# Patient Record
Sex: MALE | Race: WHITE | Employment: FULL TIME | URBAN - NONMETROPOLITAN AREA
[De-identification: names, ages, dates, MRNs, and addresses within clinical notes are randomized per-mention and may not be internally consistent; named-entity substitution may affect disease eponyms.]

---

## 2021-08-30 ENCOUNTER — APPOINTMENT (OUTPATIENT)
Dept: CT IMAGING | Age: 46
DRG: 003 | End: 2021-08-30
Payer: COMMERCIAL

## 2021-08-30 ENCOUNTER — HOSPITAL ENCOUNTER (INPATIENT)
Age: 46
LOS: 21 days | Discharge: NURSING, REHAB OR LONG-TERM CARE | DRG: 003 | End: 2021-09-20
Attending: EMERGENCY MEDICINE | Admitting: INTERNAL MEDICINE
Payer: COMMERCIAL

## 2021-08-30 ENCOUNTER — APPOINTMENT (OUTPATIENT)
Dept: GENERAL RADIOLOGY | Age: 46
DRG: 003 | End: 2021-08-30
Payer: COMMERCIAL

## 2021-08-30 DIAGNOSIS — R09.02 HYPOXIA: Primary | ICD-10-CM

## 2021-08-30 DIAGNOSIS — U07.1 COVID-19: ICD-10-CM

## 2021-08-30 LAB
ALBUMIN SERPL-MCNC: 3.6 G/DL (ref 3.5–5.1)
ALP BLD-CCNC: 59 U/L (ref 38–126)
ALT SERPL-CCNC: 20 U/L (ref 11–66)
ANION GAP SERPL CALCULATED.3IONS-SCNC: 13 MEQ/L (ref 8–16)
AST SERPL-CCNC: 41 U/L (ref 5–40)
BASOPHILS # BLD: 0.2 %
BASOPHILS ABSOLUTE: 0 THOU/MM3 (ref 0–0.1)
BILIRUB SERPL-MCNC: 0.6 MG/DL (ref 0.3–1.2)
BUN BLDV-MCNC: 24 MG/DL (ref 7–22)
C-REACTIVE PROTEIN: 10.62 MG/DL (ref 0–1)
CALCIUM SERPL-MCNC: 8.8 MG/DL (ref 8.5–10.5)
CHLORIDE BLD-SCNC: 97 MEQ/L (ref 98–111)
CO2: 25 MEQ/L (ref 23–33)
CREAT SERPL-MCNC: 1.5 MG/DL (ref 0.4–1.2)
CREATININE URINE: 202.1 MG/DL
D-DIMER QUANTITATIVE: 796 NG/ML FEU (ref 0–500)
EOSINOPHIL # BLD: 0 %
EOSINOPHILS ABSOLUTE: 0 THOU/MM3 (ref 0–0.4)
ERYTHROCYTE [DISTWIDTH] IN BLOOD BY AUTOMATED COUNT: 13.3 % (ref 11.5–14.5)
ERYTHROCYTE [DISTWIDTH] IN BLOOD BY AUTOMATED COUNT: 43.3 FL (ref 35–45)
GFR SERPL CREATININE-BSD FRML MDRD: 50 ML/MIN/1.73M2
GLUCOSE BLD-MCNC: 128 MG/DL (ref 70–108)
HCT VFR BLD CALC: 48.4 % (ref 42–52)
HEMOGLOBIN: 16 GM/DL (ref 14–18)
IMMATURE GRANS (ABS): 0.04 THOU/MM3 (ref 0–0.07)
IMMATURE GRANULOCYTES: 0.9 %
INR BLD: 1.29 (ref 0.85–1.13)
LACTIC ACID, SEPSIS: 1.5 MMOL/L (ref 0.5–1.9)
LACTIC ACID, SEPSIS: 2 MMOL/L (ref 0.5–1.9)
LYMPHOCYTES # BLD: 13.5 %
LYMPHOCYTES ABSOLUTE: 0.6 THOU/MM3 (ref 1–4.8)
MCH RBC QN AUTO: 29.1 PG (ref 26–33)
MCHC RBC AUTO-ENTMCNC: 33.1 GM/DL (ref 32.2–35.5)
MCV RBC AUTO: 88.2 FL (ref 80–94)
MONOCYTES # BLD: 8.5 %
MONOCYTES ABSOLUTE: 0.4 THOU/MM3 (ref 0.4–1.3)
NUCLEATED RED BLOOD CELLS: 0 /100 WBC
OSMOLALITY CALCULATION: 275.8 MOSMOL/KG (ref 275–300)
PLATELET # BLD: 161 THOU/MM3 (ref 130–400)
PMV BLD AUTO: 9.7 FL (ref 9.4–12.4)
POTASSIUM REFLEX MAGNESIUM: 3.9 MEQ/L (ref 3.5–5.2)
PRO-BNP: 48 PG/ML (ref 0–450)
PROCALCITONIN: 0.16 NG/ML (ref 0.01–0.09)
RBC # BLD: 5.49 MILL/MM3 (ref 4.7–6.1)
SARS-COV-2, NAA: DETECTED
SEG NEUTROPHILS: 76.9 %
SEGMENTED NEUTROPHILS ABSOLUTE COUNT: 3.2 THOU/MM3 (ref 1.8–7.7)
SODIUM BLD-SCNC: 135 MEQ/L (ref 135–145)
SODIUM URINE: < 20 MEQ/L
TOTAL PROTEIN: 7.3 G/DL (ref 6.1–8)
TROPONIN T: < 0.01 NG/ML
WBC # BLD: 4.2 THOU/MM3 (ref 4.8–10.8)

## 2021-08-30 PROCEDURE — 99284 EMERGENCY DEPT VISIT MOD MDM: CPT

## 2021-08-30 PROCEDURE — 85610 PROTHROMBIN TIME: CPT

## 2021-08-30 PROCEDURE — 84300 ASSAY OF URINE SODIUM: CPT

## 2021-08-30 PROCEDURE — 85025 COMPLETE CBC W/AUTO DIFF WBC: CPT

## 2021-08-30 PROCEDURE — 85379 FIBRIN DEGRADATION QUANT: CPT

## 2021-08-30 PROCEDURE — 93005 ELECTROCARDIOGRAM TRACING: CPT

## 2021-08-30 PROCEDURE — 71275 CT ANGIOGRAPHY CHEST: CPT

## 2021-08-30 PROCEDURE — 86140 C-REACTIVE PROTEIN: CPT

## 2021-08-30 PROCEDURE — 84145 PROCALCITONIN (PCT): CPT

## 2021-08-30 PROCEDURE — 96374 THER/PROPH/DIAG INJ IV PUSH: CPT

## 2021-08-30 PROCEDURE — 36415 COLL VENOUS BLD VENIPUNCTURE: CPT

## 2021-08-30 PROCEDURE — 6360000002 HC RX W HCPCS

## 2021-08-30 PROCEDURE — XW033E5 INTRODUCTION OF REMDESIVIR ANTI-INFECTIVE INTO PERIPHERAL VEIN, PERCUTANEOUS APPROACH, NEW TECHNOLOGY GROUP 5: ICD-10-PCS

## 2021-08-30 PROCEDURE — 2580000003 HC RX 258: Performed by: EMERGENCY MEDICINE

## 2021-08-30 PROCEDURE — 6360000002 HC RX W HCPCS: Performed by: EMERGENCY MEDICINE

## 2021-08-30 PROCEDURE — 99215 OFFICE O/P EST HI 40 MIN: CPT

## 2021-08-30 PROCEDURE — 87635 SARS-COV-2 COVID-19 AMP PRB: CPT

## 2021-08-30 PROCEDURE — 82570 ASSAY OF URINE CREATININE: CPT

## 2021-08-30 PROCEDURE — 80053 COMPREHEN METABOLIC PANEL: CPT

## 2021-08-30 PROCEDURE — 71045 X-RAY EXAM CHEST 1 VIEW: CPT

## 2021-08-30 PROCEDURE — 84484 ASSAY OF TROPONIN QUANT: CPT

## 2021-08-30 PROCEDURE — 83880 ASSAY OF NATRIURETIC PEPTIDE: CPT

## 2021-08-30 PROCEDURE — 6360000004 HC RX CONTRAST MEDICATION: Performed by: EMERGENCY MEDICINE

## 2021-08-30 PROCEDURE — 99214 OFFICE O/P EST MOD 30 MIN: CPT | Performed by: NURSE PRACTITIONER

## 2021-08-30 PROCEDURE — 83605 ASSAY OF LACTIC ACID: CPT

## 2021-08-30 PROCEDURE — 96361 HYDRATE IV INFUSION ADD-ON: CPT

## 2021-08-30 PROCEDURE — 2060000000 HC ICU INTERMEDIATE R&B

## 2021-08-30 RX ORDER — POLYETHYLENE GLYCOL 3350 17 G/17G
17 POWDER, FOR SOLUTION ORAL DAILY PRN
Status: DISCONTINUED | OUTPATIENT
Start: 2021-08-30 | End: 2021-09-20 | Stop reason: HOSPADM

## 2021-08-30 RX ORDER — SODIUM CHLORIDE 0.9 % (FLUSH) 0.9 %
5-40 SYRINGE (ML) INJECTION EVERY 12 HOURS SCHEDULED
Status: DISCONTINUED | OUTPATIENT
Start: 2021-08-30 | End: 2021-09-15

## 2021-08-30 RX ORDER — ACETAMINOPHEN 650 MG/1
650 SUPPOSITORY RECTAL EVERY 6 HOURS PRN
Status: DISCONTINUED | OUTPATIENT
Start: 2021-08-30 | End: 2021-09-13

## 2021-08-30 RX ORDER — ONDANSETRON 4 MG/1
4 TABLET, ORALLY DISINTEGRATING ORAL EVERY 8 HOURS PRN
Status: DISCONTINUED | OUTPATIENT
Start: 2021-08-30 | End: 2021-09-14

## 2021-08-30 RX ORDER — ACETAMINOPHEN 325 MG/1
650 TABLET ORAL EVERY 6 HOURS PRN
Status: DISCONTINUED | OUTPATIENT
Start: 2021-08-30 | End: 2021-09-20 | Stop reason: HOSPADM

## 2021-08-30 RX ORDER — ONDANSETRON 2 MG/ML
4 INJECTION INTRAMUSCULAR; INTRAVENOUS EVERY 6 HOURS PRN
Status: DISCONTINUED | OUTPATIENT
Start: 2021-08-30 | End: 2021-09-20 | Stop reason: HOSPADM

## 2021-08-30 RX ORDER — DEXAMETHASONE 4 MG/1
6 TABLET ORAL DAILY
Status: DISCONTINUED | OUTPATIENT
Start: 2021-08-30 | End: 2021-09-01

## 2021-08-30 RX ORDER — DEXAMETHASONE SODIUM PHOSPHATE 4 MG/ML
10 INJECTION, SOLUTION INTRA-ARTICULAR; INTRALESIONAL; INTRAMUSCULAR; INTRAVENOUS; SOFT TISSUE ONCE
Status: COMPLETED | OUTPATIENT
Start: 2021-08-30 | End: 2021-08-30

## 2021-08-30 RX ORDER — 0.9 % SODIUM CHLORIDE 0.9 %
30 INTRAVENOUS SOLUTION INTRAVENOUS PRN
Status: DISCONTINUED | OUTPATIENT
Start: 2021-08-30 | End: 2021-09-01

## 2021-08-30 RX ORDER — 0.9 % SODIUM CHLORIDE 0.9 %
1000 INTRAVENOUS SOLUTION INTRAVENOUS ONCE
Status: COMPLETED | OUTPATIENT
Start: 2021-08-30 | End: 2021-08-30

## 2021-08-30 RX ORDER — SODIUM CHLORIDE 0.9 % (FLUSH) 0.9 %
5-40 SYRINGE (ML) INJECTION PRN
Status: DISCONTINUED | OUTPATIENT
Start: 2021-08-30 | End: 2021-09-20 | Stop reason: HOSPADM

## 2021-08-30 RX ORDER — SODIUM CHLORIDE 9 MG/ML
25 INJECTION, SOLUTION INTRAVENOUS PRN
Status: DISCONTINUED | OUTPATIENT
Start: 2021-08-30 | End: 2021-09-01

## 2021-08-30 RX ADMIN — ENOXAPARIN SODIUM 40 MG: 40 INJECTION SUBCUTANEOUS at 23:12

## 2021-08-30 RX ADMIN — IOPAMIDOL 80 ML: 755 INJECTION, SOLUTION INTRAVENOUS at 18:24

## 2021-08-30 RX ADMIN — DEXAMETHASONE SODIUM PHOSPHATE 10 MG: 4 INJECTION, SOLUTION INTRA-ARTICULAR; INTRALESIONAL; INTRAMUSCULAR; INTRAVENOUS; SOFT TISSUE at 17:31

## 2021-08-30 RX ADMIN — SODIUM CHLORIDE 1000 ML: 9 INJECTION, SOLUTION INTRAVENOUS at 17:31

## 2021-08-30 ASSESSMENT — ENCOUNTER SYMPTOMS
VOMITING: 0
DIARRHEA: 0
TROUBLE SWALLOWING: 0
ABDOMINAL PAIN: 0
CHEST TIGHTNESS: 1
RHINORRHEA: 0
CONSTIPATION: 0
SHORTNESS OF BREATH: 1
EYE REDNESS: 0
COUGH: 1
SHORTNESS OF BREATH: 0
BACK PAIN: 0
NAUSEA: 0
EYE DISCHARGE: 0
ALLERGIC/IMMUNOLOGIC NEGATIVE: 1
WHEEZING: 0
SORE THROAT: 0
EYE PAIN: 0

## 2021-08-30 ASSESSMENT — PAIN SCALES - GENERAL: PAINLEVEL_OUTOF10: 0

## 2021-08-30 NOTE — ED TRIAGE NOTES
Pt presents to ED from urgent care, alert and oriented x4. Breathing easy and unlabored on RA. Pt rprts sob w/exertion. Feeling generalized fatigue and weakness. Pt place on monitor. 88% on RA. Titrated from 2-4L via NC for improved oxygen of 93%. Pt tolerating well.

## 2021-08-30 NOTE — ED NOTES
Assumed care at this time. Received bedside shift report from [facility]. RR even and unlabored. No distress noted. Pt resting in bed.  Will continue to monitor     Chad Reynoso RN  08/30/21 1931

## 2021-08-30 NOTE — ED NOTES
Pt on call light asking for water. This RN checked with Dr. Harmony Mendoza and Dr. Deepa Wilkinson verbal permission for pt to eat and drink. This RN asked pt if he takes any medications at home at all and pt refuses.       Ericka Mays RN  08/30/21 1944

## 2021-08-30 NOTE — ED NOTES
[facility] [place] ([facility]) ED unable to take report because of overcrowding.      Rick Willis RN  08/30/21 4573

## 2021-08-30 NOTE — ED PROVIDER NOTES
[facility]  Urgent Care Encounter      CHIEF COMPLAINT       Chief Complaint   Patient presents with    Concern For COVID-19    Shortness of Breath       Nurses Notes reviewed and I agree except as noted in the HPI. HISTORY OF PRESENT ILLNESS   Deloris Goel is a 48 y.o. male who presents with worsening symptoms of shortness of breath cough, fatigue, chest pain developing over the last 4 to 5 days after his wife was diagnosed with Covid. Denies wheezing or stridor, nausea vomiting or diarrhea. Patient is a non-smoker. Wife states pulse ox was as low as 85% today. REVIEW OF SYSTEMS     Review of Systems   Constitutional: Positive for activity change, appetite change and fatigue. Negative for fever. HENT: Positive for congestion. Negative for ear pain, rhinorrhea, sore throat and trouble swallowing. Eyes: Negative for pain, discharge and redness. Respiratory: Positive for cough and chest tightness. Negative for shortness of breath and wheezing. Cardiovascular: Negative. Gastrointestinal: Negative for abdominal pain, constipation, diarrhea, nausea and vomiting. Endocrine: Negative. Genitourinary: Negative for dysuria, frequency and urgency. Musculoskeletal: Negative for arthralgias, back pain and myalgias. Skin: Negative for rash. Allergic/Immunologic: Negative. Neurological: Negative for dizziness, tremors, weakness and headaches. Hematological: Negative. Psychiatric/Behavioral: Negative for dysphoric mood and sleep disturbance. The patient is not nervous/anxious. PAST MEDICAL HISTORY   No past medical history on file. SURGICAL HISTORY     Patient  has no past surgical history on file. CURRENT MEDICATIONS       Previous Medications    No medications on file       ALLERGIES     Patient is has No Known Allergies. FAMILY HISTORY     Patient'sfamily history is not on file. SOCIAL HISTORY     Patient  reports that he has never smoked.  He discharge medications for this patient.       KATHARINA Plascencia - KATHARINA Slaughter CNP  08/30/21 5405

## 2021-08-30 NOTE — ED PROVIDER NOTES
[facility] CVICU 4B    EMERGENCY MEDICINE     Pt Name: Amalia John  MRN: [id number]  [place] 1975  Date of evaluation: 8/30/2021  Provider: Jass Angelo MD,     [address] [place] [address]       Chief Complaint   Patient presents with    Concern For COVID-19    Shortness of Breath       HISTORY OF PRESENT ILLNESS    Amalia John is a pleasant 48 y.o. male who presents to the emergency department from urgent care for evaluation of shortness of breath. Patient has had cough, fatigue, chest pain over the last 4 to 5 days. His wife was recently diagnosed with Covid. Patient was seen at the urgent care in which his Covid test was positive. At that time they noticed his pulse ox was 85%. Patient states that he feels very short of breath and fatigued. Patient denies any wheezing, nausea, vomiting, diarrhea, or chest pain. Wife states that he has had fevers off and on for the past 4 days. He was not vaccinated for Covid. Triage notes and Nursing notes were reviewed by myself. Any discrepancies are addressed above. PAST MEDICAL HISTORY   History reviewed. No pertinent past medical history. SURGICAL HISTORY       Past Surgical History:   Procedure Laterality Date    CHOLECYSTECTOMY         CURRENT MEDICATIONS       There are no discharge medications for this patient. ALLERGIES     Patient has no known allergies. FAMILY HISTORY     History reviewed. No pertinent family history.      SOCIAL HISTORY       Social History     Socioeconomic History    Marital status:      Spouse name: None    Number of children: None    Years of education: None    Highest education level: None   Occupational History    None   Tobacco Use    Smoking status: Never Smoker    Smokeless tobacco: Never Used   Vaping Use    Vaping Use: Never used   Substance and Sexual Activity    Alcohol use: Yes     Comment: social    Drug use: Never    Sexual activity: None   Other Topics Concern    None   Social History Narrative    None     Social Determinants of Health     Financial Resource Strain:     Difficulty of Paying Living Expenses:    Food Insecurity:     Worried About Running Out of Food in the Last Year:     [address] in the Last Year:    Transportation Needs:     Lack of Transportation (Medical):  Lack of Transportation (Non-Medical):    Physical Activity:     Days of Exercise per Week:     Minutes of Exercise per Session:    Stress:     Feeling of Stress :    Social Connections:     Frequency of Communication with Friends and Family:     Frequency of Social Gatherings with Friends and Family:     Attends Islam Services:     Active Member of Clubs or Organizations:     Attends Club or Organization Meetings:     Marital Status:    Intimate Partner Violence:     Fear of Current or Ex-Partner:     Emotionally Abused:     Physically Abused:     Sexually Abused:        REVIEW OF SYSTEMS     Review of Systems   Constitutional: Positive for fatigue and fever. HENT: Negative for congestion and trouble swallowing. Eyes: Negative for redness. Respiratory: Positive for cough and shortness of breath. Cardiovascular: Negative for chest pain. Gastrointestinal: Negative for abdominal pain, nausea and vomiting. Genitourinary: Negative for difficulty urinating. Musculoskeletal: Positive for myalgias. Negative for back pain. Skin: Negative for rash. Allergic/Immunologic: Negative for immunocompromised state. Neurological: Negative for light-headedness and headaches. Hematological: Does not bruise/bleed easily. Except as noted above the remainder of the review of systems was reviewed and is.    PHYSICAL EXAM    (up to 7 for level 4, 8 or more for level 5)     ED Triage Vitals [08/30/21 1556]   BP Temp Temp Source Pulse Resp SpO2 Height Weight   (!) 152/84 98.4 °F (36.9 °C) Temporal 106 24 (!) 88 % 5' 10\" (1.778 m) 265 lb (120.2 kg)       Physical Exam  Vitals and nursing note reviewed. Constitutional:       General: He is not in acute distress. Appearance: He is obese. He is ill-appearing. He is not diaphoretic. HENT:      Head: Normocephalic and atraumatic. Mouth/Throat:      Mouth: Mucous membranes are moist.      Pharynx: Oropharynx is clear. Eyes:      Extraocular Movements: Extraocular movements intact. Pupils: Pupils are equal, round, and reactive to light. Neck:      Vascular: No JVD. Cardiovascular:      Rate and Rhythm: Regular rhythm. Tachycardia present. Pulses: Normal pulses. Heart sounds: Normal heart sounds. No murmur heard. Pulmonary:      Effort: Pulmonary effort is normal. Tachypnea present. No respiratory distress. Breath sounds: Normal breath sounds. No decreased breath sounds or wheezing. Chest:      Chest wall: No crepitus. Abdominal:      General: Bowel sounds are normal.      Palpations: Abdomen is soft. Tenderness: There is no abdominal tenderness. There is no guarding or rebound. Musculoskeletal:      Cervical back: Neck supple. Right lower leg: No tenderness. No edema. Left lower leg: No tenderness. No edema. Skin:     General: Skin is warm and dry. Capillary Refill: Capillary refill takes less than 2 seconds. Neurological:      General: No focal deficit present. Mental Status: He is alert and oriented to person, place, and time. DIAGNOSTIC RESULTS     EKG:(none if blank)  All EKG's are interpreted by [facility] Phys[facility] who either signs or Co-signs this chart in the absence of a cardiologist.        RADIOLOGY: (none if blank)   Interpretation per the Radiologistbelow, if available at the time of this note:    CTA Chest W WO Contrast   Final Result   1. No pulmonary emboli are seen. 2. Moderate groundglass infiltrates scattered throughout both lungs. **This report has been created using voice recognition software.   It may contain minor errors which are inherent in voice recognition technology. **          Final report electronically signed by Dr. Uri Mendoza on 8/30/2021 6:35 PM      XR CHEST PORTABLE   Final Result   1. Poor inflation of the lungs. Mild cardiomegaly. .   2. Moderate patchy infiltrates both mid and lower lung fields. No effusion. **This report has been created using voice recognition software. It may contain minor errors which are inherent in voice recognition technology. **      Final report electronically signed by Dr. Uri Mendoza on 8/30/2021 5:35 PM          LABS:  Labs Reviewed   COVID-19, RAPID - Abnormal; Notable for the following components:       Result Value    SARS-CoV-2, LISA DETECTED (*)     All other components within normal limits   CBC WITH AUTO DIFFERENTIAL - Abnormal; Notable for the following components:    WBC 4.2 (*)     Lymphocytes Absolute 0.6 (*)     All other components within normal limits   COMPREHENSIVE METABOLIC PANEL W/ REFLEX TO MG FOR LOW K - Abnormal; Notable for the following components:    Glucose 128 (*)     CREATININE 1.5 (*)     BUN 24 (*)     Chloride 97 (*)     AST 41 (*)     All other components within normal limits   PROTIME-INR - Abnormal; Notable for the following components:    INR 1.29 (*)     All other components within normal limits   D-DIMER, QUANTITATIVE - Abnormal; Notable for the following components:    D-Dimer, Quant 796.00 (*)     All other components within normal limits   C-REACTIVE PROTEIN - Abnormal; Notable for the following components:    CRP 10.62 (*)     All other components within normal limits   LACTATE, SEPSIS - Abnormal; Notable for the following components:    Lactic Acid, Sepsis 2.0 (*)     All other components within normal limits   GLOMERULAR FILTRATION RATE, ESTIMATED - Abnormal; Notable for the following components:    Est, Glom Filt Rate 50 (*)     All other components within normal limits   PROCALCITONIN - Abnormal; Notable for the following 5-40 mL (10 mLs IntraVENous Given 8/31/21 0020)   sodium chloride flush 0.9 % injection 5-40 mL (has no administration in time range)   0.9 % sodium chloride infusion (has no administration in time range)   enoxaparin (LOVENOX) injection 40 mg (40 mg SubCUTAneous Given 8/30/21 2312)   ondansetron (ZOFRAN-ODT) disintegrating tablet 4 mg (has no administration in time range)     Or   ondansetron (ZOFRAN) injection 4 mg (has no administration in time range)   polyethylene glycol (GLYCOLAX) packet 17 g (has no administration in time range)   acetaminophen (TYLENOL) tablet 650 mg (has no administration in time range)     Or   acetaminophen (TYLENOL) suppository 650 mg (has no administration in time range)   dexamethasone (DECADRON) tablet 6 mg (6 mg Oral Given 8/31/21 0020)   remdesivir 200 mg in sodium chloride 0.9 % 250 mL IVPB (200 mg IntraVENous New Bag 8/31/21 0023)     Followed by   remdesivir 100 mg in sodium chloride 0.9 % 250 mL IVPB (has no administration in time range)   0.9 % sodium chloride bolus (has no administration in time range)   0.9 % sodium chloride bolus (0 mLs IntraVENous Stopped 8/30/21 1901)   dexamethasone (DECADRON) injection 10 mg (10 mg IntraVENous Given 8/30/21 1731)   iopamidol (ISOVUE-370) 76 % injection 80 mL (80 mLs IntraVENous Given 8/30/21 1824)         Procedures: (None if blank)       CLINICAL       1. Hypoxia    2. COVID-19          DISPOSITION/PLAN   DISPOSITION Admitted 08/30/2021 08:06:52 PM      PATIENT REFERRED TO:  [facility] EMERGENCY DEPT  [address]  [phone]    If symptoms worsen      DISCHARGE MEDICATIONS:  There are no discharge medications for this patient.              (Please note that portions of this note were completed with a voice recognition program.  Efforts were made to edit the dictations but occasionallywords are mis-transcribed.)      Electronically signed by Kristofer Ruiz MD on 8/30/21 at 5:31 PM EDT    Attending Physician, Emergency [address] [facility] MD  08/31/21 4611

## 2021-08-31 LAB
ALBUMIN SERPL-MCNC: 3.5 G/DL (ref 3.5–5.1)
ALP BLD-CCNC: 54 U/L (ref 38–126)
ALT SERPL-CCNC: 18 U/L (ref 11–66)
ANION GAP SERPL CALCULATED.3IONS-SCNC: 13 MEQ/L (ref 8–16)
APTT: 31.5 SECONDS (ref 22–38)
APTT: 35.4 SECONDS (ref 22–38)
APTT: 36.5 SECONDS (ref 22–38)
AST SERPL-CCNC: 35 U/L (ref 5–40)
BASOPHILS # BLD: 0.4 %
BASOPHILS ABSOLUTE: 0 THOU/MM3 (ref 0–0.1)
BILIRUB SERPL-MCNC: 0.4 MG/DL (ref 0.3–1.2)
BUN BLDV-MCNC: 21 MG/DL (ref 7–22)
C-REACTIVE PROTEIN: 8.84 MG/DL (ref 0–1)
CALCIUM SERPL-MCNC: 8.5 MG/DL (ref 8.5–10.5)
CHLORIDE BLD-SCNC: 104 MEQ/L (ref 98–111)
CO2: 23 MEQ/L (ref 23–33)
CREAT SERPL-MCNC: 0.8 MG/DL (ref 0.4–1.2)
EKG ATRIAL RATE: 80 BPM
EKG P AXIS: 31 DEGREES
EKG P-R INTERVAL: 136 MS
EKG Q-T INTERVAL: 390 MS
EKG QRS DURATION: 98 MS
EKG QTC CALCULATION (BAZETT): 449 MS
EKG R AXIS: -16 DEGREES
EKG T AXIS: -15 DEGREES
EKG VENTRICULAR RATE: 80 BPM
EOSINOPHIL # BLD: 0 %
EOSINOPHILS ABSOLUTE: 0 THOU/MM3 (ref 0–0.4)
ERYTHROCYTE [DISTWIDTH] IN BLOOD BY AUTOMATED COUNT: 13.5 % (ref 11.5–14.5)
ERYTHROCYTE [DISTWIDTH] IN BLOOD BY AUTOMATED COUNT: 13.6 % (ref 11.5–14.5)
ERYTHROCYTE [DISTWIDTH] IN BLOOD BY AUTOMATED COUNT: 43.5 FL (ref 35–45)
ERYTHROCYTE [DISTWIDTH] IN BLOOD BY AUTOMATED COUNT: 44 FL (ref 35–45)
FERRITIN: 1997 NG/ML (ref 22–322)
GFR SERPL CREATININE-BSD FRML MDRD: > 90 ML/MIN/1.73M2
GLUCOSE BLD-MCNC: 158 MG/DL (ref 70–108)
HCT VFR BLD CALC: 45.9 % (ref 42–52)
HCT VFR BLD CALC: 46.1 % (ref 42–52)
HEMOGLOBIN: 15 GM/DL (ref 14–18)
HEMOGLOBIN: 15.1 GM/DL (ref 14–18)
IMMATURE GRANS (ABS): 0.03 THOU/MM3 (ref 0–0.07)
IMMATURE GRANULOCYTES: 1.2 %
LYMPHOCYTES # BLD: 20.5 %
LYMPHOCYTES ABSOLUTE: 0.5 THOU/MM3 (ref 1–4.8)
MCH RBC QN AUTO: 28.7 PG (ref 26–33)
MCH RBC QN AUTO: 28.8 PG (ref 26–33)
MCHC RBC AUTO-ENTMCNC: 32.5 GM/DL (ref 32.2–35.5)
MCHC RBC AUTO-ENTMCNC: 32.9 GM/DL (ref 32.2–35.5)
MCV RBC AUTO: 87.1 FL (ref 80–94)
MCV RBC AUTO: 88.5 FL (ref 80–94)
MONOCYTES # BLD: 7.4 %
MONOCYTES ABSOLUTE: 0.2 THOU/MM3 (ref 0.4–1.3)
NUCLEATED RED BLOOD CELLS: 0 /100 WBC
PLATELET # BLD: 157 THOU/MM3 (ref 130–400)
PLATELET # BLD: 165 THOU/MM3 (ref 130–400)
PMV BLD AUTO: 9.5 FL (ref 9.4–12.4)
PMV BLD AUTO: 9.8 FL (ref 9.4–12.4)
POTASSIUM REFLEX MAGNESIUM: 3.8 MEQ/L (ref 3.5–5.2)
RBC # BLD: 5.21 MILL/MM3 (ref 4.7–6.1)
RBC # BLD: 5.27 MILL/MM3 (ref 4.7–6.1)
SEG NEUTROPHILS: 70.5 %
SEGMENTED NEUTROPHILS ABSOLUTE COUNT: 1.7 THOU/MM3 (ref 1.8–7.7)
SODIUM BLD-SCNC: 140 MEQ/L (ref 135–145)
TOTAL PROTEIN: 6.6 G/DL (ref 6.1–8)
WBC # BLD: 2.4 THOU/MM3 (ref 4.8–10.8)
WBC # BLD: 2.8 THOU/MM3 (ref 4.8–10.8)

## 2021-08-31 PROCEDURE — 85025 COMPLETE CBC W/AUTO DIFF WBC: CPT

## 2021-08-31 PROCEDURE — 97166 OT EVAL MOD COMPLEX 45 MIN: CPT

## 2021-08-31 PROCEDURE — 2060000000 HC ICU INTERMEDIATE R&B

## 2021-08-31 PROCEDURE — 86140 C-REACTIVE PROTEIN: CPT

## 2021-08-31 PROCEDURE — 2700000000 HC OXYGEN THERAPY PER DAY

## 2021-08-31 PROCEDURE — 82728 ASSAY OF FERRITIN: CPT

## 2021-08-31 PROCEDURE — 6360000002 HC RX W HCPCS

## 2021-08-31 PROCEDURE — 80053 COMPREHEN METABOLIC PANEL: CPT

## 2021-08-31 PROCEDURE — 2580000003 HC RX 258: Performed by: INTERNAL MEDICINE

## 2021-08-31 PROCEDURE — 85730 THROMBOPLASTIN TIME PARTIAL: CPT

## 2021-08-31 PROCEDURE — 97535 SELF CARE MNGMENT TRAINING: CPT

## 2021-08-31 PROCEDURE — 36415 COLL VENOUS BLD VENIPUNCTURE: CPT

## 2021-08-31 PROCEDURE — 99233 SBSQ HOSP IP/OBS HIGH 50: CPT | Performed by: INTERNAL MEDICINE

## 2021-08-31 PROCEDURE — 6370000000 HC RX 637 (ALT 250 FOR IP): Performed by: INTERNAL MEDICINE

## 2021-08-31 PROCEDURE — 93010 ELECTROCARDIOGRAM REPORT: CPT | Performed by: NUCLEAR MEDICINE

## 2021-08-31 PROCEDURE — 6360000002 HC RX W HCPCS: Performed by: INTERNAL MEDICINE

## 2021-08-31 PROCEDURE — 2580000003 HC RX 258

## 2021-08-31 PROCEDURE — 97530 THERAPEUTIC ACTIVITIES: CPT

## 2021-08-31 PROCEDURE — 2500000003 HC RX 250 WO HCPCS

## 2021-08-31 PROCEDURE — 85027 COMPLETE CBC AUTOMATED: CPT

## 2021-08-31 RX ORDER — HEPARIN SODIUM 5000 [USP'U]/ML
5000 INJECTION, SOLUTION INTRAVENOUS; SUBCUTANEOUS EVERY 8 HOURS SCHEDULED
Status: DISCONTINUED | OUTPATIENT
Start: 2021-08-31 | End: 2021-09-01 | Stop reason: SDUPTHER

## 2021-08-31 RX ORDER — HEPARIN SODIUM 1000 [USP'U]/ML
80 INJECTION, SOLUTION INTRAVENOUS; SUBCUTANEOUS ONCE
Status: DISCONTINUED | OUTPATIENT
Start: 2021-08-31 | End: 2021-08-31 | Stop reason: ALTCHOICE

## 2021-08-31 RX ORDER — SODIUM CHLORIDE, SODIUM LACTATE, POTASSIUM CHLORIDE, CALCIUM CHLORIDE 600; 310; 30; 20 MG/100ML; MG/100ML; MG/100ML; MG/100ML
INJECTION, SOLUTION INTRAVENOUS CONTINUOUS
Status: DISCONTINUED | OUTPATIENT
Start: 2021-08-31 | End: 2021-08-31

## 2021-08-31 RX ORDER — ZINC SULFATE 50(220)MG
50 CAPSULE ORAL DAILY
Status: DISCONTINUED | OUTPATIENT
Start: 2021-08-31 | End: 2021-09-01

## 2021-08-31 RX ORDER — HEPARIN SODIUM 1000 [USP'U]/ML
40 INJECTION, SOLUTION INTRAVENOUS; SUBCUTANEOUS PRN
Status: DISCONTINUED | OUTPATIENT
Start: 2021-08-31 | End: 2021-08-31 | Stop reason: ALTCHOICE

## 2021-08-31 RX ORDER — HEPARIN SODIUM 10000 [USP'U]/100ML
5-30 INJECTION, SOLUTION INTRAVENOUS CONTINUOUS
Status: DISCONTINUED | OUTPATIENT
Start: 2021-08-31 | End: 2021-08-31

## 2021-08-31 RX ORDER — LACTOBACILLUS RHAMNOSUS GG 10B CELL
1 CAPSULE ORAL
Status: DISCONTINUED | OUTPATIENT
Start: 2021-08-31 | End: 2021-09-14

## 2021-08-31 RX ORDER — VITAMIN B COMPLEX
1000 TABLET ORAL DAILY
Status: DISCONTINUED | OUTPATIENT
Start: 2021-08-31 | End: 2021-09-01

## 2021-08-31 RX ORDER — SODIUM CHLORIDE, SODIUM LACTATE, POTASSIUM CHLORIDE, CALCIUM CHLORIDE 600; 310; 30; 20 MG/100ML; MG/100ML; MG/100ML; MG/100ML
INJECTION, SOLUTION INTRAVENOUS CONTINUOUS
Status: ACTIVE | OUTPATIENT
Start: 2021-08-31 | End: 2021-08-31

## 2021-08-31 RX ORDER — ASCORBIC ACID 500 MG
1000 TABLET ORAL DAILY
Status: DISCONTINUED | OUTPATIENT
Start: 2021-08-31 | End: 2021-09-01

## 2021-08-31 RX ORDER — SODIUM CHLORIDE, SODIUM LACTATE, POTASSIUM CHLORIDE, CALCIUM CHLORIDE 600; 310; 30; 20 MG/100ML; MG/100ML; MG/100ML; MG/100ML
INJECTION, SOLUTION INTRAVENOUS CONTINUOUS
Status: DISCONTINUED | OUTPATIENT
Start: 2021-08-31 | End: 2021-09-01

## 2021-08-31 RX ORDER — HEPARIN SODIUM 1000 [USP'U]/ML
80 INJECTION, SOLUTION INTRAVENOUS; SUBCUTANEOUS PRN
Status: DISCONTINUED | OUTPATIENT
Start: 2021-08-31 | End: 2021-08-31 | Stop reason: ALTCHOICE

## 2021-08-31 RX ADMIN — SODIUM CHLORIDE, PRESERVATIVE FREE 10 ML: 5 INJECTION INTRAVENOUS at 22:57

## 2021-08-31 RX ADMIN — Medication 50 MG: at 10:08

## 2021-08-31 RX ADMIN — DEXAMETHASONE 6 MG: 4 TABLET ORAL at 00:20

## 2021-08-31 RX ADMIN — HEPARIN SODIUM 5000 UNITS: 5000 INJECTION INTRAVENOUS; SUBCUTANEOUS at 07:10

## 2021-08-31 RX ADMIN — SODIUM CHLORIDE, POTASSIUM CHLORIDE, SODIUM LACTATE AND CALCIUM CHLORIDE: 600; 310; 30; 20 INJECTION, SOLUTION INTRAVENOUS at 17:40

## 2021-08-31 RX ADMIN — SODIUM CHLORIDE, POTASSIUM CHLORIDE, SODIUM LACTATE AND CALCIUM CHLORIDE 125 ML/HR: 600; 310; 30; 20 INJECTION, SOLUTION INTRAVENOUS at 03:57

## 2021-08-31 RX ADMIN — HEPARIN SODIUM 5000 UNITS: 5000 INJECTION INTRAVENOUS; SUBCUTANEOUS at 22:45

## 2021-08-31 RX ADMIN — Medication 1000 UNITS: at 10:09

## 2021-08-31 RX ADMIN — DEXAMETHASONE 6 MG: 4 TABLET ORAL at 10:10

## 2021-08-31 RX ADMIN — SODIUM CHLORIDE, PRESERVATIVE FREE 10 ML: 5 INJECTION INTRAVENOUS at 00:20

## 2021-08-31 RX ADMIN — REMDESIVIR 200 MG: 100 INJECTION, POWDER, LYOPHILIZED, FOR SOLUTION INTRAVENOUS at 00:23

## 2021-08-31 RX ADMIN — OXYCODONE HYDROCHLORIDE AND ACETAMINOPHEN 1000 MG: 500 TABLET ORAL at 10:09

## 2021-08-31 RX ADMIN — Medication 1 CAPSULE: at 10:08

## 2021-08-31 RX ADMIN — REMDESIVIR 100 MG: 100 INJECTION, POWDER, LYOPHILIZED, FOR SOLUTION INTRAVENOUS at 22:43

## 2021-08-31 RX ADMIN — HEPARIN SODIUM 5000 UNITS: 5000 INJECTION INTRAVENOUS; SUBCUTANEOUS at 14:00

## 2021-08-31 RX ADMIN — TOCILIZUMAB 810 MG: 180 INJECTION, SOLUTION SUBCUTANEOUS at 16:26

## 2021-08-31 ASSESSMENT — PAIN SCALES - GENERAL
PAINLEVEL_OUTOF10: 0

## 2021-08-31 NOTE — PROGRESS NOTES
Calvin Lackey 58  INPATIENT OCCUPATIONAL THERAPY  [facility] CVICU 4B  EVALUATION    Time:   Time In: 8662  Time Out: 2523  Timed Code Treatment Minutes: 40 Minutes  Minutes: 51          Date: 2021  Patient Name: Harry Mcrae,   Gender: male      MRN: [id number]  : 1975  (48 y.o.)  Referring Practitioner: Cecil Cohen MD  Diagnosis: hypoxia  Additional Pertinent Hx: Per H&P: Pt presents with SOB, productive cough, fever, chills, nasal congestion, loss of smell, loss of taste, and malaise starting 2021. Pt diagnosed with Acute Hypoxic Respiratory Failure 2/2 COVID-19 PNA with suspected superimposed CAP    Restrictions/Precautions:  Restrictions/Precautions: General Precautions, Fall Risk, Isolation  Position Activity Restriction  Other position/activity restrictions: monitor O2; droplet +    Subjective  Chart Reviewed: Yes, Orders, Progress Notes, History and Physical  Patient assessed for rehabilitation services?: Yes  Family / Caregiver Present: No    Subjective: Pt seated in bed upon arrival, agreeable to OT session. Sp02 85% on 5L on arrival. Spoke with RT who in agreement with titrating O2 up to 6L before initiating activity, with continued monitoring of Sp02 throughout. Increased time required throughout to ensure adequate oxygenation before attempting next activity. Pain:  Pain Assessment  Patient Currently in Pain: Denies    Vitals: Blood Pressure: 111/73  Oxygen: 86% on arrival while on 5L O2 via NC; Titrated to 6L with pt then able to achieve 90% within few minutes; Sp02 decreased to 82% at lowest upon transitioning to EOB, required ~5 minutes to recover to 88-90%; Pt then requesting to use BSC, despite encouragement for bedpan due to Sp02, and with mobility to BSC sp02 decreased to 86% while on 6L. Again required ~5 minutes to recover to 88-90%.  RN ok'd transfer to bedside chair for pt to eat breakfast with Sp02 decreasing to 87% when transferred to bedside chair, able to recover to 90-91% within ~3 minutes  Heart Rate: 91 bpm    Significantly increased time required to monitor vitals throughout, max cuing for pursed lip breathing. Increased time required for pt to recover when Sp02 decreased. Primarily maintained at 88-90% at rest, decreased with activity. While seated at EOB, required increased time for staff to retrieve different pulse oximeter due to inaccurate pleth, in addition to time required to retrieve [facility] and ADL supplies. Upon OTR departure from room, pt seated upright in chair eating breakfast and Sp02 at 92% on 6L. Social/Functional History:  Lives With: Spouse (and 3 schoolage children)  Type of Home: House  Home Layout: Two level, Performs ADL's on one level, Able to Live on Main level with bedroom/bathroom  Home Equipment:  (none)   Bathroom Equipment:  (none)       ADL Assistance: Independent  Homemaking Assistance: Independent  Ambulation Assistance: Independent  Transfer Assistance: Independent    Active : Yes  Occupation: Full time employment       VISION:WFL    HEARING:  WFL    COGNITION: Silva Hussein:  Bilateral Upper Extremity:  WFL    STRENGTH:  Bilateral Upper Extremity:  WFL    ADL:   Feeding: Independent. Toileting: Stand By Assistance and with verbal cues . clothing mgmt; tolerated standing ~1 minute X2 trials to complete; moderate cues for pursed lip breathing  Toilet Transfer: Stand By Assistance. to/from [facility]. Increased time to complete ADLs due to Sp02. BALANCE:  Sitting Balance:  Independent. Standing Balance: Stand By Assistance. BED MOBILITY:  Supine to Sit: Supervision    Scooting: Independent      TRANSFERS:  Sit to Stand:  Stand By Assistance. Stand to Sit: Stand By Assistance. FUNCTIONAL MOBILITY:  Assistive Device: None  Assist Level:  Stand By Assistance. Distance: EOB>BSC; BSC>bedside chair  Steady, no LOB. Cues for pursed lip breathing. Activity Tolerance:  Patient tolerance of  treatment: poor. Significantly limited activity due to decreased Sp02 with all activity. Pt demoes decreased awareness of when Sp02 is low. Assessment:  Assessment: Pt presents requiring increased assistance for ADLs, transfers, and functional mobility compared to PLOF. Pt will continue to benefit from OT services to improve independence with these tasks, in addition to overall strength/endurance to facilitate return to PLOF. Performance deficits / Impairments: Decreased ADL status, Decreased functional mobility , Decreased endurance, Decreased high-level IADLs  Prognosis: Good  REQUIRES OT FOLLOW UP: Yes  Decision Making: Medium Complexity    Treatment Initiated: Treatment and education initiated within context of evaluation. Evaluation time included review of current medical information, gathering information related to past medical, social and functional history, completion of standardized testing, formal and informal observation of tasks, assessment of data and development of plan of care and goals. Treatment time included skilled education and facilitation of tasks to increase safety and independence with ADL's for improved functional independence and quality of life. Discharge Recommendations:  Home with assist PRN, Home with Home health OT, Continue to assess pending progress (pending O2 requirements)    Patient Education:  OT Education: OT Role, Plan of Care, Energy Conservation, monitoring Sp02 both at rest and with activity    Equipment Recommendations: Other: will continue to monitor pending progress    Plan:  Times per week: 5x  Current Treatment Recommendations: Strengthening, Functional Mobility Training, Endurance Training, Patient/Caregiver Education & Training, Equipment Evaluation, Education, & procurement, Self-Care / ADL, Home Management Training. See long-term goal time frame for expected duration of plan of care.   If no long-term goals established, a short length of stay is anticipated. Goals:     Short term goals  Time Frame for Short term goals: by discharge  Short term goal 1: Pt will increase activity tolerance for functional mobility to/from [facility] or chair, progressing to bathroom as able, with MI and Sp02 >=90% in prep for toileting tasks. Short term goal 2: Pt will complete BADL tasks with MI, incorporating ECT prn, to increase independence and ease with self care tasks. Short term goal 3: Pt will tolerate dynamic standing >5 minutes with MI and Sp02 >=90% in prep for sinkside grooming tasks. Following session, patient left in safe position with all fall risk precautions in place.

## 2021-08-31 NOTE — PROGRESS NOTES
Updated Dr Sobia Ingram regarding patient asking for Tricor to be administered as there is a COVID research done that showed a correlation between [place] and fat. Awaiting on response.

## 2021-08-31 NOTE — H&P
Medicine Admission History & Physical      Patient:  Steph Mercado  YOB: 1975  Date of Service: 8/30/2021  MRN: [id number]   Acct: [de-identified]   Primary Care Physician: No primary care provider on file. Admitting Faculty MD: No admitting provider for patient encounter. Code Status: No Order Limited Code details: Intubation/Re-intubation No Comment; Defibrillation/Cardioversion No Comment; Chest Compressions No Comment; Resuscitative Medications No Comment; Other No Comment    Date of Service: Pt seen/examined in consultation on 8/30/2021     General Medicine History and Physical     History provided by: patient and wife  History limited by: patient condition  Patient presents from: home    Chief Complaint with Duration:  SOB, productive cough, fever, chills, nasal congestion, loss of smell, loss of taste, and malaise starting 08/28/2021    History of Present Illness:  Steph Mercado is a 48 y.o. male with a PMH of undiagnosed WILLIE and morbid obesity  who presented with the aforementioned. COVID-19 Assessment Note     Subjective  Steph Mercado is currently Confirmed for COVID-19. Presenting symptoms: fever, chills, cough, shortness of breath, nasal congestion, sputum production, loss of smell, loss of taste and malaise. he denies  sore throat, chest pain, diarrhea, nausea and vomiting, headache, muscle pain, abdominal pain and malaise  Max temperature in last 24 hours: 100.8 around 11:00 AM 08/30/2021  Symptoms began on 08/28/2021. Exposure source: member of household, wife who got it last week w/o severe s/s    Summarized ED course: Initial vital signs included RR 24, , /84. 1L IVNS x1. 10 mg IV Dexamethasone.      Admission Labs:   BUN 24   Serum creatinine 1.5      CRP 10.62   Pro-BNP 48   Troponin <0.010   AST 41   WBC 4.2, abs lymph 0.6   INR 1.29   D-dimer 796   COVID POSITIVE   Otherwise unremarkable    Admission Diagnostics/Imaging:   CKR - moderate patchy infiltrates both mid and lower lung fields. No effusion   CTPE - no PE. Moderate GGO scattered throughout bilateral lung fields   EKG: normal sinus rhythm    Assessment / Plan     #Acute Hypoxic Respiratory Failure 2/2 COVID-19 PNA with suspected superimposed CAP: Active  Sxs started 08/28/2021 as above. Baseline O2 is RA. Initially placed on 3L NC, now on 5L NC and O2 sat >93%. Plan:  -Initiate O2 therapy; wean to saturation >92%  -Ordered ferritin  -Ordered procalcitonin; pt had change of sputum, concern for superimposed bacterial infection. If not elevated, will d/c empiric CAP abx.   -Daily CMP  -Daily CBC  -Start IV dexamethasone 6 mg daily for 10 days (Stop: 09/09)  -Pt qualifies for remdesivir; dosing per pharmacy  -Re-evaluate to determine if patient is a candidate for tocilizumab. CRP 10.62    #Non-oliguric Pre-renal NIRAJ, unstagable: Active   Serum creatinine 1.5 no baseline. eGFR 50. BUN:sCr 16 favoring Intrinsic etiology however FENa 0.1% favoring pre-renal etiology likely 2/2 poor PO fluid intake. Plan:  -Start Jeanell Frankel @ 125 cc/hr x 12 hours  -Strict I&Os  -Avoid nephrotoxic substances  -BMP daily  -Will consider nephrology consult if renal function worsens    #Suspected WILLIE w/o formal evaluation:   Snores, witnessed apneic episodes. STOP-BANG positive  Plan: Refer to pulmonologist for outpatient sleep study on discharge    #Morbid Obesity: Active  W: 120.2 kg. BMI: 38.02  Plan: Noted    Fluids: Encourage PO fluid intake  Electrolyte: Replete as needed   Nutrition: Regular  GI: none  DVT ppx:  Heparin porcine every 8h. Study published in NEJM 1 mo ago suggests therapeutically-dosed heparin in non-critically ill patients with COVID-19 increases the probability of survival to hospital discharge with reduced use of cardiovascular or respiratory organ support as compared with usual-care thromboprophylaxis. PT/OT/SLP: PT/OT for early mobility    Admit to:  [facility]    Past Medical History Past Surgical History    has no past medical history on file. has no past surgical history on file. Social History Family History    reports that he has never smoked. He has never used smokeless tobacco. He reports current alcohol use. He reports that he does not use drugs. family history is not on file. Outpatient Medications   No current outpatient medications     Allergies   Patient has no known allergies. Immunizations     There is no immunization history on file for this patient. Review of Systems - negative except for the aforementioned    Objective     Vitals:  BP (!) 152/84   Pulse 88   Temp 98.4 °F (36.9 °C) (Temporal)   Resp 20   Ht 5' 10\" (1.778 m)   Wt 265 lb (120.2 kg)   SpO2 94%   BMI 38.02 kg/m²     Physical Exam  Constitutional:       Appearance: Normal appearance. He is obese. He is not diaphoretic. HENT:      Head: Normocephalic and atraumatic. Mouth/Throat:      Mouth: Mucous membranes are moist.      Pharynx: Oropharynx is clear. No oropharyngeal exudate or posterior oropharyngeal erythema. Eyes:      General: No scleral icterus. Extraocular Movements: Extraocular movements intact. Pupils: Pupils are equal, round, and reactive to light. Cardiovascular:      Rate and Rhythm: Normal rate and regular rhythm. Pulses: Normal pulses. Heart sounds: Normal heart sounds. No murmur heard. No friction rub. No gallop. Pulmonary:      Effort: Pulmonary effort is normal.      Breath sounds: Examination of the right-lower field reveals rales. Examination of the left-lower field reveals rales. Rales present. No wheezing or rhonchi. Abdominal:      General: Bowel sounds are normal.      Palpations: Abdomen is soft. Tenderness: There is no abdominal tenderness. There is no guarding or rebound. Musculoskeletal:      Cervical back: Normal range of motion and neck supple. Right lower leg: No edema. Left lower leg: No edema.    Skin: Peptide   Result Value Ref Range    Pro-BNP 48.0 0.0 - 450.0 pg/mL   Protime-INR   Result Value Ref Range    INR 1.29 (H) 0.85 - 1.13   D-Dimer, Quantitative   Result Value Ref Range    D-Dimer, Quant 796.00 (H) 0.00 - 500.00 ng/ml FEU   C-Reactive Protein   Result Value Ref Range    CRP 10.62 (H) 0.00 - 1.00 mg/dl   Lactate, Sepsis   Result Value Ref Range    Lactic Acid, Sepsis 2.0 (H) 0.5 - 1.9 mmol/L   Lactate, Sepsis   Result Value Ref Range    Lactic Acid, Sepsis 1.5 0.5 - 1.9 mmol/L   Anion Gap   Result Value Ref Range    Anion Gap 13.0 8.0 - 16.0 meq/L   Osmolality   Result Value Ref Range    Osmolality Calc 275.8 275.0 - 300.0 mOsmol/kg   Glomerular Filtration Rate, Estimated   Result Value Ref Range    Est, Glom Filt Rate 50 (A) ml/min/1.73m2       Diagnostics:  CTA Chest W WO Contrast  Result Date: 8/30/2021  1. No pulmonary emboli are seen. 2. Moderate groundglass infiltrates scattered throughout both lungs. **This report has been created using voice recognition software. It may contain minor errors which are inherent in voice recognition technology. **  Final report electronically signed by Dr. Prashant Nice on 8/30/2021 6:35 PM    XR CHEST PORTABLE  Result Date: 8/30/2021  1. Poor inflation of the lungs. Mild cardiomegaly. . 2. Moderate patchy infiltrates both mid and lower lung fields. No effusion. **This report has been created using voice recognition software. It may contain minor errors which are inherent in voice recognition technology. ** Final report electronically signed by Dr. Prashant Nice on 8/30/2021 5:35 PM    EKG:   As above    Micro:  COVID-19    Signed:  Candis Mercado MD  Internal Medicine, PGY-1  08/30/21  8:06 PM    Staff: No admitting provider for patient encounter.

## 2021-08-31 NOTE — ED NOTES
Pt and vs reassessed. RR increases while pt is asleep. Pt oxygen at 02% on 4 L NC. Pt resting in bed with eyes closed.  Will continue to monitor     Chip Peña RN  08/30/21 0208

## 2021-08-31 NOTE — PROGRESS NOTES
Patient is concerned with FMLA paperwork, called , Kenia Aguirre. Advised FMLA paperwork needs to go through family provider. Updated wife, Diaz Yaneth.

## 2021-08-31 NOTE — CARE COORDINATION
8/31/21, 7:18 AM EDT  DISCHARGE PLANNING EVALUATION:    Amalia John       Admitted: 8/30/2021/ [facility] day: 1   Location: [facility]05/005-A Reason for admit: Hypoxia [R09.02]  COVID-19 [U07.1]   PMH:  has no past medical history on file. Procedure:   CXR   Impression:        1. Poor inflation of the lungs. Mild cardiomegaly. .   2. Moderate patchy infiltrates both mid and lower lung fields. No effusion. CTA Chest W WO Contrast   Impression:        1.  No pulmonary emboli are seen. 2. Moderate groundglass infiltrates scattered throughout both lungs. Barriers to Discharge:  From ED, Covid (+), procalcitonin 0.16, creatinine 1.5, Tmax 98.4, nasal cannula oxygen at 5 liters with saturations at 95%. Acapella, incentive spirometry, PT/OT, ID consult, Dcadron, SQ Heparin, IV fluids, IV Remdesivir. PCP: No primary care provider on file. Readmission Risk Score: 10%    Patient Goals/Plan/Treatment Preferences: Met with Leonid So. He currently lives at home with his wife. Plan is to return home at discharge. He denies need for DME and declines HH. Will follow for potential needs. Transportation/Food Security/Housekeeping Addressed:  No issues identified.

## 2021-08-31 NOTE — ED NOTES
ED to inpatient nurses report    Chief Complaint   Patient presents with    Concern For COVID-19    Shortness of Breath      Present to ED from home  LOC: alert and orientated to name, place, date  Vital signs   Vitals:    08/30/21 1556 08/30/21 1930 08/30/21 2029   BP: (!) 152/84  108/68   Pulse: 106 88 82   Resp: 24 20 25   Temp: 98.4 °F (36.9 °C)     TempSrc: Temporal     SpO2: (!) 88% 94% 91%   Weight: 265 lb (120.2 kg)     Height: 5' 10\" (1.778 m)        Oxygen Baseline 92%      Current needs required 4 L NC     LDAs:   Peripheral IV 08/30/21 Left Antecubital (Active)   Site Assessment Clean;Dry; Intact 08/30/21 2052   Line Status Normal saline locked 08/30/21 2052   Dressing Status Clean;Dry; Intact 08/30/21 2052   Dressing Intervention New 08/30/21 1719     Mobility: Independent  Pending ED orders: none    Present condition: pt O2 in low 90%, this RN increased pt from 3 L back to 4L and O2 levels went back to 95%      Electronically signed by Donn Dodd RN on 8/30/2021 at 8:58 PM       Donn Dodd RN  08/30/21 2059

## 2021-08-31 NOTE — ED NOTES
Pt and vs reassessed. RR even and unlabored. Pt O2 levels 91% on 3 L. This RN increase oxygen back up to 4 L. No distress noted.  Will continue to monitor     Lizzeth Wynne RN  08/30/21 2100

## 2021-08-31 NOTE — PROGRESS NOTES
Hospitalist Progress Note      Patient:  Mini Granda    Unit/Bed:4B-05/005-A  YOB: 1975  MRN: [id number]   Acct: [de-identified]   PCP: Trey Xiong MD  Date of Admission: 8/30/2021    Assessment/Plan:    Acute hypoxic respiratory failure secondary to COVID-19 pneumonia-WORSENING - now on hi flow. procalcitonin is negative-hold off antibiotics-currently on remdesivir and Decadron-continue add vitamin D vitamin C zinc and probiotic as well, transition to high flow today 8/31-wean high flow as able  -We will add one-time dose of Actemra    NIRAJ-likely prerenal resolved with IV fluid    Morbid obesity-lifestyle modification    Chief Complaint: SOB    Initial H and P:-    Mini Granda is a 48 y.o. male with a PMH of undiagnosed WILLIE and morbid obesity  who presented with the aforementioned.     COVID-19 Assessment Note      Subjective  Mini Granda is currently Confirmed for COVID-19. Presenting symptoms: fever, chills, cough, shortness of breath, nasal congestion, sputum production, loss of smell, loss of taste and malaise. he denies  sore throat, chest pain, diarrhea, nausea and vomiting, headache, muscle pain, abdominal pain and malaise  Max temperature in last 24 hours: 100.8 around 11:00 AM 08/30/2021  Symptoms began on 08/28/2021. Exposure source: member of household, wife who got it last week w/o severe s/s    Subjective (past 24 hours):   Still short of breath  No nausea no vomiting no diarrhea  No chest pain      Past medical history, family history, social history and allergies reviewed again and is unchanged since admission. ROS (All review of systems completed. Pertinent positives noted.  Otherwise All other systems reviewed and negative.)     Medications:  Reviewed    Infusion Medications    sodium chloride       Scheduled Medications    heparin (porcine)  5,000 Units SubCUTAneous 3 times per day    ascorbic acid  1,000 mg Oral Daily    Vitamin D  1,000 Units Oral Daily    zinc sulfate  50 mg Oral Daily    lactobacillus  1 capsule Oral Daily with breakfast    sodium chloride flush  5-40 mL IntraVENous 2 times per day    dexamethasone  6 mg Oral Daily    remdesivir IVPB  100 mg IntraVENous Q24H     PRN Meds: sodium chloride flush, sodium chloride, ondansetron **OR** ondansetron, polyethylene glycol, acetaminophen **OR** acetaminophen, sodium chloride      Intake/Output Summary (Last 24 hours) at 8/31/2021 1514  Last data filed at 8/31/2021 1359  Gross per 24 hour   Intake 1728.82 ml   Output 400 ml   Net 1328.82 ml       Diet:  ADULT DIET; Regular    Exam:  /73   Pulse 88   Temp 97.6 °F (36.4 °C) (Oral)   Resp 15   Ht 5' 10\" (1.778 m)   Wt 263 lb 8 oz (119.5 kg)   SpO2 93%   BMI 37.81 kg/m²   General appearance: No apparent distress, appears stated age and cooperative. HEENT: Pupils equal, round, and reactive to light. Conjunctivae/corneas clear. Neck: Supple, with full range of motion. No jugular venous distention. Trachea midline. Respiratory:  Normal respiratory effort. Clear to auscultation, bilaterally without Rales/Wheezes/Rhonchi. Cardiovascular: Regular rate and rhythm with normal S1/S2 without murmurs, rubs or gallops. Abdomen: Soft, non-tender, non-distended with normal bowel sounds. Musculoskeletal: passive and active ROM x 4 extremities. Skin: Skin color, texture, turgor normal.  No rashes or lesions. Neurologic:  Neurovascularly intact without any focal sensory/motor deficits.  Cranial nerves: II-XII intact, grossly non-focal.  Psychiatric: Alert and oriented, thought content appropriate, normal insight  Capillary Refill: Brisk,< 3 seconds   Peripheral Pulses: +2 palpable, equal bilaterally     Labs:   Recent Labs     08/30/21  1712 08/31/21  0208 08/31/21  0807   WBC 4.2* 2.8* 2.4*   HGB 16.0 15.0 15.1   HCT 48.4 46.1 45.9    157 165     Recent Labs     08/30/21  1712 08/31/21  0807    140   K 3.9 3.8   CL 97* 104   CO2 25 23   BUN 24* 21   CREATININE 1.5* 0.8   CALCIUM 8.8 8.5     Recent Labs     08/30/21  1712 08/31/21  0807   AST 41* 35   ALT 20 18   BILITOT 0.6 0.4   ALKPHOS 59 54     Recent Labs     08/30/21  1712   INR 1.29*     No results for input(s): Beth Whitehead in the last 72 hours. Microbiology:    Blood culture #1: No results found for: BC    Blood culture #2:No results found for: Gigi Dunk    Organism:No results found for: ORG    No results found for: LABGRAM    MRSA culture only:No results found for: [facility]    Urine culture: No results found for: LABURIN    Respiratory culture: No results found for: CULTRESP    Aerobic and Anaerobic :  No results found for: LABAERO  No results found for: LABANAE    Urinalysis:    No results found for: Jonna Bacca, BACTERIA, RBCUA, BLOODU, [address], [address]    Radiology:  CTA Chest W WO Contrast   Final Result   1. No pulmonary emboli are seen. 2. Moderate groundglass infiltrates scattered throughout both lungs. **This report has been created using voice recognition software. It may contain minor errors which are inherent in voice recognition technology. **          Final report electronically signed by Dr. Francisca Perry on 8/30/2021 6:35 PM      XR CHEST PORTABLE   Final Result   1. Poor inflation of the lungs. Mild cardiomegaly. .   2. Moderate patchy infiltrates both mid and lower lung fields. No effusion. **This report has been created using voice recognition software. It may contain minor errors which are inherent in voice recognition technology. **      Final report electronically signed by Dr. Francisca Perry on 8/30/2021 5:35 PM        CTA Chest W WO Contrast    Result Date: 8/30/2021  CTA THORAX / PULMONARY EMBOLUS STUDY: CLINICAL INFORMATION: SOB, positive for covid COMPARISON; No prior study. TECHNIQUE: 1.5 mm axial images were obtained through the chest following the administration of IV contrast (ISOVUE).   A non-contrast localizer was obtained. 3D reconstructions were performed on the scanner to include sagittal and coronal MIP images through both the right and left pulmonary arteries. ALL CT SCANS AT THIS FACILITY use dose modulation, iterative reconstruction, and/or weight-based dosing when appropriate to reduce radiation dose to as low as reasonably achievable. FINDINGS: Upper abdomen: Small hiatus hernia. Mediastinum and darren: There are several slightly prominent lymph nodes in the mediastinum, likely postinflammatory. Many of the lymph nodes are calcified, consistent with old, healed granulomatous disease. Similarly, there are calcified lymph nodes in both hilar regions. Bones: No evidence for acute fracture or bone destruction. Lungs: Moderate patchy groundglass infiltrates are scattered throughout both lungs, consistent with a history of covid infection. . No effusion is seen. Vascular: No pulmonary emboli are seen. There is no large vessel aneurysm or dissection. 1.  No pulmonary emboli are seen. 2. Moderate groundglass infiltrates scattered throughout both lungs. **This report has been created using voice recognition software. It may contain minor errors which are inherent in voice recognition technology. **  Final report electronically signed by Dr. Zakiya Arriaga on 8/30/2021 6:35 PM    XR CHEST PORTABLE    Result Date: 8/30/2021  PROCEDURE: XR CHEST PORTABLE CLINICAL INFORMATION: SOB, covid COMPARISON: No prior study. TECHNIQUE: A single mobile view of the chest was obtained. 1. Poor inflation of the lungs. Mild cardiomegaly. . 2. Moderate patchy infiltrates both mid and lower lung fields. No effusion. **This report has been created using voice recognition software. It may contain minor errors which are inherent in voice recognition technology. ** Final report electronically signed by Dr. Zakiya Arriaga on 8/30/2021 5:35 PM      Electronically signed by Jessica Reid MD on 8/31/2021 at 3:14 PM

## 2021-09-01 ENCOUNTER — APPOINTMENT (OUTPATIENT)
Dept: GENERAL RADIOLOGY | Age: 46
DRG: 003 | End: 2021-09-01
Payer: COMMERCIAL

## 2021-09-01 ENCOUNTER — APPOINTMENT (OUTPATIENT)
Dept: CARDIAC CATH/INVASIVE PROCEDURES | Age: 46
DRG: 003 | End: 2021-09-01
Payer: COMMERCIAL

## 2021-09-01 LAB
ABO: NORMAL
ACINETOBACTER CALCOACETICUS-BAUMANNII BY PCR: NOT DETECTED
ADENOVIRUS BY PCR: NOT DETECTED
ALBUMIN SERPL-MCNC: 3 G/DL (ref 3.5–5.1)
ALLEN TEST: ABNORMAL
ALLEN TEST: POSITIVE
ALLEN TEST: POSITIVE
ALP BLD-CCNC: 50 U/L (ref 38–126)
ALT SERPL-CCNC: 20 U/L (ref 11–66)
ANION GAP SERPL CALCULATED.3IONS-SCNC: 11 MEQ/L (ref 8–16)
ANION GAP SERPL CALCULATED.3IONS-SCNC: 13 MEQ/L (ref 8–16)
ANION GAP SERPL CALCULATED.3IONS-SCNC: 8 MEQ/L (ref 8–16)
ANTIBODY SCREEN: NORMAL
AST SERPL-CCNC: 35 U/L (ref 5–40)
BASE EXCESS (CALCULATED): -1.1 MMOL/L (ref -2.5–2.5)
BASE EXCESS (CALCULATED): -1.6 MMOL/L (ref -2.5–2.5)
BASE EXCESS (CALCULATED): -1.6 MMOL/L (ref -2.5–2.5)
BASE EXCESS (CALCULATED): -2.3 MMOL/L (ref -2.5–2.5)
BASE EXCESS (CALCULATED): -3.2 MMOL/L (ref -2.5–2.5)
BASE EXCESS (CALCULATED): -3.7 MMOL/L (ref -2.5–2.5)
BASE EXCESS (CALCULATED): 0.7 MMOL/L (ref -2.5–2.5)
BASE EXCESS (CALCULATED): 0.9 MMOL/L (ref -2.5–2.5)
BASE EXCESS MIXED: -2 MMOL/L (ref -2–3)
BASE EXCESS MIXED: -3 MMOL/L (ref -2–3)
BASE EXCESS MIXED: -3.5 MMOL/L (ref -2–3)
BILIRUB SERPL-MCNC: 0.4 MG/DL (ref 0.3–1.2)
BILIRUBIN URINE: NEGATIVE
BLOOD, URINE: NEGATIVE
BUN BLDV-MCNC: 21 MG/DL (ref 7–22)
BUN BLDV-MCNC: 21 MG/DL (ref 7–22)
BUN BLDV-MCNC: 24 MG/DL (ref 7–22)
C-REACTIVE PROTEIN: 4 MG/DL (ref 0–1)
CALCIUM SERPL-MCNC: 7 MG/DL (ref 8.5–10.5)
CALCIUM SERPL-MCNC: 7.3 MG/DL (ref 8.5–10.5)
CALCIUM SERPL-MCNC: 8.4 MG/DL (ref 8.5–10.5)
CHARACTER, URINE: CLEAR
CHLAMYDIA PNEUMONIAE BY PCR: NOT DETECTED
CHLORIDE BLD-SCNC: 106 MEQ/L (ref 98–111)
CHLORIDE BLD-SCNC: 111 MEQ/L (ref 98–111)
CHLORIDE BLD-SCNC: 113 MEQ/L (ref 98–111)
CO2: 17 MEQ/L (ref 23–33)
CO2: 23 MEQ/L (ref 23–33)
CO2: 24 MEQ/L (ref 23–33)
COLLECTED BY:: ABNORMAL
COLOR: YELLOW
CREAT SERPL-MCNC: 0.6 MG/DL (ref 0.4–1.2)
CREAT SERPL-MCNC: 0.6 MG/DL (ref 0.4–1.2)
CREAT SERPL-MCNC: 0.7 MG/DL (ref 0.4–1.2)
DEVICE: ABNORMAL
ENTEROBACTER CLOACAE COMPLEX BY PCR: NOT DETECTED
ERYTHROCYTE [DISTWIDTH] IN BLOOD BY AUTOMATED COUNT: 13.5 % (ref 11.5–14.5)
ERYTHROCYTE [DISTWIDTH] IN BLOOD BY AUTOMATED COUNT: 42.1 FL (ref 35–45)
ESCHERICHIA COLI BY PCR: NOT DETECTED
FIBRINOGEN: 386 MG/100ML (ref 155–475)
FIO2, MIXED VENOUS: 30
FIO2, MIXED VENOUS: 30
GFR SERPL CREATININE-BSD FRML MDRD: > 90 ML/MIN/1.73M2
GLUCOSE BLD-MCNC: 152 MG/DL (ref 70–108)
GLUCOSE BLD-MCNC: 164 MG/DL (ref 70–108)
GLUCOSE BLD-MCNC: 180 MG/DL (ref 70–108)
GLUCOSE, URINE: NEGATIVE MG/DL
GLUCOSE, WHOLE BLOOD: 160 MG/DL (ref 70–108)
GLUCOSE, WHOLE BLOOD: 197 MG/DL (ref 70–108)
HAEMOPHILUS INFLUENZAE BY PCR: DETECTED
HCO3, MIXED: 19 MMOL/L (ref 23–28)
HCO3, MIXED: 24 MMOL/L (ref 23–28)
HCO3, MIXED: 25 MMOL/L (ref 23–28)
HCO3: 20 MMOL/L (ref 23–28)
HCO3: 20 MMOL/L (ref 23–28)
HCO3: 22 MMOL/L (ref 23–28)
HCO3: 23 MMOL/L (ref 23–28)
HCO3: 23 MMOL/L (ref 23–28)
HCO3: 24 MMOL/L (ref 23–28)
HCO3: 24 MMOL/L (ref 23–28)
HCO3: 25 MMOL/L (ref 23–28)
HCT VFR BLD CALC: 38.6 % (ref 42–52)
HCT VFR BLD CALC: 40.2 % (ref 42–52)
HEMOGLOBIN: 13.1 GM/DL (ref 14–18)
HEMOGLOBIN: 13.1 GM/DL (ref 14–18)
IFIO2: 100
IFIO2: 100
IFIO2: 30
INFLUENZA A BY PCR: NOT DETECTED
INFLUENZA B BY PCR: NOT DETECTED
KETONES, URINE: NEGATIVE
KLEBSIELLA AEROGENES BY PCR: NOT DETECTED
KLEBSIELLA OXYTOCA BY PCR: NOT DETECTED
KLEBSIELLA PNEUMONIAE GROUP BY PCR: NOT DETECTED
LACTIC ACID: 1.3 MMOL/L (ref 0.5–2)
LEGIONELLA PNEUMOPHILIA BY PCR: NOT DETECTED
LEUKOCYTE EST, POC: NEGATIVE
LV EF: 58 %
LV EF: 60 %
LVEF MODALITY: NORMAL
LVEF MODALITY: NORMAL
MAGNESIUM: 2 MG/DL (ref 1.6–2.4)
MCH RBC QN AUTO: 29 PG (ref 26–33)
MCHC RBC AUTO-ENTMCNC: 33.9 GM/DL (ref 32.2–35.5)
MCV RBC AUTO: 85.4 FL (ref 80–94)
METAPNEUMOVIRUS BY PCR: NOT DETECTED
MODE: ABNORMAL
MORAXELLA CATARRHALIS BY PCR: NOT DETECTED
MYCOPLASMA PNEUMONIAE BY PCR: NOT DETECTED
NITRITE, URINE: NEGATIVE
NON-SARS CORONAVIRUS: NOT DETECTED
O2 SAT, MIXED: 100 %
O2 SAT, MIXED: 85 %
O2 SAT, MIXED: 87 %
O2 SATURATION: 100 %
O2 SATURATION: 94 %
O2 SATURATION: 94 %
O2 SATURATION: 96 %
O2 SATURATION: 97 %
O2 SATURATION: 97 %
PARAINFLUENZA VIRUS BY PCR: NOT DETECTED
PCO2, MIXED VENOUS: 26 MMHG (ref 41–51)
PCO2, MIXED VENOUS: 49 MMHG (ref 41–51)
PCO2, MIXED VENOUS: 56 MMHG (ref 41–51)
PCO2: 24 MMHG (ref 35–45)
PCO2: 26 MMHG (ref 35–45)
PCO2: 29 MMHG (ref 35–45)
PCO2: 34 MMHG (ref 35–45)
PCO2: 34 MMHG (ref 35–45)
PCO2: 44 MMHG (ref 35–45)
PCO2: 49 MMHG (ref 35–45)
PCO2: 49 MMHG (ref 35–45)
PH BLOOD GAS: 7.29 (ref 7.35–7.45)
PH BLOOD GAS: 7.32 (ref 7.35–7.45)
PH BLOOD GAS: 7.32 (ref 7.35–7.45)
PH BLOOD GAS: 7.45 (ref 7.35–7.45)
PH BLOOD GAS: 7.46 (ref 7.35–7.45)
PH BLOOD GAS: 7.48 (ref 7.35–7.45)
PH BLOOD GAS: 7.49 (ref 7.35–7.45)
PH BLOOD GAS: 7.53 (ref 7.35–7.45)
PH UA: 5.5 (ref 5–9)
PH, MIXED: 7.25 (ref 7.31–7.41)
PH, MIXED: 7.31 (ref 7.31–7.41)
PH, MIXED: 7.47 (ref 7.31–7.41)
PLATELET # BLD: 175 THOU/MM3 (ref 130–400)
PMV BLD AUTO: 9.7 FL (ref 9.4–12.4)
PO2 MIXED: 169 MMHG (ref 25–40)
PO2 MIXED: 55 MMHG (ref 25–40)
PO2 MIXED: 62 MMHG (ref 25–40)
PO2: 100 MMHG (ref 71–104)
PO2: 368 MMHG (ref 71–104)
PO2: 368 MMHG (ref 71–104)
PO2: 426 MMHG (ref 71–104)
PO2: 67 MMHG (ref 71–104)
PO2: 67 MMHG (ref 71–104)
PO2: 93 MMHG (ref 71–104)
PO2: 97 MMHG (ref 71–104)
POC ACTIVATED CLOTTING TIME KAOLIN: 395 SECONDS (ref 1–150)
POC LACTIC ACID: 0.9 MMOL/L (ref 0.5–1.9)
POC LACTIC ACID: 1.1 MMOL/L (ref 0.5–1.9)
POC O2 SATURATION: 79 % (ref 94–97)
POC O2 SATURATION: 98 % (ref 94–97)
POTASSIUM REFLEX MAGNESIUM: 3.8 MEQ/L (ref 3.5–5.2)
POTASSIUM SERPL-SCNC: 3.4 MEQ/L (ref 3.5–5.2)
POTASSIUM SERPL-SCNC: 3.5 MEQ/L (ref 3.5–5.2)
POTASSIUM SERPL-SCNC: 3.5 MEQ/L (ref 3.5–5.2)
PROCALCITONIN: 0.08 NG/ML (ref 0.01–0.09)
PROTEIN UA: NEGATIVE MG/DL
PROTEUS SPECIES BY PCR: NOT DETECTED
PSEUDOMONAS AERUGINOSA BY PCR: NOT DETECTED
RBC # BLD: 4.52 MILL/MM3 (ref 4.7–6.1)
RESISTANT GENE CTX-M BY PCR: ABNORMAL
RESISTANT GENE IMP BY PCR: ABNORMAL
RESISTANT GENE KPC BY PCR: ABNORMAL
RESISTANT GENE MECA/C & MREJ BY PCR: ABNORMAL
RESISTANT GENE NDM BY PCR: ABNORMAL
RESISTANT GENE OXA-48-LIKE BY PCR: ABNORMAL
RESISTANT GENE VIM BY PCR: ABNORMAL
RESPIRATORY SYNCYTIAL VIRUS BY PCR: NOT DETECTED
RH FACTOR: NORMAL
RHINOVIRUS ENTEROVIRUS PCR: NOT DETECTED
SERRATIA MARCESCENS BY PCR: NOT DETECTED
SET PEEP: 12 MMHG
SET PRESS SUPP: 10 CMH2O
SET RESPIRATORY RATE: 10 BPM
SITE: ABNORMAL
SODIUM BLD-SCNC: 140 MEQ/L (ref 135–145)
SODIUM BLD-SCNC: 143 MEQ/L (ref 135–145)
SODIUM BLD-SCNC: 143 MEQ/L (ref 135–145)
SOURCE, BLOOD GAS: ABNORMAL
SOURCE: ABNORMAL
SPECIFIC GRAVITY UA: > 1.03 (ref 1–1.03)
SPECIMEN ACCEPTABILITY: ABNORMAL
STAPH AUREUS BY PCR: NOT DETECTED
STREP AGALACTIAE BY PCR: NOT DETECTED
STREP PNEUMONIAE BY PCR: NOT DETECTED
STREP PYOGENES BY PCR: NOT DETECTED
TOTAL PROTEIN: 6.3 G/DL (ref 6.1–8)
TRIGL SERPL-MCNC: 85 MG/DL (ref 0–199)
UROBILINOGEN, URINE: 1 EU/DL (ref 0–1)
WBC # BLD: 4.5 THOU/MM3 (ref 4.8–10.8)

## 2021-09-01 PROCEDURE — 84145 PROCALCITONIN (PCT): CPT

## 2021-09-01 PROCEDURE — 2000000000 HC ICU R&B

## 2021-09-01 PROCEDURE — 94002 VENT MGMT INPAT INIT DAY: CPT

## 2021-09-01 PROCEDURE — 6360000002 HC RX W HCPCS: Performed by: HOSPITALIST

## 2021-09-01 PROCEDURE — 87486 CHLMYD PNEUM DNA AMP PROBE: CPT

## 2021-09-01 PROCEDURE — 2580000003 HC RX 258

## 2021-09-01 PROCEDURE — 82803 BLOOD GASES ANY COMBINATION: CPT

## 2021-09-01 PROCEDURE — 93005 ELECTROCARDIOGRAM TRACING: CPT | Performed by: NURSE PRACTITIONER

## 2021-09-01 PROCEDURE — 80053 COMPREHEN METABOLIC PANEL: CPT

## 2021-09-01 PROCEDURE — 89220 SPUTUM SPECIMEN COLLECTION: CPT

## 2021-09-01 PROCEDURE — 71045 X-RAY EXAM CHEST 1 VIEW: CPT

## 2021-09-01 PROCEDURE — 33952 ECMO/ECLS INSJ PRPH CANNULA: CPT | Performed by: INTERNAL MEDICINE

## 2021-09-01 PROCEDURE — 87077 CULTURE AEROBIC IDENTIFY: CPT

## 2021-09-01 PROCEDURE — 93460 R&L HRT ART/VENTRICLE ANGIO: CPT | Performed by: INTERNAL MEDICINE

## 2021-09-01 PROCEDURE — 6360000004 HC RX CONTRAST MEDICATION: Performed by: INTERNAL MEDICINE

## 2021-09-01 PROCEDURE — 99233 SBSQ HOSP IP/OBS HIGH 50: CPT | Performed by: HOSPITALIST

## 2021-09-01 PROCEDURE — 36556 INSERT NON-TUNNEL CV CATH: CPT

## 2021-09-01 PROCEDURE — 82810 BLOOD GASES O2 SAT ONLY: CPT

## 2021-09-01 PROCEDURE — 36556 INSERT NON-TUNNEL CV CATH: CPT | Performed by: NURSE PRACTITIONER

## 2021-09-01 PROCEDURE — 97530 THERAPEUTIC ACTIVITIES: CPT

## 2021-09-01 PROCEDURE — 31500 INSERT EMERGENCY AIRWAY: CPT

## 2021-09-01 PROCEDURE — 6360000002 HC RX W HCPCS: Performed by: INTERNAL MEDICINE

## 2021-09-01 PROCEDURE — 93306 TTE W/DOPPLER COMPLETE: CPT

## 2021-09-01 PROCEDURE — 6360000002 HC RX W HCPCS

## 2021-09-01 PROCEDURE — 2580000003 HC RX 258: Performed by: NURSE PRACTITIONER

## 2021-09-01 PROCEDURE — 6370000000 HC RX 637 (ALT 250 FOR IP): Performed by: INTERNAL MEDICINE

## 2021-09-01 PROCEDURE — 36556 INSERT NON-TUNNEL CV CATH: CPT | Performed by: INTERNAL MEDICINE

## 2021-09-01 PROCEDURE — 36600 WITHDRAWAL OF ARTERIAL BLOOD: CPT

## 2021-09-01 PROCEDURE — P9016 RBC LEUKOCYTES REDUCED: HCPCS

## 2021-09-01 PROCEDURE — 93320 DOPPLER ECHO COMPLETE: CPT

## 2021-09-01 PROCEDURE — 86923 COMPATIBILITY TEST ELECTRIC: CPT

## 2021-09-01 PROCEDURE — 83605 ASSAY OF LACTIC ACID: CPT

## 2021-09-01 PROCEDURE — 85027 COMPLETE CBC AUTOMATED: CPT

## 2021-09-01 PROCEDURE — P9041 ALBUMIN (HUMAN),5%, 50ML: HCPCS | Performed by: INTERNAL MEDICINE

## 2021-09-01 PROCEDURE — 93312 ECHO TRANSESOPHAGEAL: CPT

## 2021-09-01 PROCEDURE — C1713 ANCHOR/SCREW BN/BN,TIS/BN: HCPCS

## 2021-09-01 PROCEDURE — 87581 M.PNEUMON DNA AMP PROBE: CPT

## 2021-09-01 PROCEDURE — 2709999900 HC NON-CHARGEABLE SUPPLY

## 2021-09-01 PROCEDURE — 36592 COLLECT BLOOD FROM PICC: CPT

## 2021-09-01 PROCEDURE — 87798 DETECT AGENT NOS DNA AMP: CPT

## 2021-09-01 PROCEDURE — 87541 LEGION PNEUMO DNA AMP PROB: CPT

## 2021-09-01 PROCEDURE — 33946 ECMO/ECLS INITIATION VENOUS: CPT | Performed by: INTERNAL MEDICINE

## 2021-09-01 PROCEDURE — 86140 C-REACTIVE PROTEIN: CPT

## 2021-09-01 PROCEDURE — 85385 FIBRINOGEN ANTIGEN: CPT

## 2021-09-01 PROCEDURE — 87205 SMEAR GRAM STAIN: CPT

## 2021-09-01 PROCEDURE — 2580000003 HC RX 258: Performed by: HOSPITALIST

## 2021-09-01 PROCEDURE — 2500000003 HC RX 250 WO HCPCS: Performed by: NURSE PRACTITIONER

## 2021-09-01 PROCEDURE — 36415 COLL VENOUS BLD VENIPUNCTURE: CPT

## 2021-09-01 PROCEDURE — 0BH18EZ INSERTION OF ENDOTRACHEAL AIRWAY INTO TRACHEA, VIA NATURAL OR ARTIFICIAL OPENING ENDOSCOPIC: ICD-10-PCS | Performed by: INTERNAL MEDICINE

## 2021-09-01 PROCEDURE — 05HM33Z INSERTION OF INFUSION DEVICE INTO RIGHT INTERNAL JUGULAR VEIN, PERCUTANEOUS APPROACH: ICD-10-PCS | Performed by: INTERNAL MEDICINE

## 2021-09-01 PROCEDURE — 84132 ASSAY OF SERUM POTASSIUM: CPT

## 2021-09-01 PROCEDURE — 94761 N-INVAS EAR/PLS OXIMETRY MLT: CPT

## 2021-09-01 PROCEDURE — 6370000000 HC RX 637 (ALT 250 FOR IP): Performed by: HOSPITALIST

## 2021-09-01 PROCEDURE — 6370000000 HC RX 637 (ALT 250 FOR IP): Performed by: NURSE PRACTITIONER

## 2021-09-01 PROCEDURE — 6360000002 HC RX W HCPCS: Performed by: NURSE PRACTITIONER

## 2021-09-01 PROCEDURE — 93325 DOPPLER ECHO COLOR FLOW MAPG: CPT

## 2021-09-01 PROCEDURE — 81003 URINALYSIS AUTO W/O SCOPE: CPT

## 2021-09-01 PROCEDURE — P9045 ALBUMIN (HUMAN), 5%, 250 ML: HCPCS

## 2021-09-01 PROCEDURE — 85018 HEMOGLOBIN: CPT

## 2021-09-01 PROCEDURE — 86850 RBC ANTIBODY SCREEN: CPT

## 2021-09-01 PROCEDURE — 83735 ASSAY OF MAGNESIUM: CPT

## 2021-09-01 PROCEDURE — C1894 INTRO/SHEATH, NON-LASER: HCPCS

## 2021-09-01 PROCEDURE — 99291 CRITICAL CARE FIRST HOUR: CPT | Performed by: INTERNAL MEDICINE

## 2021-09-01 PROCEDURE — 97110 THERAPEUTIC EXERCISES: CPT

## 2021-09-01 PROCEDURE — C1769 GUIDE WIRE: HCPCS

## 2021-09-01 PROCEDURE — 87070 CULTURE OTHR SPECIMN AEROBIC: CPT

## 2021-09-01 PROCEDURE — 31500 INSERT EMERGENCY AIRWAY: CPT | Performed by: INTERNAL MEDICINE

## 2021-09-01 PROCEDURE — 5A1955Z RESPIRATORY VENTILATION, GREATER THAN 96 CONSECUTIVE HOURS: ICD-10-PCS | Performed by: INTERNAL MEDICINE

## 2021-09-01 PROCEDURE — 84478 ASSAY OF TRIGLYCERIDES: CPT

## 2021-09-01 PROCEDURE — 85347 COAGULATION TIME ACTIVATED: CPT

## 2021-09-01 PROCEDURE — 86901 BLOOD TYPING SEROLOGIC RH(D): CPT

## 2021-09-01 PROCEDURE — 87086 URINE CULTURE/COLONY COUNT: CPT

## 2021-09-01 PROCEDURE — 87631 RESP VIRUS 3-5 TARGETS: CPT

## 2021-09-01 PROCEDURE — 99223 1ST HOSP IP/OBS HIGH 75: CPT | Performed by: INTERNAL MEDICINE

## 2021-09-01 PROCEDURE — 2500000003 HC RX 250 WO HCPCS

## 2021-09-01 PROCEDURE — 37799 UNLISTED PX VASCULAR SURGERY: CPT

## 2021-09-01 PROCEDURE — 94669 MECHANICAL CHEST WALL OSCILL: CPT

## 2021-09-01 PROCEDURE — 2700000000 HC OXYGEN THERAPY PER DAY

## 2021-09-01 PROCEDURE — C9113 INJ PANTOPRAZOLE SODIUM, VIA: HCPCS | Performed by: NURSE PRACTITIONER

## 2021-09-01 PROCEDURE — 82947 ASSAY GLUCOSE BLOOD QUANT: CPT

## 2021-09-01 PROCEDURE — 85014 HEMATOCRIT: CPT

## 2021-09-01 PROCEDURE — 86900 BLOOD TYPING SEROLOGIC ABO: CPT

## 2021-09-01 RX ORDER — ACETAMINOPHEN 325 MG/1
650 TABLET ORAL EVERY 4 HOURS PRN
Status: DISCONTINUED | OUTPATIENT
Start: 2021-09-01 | End: 2021-09-01 | Stop reason: SDUPTHER

## 2021-09-01 RX ORDER — ALBUMIN, HUMAN INJ 5% 5 %
25 SOLUTION INTRAVENOUS ONCE
Status: COMPLETED | OUTPATIENT
Start: 2021-09-01 | End: 2021-09-01

## 2021-09-01 RX ORDER — AZITHROMYCIN 250 MG/1
250 TABLET, FILM COATED ORAL DAILY
Status: DISCONTINUED | OUTPATIENT
Start: 2021-09-02 | End: 2021-09-01

## 2021-09-01 RX ORDER — ALBUMIN, HUMAN INJ 5% 5 %
25 SOLUTION INTRAVENOUS ONCE
Status: COMPLETED | OUTPATIENT
Start: 2021-09-02 | End: 2021-09-02

## 2021-09-01 RX ORDER — SODIUM CHLORIDE 9 MG/ML
INJECTION, SOLUTION INTRAVENOUS CONTINUOUS
Status: DISCONTINUED | OUTPATIENT
Start: 2021-09-01 | End: 2021-09-01

## 2021-09-01 RX ORDER — SODIUM CHLORIDE, SODIUM LACTATE, POTASSIUM CHLORIDE, CALCIUM CHLORIDE 600; 310; 30; 20 MG/100ML; MG/100ML; MG/100ML; MG/100ML
INJECTION, SOLUTION INTRAVENOUS CONTINUOUS
Status: DISCONTINUED | OUTPATIENT
Start: 2021-09-01 | End: 2021-09-02

## 2021-09-01 RX ORDER — CISATRACURIUM BESYLATE 2 MG/ML
10 INJECTION, SOLUTION INTRAVENOUS ONCE
Status: COMPLETED | OUTPATIENT
Start: 2021-09-01 | End: 2021-09-01

## 2021-09-01 RX ORDER — ONDANSETRON 2 MG/ML
4 INJECTION INTRAMUSCULAR; INTRAVENOUS EVERY 6 HOURS PRN
Status: DISCONTINUED | OUTPATIENT
Start: 2021-09-01 | End: 2021-09-01 | Stop reason: SDUPTHER

## 2021-09-01 RX ORDER — ALBUMIN, HUMAN INJ 5% 5 %
SOLUTION INTRAVENOUS
Status: COMPLETED
Start: 2021-09-01 | End: 2021-09-02

## 2021-09-01 RX ORDER — SODIUM CHLORIDE 9 MG/ML
INJECTION, SOLUTION INTRAVENOUS PRN
Status: DISCONTINUED | OUTPATIENT
Start: 2021-09-01 | End: 2021-09-01

## 2021-09-01 RX ORDER — AZITHROMYCIN 250 MG/1
500 TABLET, FILM COATED ORAL ONCE
Status: COMPLETED | OUTPATIENT
Start: 2021-09-01 | End: 2021-09-01

## 2021-09-01 RX ORDER — LORAZEPAM 2 MG/ML
INJECTION INTRAMUSCULAR
Status: DISCONTINUED
Start: 2021-09-01 | End: 2021-09-01

## 2021-09-01 RX ORDER — MAGNESIUM SULFATE IN WATER 40 MG/ML
2000 INJECTION, SOLUTION INTRAVENOUS PRN
Status: DISCONTINUED | OUTPATIENT
Start: 2021-09-01 | End: 2021-09-20 | Stop reason: HOSPADM

## 2021-09-01 RX ORDER — DEXAMETHASONE SODIUM PHOSPHATE 4 MG/ML
10 INJECTION, SOLUTION INTRA-ARTICULAR; INTRALESIONAL; INTRAMUSCULAR; INTRAVENOUS; SOFT TISSUE EVERY 24 HOURS
Status: COMPLETED | OUTPATIENT
Start: 2021-09-01 | End: 2021-09-05

## 2021-09-01 RX ORDER — HEPARIN SODIUM 5000 [USP'U]/ML
15000 INJECTION, SOLUTION INTRAVENOUS; SUBCUTANEOUS PRN
Status: DISCONTINUED | OUTPATIENT
Start: 2021-09-01 | End: 2021-09-11

## 2021-09-01 RX ORDER — SODIUM CHLORIDE 9 MG/ML
25 INJECTION, SOLUTION INTRAVENOUS PRN
Status: DISCONTINUED | OUTPATIENT
Start: 2021-09-01 | End: 2021-09-01 | Stop reason: SDUPTHER

## 2021-09-01 RX ORDER — SODIUM CHLORIDE 0.9 % (FLUSH) 0.9 %
5-40 SYRINGE (ML) INJECTION EVERY 12 HOURS SCHEDULED
Status: DISCONTINUED | OUTPATIENT
Start: 2021-09-01 | End: 2021-09-01 | Stop reason: SDUPTHER

## 2021-09-01 RX ORDER — MORPHINE SULFATE 2 MG/ML
INJECTION, SOLUTION INTRAMUSCULAR; INTRAVENOUS
Status: DISCONTINUED
Start: 2021-09-01 | End: 2021-09-01

## 2021-09-01 RX ORDER — SODIUM CHLORIDE 0.9 % (FLUSH) 0.9 %
5-40 SYRINGE (ML) INJECTION PRN
Status: DISCONTINUED | OUTPATIENT
Start: 2021-09-01 | End: 2021-09-01 | Stop reason: SDUPTHER

## 2021-09-01 RX ORDER — MEPERIDINE HYDROCHLORIDE 50 MG/ML
50 INJECTION INTRAMUSCULAR; INTRAVENOUS; SUBCUTANEOUS EVERY 4 HOURS PRN
Status: DISCONTINUED | OUTPATIENT
Start: 2021-09-01 | End: 2021-09-01 | Stop reason: SDUPTHER

## 2021-09-01 RX ORDER — PANTOPRAZOLE SODIUM 40 MG/10ML
40 INJECTION, POWDER, LYOPHILIZED, FOR SOLUTION INTRAVENOUS DAILY
Status: DISCONTINUED | OUTPATIENT
Start: 2021-09-01 | End: 2021-09-14

## 2021-09-01 RX ORDER — SODIUM CHLORIDE 9 MG/ML
10 INJECTION INTRAVENOUS DAILY
Status: DISCONTINUED | OUTPATIENT
Start: 2021-09-01 | End: 2021-09-14

## 2021-09-01 RX ORDER — MINERAL OIL AND WHITE PETROLATUM 150; 830 MG/G; MG/G
OINTMENT OPHTHALMIC PRN
Status: DISCONTINUED | OUTPATIENT
Start: 2021-09-01 | End: 2021-09-20 | Stop reason: HOSPADM

## 2021-09-01 RX ORDER — POTASSIUM CHLORIDE 29.8 MG/ML
20 INJECTION INTRAVENOUS PRN
Status: DISCONTINUED | OUTPATIENT
Start: 2021-09-02 | End: 2021-09-14

## 2021-09-01 RX ORDER — CHLORHEXIDINE GLUCONATE 0.12 MG/ML
15 RINSE ORAL 2 TIMES DAILY
Status: DISCONTINUED | OUTPATIENT
Start: 2021-09-01 | End: 2021-09-06

## 2021-09-01 RX ORDER — ALBUTEROL SULFATE 2.5 MG/3ML
2.5 SOLUTION RESPIRATORY (INHALATION) EVERY 4 HOURS PRN
Status: DISCONTINUED | OUTPATIENT
Start: 2021-09-01 | End: 2021-09-20 | Stop reason: HOSPADM

## 2021-09-01 RX ORDER — MORPHINE SULFATE 4 MG/ML
INJECTION, SOLUTION INTRAMUSCULAR; INTRAVENOUS
Status: DISCONTINUED
Start: 2021-09-01 | End: 2021-09-01

## 2021-09-01 RX ORDER — KETAMINE HCL IN NACL, ISO-OSM 100MG/10ML
100 SYRINGE (ML) INJECTION ONCE
Status: COMPLETED | OUTPATIENT
Start: 2021-09-01 | End: 2021-09-01

## 2021-09-01 RX ORDER — PROPOFOL 10 MG/ML
5-80 INJECTION, EMULSION INTRAVENOUS
Status: DISCONTINUED | OUTPATIENT
Start: 2021-09-01 | End: 2021-09-04

## 2021-09-01 RX ORDER — HEPARIN SODIUM 10000 [USP'U]/100ML
5-30 INJECTION, SOLUTION INTRAVENOUS CONTINUOUS
Status: DISCONTINUED | OUTPATIENT
Start: 2021-09-01 | End: 2021-09-08

## 2021-09-01 RX ADMIN — MORPHINE SULFATE: 4 INJECTION, SOLUTION INTRAMUSCULAR; INTRAVENOUS at 16:34

## 2021-09-01 RX ADMIN — CEFTRIAXONE SODIUM 1000 MG: 1 INJECTION, POWDER, FOR SOLUTION INTRAMUSCULAR; INTRAVENOUS at 15:17

## 2021-09-01 RX ADMIN — SODIUM CHLORIDE, PRESERVATIVE FREE 10 ML: 5 INJECTION INTRAVENOUS at 21:05

## 2021-09-01 RX ADMIN — SODIUM CHLORIDE, PRESERVATIVE FREE 10 ML: 5 INJECTION INTRAVENOUS at 08:15

## 2021-09-01 RX ADMIN — OXYCODONE HYDROCHLORIDE AND ACETAMINOPHEN 1000 MG: 500 TABLET ORAL at 08:12

## 2021-09-01 RX ADMIN — HEPARIN SODIUM 5000 UNITS: 5000 INJECTION INTRAVENOUS; SUBCUTANEOUS at 15:14

## 2021-09-01 RX ADMIN — Medication 15 ML: at 22:00

## 2021-09-01 RX ADMIN — IOPAMIDOL 40 ML: 755 INJECTION, SOLUTION INTRAVENOUS at 19:52

## 2021-09-01 RX ADMIN — ALBUMIN, HUMAN INJ 5% 25 G: 5 SOLUTION at 23:45

## 2021-09-01 RX ADMIN — Medication 50 MG: at 08:12

## 2021-09-01 RX ADMIN — CEFAZOLIN 3000 MG: 10 INJECTION, POWDER, FOR SOLUTION INTRAVENOUS at 17:30

## 2021-09-01 RX ADMIN — DEXAMETHASONE 6 MG: 4 TABLET ORAL at 08:13

## 2021-09-01 RX ADMIN — LORAZEPAM: 2 INJECTION INTRAMUSCULAR; INTRAVENOUS at 16:35

## 2021-09-01 RX ADMIN — CISATRACURIUM BESYLATE 10 MG: 2 INJECTION INTRAVENOUS at 18:20

## 2021-09-01 RX ADMIN — CISATRACURIUM BESYLATE 2 MCG/KG/MIN: 10 INJECTION, SOLUTION INTRAVENOUS at 18:20

## 2021-09-01 RX ADMIN — SODIUM BICARBONATE 50 MEQ: 84 INJECTION INTRAVENOUS at 22:22

## 2021-09-01 RX ADMIN — Medication 1000 UNITS: at 08:12

## 2021-09-01 RX ADMIN — SODIUM CHLORIDE, PRESERVATIVE FREE 10 ML: 5 INJECTION INTRAVENOUS at 19:00

## 2021-09-01 RX ADMIN — ALBUMIN (HUMAN) 25 G: 12.5 INJECTION, SOLUTION INTRAVENOUS at 23:45

## 2021-09-01 RX ADMIN — PROPOFOL 40 MCG/KG/MIN: 10 INJECTION, EMULSION INTRAVENOUS at 22:22

## 2021-09-01 RX ADMIN — DEXAMETHASONE SODIUM PHOSPHATE 10 MG: 4 INJECTION, SOLUTION INTRA-ARTICULAR; INTRALESIONAL; INTRAMUSCULAR; INTRAVENOUS; SOFT TISSUE at 15:10

## 2021-09-01 RX ADMIN — HEPARIN SODIUM 5000 UNITS: 5000 INJECTION INTRAVENOUS; SUBCUTANEOUS at 05:50

## 2021-09-01 RX ADMIN — Medication 100 MG: at 16:27

## 2021-09-01 RX ADMIN — MORPHINE SULFATE: 2 INJECTION, SOLUTION INTRAMUSCULAR; INTRAVENOUS at 16:21

## 2021-09-01 RX ADMIN — SODIUM CHLORIDE: 9 INJECTION, SOLUTION INTRAVENOUS at 17:36

## 2021-09-01 RX ADMIN — Medication 1 CAPSULE: at 08:12

## 2021-09-01 RX ADMIN — AZITHROMYCIN 500 MG: 250 TABLET, FILM COATED ORAL at 15:10

## 2021-09-01 RX ADMIN — ALBUMIN (HUMAN) 25 G: 12.5 INJECTION, SOLUTION INTRAVENOUS at 16:50

## 2021-09-01 RX ADMIN — SODIUM CHLORIDE, POTASSIUM CHLORIDE, SODIUM LACTATE AND CALCIUM CHLORIDE: 600; 310; 30; 20 INJECTION, SOLUTION INTRAVENOUS at 22:42

## 2021-09-01 RX ADMIN — PANTOPRAZOLE SODIUM 40 MG: 40 INJECTION, POWDER, FOR SOLUTION INTRAVENOUS at 17:36

## 2021-09-01 RX ADMIN — SODIUM CHLORIDE, PRESERVATIVE FREE 10 ML: 5 INJECTION INTRAVENOUS at 21:00

## 2021-09-01 ASSESSMENT — PULMONARY FUNCTION TESTS
PIF_VALUE: 31
PIF_VALUE: 53
PIF_VALUE: 31
PIF_VALUE: 29
PIF_VALUE: 31
PIF_VALUE: 31

## 2021-09-01 ASSESSMENT — PAIN SCALES - GENERAL
PAINLEVEL_OUTOF10: 0
PAINLEVEL_OUTOF10: 0

## 2021-09-01 NOTE — PROGRESS NOTES
A size 8.0 endotracheal tube was successfully placed using a size 4 blade.  The Lot number for the endotracheal tube was [id number]

## 2021-09-01 NOTE — SIGNIFICANT EVENT
I discussed with the patient and his wife the benefits and risks of ECMO. I explained the reasons why we perform ECMO. I explained that it does not treat Covid. However, it is an alternative means of life support while recovering from Covid. Answered questions. Regina hours later, I came back and met with the patient and his wife. I explained that there were 4 options available. One option was to proceed with intubation and immediate ECMO. I explained that he qualifies because his PF ratio is approximately 70. Benefits to this strategy is that there is some safety margin at this point with intubation and cannulation. Option 2 is to wait to see if the disease progresses further. If the disease progresses further, then proceed with intubation and cannulation. The benefit to this process would be that if he got better there would be no intubation or ECMO. However, the risk is that if he gets worse, there is much less safety net with the hypoxemia and it is more risky to provide life support at that point. The 3rd option is to opt for intubation if disease progresses, and proceed with traditional life support management. The 4th option is to continue with current therapy with no escalation if he gets worse. We discussed the different options. The patient decided he would prefer to have the safety net and proceed with intubation and ECMO. Patient is an excellent ECMO candidate and that he is young, very active, and has no comorbid illnesses. His risk factor is that he is obese. However, he is not extremely obese. Electronically signed by Yetta Lions Marylen Perish MD.

## 2021-09-01 NOTE — CARE COORDINATION
9/1/21, 12:28 PM EDT    DISCHARGE ON GOING EVALUATION    [facility] day: 2  Location: -05/005-A Reason for admit: Hypoxia [R09.02]  COVID-19 [U07.1]   Procedure:   9/1 CXR    Impression:       There are diffuse infiltrates throughout the lungs bilaterally. Overall appearance of the chest is similar to the previous exam.      ECHO    Barriers to Discharge: Tmax 98.2, high flow oxygen at 100% FiO2, 40 liters oxygen with saturations at 91%. Acapella, incentive spirometry, IV fluids, IV Zithromax, IV Rocephin, IV Decadron, SQ Heparin, IV Remdesivir. PCP: Frank Mercado MD  Readmission Risk Score: 12%  Patient Goals/Plan/Treatment Preferences: Plan is to return home with spouse. Will follow for potential needs or services.

## 2021-09-01 NOTE — PROGRESS NOTES
1820 100 mcq ketamine , 10mg of nimbex and nimbex drip started at 2 mcq/kg/min per Antonio Williamson APNP  0115  and  present. preparing for ecmo placement. 4778 blood started  1851 blood to pt.  1915 blood completed. 1920 procedure completed. bilateral groin cannulation. 2 oxygenators in line. Sweep  At 2 liters each oxy. flow 5.0 with rpm at 5000. .1925 abgs drawn. Decreased sweep to 1 liter each oxy. 1935 repeated abg, no change  . Rafi Hark placed in lt introducer. A- line in rt radial. Noted some hematoma at site. plased vasc band with 10 ml air.,no more hematoma. See notes from hybrid or/cath lab documentation for complete procedure.

## 2021-09-01 NOTE — PROCEDURES
ICU PROCEDURE - ENDOTRACHEAL INTUBATION    Arielle Houston     MRN#: [id number]  21      Rubi Winston@[employer]     : 1975      INDICATION: Progressive respiratory failure    TIME OUT: taken    Permission obtained, risks/benefits reviewed:    ANESTHESIA:   [x]Ketamine  []Ativan  [x] Morphine  [x]Propofol  []Other medications:      ESTIMATED BLOOD LOSS:  None. COMPLICATIONS:  [x]N/A  [] Other:    LARYNGOSCOPIC AIRWAY GRADE (CORMACK-LEHANE):[]1  []2a  [x]2b []3  []4        INTUBATION EQUIPMENT USED:  [x] Direct laryngoscope only    OUTCOME: Successful placement of #8.0 Taperguard Evac endotracheal via   [x]Oral route    INSERTION DEPTH:  25    cm from   [x]lip           CONFIRMATION OF TUBE POSITION:   [x]Capnography - Strong & repeatable exhaled CO2 detection   [x]Multiple point auscultation   [x]SpO2 response   [x]STAT X-ray   []Bronchoscopic assessment    UNUSUAL FINDINGS:    PROCEDURE:     Using direct laryngoscopy, the vocal cords were visualized and the endotracheal tube was placed through the cords under direct vision. Good breath sounds were auscultated bilaterally without sounds over abdomen. Appropriate strong & repeatable exhaled CO2 detection was confirmed.        Electronically signed by Zora Estrella MD on 2021 at 5:55 PM

## 2021-09-01 NOTE — PROGRESS NOTES
[facility] CVICU 4B  Occupational Therapy  Daily Note  Time:   Time In:   Time Out: 0804  Timed Code Treatment Minutes: 24 Minutes  Minutes: 24          Date: 2021  Patient Name: Jaxon Baker,   Gender: male      Room: [facility]05/005-A  MRN: [id number]  : 1975  (48 y.o.)  Referring Practitioner: Fitz Charles MD  Diagnosis: hypoxia  Additional Pertinent Hx: Per H&P: Pt presents with SOB, productive cough, fever, chills, nasal congestion, loss of smell, loss of taste, and malaise starting 2021. Pt diagnosed with Acute Hypoxic Respiratory Failure 2/2 COVID-19 PNA with suspected superimposed CAP    Restrictions/Precautions:  Restrictions/Precautions: General Precautions, Fall Risk, Isolation  Position Activity Restriction  Other position/activity restrictions: monitor O2; droplet +     SUBJECTIVE: Pt seated in bedside chair upon arrival, agreeable to OT session. PAIN: denies    Vitals: Oxygen: Patient on 40 Lpm 100% FiO2 via High Flow  upon arrival to room. Patient O2 sats at 92%. Upon activity patient dropping O2 at 86-89%. Pt requiring mn rest break(s) to recover. Heart Rate: 90's  Respiration Rate: 20's    COGNITION: WFL    ADL:   No ADL's completed this session. Shameka Ramesh BALANCE:  Sitting Balance:  Supervision. Standing Balance: Stand By Assistance. x4 minutes x1 trial    BED MOBILITY:  Not Tested    TRANSFERS:  Sit to Stand:  Stand By Assistance. Stand to Sit: Stand By Assistance. Comment: To/from bedside chair    FUNCTIONAL MOBILITY:  Assistive Device: None  Assist Level:  Stand By Assistance. Distance:   Completed functional mobility short distance within pt room at fair pace, no LOB noted- distance limited by high flow tubing. Pt requires lengthy seated rest break after trial of mobility, mod fatigue noted.      ADDITIONAL ACTIVITIES:  Patient identified a personal goal to increase UB strength and improve overall endurance so they can complete their toilet & shower transfers; skilled edu on UE strengthening. Completed BUE exercises x10 reps x1 sets using min resistance band in all joints/planes to increase strength and endurance required for ADLs. Pt required min rest break between each exercise and min v/c for proper technique. Reviewed energy conservation techniques for deep breathing- demos good understanding. Completed to increase ease during functional tasks. ASSESSMENT:     Activity Tolerance:  Patient tolerance of  treatment: fair. Discharge Recommendations: Home with assist PRN, Home with Home health OT, Continue to assess pending progress (pending O2 requirements)   Equipment Recommendations: Other: will continue to monitor pending progress  Plan: Times per week: 5x  Current Treatment Recommendations: Strengthening, Functional Mobility Training, Endurance Training, Patient/Caregiver Education & Training, Equipment Evaluation, Education, & procurement, Self-Care / ADL, Home Management Training    Patient Education  Patient Education: Energy Conservation, Home Exercise Program, Importance of Increasing Activity and Safety with transfers and mobility. Goals  Short term goals  Time Frame for Short term goals: by discharge  Short term goal 1: Pt will increase activity tolerance for functional mobility to/from [facility] or chair, progressing to bathroom as able, with MI and Sp02 >=90% in prep for toileting tasks. Short term goal 2: Pt will complete BADL tasks with MI, incorporating ECT prn, to increase independence and ease with self care tasks. Short term goal 3: Pt will tolerate dynamic standing >5 minutes with MI and Sp02 >=90% in prep for sinkside grooming tasks. Following session, patient left in safe position with all fall risk precautions in place.

## 2021-09-01 NOTE — PROCEDURES
ICU PROCEDURE - CVC PLACEMENT:    Risks and benefits to the procedure were discussed. Alternatives and their risks were discussed as well. INDICATION: Acute hypoxic respiratory failure    SITE: Right Subclavian Vein      CONSENT: was obtained after explaining indication/risks to patient and/or next of kin    TIME OUT: taken    STERILE PREP: Prior to the procedure all involved washed their hands. Full maximum sterile field/barrier technique was followed (with cap and mask, gloves and sterile gown, as well as broad field sterile drapes). Local disinfection was performed with broad field application of orange dyed chloraprep solution, which was allowed to dry fully prior to the initiation of the procedure. LOCAL ANESTHETIC: Aqueous lidocaine 1%     ESTIMATED BLOOD LOSS: Minimal    ULTRASOUND GUIDANCE USED: No    PROCEDURE:  Using modified seldinger technique, the appropriate vessel was located with the introducer needle subsequent to the use of a 22g x 1.5 inch \"\" needle. The flexible J-tip wire was passed without difficulty or resistance, followed by minimal skin incisions over the introducer needle. The introducer needle was withdrawn and discarded, maintaining manual control of the guidewire at all times. After dilation of the tract, a preflushed 3 lumen CVC catheter was advanced over the guidewire without difficulty or resistance to its full extent. The guidewire was withdrawn and discarded and good return of nonpulsatile, dark venous blood assured through all lumes, with easy flushing of the lumens. The line was sutured in place then the site was reprepped with a  chlorprep solution followed by a Biopatch device. After hemostasis was assured, an op site was applied and all sharps and materials were discarded appropriately. EBL < 5 ml. COMPLICATIONS: None    POST PROCEDURE CHEST XRAY HAS BEEN ORDERED    Electronically signed by     Nicol Fernandes CNP on 9/1/2021 at 7:58 PM

## 2021-09-01 NOTE — H&P
[address] [address]  Sedation/Analgesia History & Physical    Pt Name: Aleah Balderas  Account number: [de-identified]  MRN: [id number]  YOB: 1975  Provider Performing Procedure: Claude Salinas, MD MD  Referring Provider: Charlee Pearson MD   Primary Care Physician: Mitali Alvarez MD  Date: 9/1/2021    PRE-PROCEDURE    Code Status: FULL CODE  Brief History/Pre-Procedure Diagnosis:   Hypoxic Resp Failure  COVID    Consent: : I have discussed with the patient risks, benefits, and alternatives (and relevant risks, benefits, and side effects related to alternatives or not receiving care), and likelihood of the success. The patient and/or representative appear to understand and agree to proceed. The discussion encompasses risks, benefits, and side effects related to the alternatives and the risks related to not receiving the proposed care, treatment, and services. The indication, risks and benefits of the procedure and possible therapeutic consequences and alternatives were discussed with the patient. The patient was given the opportunity to ask questions and to have them answered to his/her satisfaction. Risks of the procedure include but are not limited to the following: Bleeding, hematoma including retroperitoneal hemmorhage, infection, pain and discomfort, injury to the aorta and other blood vessels, rhythm disturbance, low blood pressure, myocardial infarction, stroke, kidney damage/failure, myocardial perforation, allergic reactions to sedatives/contrast material, loss of pulse/vascular compromise requiring surgery, aneurysm/pseudoaneurysm formation, possible loss of a limb/hand/leg, needing blood transfusion, requiring emergent open heart surgery or emergent coronary intervention, the need for intubation/respiratory support, the requirement for defibrillation/cardioversion, and death. Alternatives to and omission of the suggested procedure were discussed.  The patient had no further questions and wished to proceed; the consent form was signed. MEDICAL HISTORY  []ASHD/ANGINA/MI/CHF   []Hypertension  []Diabetes  []Hyperlipidemia  []Smoking  []Family Hx of ASHD  [x]Additional information:       has no past medical history on file. SURGICAL HISTORY   has a past surgical history that includes Cholecystectomy.   Additional information:       ALLERGIES   Allergies as of 08/30/2021    (No Known Allergies)     Additional information:       MEDICATIONS   Aspirin  [] 81 mg  [] 325 mg  [x] None  Antiplatelet drug therapy use last 5 days  [x]No []Yes  Coumadin Use Last 5 Days [x]No []Yes  Other anticoagulant use last 5 days  [x]No []Yes    Current Facility-Administered Medications:     Dexamethasone Sodium Phosphate injection 10 mg, 10 mg, IntraVENous, Q24H, Kaila Mccarty MD, 10 mg at 09/01/21 1510    heparin 25,000 units in dextrose 5% 250 mL (premix) infusion, 5-30 Units/kg/hr, IntraVENous, Continuous, KATHARINA Mar CNP    fentaNYL 500 mcg in sodium chloride 0.9% 100 ml infusion, 12.5-300 mcg/hr, IntraVENous, Continuous, KATHARINA Mar - CNP    midazolam (VERSED) 100 mg in dextrose 5 % 100 mL infusion, 1-10 mg/hr, IntraVENous, Continuous, KATHARINA Mar - CNP    dexmedetomidine (PRECEDEX) 400 mcg in sodium chloride 0.9 % 100 mL infusion, 0.2-1.4 mcg/kg/hr, IntraVENous, Continuous, KATHARINA Mar - CNP    docusate (COLACE) 50 MG/5ML liquid 100 mg, 100 mg, Per NG tube, BID PRN, KATHARINA Mar - CNP    pantoprazole (PROTONIX) injection 40 mg, 40 mg, IntraVENous, Daily, 40 mg at 09/01/21 1736 **AND** sodium chloride (PF) 0.9 % injection 10 mL, 10 mL, IntraVENous, Daily, KATHARINA Mar CNP    [START ON 9/2/2021] potassium chloride 20 mEq/50 mL IVPB (Central Line), 20 mEq, IntraVENous, PRN, KATHARINA Mar - CNP    magnesium sulfate 2000 mg in 50 mL IVPB premix, 2,000 mg, IntraVENous, PRN, KATHARINA Mar - CNP    calcium replacement protocol, , Other, RX Placeholder, KATHARINA Salmon - CNP    albuterol (PROVENTIL) nebulizer solution 2.5 mg, 2.5 mg, Nebulization, Q4H PRN, Gaudencio Martell APRN - CNP    0.9 % sodium chloride infusion, , IntraVENous, Continuous, Gaudencio Dials, APRN - CNP, Last Rate: 125 mL/hr at 09/01/21 1736, New Bag at 09/01/21 1736    [START ON 9/2/2021] ceFAZolin (ANCEF) 2000 mg in dextrose 5 % 50 mL IVPB, 2,000 mg, IntraVENous, Q8H, KATHARINA Salmon - CNP    cisatracurium Besylate injection SOLN 10 mg, 10 mg, IntraVENous, Once, KATHARINA Salmon - CNP    cisatracurium besylate (NIMBEX) 200 mg in sodium chloride 0.9 % 100 mL infusion, 0.5-10 mcg/kg/min, IntraVENous, Continuous, KATHARINA Salmon - CNP, Last Rate: 7.2 mL/hr at 09/01/21 1820, 2 mcg/kg/min at 09/01/21 1820    ketamine (KETALAR) injection 100 mg, 100 mg, IntraVENous, Once, KATHARINA Salmon - CNP    propofol injection, 5-80 mcg/kg/min, IntraVENous, Titrated, Gaudencio Martell APRN - CNP    morphine 4 MG/ML injection, , , ,     morphine 2 MG/ML injection, , , ,     LORazepam (ATIVAN) 2 MG/ML injection, , , ,     chlorhexidine (PERIDEX) 0.12 % solution 15 mL, 15 mL, Mouth/Throat, BID, Gaudencio Martell APRN - CNP    heparin (porcine) injection 15,000 Units, 15,000 Units, IntraVENous, PRN, Gaudencio Martell APRN - CNP    0.9 % sodium chloride infusion, , IntraVENous, PRN, Ehsan Pham MD    0.9 % sodium chloride infusion, , IntraVENous, PRN, Ehsan Pham MD    lactobacillus (CULTURELLE) capsule 1 capsule, 1 capsule, Oral, Daily with breakfast, Terry Cleary MD, 1 capsule at 09/01/21 8390    sodium chloride flush 0.9 % injection 5-40 mL, 5-40 mL, IntraVENous, 2 times per day, Nicole Patterson MD, 10 mL at 09/01/21 0815    sodium chloride flush 0.9 % injection 5-40 mL, 5-40 mL, IntraVENous, PRN, Nicole Patterson MD    0.9 % sodium chloride infusion, 25 mL, IntraVENous, PRN, Nicole Patterson MD    ondansetron (ZOFRAN-ODT) disintegrating tablet 4 mg, 4 sedation/analgesia documentation record)  [x]Formulation and discussion of sedation/procedure plan, risks, and expectations with patient and/or responsible adult completed. [x]Patient examined immediately prior to the procedure.  (Refer to nursing sedation/analgesia documentation record)    Sandy Kiser MD MD   Electronically signed 9/1/2021 at 7:35 PM

## 2021-09-01 NOTE — FLOWSHEET NOTE
09/01/21 1712   Encounter Summary   Services provided to: Patient; Family   Referral/Consult From: Nurse   Support System Spouse; Family members   Continue Visiting Yes  (1/21 COVID-19)   Complexity of Encounter Moderate   Length of Encounter 30 minutes   Routine   Type Initial   Assessment Tearful; Anxious; Fearful;Unable to respond   Intervention Active listening;Explored feelings, thoughts, concerns;Prayer   Spiritual/Jew   Type Spiritual support   Assessment Tearful; Anxious; Fearful   Intervention Active listening;Nurtured hope;Prayer   Assessment:  During my contact with the 48year old COVID-19 patient he was intubated and unresponsive. The pt was being attend to by the medical staff. The pt's spouse Aquilino Ortiz) was tearfully present. She shared that her  was admitted 2 days ago. She also shared her anxiety and fears. Interventions:  I offered emotional support and words of encouragement. I also said a prayer of comfort, strength and healing. Outcomes: The pt's spouse was grateful and was less anxious. Plan:  A follow up from the [address] [place] [address] to assist the pt's family because it has been emotionally stressful.

## 2021-09-01 NOTE — H&P
Patient:  Pro Razo    Unit/Bed:4D-02/002-A  MRN: [id number]   PCP: Lauryn Pearson MD  Date of Admission: 8/30/2021    Assessment and Plan(All pulmonary edema, renal failure, PE, and respiratory failure diagnoses are acute in nature unless otherwise specified):        1. Acute hypoxemic respiratory failure: Secondary to COVID-19 with subsequent progression to ARDS. Patient intubated 9/1/2021. He was assessed for ECMO. He was found to be an excellent candidate for ECMO despite having obesity. After thorough discussion with the patient and family, he elected to proceed with intubation and ECMO. Patient will undergo minimal mechanical ventilator support and look to early wean from mechanical ventilator after ECMO is initiated. 2. COVID-19: Patient failed trial of remdesivir, tocilizumab, and Decadron. Patient with progressive hypoxemia. 3. ARDS: Patient will undergo pulmonary lavage to screen for bacterial pulmonary infection. Patient has already received Rocephin and Zithromax. Holding on antibiotics. Initiate ECMO. Initiate lung rest strategy. 4. Obesity: Aware. 5. Mild neutropenia: Obtain immunoglobulin panel. CC: [id number]. HPI: Patient is a 41-year-old white male lifetime non-smoker. He is a farmer by occupation. He is obese but has no other medical diagnoses including coronary artery disease diabetes or sleep apnea. Patient presented to the emergency room and was admitted on 8/30/2021. He started to develop fever associated with chills cough shortness of breath nasal congestion loss of taste and loss of smell starting 8/28/2021. As he continued to progress his dyspnea continued to worsen. He developed malaise. He indicated that his wife was recently diagnosed with Covid. Patient underwent Covid screen and was positive. CT scan chest showed bilateral infiltrates. He initially was started on low-flow oxygen but continued to require progressive oxygen supplementation.   He was treated with remdesivir Decadron and subsequently tocilizumab. He continued to develop progressive dyspnea and had the syndrome of \"happy hypoxia\". As patient continued to worsen, he elected to proceed with intubation and ECMO. Patient intubated 9/1/2021. ECMO cannulation 9/1/2021. ROS: Prior to intubation patient complained of shortness of breath. He denied fever. He had a cough but no mucus production. He denies chills. He denies diarrhea. Does complain of mild anxiety. He is currently sedated on mechanical ventilator. PMH:  Per HPI  SHX: Lifetime non-smoker. FHX: Recently exposed to his wife who had Covid. Allergies: NKDA  Medications:     heparin (PORCINE) Infusion      fentaNYL 500 mcg in sodium chloride 0.9% 100 ml infusion      midazolam      dexmedetomidine (PRECEDEX) IV infusion      sodium chloride      cisatracurium (NIMBEX) infusion      propofol      sodium chloride      sodium chloride        dexamethasone  10 mg IntraVENous Q24H    pantoprazole  40 mg IntraVENous Daily    And    sodium chloride (PF)  10 mL IntraVENous Daily    calcium replacement protocol   Other RX Placeholder    [START ON 9/2/2021] ceFAZolin (ANCEF) IVPB  2,000 mg IntraVENous Q8H    cisatracurium Besylate  10 mg IntraVENous Once    ketamine  100 mg IntraVENous Once    morphine        morphine        LORazepam        chlorhexidine  15 mL Mouth/Throat BID    ceFAZolin  3,000 mg IntraVENous Once    lactobacillus  1 capsule Oral Daily with breakfast    sodium chloride flush  5-40 mL IntraVENous 2 times per day       Vital Signs:   T: 98.5: P: 84 RR: 20 B/P: 137/88: FiO2: 100: O2 Sat:95: I/O: pending GCS: 3  Body mass index is 37.81 kg/m². Darrian Dailey General:   Heavyset acutely ill-appearing white male. HEENT:  normocephalic and atraumatic. No scleral icterus. PERR  Neck: supple. No Thyromegaly. Lungs: Bilateral crackles. Tachypnea. Normal respirations. Cardiac: RRR. No JVD. Abdomen: soft. Nontender. Nondistended. Extremities:  No clubbing, cyanosis, or edema x 4. Vasculature: capillary refill < 3 seconds. Palpable dorsalis pedis pulses. Skin:  warm and dry. Psych:  Alert and oriented x3. Affect appropriate. Now sedated on mechanical ventilator. Lymph:  No supraclavicular adenopathy. Neurologic:  No focal deficit. No seizures. Data: (All radiographs, tracings, PFTs, and imaging are personally viewed and interpreted unless otherwise noted).  Telemetry shows sinus tachycardia with a rate of 102.  Chest x-ray shows bilateral infiltrates.  Covid + 8/30/2021.  Sodium 140, potassium 3.8, chloride 106, bicarb 23, BUN 24, creatinine 0.7, glucose 152. CRP 4.0. Albumin 3.0. White blood cell count 2.4, hemoglobin 15.1, platelets 241. Ferritin 1997.  ABG on 100% oxygen: 7.4 5/34/67. PF ratio 67.    CC time 35 minutes. Time was discontiguous and does not include procedures. Electronically signed by Rosario Verma M.D.

## 2021-09-01 NOTE — PROCEDURES
Central Line Placement: Risks and benefits to the procedure were discussed. Alternatives and their risks were discussed as well. Patient was placed in the attention position. Patient was placed in Trendelenburg position. Patient was prepped using Chlorhexidene prep. Patient was draped utilizing sterile gloves, hair net, mask, sterile gown and sterile OR towels. Maximum barrier precautions were utilized. Utilizing the left subclavian approach, patient received 5 cc of 1% lidocaine. Utilizing an introducer needle, it was placed subcutaneously advanced under the clavicle and leveled flat. It was subsequently advanced toward the thoracic inlet, until venous return was obtained. A guide wire was placed through the introducer needle. The introducer needle was subsequently withdrawn leaving the guide wire in place. Utilizing a scalpel, a small incision was made in the skin. A punch dilator was placed over the guide wire and subsequently withdrawn leaving the guide wire in place. A cordis catheter was subsequently placed over the guide wire. The guide wire was subsequently withdrawn leaving this catheter in place. All ports had good venous return and were subsequently flushed with saline. The catheter was secured using 2.0 silk. Secondary cleansing with chlorhexidine prep was subsequently placed. Tegaderm with bio- patch dressing was utilized for secondary securing and protection. There were no complications. EBL:   Less than 5 mL      Indication:  ECMO    Electronically signed by Malka Cortez.  [place] MD

## 2021-09-01 NOTE — PROGRESS NOTES
80 DR. Sherin Rosado in assessed pt spoke [facility] pt and wife. 1621 morphine 4 mg given iv  1627 ketamne 100 mg given iv.  1630 intubated [facility] 8 ett 26 at lip.placed on vent bilateral breath sounds heard. 1634 morphne 4 mg given iv.  1635 ativan 4 mg given iv.  1640 preparing for cvc and introducer.

## 2021-09-01 NOTE — BRIEF OP NOTE
[facility]  Sedation/Analgesia Post Sedation Record    Pt Name: Verónica Malone  Account number: [de-identified]  MRN: [id number]  YOB: 1975  Procedure Performed By: Percival Augusta, MD MD Candance Crate, [address]  Primary Care Physician: Juan Armenta MD  Date: 9/1/2021    POST-PROCEDURE    Physicians/Assistants: Percival Augusta, MD MD Candance Crate, JESÚS    Procedure Performed:VV ECMO, Cath, [address]      Sedation/Anesthesia: Versed/ Fentanyl and 2% xylocaine local anesthesia. Estimated Blood Loss: < 50 ml. Specimens Removed: None         Disposition of Specimen: N/A        Complications: No Immediate Complications.        Post-procedure Diagnosis/Findings:     No sig CAD  PA pressure 40s  Preserved CO/CI  25Fr outlflow cannula on the left  24Fr inflow cannula on the right  Heparin gtt    Flow was 5-8 L./min          Percival Augusta, MD MD Candance Crate, JESÚS  Electronically signed 9/1/2021 at 7:37 PM  Interventional Cardiology

## 2021-09-01 NOTE — CONSULTS
[facility]    Patient's Name/Date of Birth: Lashawn Olivares / 1975 (15 y.o.)    Date: September 1, 2021     Referring Provider: Carlos Alvarez MD    CHIEF COMPLAINT: Hypoxic Resp Failure     HPI: This is a pleasant [address] y.o. male presents with morbid obesity with 3-4 day onset of sob and fatigue. Wife states it has been difficult for him to move and function which is unusual for him. He came in to the ED after stated he was just too sick. Wife is COVID + and and RN at hospital.  He was hypoxic to 4L initially with bilateral severe infiltrates. Over the last 24 hours, he continues to be hypoxic, RR has increased from 20 to 25. PF ratio < 70 more than 4 hours. He is on 100% FiO2, and 40-60Lpm.  He has continued to decline. Wife and patient have been given all options. He is on medical therapy for COVID. No smoking. Echo: No results found for this or any previous visit. All labs, EKG's, diagnostic testing and images as well as cardiac cath, stress testing were reviewed during this encounter    History reviewed. No pertinent past medical history.   Past Surgical History:   Procedure Laterality Date    CHOLECYSTECTOMY       Current Facility-Administered Medications   Medication Dose Route Frequency Provider Last Rate Last Admin    Dexamethasone Sodium Phosphate injection 10 mg  10 mg IntraVENous Q24H Gerson Sin MD        azithromycin [facility]) tablet 500 mg  500 mg Oral Once Carlos Alvarez MD        cefTRIAXone (ROCEPHIN) 1000 mg IVPB in 50 mL D5W minibag  1,000 mg IntraVENous Q24H Carlos Alvarez MD       [employer] [START ON 9/2/2021] azithromycin (ZITHROMAX) tablet 250 mg  250 mg Oral Daily Carlos Alvarez MD        heparin (porcine) injection 5,000 Units  5,000 Units SubCUTAneous 3 times per day Jackalyn Gottron, MD   5,000 Units at 09/01/21 0550    lactobacillus (CULTURELLE) capsule 1 capsule  1 capsule Oral Daily with breakfast Bryant Caba MD   1 capsule at 09/01/21 0934    sodium chloride flush 0.9 % injection 5-40 mL  5-40 mL IntraVENous 2 times per day Kelsea Dale MD   10 mL at 09/01/21 0815    sodium chloride flush 0.9 % injection 5-40 mL  5-40 mL IntraVENous PRN Kelsea Dale MD        0.9 % sodium chloride infusion  25 mL IntraVENous PRN Kelsea Dale MD        ondansetron (ZOFRAN-ODT) disintegrating tablet 4 mg  4 mg Oral Q8H PRN Kelsea Dale MD        Or    ondansetron [facility]) injection 4 mg  4 mg IntraVENous Q6H PRN Kelsea Dale MD        polyethylene glycol (GLYCOLAX) packet 17 g  17 g Oral Daily PRN Kelsea Dale MD        acetaminophen (TYLENOL) tablet 650 mg  650 mg Oral Q6H PRN Kelsea Dale MD        Or   [facility] acetaminophen (TYLENOL) suppository 650 mg  650 mg Rectal Q6H PRN Kelsea Dale MD        remdesivir 100 mg in sodium chloride 0.9 % 250 mL IVPB  100 mg IntraVENous Q24H Kelsea Dale MD   Stopped at 08/31/21 2313    0.9 % sodium chloride bolus  30 mL IntraVENous PRN Kelsea Dale MD         Prior to Admission medications    Not on File   Scheduled Meds:   dexamethasone  10 mg IntraVENous Q24H    azithromycin  500 mg Oral Once    cefTRIAXone (ROCEPHIN) IV  1,000 mg IntraVENous Q24H    [START ON 9/2/2021] azithromycin  250 mg Oral Daily    heparin (porcine)  5,000 Units SubCUTAneous 3 times per day    lactobacillus  1 capsule Oral Daily with breakfast    sodium chloride flush  5-40 mL IntraVENous 2 times per day    remdesivir IVPB  100 mg IntraVENous Q24H     Continuous Infusions:   sodium chloride       PRN Meds:.sodium chloride flush, sodium chloride, ondansetron **OR** ondansetron, polyethylene glycol, acetaminophen **OR** acetaminophen, sodium chloride    No Known Allergies  History reviewed. No pertinent family history.   Social History     Socioeconomic History    Marital status:      Spouse name: Not on file    Number of children: Not on file    Years of education: Not on file    Highest education level: Not on file   Occupational History    Not on file   Tobacco Use    Smoking status: Never Smoker    Smokeless tobacco: Never Used   Vaping Use    Vaping Use: Never used   Substance and Sexual Activity    Alcohol use: Yes     Comment: social    Drug use: Never    Sexual activity: Not on file   Other Topics Concern    Not on file   Social History Narrative    Not on file     Social Determinants of Health     Financial Resource Strain:     Difficulty of Paying Living Expenses:    Food Insecurity:     Worried About Running Out of Food in the Last Year:     [address] in the Last Year:    Transportation Needs:     Lack of Transportation (Medical):  Lack of Transportation (Non-Medical):    Physical Activity:     Days of Exercise per Week:     Minutes of Exercise per Session:    Stress:     Feeling of Stress :    Social Connections:     Frequency of Communication with Friends and Family:     Frequency of Social Gatherings with Friends and Family:     Attends Christianity Services:     Active Member of Clubs or Organizations:     Attends Club or Organization Meetings:     Marital Status:    Intimate Partner Violence:     Fear of Current or Ex-Partner:     Emotionally Abused:     Physically Abused:     Sexually Abused:      ROS:   Constitutional: Denies any recent wt change. Eyes:  Denies any blurring or double vision, no glaucoma  Ears/Nose/Mouth/Throat:  Denies any chronic sinus/rhinitis, bleeding gums  Cardiovascular:  As described above. Respiratory:  Denies any frequent cough, wheezing or coughing up blood  Genitourinary:  Denies difficulty with urination and kidney stones  Gastrointestinal:  Denies any chronic problems with abdominal pain, nausea, vomiting or diarrhea  Musculoskeletal:  Denies any joint pain, back pain, or difficulty walking  Integumentary:  Denies any rash  Neurological:  No numbness or tingling  Endocrine:  Denies any polydipsia. Hematologic/Lymphatic:  Denies any hemorrhage or lymphatic drainage problems. Labs:  CBC:   Recent Labs     08/30/21  1712 08/31/21  0208 08/31/21  0807   WBC 4.2* 2.8* 2.4*   HGB 16.0 15.0 15.1   HCT 48.4 46.1 45.9   MCV 88.2 88.5 87.1    157 165     BMP:   Recent Labs     08/30/21  1712 08/31/21  0807 09/01/21  0454    140 140   K 3.9 3.8 3.8   CL 97* 104 106   CO2 25 23 23   BUN 24* 21 24*   CREATININE 1.5* 0.8 0.7     Accucheck Glucoses: No results for input(s): POCGLU in the last 72 hours. Cardiac Enzymes: No results for input(s): CKTOTAL, CKMB, CKMBINDEX, TROPONINI in the last 72 hours.   PT/INR:   Recent Labs     08/30/21  1712   INR 1.29*     APTT:   Recent Labs     08/31/21  0208 08/31/21  0807 08/31/21  1435   APTT 36.5 35.4 31.5     Liver Profile:  Lab Results   Component Value Date    AST 35 09/01/2021    ALT 20 09/01/2021    BILITOT 0.4 09/01/2021    ALKPHOS 50 09/01/2021   No results found for: CHOL, HDL, TRIG  TSH: No results found for: TSH  UA: No results found for: NITRITE, COLORU, PHUR, LABCAST, WBCUA, RBCUA, MUCUS, TRICHOMONAS, YEAST, BACTERIA, CLARITYU, SPECGRAV, LEUKOCYTESUR, UROBILINOGEN, BILIRUBINUR, BLOODU, GLUCOSEU, AMORPHOUS      Physical Exam:  Vitals:    09/01/21 1428   BP:    Pulse:    Resp: (!) 32   Temp:    SpO2: 95%        Intake/Output Summary (Last 24 hours) at 9/1/2021 1510  Last data filed at 9/1/2021 1157  Gross per 24 hour   Intake 598 ml   Output 400 ml   Net 198 ml      General:  Tachypneic, fatigued  Neck: Supple, no JVD, no carotid bruits  Heart: Regular rhythm normal S1 and S2, no rubs, murmurs or gallops  Lungs: Bilateral crackles  Abdomen: positive bowel sounds, soft, non-tender, non-distended, no bruits, no masses  Extremities:no clubbing, cyanosis or edema  Neurologic: alert and oriented x 3, cranial nerves 2-12 grossly intact, motor and sensory intact, moving all extremities  Skin: No rashes    Assessment:  Acute Hypoxic Resp Failure with COVID 19

## 2021-09-01 NOTE — PROGRESS NOTES
Hospitalist Progress Note      Patient:  Kyle Washburn    Unit/Bed:4B-05/005-A  YOB: 1975  MRN: [id number]   Acct: [de-identified]   PCP: Galen Jorgensen MD  Date of Admission: 8/30/2021    Assessment/Plan:    Acute hypoxic respiratory failure secondary to COVID-19 pneumonia-WORSENING - Currently on remdesivir and Dexamethasone, s/p Tocilizumabab; ok to continue w/ supplements. on HFO w/ ABG showing hypercapnia c/w resp fatigue, transferred to ICU for consideration fo ECMO first thing in the morning . procalcitonin WNLs, no Abx warranted at this time. IS/Acapella/PT to see        NIRAJ-likely prerenal resolved with IV fluid     Morbid obesity w/ BMI 37.81    Chief Complaint: SOB    Initial H and P:-    Admitted for management of Acute hypoxic respiratory failure secondary to COVID 19  I took over care on 9/1/2021. Pt was already transferred to ICU. Subjective (past 24 hours):   Pt reports clinical status overall unchanged; denies CP/fever/chills/palpitation      Past medical history, family history, social history and allergies reviewed again and is unchanged since admission. ROS (All review of systems completed. Pertinent positives noted.  Otherwise All other systems reviewed and negative.)     Medications:  Reviewed    Infusion Medications    sodium chloride       Scheduled Medications    dexamethasone  10 mg IntraVENous Q24H    azithromycin  500 mg Oral Daily    cefTRIAXone (ROCEPHIN) IV  1,000 mg IntraVENous Q24H    heparin (porcine)  5,000 Units SubCUTAneous 3 times per day    lactobacillus  1 capsule Oral Daily with breakfast    sodium chloride flush  5-40 mL IntraVENous 2 times per day    remdesivir IVPB  100 mg IntraVENous Q24H     PRN Meds: sodium chloride flush, sodium chloride, ondansetron **OR** ondansetron, polyethylene glycol, acetaminophen **OR** acetaminophen, sodium chloride      Intake/Output Summary (Last 24 hours) at 9/1/2021 results found for: ORG    No results found for: LABGRAM    MRSA culture only:No results found for: [facility]    Urine culture: No results found for: LABURIN    Respiratory culture: No results found for: CULTRESP    Aerobic and Anaerobic :  No results found for: LABAERO  No results found for: LABANAE    Urinalysis:    No results found for: [place]ny [place], BACTERIA, RBCUA, BLOODU, [address] [place] [address]    Radiology:  CTA Chest W WO Contrast   Final Result   1. No pulmonary emboli are seen. 2. Moderate groundglass infiltrates scattered throughout both lungs. **This report has been created using voice recognition software. It may contain minor errors which are inherent in voice recognition technology. **          Final report electronically signed by Dr. Raffi Alcantara on 8/30/2021 6:35 PM      XR CHEST PORTABLE   Final Result   1. Poor inflation of the lungs. Mild cardiomegaly. .   2. Moderate patchy infiltrates both mid and lower lung fields. No effusion. **This report has been created using voice recognition software. It may contain minor errors which are inherent in voice recognition technology. **      Final report electronically signed by Dr. Raffi Alcantara on 8/30/2021 5:35 PM      XR CHEST PORTABLE    (Results Pending)     CTA Chest W WO Contrast    Result Date: 8/30/2021  CTA THORAX / PULMONARY EMBOLUS STUDY: CLINICAL INFORMATION: SOB, positive for covid COMPARISON; No prior study. TECHNIQUE: 1.5 mm axial images were obtained through the chest following the administration of IV contrast (ISOVUE). A non-contrast localizer was obtained. 3D reconstructions were performed on the scanner to include sagittal and coronal MIP images through both the right and left pulmonary arteries. ALL CT SCANS AT THIS FACILITY use dose modulation, iterative reconstruction, and/or weight-based dosing when appropriate to reduce radiation dose to as low as reasonably achievable.  FINDINGS: Upper abdomen: Small hiatus hernia. Mediastinum and darren: There are several slightly prominent lymph nodes in the mediastinum, likely postinflammatory. Many of the lymph nodes are calcified, consistent with old, healed granulomatous disease. Similarly, there are calcified lymph nodes in both hilar regions. Bones: No evidence for acute fracture or bone destruction. Lungs: Moderate patchy groundglass infiltrates are scattered throughout both lungs, consistent with a history of covid infection. . No effusion is seen. Vascular: No pulmonary emboli are seen. There is no large vessel aneurysm or dissection. 1.  No pulmonary emboli are seen. 2. Moderate groundglass infiltrates scattered throughout both lungs. **This report has been created using voice recognition software. It may contain minor errors which are inherent in voice recognition technology. **  Final report electronically signed by Dr. Dru Gore on 8/30/2021 6:35 PM    XR CHEST PORTABLE    Result Date: 8/30/2021  PROCEDURE: XR CHEST PORTABLE CLINICAL INFORMATION: SOB, covid COMPARISON: No prior study. TECHNIQUE: A single mobile view of the chest was obtained. 1. Poor inflation of the lungs. Mild cardiomegaly. . 2. Moderate patchy infiltrates both mid and lower lung fields. No effusion. **This report has been created using voice recognition software. It may contain minor errors which are inherent in voice recognition technology. ** Final report electronically signed by Dr. Dru Gore on 8/30/2021 5:35 PM    With RN in room, patient was updated about and agreed upon the treatment plan, all the questions and concerns were addressed. Patient was seen in PPE (PERSONAL PROTECTIVE EQUIPMENT). Patient was interviewed in Strict Airborne and Droplet Precautions.     Electronically signed by Lalo Cobian MD on 9/1/2021 at 8:49 AM

## 2021-09-01 NOTE — FLOWSHEET NOTE
Pt was with his wife at the time of the visit. He got covid and will be intubated. His wife was very emotional and teary. I consoled them and empowered them and prayed for them. 09/01/21 1729   Encounter Summary   Services provided to: Patient and family together   Referral/Consult From: Nurse;Nursing Supervisor/Manager   Support System Spouse; Children   Continue Visiting Yes  (9/1)   Complexity of Encounter Moderate   Length of Encounter 15 minutes   Spiritual/Mormonism   Type Spiritual support   Assessment Approachable;Tearful;Fearful   Intervention Active listening;Nurtured hope;Prayer;Empowerment;Sustaining presence/ Ministry of presence   Outcome Connection/belonging;Expressed gratitude;Encouraged; Hopeful;Receptive

## 2021-09-01 NOTE — FLOWSHEET NOTE
Patient arrived to unit from  via bed. Patient transferred to ICU bed and placed on continuous ICU bedside monitor. Patient admitted for Hypoxia [R09.02]  COVID-19 [U07.1]. Vitals obtained. See flowsheets. Patient's IV access includes LR left AC. Current infusions and rates of infusion include LR atKVO. Assessment completed by Conrado Jc RN. Two nurse skin assessment completed by Marya Yu. See flowsheets for assessment details. Policies and procedures of ICU able to be explained to patient at this time. Family member(s)/representative(s) present at time of admission include wife. Patient rights explained to family member(s)/representatives and patient, as able. Patient/patient's family member(s)/representative(s) N/A to have physician notified of their admission. All questions posed by patient's family member(s)/representative(s) and patient answered at this time.

## 2021-09-02 ENCOUNTER — APPOINTMENT (OUTPATIENT)
Dept: GENERAL RADIOLOGY | Age: 46
DRG: 003 | End: 2021-09-02
Payer: COMMERCIAL

## 2021-09-02 LAB
ALBUMIN SERPL-MCNC: 2.9 G/DL (ref 3.5–5.1)
ALBUMIN SERPL-MCNC: 3 G/DL (ref 3.5–5.1)
ALLEN TEST: ABNORMAL
ALLEN TEST: NORMAL
ALP BLD-CCNC: 36 U/L (ref 38–126)
ALP BLD-CCNC: 37 U/L (ref 38–126)
ALT SERPL-CCNC: 26 U/L (ref 11–66)
ALT SERPL-CCNC: 26 U/L (ref 11–66)
ANION GAP SERPL CALCULATED.3IONS-SCNC: 10 MEQ/L (ref 8–16)
ANION GAP SERPL CALCULATED.3IONS-SCNC: 8 MEQ/L (ref 8–16)
ANION GAP SERPL CALCULATED.3IONS-SCNC: 9 MEQ/L (ref 8–16)
AST SERPL-CCNC: 44 U/L (ref 5–40)
AST SERPL-CCNC: 44 U/L (ref 5–40)
AVERAGE GLUCOSE: 108 MG/DL (ref 70–126)
BASE EXCESS (CALCULATED): -0.4 MMOL/L (ref -2.5–2.5)
BASE EXCESS (CALCULATED): -0.5 MMOL/L (ref -2.5–2.5)
BASE EXCESS (CALCULATED): -0.5 MMOL/L (ref -2.5–2.5)
BASE EXCESS (CALCULATED): -0.6 MMOL/L (ref -2.5–2.5)
BASE EXCESS (CALCULATED): -0.8 MMOL/L (ref -2.5–2.5)
BASE EXCESS (CALCULATED): -1.2 MMOL/L (ref -2.5–2.5)
BASE EXCESS (CALCULATED): -1.2 MMOL/L (ref -2.5–2.5)
BASE EXCESS (CALCULATED): -1.3 MMOL/L (ref -2.5–2.5)
BASE EXCESS (CALCULATED): -1.4 MMOL/L (ref -2.5–2.5)
BASE EXCESS (CALCULATED): -1.4 MMOL/L (ref -2.5–2.5)
BASE EXCESS (CALCULATED): -1.7 MMOL/L (ref -2.5–2.5)
BASE EXCESS MIXED: -0.5 MMOL/L (ref -2–3)
BASE EXCESS MIXED: -0.8 MMOL/L (ref -2–3)
BASE EXCESS MIXED: -0.8 MMOL/L (ref -2–3)
BASE EXCESS MIXED: -1 MMOL/L (ref -2–3)
BASE EXCESS MIXED: -1.3 MMOL/L (ref -2–3)
BASE EXCESS MIXED: -1.4 MMOL/L (ref -2–3)
BASE EXCESS MIXED: -1.7 MMOL/L (ref -2–3)
BASE EXCESS MIXED: -1.8 MMOL/L (ref -2–3)
BASE EXCESS MIXED: -3 MMOL/L (ref -2–3)
BASE EXCESS MIXED: -3.1 MMOL/L (ref -2–3)
BASE EXCESS MIXED: -4.2 MMOL/L (ref -2–3)
BASE EXCESS MIXED: -4.8 MMOL/L (ref -2–3)
BASE EXCESS MIXED: 0 MMOL/L (ref -2–3)
BASE EXCESS MIXED: 0.3 MMOL/L (ref -2–3)
BASE EXCESS MIXED: 0.4 MMOL/L (ref -2–3)
BASE EXCESS MIXED: 0.8 MMOL/L (ref -2–3)
BILIRUB SERPL-MCNC: 0.6 MG/DL (ref 0.3–1.2)
BILIRUB SERPL-MCNC: 0.6 MG/DL (ref 0.3–1.2)
BILIRUBIN DIRECT: 0.3 MG/DL (ref 0–0.3)
BUN BLDV-MCNC: 18 MG/DL (ref 7–22)
BUN BLDV-MCNC: 19 MG/DL (ref 7–22)
BUN BLDV-MCNC: 19 MG/DL (ref 7–22)
BUN BLDV-MCNC: 20 MG/DL (ref 7–22)
BUN BLDV-MCNC: 20 MG/DL (ref 7–22)
C-REACTIVE PROTEIN: 1.36 MG/DL (ref 0–1)
CALCIUM IONIZED: 1.05 MMOL/L (ref 1.12–1.32)
CALCIUM SERPL-MCNC: 7.2 MG/DL (ref 8.5–10.5)
CALCIUM SERPL-MCNC: 7.4 MG/DL (ref 8.5–10.5)
CALCIUM SERPL-MCNC: 7.5 MG/DL (ref 8.5–10.5)
CHLORIDE BLD-SCNC: 110 MEQ/L (ref 98–111)
CHLORIDE BLD-SCNC: 110 MEQ/L (ref 98–111)
CHLORIDE BLD-SCNC: 111 MEQ/L (ref 98–111)
CHLORIDE BLD-SCNC: 112 MEQ/L (ref 98–111)
CHLORIDE BLD-SCNC: 112 MEQ/L (ref 98–111)
CO2: 22 MEQ/L (ref 23–33)
CO2: 23 MEQ/L (ref 23–33)
CO2: 24 MEQ/L (ref 23–33)
COLLECTED BY:: ABNORMAL
COLLECTED BY:: NORMAL
CREAT SERPL-MCNC: 0.6 MG/DL (ref 0.4–1.2)
CREAT SERPL-MCNC: 0.7 MG/DL (ref 0.4–1.2)
DEVICE: ABNORMAL
DEVICE: NORMAL
EKG ATRIAL RATE: 53 BPM
EKG P AXIS: 62 DEGREES
EKG P-R INTERVAL: 102 MS
EKG Q-T INTERVAL: 514 MS
EKG QRS DURATION: 114 MS
EKG QTC CALCULATION (BAZETT): 482 MS
EKG R AXIS: -17 DEGREES
EKG T AXIS: -31 DEGREES
EKG VENTRICULAR RATE: 53 BPM
ERYTHROCYTE [DISTWIDTH] IN BLOOD BY AUTOMATED COUNT: 13.8 % (ref 11.5–14.5)
ERYTHROCYTE [DISTWIDTH] IN BLOOD BY AUTOMATED COUNT: 14 % (ref 11.5–14.5)
ERYTHROCYTE [DISTWIDTH] IN BLOOD BY AUTOMATED COUNT: 14.1 % (ref 11.5–14.5)
ERYTHROCYTE [DISTWIDTH] IN BLOOD BY AUTOMATED COUNT: 14.5 % (ref 11.5–14.5)
ERYTHROCYTE [DISTWIDTH] IN BLOOD BY AUTOMATED COUNT: 45 FL (ref 35–45)
ERYTHROCYTE [DISTWIDTH] IN BLOOD BY AUTOMATED COUNT: 45.1 FL (ref 35–45)
ERYTHROCYTE [DISTWIDTH] IN BLOOD BY AUTOMATED COUNT: 45.9 FL (ref 35–45)
ERYTHROCYTE [DISTWIDTH] IN BLOOD BY AUTOMATED COUNT: 48.1 FL (ref 35–45)
FIBRINOGEN: 252 MG/100ML (ref 155–475)
FIBRINOGEN: 272 MG/100ML (ref 155–475)
FIBRINOGEN: 295 MG/100ML (ref 155–475)
FIBRINOGEN: 329 MG/100ML (ref 155–475)
FIO2, MIXED VENOUS: 30
GFR SERPL CREATININE-BSD FRML MDRD: > 90 ML/MIN/1.73M2
GLUCOSE BLD-MCNC: 146 MG/DL (ref 70–108)
GLUCOSE BLD-MCNC: 154 MG/DL (ref 70–108)
GLUCOSE BLD-MCNC: 162 MG/DL (ref 70–108)
GLUCOSE BLD-MCNC: 162 MG/DL (ref 70–108)
GLUCOSE BLD-MCNC: 164 MG/DL (ref 70–108)
GLUCOSE BLD-MCNC: 169 MG/DL (ref 70–108)
GLUCOSE, WHOLE BLOOD: 153 MG/DL (ref 70–108)
GLUCOSE, WHOLE BLOOD: 164 MG/DL (ref 70–108)
GLUCOSE, WHOLE BLOOD: 169 MG/DL (ref 70–108)
GLUCOSE, WHOLE BLOOD: 172 MG/DL (ref 70–108)
GLUCOSE, WHOLE BLOOD: 172 MG/DL (ref 70–108)
GLUCOSE, WHOLE BLOOD: 179 MG/DL (ref 70–108)
GLUCOSE, WHOLE BLOOD: 186 MG/DL (ref 70–108)
HBA1C MFR BLD: 5.6 % (ref 4.4–6.4)
HCO3, MIXED: 19 MMOL/L (ref 23–28)
HCO3, MIXED: 20 MMOL/L (ref 23–28)
HCO3, MIXED: 20 MMOL/L (ref 23–28)
HCO3, MIXED: 21 MMOL/L (ref 23–28)
HCO3, MIXED: 23 MMOL/L (ref 23–28)
HCO3, MIXED: 24 MMOL/L (ref 23–28)
HCO3, MIXED: 25 MMOL/L (ref 23–28)
HCO3, MIXED: 26 MMOL/L (ref 23–28)
HCO3, MIXED: 26 MMOL/L (ref 23–28)
HCO3, MIXED: 27 MMOL/L (ref 23–28)
HCO3: 22 MMOL/L (ref 23–28)
HCO3: 22 MMOL/L (ref 23–28)
HCO3: 23 MMOL/L (ref 23–28)
HCO3: 24 MMOL/L (ref 23–28)
HCO3: 25 MMOL/L (ref 23–28)
HCT VFR BLD CALC: 34.2 % (ref 42–52)
HCT VFR BLD CALC: 34.2 % (ref 42–52)
HCT VFR BLD CALC: 34.9 % (ref 42–52)
HCT VFR BLD CALC: 36.7 % (ref 42–52)
HCT VFR BLD CALC: 38.3 % (ref 42–52)
HEMOGLOBIN: 10.7 GM/DL (ref 14–18)
HEMOGLOBIN: 11 GM/DL (ref 14–18)
HEMOGLOBIN: 11.4 GM/DL (ref 14–18)
HEMOGLOBIN: 11.9 GM/DL (ref 14–18)
HEMOGLOBIN: 12.3 GM/DL (ref 14–18)
IFIO2: 30
IGA: 64 MG/DL (ref 70–400)
IGG: 721 MG/DL (ref 700–1600)
IGM: 54 MG/DL (ref 40–230)
LACTIC ACID: 1.3 MMOL/L (ref 0.5–2)
LACTIC ACID: 1.8 MMOL/L (ref 0.5–2)
MAGNESIUM: 2.1 MG/DL (ref 1.6–2.4)
MCH RBC QN AUTO: 28.4 PG (ref 26–33)
MCH RBC QN AUTO: 28.4 PG (ref 26–33)
MCH RBC QN AUTO: 28.5 PG (ref 26–33)
MCH RBC QN AUTO: 28.7 PG (ref 26–33)
MCHC RBC AUTO-ENTMCNC: 31.3 GM/DL (ref 32.2–35.5)
MCHC RBC AUTO-ENTMCNC: 32.1 GM/DL (ref 32.2–35.5)
MCHC RBC AUTO-ENTMCNC: 32.2 GM/DL (ref 32.2–35.5)
MCHC RBC AUTO-ENTMCNC: 32.7 GM/DL (ref 32.2–35.5)
MCV RBC AUTO: 87.9 FL (ref 80–94)
MCV RBC AUTO: 88.5 FL (ref 80–94)
MCV RBC AUTO: 88.6 FL (ref 80–94)
MCV RBC AUTO: 90.7 FL (ref 80–94)
MODE: ABNORMAL
MODE: NORMAL
O2 SAT, MIXED: 62 %
O2 SAT, MIXED: 64 %
O2 SAT, MIXED: 66 %
O2 SAT, MIXED: 66 %
O2 SAT, MIXED: 68 %
O2 SAT, MIXED: 69 %
O2 SAT, MIXED: 73 %
O2 SAT, MIXED: 79 %
O2 SAT, MIXED: 82 %
O2 SAT, MIXED: 82 %
O2 SAT, MIXED: 84 %
O2 SAT, MIXED: 87 %
O2 SAT, MIXED: 87 %
O2 SAT, MIXED: 91 %
O2 SAT, MIXED: 91 %
O2 SAT, MIXED: 99 %
O2 SATURATION: 92 %
O2 SATURATION: 94 %
O2 SATURATION: 95 %
O2 SATURATION: 97 %
O2 SATURATION: 97 %
O2 SATURATION: 98 %
O2 SATURATION: 99 %
PCO2, MIXED VENOUS: 29 MMHG (ref 41–51)
PCO2, MIXED VENOUS: 30 MMHG (ref 41–51)
PCO2, MIXED VENOUS: 30 MMHG (ref 41–51)
PCO2, MIXED VENOUS: 31 MMHG (ref 41–51)
PCO2, MIXED VENOUS: 36 MMHG (ref 41–51)
PCO2, MIXED VENOUS: 37 MMHG (ref 41–51)
PCO2, MIXED VENOUS: 38 MMHG (ref 41–51)
PCO2, MIXED VENOUS: 40 MMHG (ref 41–51)
PCO2, MIXED VENOUS: 44 MMHG (ref 41–51)
PCO2, MIXED VENOUS: 46 MMHG (ref 41–51)
PCO2, MIXED VENOUS: 46 MMHG (ref 41–51)
PCO2, MIXED VENOUS: 47 MMHG (ref 41–51)
PCO2: 30 MMHG (ref 35–45)
PCO2: 32 MMHG (ref 35–45)
PCO2: 35 MMHG (ref 35–45)
PCO2: 36 MMHG (ref 35–45)
PCO2: 37 MMHG (ref 35–45)
PCO2: 37 MMHG (ref 35–45)
PCO2: 38 MMHG (ref 35–45)
PCO2: 39 MMHG (ref 35–45)
PCO2: 39 MMHG (ref 35–45)
PCO2: 42 MMHG (ref 35–45)
PCO2: 42 MMHG (ref 35–45)
PCO2: 46 MMHG (ref 35–45)
PH BLOOD GAS: 7.34 (ref 7.35–7.45)
PH BLOOD GAS: 7.37 (ref 7.35–7.45)
PH BLOOD GAS: 7.38 (ref 7.35–7.45)
PH BLOOD GAS: 7.39 (ref 7.35–7.45)
PH BLOOD GAS: 7.4 (ref 7.35–7.45)
PH BLOOD GAS: 7.4 (ref 7.35–7.45)
PH BLOOD GAS: 7.41 (ref 7.35–7.45)
PH BLOOD GAS: 7.42 (ref 7.35–7.45)
PH BLOOD GAS: 7.43 (ref 7.35–7.45)
PH BLOOD GAS: 7.45 (ref 7.35–7.45)
PH BLOOD GAS: 7.47 (ref 7.35–7.45)
PH, MIXED: 7.32 (ref 7.31–7.41)
PH, MIXED: 7.34 (ref 7.31–7.41)
PH, MIXED: 7.34 (ref 7.31–7.41)
PH, MIXED: 7.36 (ref 7.31–7.41)
PH, MIXED: 7.36 (ref 7.31–7.41)
PH, MIXED: 7.37 (ref 7.31–7.41)
PH, MIXED: 7.37 (ref 7.31–7.41)
PH, MIXED: 7.38 (ref 7.31–7.41)
PH, MIXED: 7.4 (ref 7.31–7.41)
PH, MIXED: 7.42 (ref 7.31–7.41)
PH, MIXED: 7.42 (ref 7.31–7.41)
PH, MIXED: 7.43 (ref 7.31–7.41)
PH, MIXED: 7.45 (ref 7.31–7.41)
PH, MIXED: 7.46 (ref 7.31–7.41)
PIP: 22 CMH2O
PLATELET # BLD: 173 THOU/MM3 (ref 130–400)
PLATELET # BLD: 178 THOU/MM3 (ref 130–400)
PLATELET # BLD: 195 THOU/MM3 (ref 130–400)
PLATELET # BLD: 196 THOU/MM3 (ref 130–400)
PMV BLD AUTO: 9.4 FL (ref 9.4–12.4)
PMV BLD AUTO: 9.5 FL (ref 9.4–12.4)
PMV BLD AUTO: 9.5 FL (ref 9.4–12.4)
PMV BLD AUTO: 9.7 FL (ref 9.4–12.4)
PO2 MIXED: 146 MMHG (ref 25–40)
PO2 MIXED: 33 MMHG (ref 25–40)
PO2 MIXED: 34 MMHG (ref 25–40)
PO2 MIXED: 36 MMHG (ref 25–40)
PO2 MIXED: 36 MMHG (ref 25–40)
PO2 MIXED: 37 MMHG (ref 25–40)
PO2 MIXED: 37 MMHG (ref 25–40)
PO2 MIXED: 40 MMHG (ref 25–40)
PO2 MIXED: 43 MMHG (ref 25–40)
PO2 MIXED: 43 MMHG (ref 25–40)
PO2 MIXED: 45 MMHG (ref 25–40)
PO2 MIXED: 50 MMHG (ref 25–40)
PO2 MIXED: 51 MMHG (ref 25–40)
PO2 MIXED: 53 MMHG (ref 25–40)
PO2 MIXED: 59 MMHG (ref 25–40)
PO2 MIXED: 64 MMHG (ref 25–40)
PO2: 102 MMHG (ref 71–104)
PO2: 103 MMHG (ref 71–104)
PO2: 115 MMHG (ref 71–104)
PO2: 123 MMHG (ref 71–104)
PO2: 125 MMHG (ref 71–104)
PO2: 130 MMHG (ref 71–104)
PO2: 131 MMHG (ref 71–104)
PO2: 132 MMHG (ref 71–104)
PO2: 65 MMHG (ref 71–104)
PO2: 68 MMHG (ref 71–104)
PO2: 72 MMHG (ref 71–104)
PO2: 92 MMHG (ref 71–104)
PO2: 97 MMHG (ref 71–104)
PO2: 99 MMHG (ref 71–104)
POC ACTIVATED CLOTTING TIME KAOLIN: 164 SECONDS (ref 1–150)
POC ACTIVATED CLOTTING TIME KAOLIN: 164 SECONDS (ref 1–150)
POC ACTIVATED CLOTTING TIME KAOLIN: 169 SECONDS (ref 1–150)
POC ACTIVATED CLOTTING TIME KAOLIN: 169 SECONDS (ref 1–150)
POC ACTIVATED CLOTTING TIME KAOLIN: 175 SECONDS (ref 1–150)
POC ACTIVATED CLOTTING TIME KAOLIN: 186 SECONDS (ref 1–150)
POC ACTIVATED CLOTTING TIME KAOLIN: 191 SECONDS (ref 1–150)
POC ACTIVATED CLOTTING TIME KAOLIN: 191 SECONDS (ref 1–150)
POC ACTIVATED CLOTTING TIME KAOLIN: 197 SECONDS (ref 1–150)
POC ACTIVATED CLOTTING TIME KAOLIN: 202 SECONDS (ref 1–150)
POC ACTIVATED CLOTTING TIME KAOLIN: 208 SECONDS (ref 1–150)
POC ACTIVATED CLOTTING TIME KAOLIN: 213 SECONDS (ref 1–150)
POC ACTIVATED CLOTTING TIME KAOLIN: 224 SECONDS (ref 1–150)
POC ACTIVATED CLOTTING TIME KAOLIN: 252 SECONDS (ref 1–150)
POC ACTIVATED CLOTTING TIME KAOLIN: 274 SECONDS (ref 1–150)
POC ACTIVATED CLOTTING TIME KAOLIN: 279 SECONDS (ref 1–150)
POTASSIUM REFLEX MAGNESIUM: 4.3 MEQ/L (ref 3.5–5.2)
POTASSIUM SERPL-SCNC: 4.2 MEQ/L (ref 3.5–5.2)
POTASSIUM SERPL-SCNC: 4.2 MEQ/L (ref 3.5–5.2)
POTASSIUM SERPL-SCNC: 4.3 MEQ/L (ref 3.5–5.2)
POTASSIUM SERPL-SCNC: 4.3 MEQ/L (ref 3.5–5.2)
RBC # BLD: 3.77 MILL/MM3 (ref 4.7–6.1)
RBC # BLD: 3.86 MILL/MM3 (ref 4.7–6.1)
RBC # BLD: 3.97 MILL/MM3 (ref 4.7–6.1)
RBC # BLD: 4.33 MILL/MM3 (ref 4.7–6.1)
SET PEEP: 12 MMHG
SET PRESS SUPP: 10 CMH2O
SET RESPIRATORY RATE: 10 BPM
SITE: ABNORMAL
SITE: NORMAL
SITE: NORMAL
SODIUM BLD-SCNC: 142 MEQ/L (ref 135–145)
SODIUM BLD-SCNC: 142 MEQ/L (ref 135–145)
SODIUM BLD-SCNC: 143 MEQ/L (ref 135–145)
SOURCE, BLOOD GAS: ABNORMAL
SOURCE, BLOOD GAS: NORMAL
TOTAL PROTEIN: 5.1 G/DL (ref 6.1–8)
TOTAL PROTEIN: 5.1 G/DL (ref 6.1–8)
WBC # BLD: 4.8 THOU/MM3 (ref 4.8–10.8)
WBC # BLD: 4.9 THOU/MM3 (ref 4.8–10.8)
WBC # BLD: 5.2 THOU/MM3 (ref 4.8–10.8)
WBC # BLD: 5.2 THOU/MM3 (ref 4.8–10.8)

## 2021-09-02 PROCEDURE — 71045 X-RAY EXAM CHEST 1 VIEW: CPT

## 2021-09-02 PROCEDURE — 37799 UNLISTED PX VASCULAR SURGERY: CPT

## 2021-09-02 PROCEDURE — 85014 HEMATOCRIT: CPT

## 2021-09-02 PROCEDURE — 6370000000 HC RX 637 (ALT 250 FOR IP): Performed by: NURSE PRACTITIONER

## 2021-09-02 PROCEDURE — 94003 VENT MGMT INPAT SUBQ DAY: CPT

## 2021-09-02 PROCEDURE — 86140 C-REACTIVE PROTEIN: CPT

## 2021-09-02 PROCEDURE — 82330 ASSAY OF CALCIUM: CPT

## 2021-09-02 PROCEDURE — 2000000000 HC ICU R&B

## 2021-09-02 PROCEDURE — 82947 ASSAY GLUCOSE BLOOD QUANT: CPT

## 2021-09-02 PROCEDURE — 6360000002 HC RX W HCPCS: Performed by: INTERNAL MEDICINE

## 2021-09-02 PROCEDURE — B2111ZZ FLUOROSCOPY OF MULTIPLE CORONARY ARTERIES USING LOW OSMOLAR CONTRAST: ICD-10-PCS | Performed by: INTERNAL MEDICINE

## 2021-09-02 PROCEDURE — 99291 CRITICAL CARE FIRST HOUR: CPT | Performed by: INTERNAL MEDICINE

## 2021-09-02 PROCEDURE — P9045 ALBUMIN (HUMAN), 5%, 250 ML: HCPCS

## 2021-09-02 PROCEDURE — 36415 COLL VENOUS BLD VENIPUNCTURE: CPT

## 2021-09-02 PROCEDURE — 4A023N6 MEASUREMENT OF CARDIAC SAMPLING AND PRESSURE, RIGHT HEART, PERCUTANEOUS APPROACH: ICD-10-PCS | Performed by: INTERNAL MEDICINE

## 2021-09-02 PROCEDURE — 33948 ECMO/ECLS DAILY MGMT-VENOUS: CPT | Performed by: INTERNAL MEDICINE

## 2021-09-02 PROCEDURE — 85018 HEMOGLOBIN: CPT

## 2021-09-02 PROCEDURE — 85347 COAGULATION TIME ACTIVATED: CPT

## 2021-09-02 PROCEDURE — 80053 COMPREHEN METABOLIC PANEL: CPT

## 2021-09-02 PROCEDURE — 6360000002 HC RX W HCPCS: Performed by: NURSE PRACTITIONER

## 2021-09-02 PROCEDURE — 2580000003 HC RX 258: Performed by: INTERNAL MEDICINE

## 2021-09-02 PROCEDURE — 82248 BILIRUBIN DIRECT: CPT

## 2021-09-02 PROCEDURE — 85027 COMPLETE CBC AUTOMATED: CPT

## 2021-09-02 PROCEDURE — 2580000003 HC RX 258

## 2021-09-02 PROCEDURE — 82948 REAGENT STRIP/BLOOD GLUCOSE: CPT

## 2021-09-02 PROCEDURE — C9113 INJ PANTOPRAZOLE SODIUM, VIA: HCPCS | Performed by: NURSE PRACTITIONER

## 2021-09-02 PROCEDURE — 2580000003 HC RX 258: Performed by: NURSE PRACTITIONER

## 2021-09-02 PROCEDURE — 6360000002 HC RX W HCPCS

## 2021-09-02 PROCEDURE — P9041 ALBUMIN (HUMAN),5%, 50ML: HCPCS | Performed by: INTERNAL MEDICINE

## 2021-09-02 PROCEDURE — 83735 ASSAY OF MAGNESIUM: CPT

## 2021-09-02 PROCEDURE — 82784 ASSAY IGA/IGD/IGG/IGM EACH: CPT

## 2021-09-02 PROCEDURE — 83036 HEMOGLOBIN GLYCOSYLATED A1C: CPT

## 2021-09-02 PROCEDURE — 85385 FIBRINOGEN ANTIGEN: CPT

## 2021-09-02 PROCEDURE — 94761 N-INVAS EAR/PLS OXIMETRY MLT: CPT

## 2021-09-02 PROCEDURE — 93010 ELECTROCARDIOGRAM REPORT: CPT | Performed by: NUCLEAR MEDICINE

## 2021-09-02 PROCEDURE — 74018 RADEX ABDOMEN 1 VIEW: CPT

## 2021-09-02 PROCEDURE — P9041 ALBUMIN (HUMAN),5%, 50ML: HCPCS | Performed by: NURSE PRACTITIONER

## 2021-09-02 PROCEDURE — 2500000003 HC RX 250 WO HCPCS: Performed by: NURSE PRACTITIONER

## 2021-09-02 PROCEDURE — 2500000003 HC RX 250 WO HCPCS: Performed by: INTERNAL MEDICINE

## 2021-09-02 PROCEDURE — 6370000000 HC RX 637 (ALT 250 FOR IP): Performed by: INTERNAL MEDICINE

## 2021-09-02 PROCEDURE — 82803 BLOOD GASES ANY COMBINATION: CPT

## 2021-09-02 PROCEDURE — 83605 ASSAY OF LACTIC ACID: CPT

## 2021-09-02 RX ORDER — SODIUM CHLORIDE, SODIUM LACTATE, POTASSIUM CHLORIDE, AND CALCIUM CHLORIDE .6; .31; .03; .02 G/100ML; G/100ML; G/100ML; G/100ML
250 INJECTION, SOLUTION INTRAVENOUS ONCE
Status: COMPLETED | OUTPATIENT
Start: 2021-09-02 | End: 2021-09-02

## 2021-09-02 RX ORDER — ALBUMIN, HUMAN INJ 5% 5 %
25 SOLUTION INTRAVENOUS ONCE
Status: COMPLETED | OUTPATIENT
Start: 2021-09-02 | End: 2021-09-03

## 2021-09-02 RX ORDER — LORAZEPAM 2 MG/ML
4 INJECTION INTRAMUSCULAR EVERY 4 HOURS PRN
Status: DISCONTINUED | OUTPATIENT
Start: 2021-09-02 | End: 2021-09-11

## 2021-09-02 RX ORDER — DEXTROSE MONOHYDRATE 25 G/50ML
12.5 INJECTION, SOLUTION INTRAVENOUS PRN
Status: DISCONTINUED | OUTPATIENT
Start: 2021-09-02 | End: 2021-09-20 | Stop reason: HOSPADM

## 2021-09-02 RX ORDER — ALBUMIN, HUMAN INJ 5% 5 %
25 SOLUTION INTRAVENOUS EVERY 6 HOURS
Status: DISCONTINUED | OUTPATIENT
Start: 2021-09-02 | End: 2021-09-03

## 2021-09-02 RX ORDER — MORPHINE SULFATE 4 MG/ML
4 INJECTION, SOLUTION INTRAMUSCULAR; INTRAVENOUS
Status: DISCONTINUED | OUTPATIENT
Start: 2021-09-02 | End: 2021-09-11

## 2021-09-02 RX ORDER — 0.9 % SODIUM CHLORIDE 0.9 %
250 INTRAVENOUS SOLUTION INTRAVENOUS ONCE
Status: DISCONTINUED | OUTPATIENT
Start: 2021-09-02 | End: 2021-09-02

## 2021-09-02 RX ORDER — SODIUM CHLORIDE, SODIUM LACTATE, POTASSIUM CHLORIDE, CALCIUM CHLORIDE 600; 310; 30; 20 MG/100ML; MG/100ML; MG/100ML; MG/100ML
INJECTION, SOLUTION INTRAVENOUS CONTINUOUS
Status: DISCONTINUED | OUTPATIENT
Start: 2021-09-02 | End: 2021-09-03

## 2021-09-02 RX ORDER — DOPAMINE HYDROCHLORIDE 160 MG/100ML
2-20 INJECTION, SOLUTION INTRAVENOUS CONTINUOUS
Status: DISCONTINUED | OUTPATIENT
Start: 2021-09-02 | End: 2021-09-02

## 2021-09-02 RX ORDER — DEXTROSE MONOHYDRATE 50 MG/ML
100 INJECTION, SOLUTION INTRAVENOUS PRN
Status: DISCONTINUED | OUTPATIENT
Start: 2021-09-02 | End: 2021-09-20 | Stop reason: HOSPADM

## 2021-09-02 RX ORDER — SODIUM CHLORIDE, SODIUM LACTATE, POTASSIUM CHLORIDE, AND CALCIUM CHLORIDE .6; .31; .03; .02 G/100ML; G/100ML; G/100ML; G/100ML
500 INJECTION, SOLUTION INTRAVENOUS ONCE
Status: COMPLETED | OUTPATIENT
Start: 2021-09-02 | End: 2021-09-02

## 2021-09-02 RX ORDER — MIDAZOLAM HYDROCHLORIDE 1 MG/ML
INJECTION INTRAMUSCULAR; INTRAVENOUS
Status: DISCONTINUED
Start: 2021-09-02 | End: 2021-09-02 | Stop reason: WASHOUT

## 2021-09-02 RX ORDER — NICOTINE POLACRILEX 4 MG
15 LOZENGE BUCCAL PRN
Status: DISCONTINUED | OUTPATIENT
Start: 2021-09-02 | End: 2021-09-14

## 2021-09-02 RX ADMIN — ALBUMIN (HUMAN) 25 G: 12.5 INJECTION, SOLUTION INTRAVENOUS at 20:30

## 2021-09-02 RX ADMIN — ALBUMIN (HUMAN) 25 G: 12.5 INJECTION, SOLUTION INTRAVENOUS at 14:04

## 2021-09-02 RX ADMIN — DEXAMETHASONE SODIUM PHOSPHATE 10 MG: 4 INJECTION, SOLUTION INTRA-ARTICULAR; INTRALESIONAL; INTRAMUSCULAR; INTRAVENOUS; SOFT TISSUE at 08:30

## 2021-09-02 RX ADMIN — CISATRACURIUM BESYLATE 2 MCG/KG/MIN: 10 INJECTION, SOLUTION INTRAVENOUS at 06:34

## 2021-09-02 RX ADMIN — DOCUSATE SODIUM 100 MG: 50 LIQUID ORAL at 08:30

## 2021-09-02 RX ADMIN — MIDAZOLAM 1 MG/HR: 5 INJECTION, SOLUTION INTRAMUSCULAR; INTRAVENOUS at 05:08

## 2021-09-02 RX ADMIN — SODIUM CHLORIDE, PRESERVATIVE FREE 10 ML: 5 INJECTION INTRAVENOUS at 08:31

## 2021-09-02 RX ADMIN — POTASSIUM CHLORIDE 20 MEQ: 29.8 INJECTION, SOLUTION INTRAVENOUS at 01:08

## 2021-09-02 RX ADMIN — SODIUM CHLORIDE, POTASSIUM CHLORIDE, SODIUM LACTATE AND CALCIUM CHLORIDE 250 ML: 600; 310; 30; 20 INJECTION, SOLUTION INTRAVENOUS at 05:05

## 2021-09-02 RX ADMIN — Medication 15 ML: at 08:30

## 2021-09-02 RX ADMIN — FENTANYL CITRATE 200 MCG/HR: 50 INJECTION INTRAMUSCULAR; INTRAVENOUS at 12:30

## 2021-09-02 RX ADMIN — HEPARIN SODIUM 18 UNITS/KG/HR: 10000 INJECTION, SOLUTION INTRAVENOUS at 23:02

## 2021-09-02 RX ADMIN — DOPAMINE HYDROCHLORIDE 2.5 MCG/KG/MIN: 160 INJECTION, SOLUTION INTRAVENOUS at 23:25

## 2021-09-02 RX ADMIN — Medication 200 MCG/HR: at 09:43

## 2021-09-02 RX ADMIN — HEPARIN SODIUM 22 UNITS/KG/HR: 10000 INJECTION, SOLUTION INTRAVENOUS at 11:48

## 2021-09-02 RX ADMIN — FENTANYL CITRATE 200 MCG/HR: 50 INJECTION INTRAMUSCULAR; INTRAVENOUS at 18:13

## 2021-09-02 RX ADMIN — MINERAL OIL AND WHITE PETROLATUM: 150; 830 OINTMENT OPHTHALMIC at 05:09

## 2021-09-02 RX ADMIN — SODIUM CHLORIDE, POTASSIUM CHLORIDE, SODIUM LACTATE AND CALCIUM CHLORIDE: 600; 310; 30; 20 INJECTION, SOLUTION INTRAVENOUS at 19:43

## 2021-09-02 RX ADMIN — PANTOPRAZOLE SODIUM 40 MG: 40 INJECTION, POWDER, FOR SOLUTION INTRAVENOUS at 08:30

## 2021-09-02 RX ADMIN — CEFTRIAXONE SODIUM 2000 MG: 2 INJECTION, POWDER, FOR SOLUTION INTRAMUSCULAR; INTRAVENOUS at 01:46

## 2021-09-02 RX ADMIN — INSULIN LISPRO 1 UNITS: 100 INJECTION, SOLUTION INTRAVENOUS; SUBCUTANEOUS at 20:37

## 2021-09-02 RX ADMIN — POTASSIUM CHLORIDE 20 MEQ: 29.8 INJECTION, SOLUTION INTRAVENOUS at 00:16

## 2021-09-02 RX ADMIN — PROPOFOL 35 MCG/KG/MIN: 10 INJECTION, EMULSION INTRAVENOUS at 02:28

## 2021-09-02 RX ADMIN — MIDAZOLAM 10 MG/HR: 5 INJECTION, SOLUTION INTRAMUSCULAR; INTRAVENOUS at 15:45

## 2021-09-02 RX ADMIN — ALBUMIN (HUMAN) 25 G: 12.5 INJECTION, SOLUTION INTRAVENOUS at 23:28

## 2021-09-02 RX ADMIN — MINERAL OIL AND WHITE PETROLATUM: 150; 830 OINTMENT OPHTHALMIC at 02:31

## 2021-09-02 RX ADMIN — POTASSIUM CHLORIDE 20 MEQ: 29.8 INJECTION, SOLUTION INTRAVENOUS at 02:09

## 2021-09-02 RX ADMIN — Medication 15 ML: at 20:33

## 2021-09-02 RX ADMIN — INSULIN LISPRO 1 UNITS: 100 INJECTION, SOLUTION INTRAVENOUS; SUBCUTANEOUS at 16:36

## 2021-09-02 RX ADMIN — SODIUM CHLORIDE, POTASSIUM CHLORIDE, SODIUM LACTATE AND CALCIUM CHLORIDE 500 ML: 600; 310; 30; 20 INJECTION, SOLUTION INTRAVENOUS at 20:58

## 2021-09-02 RX ADMIN — SODIUM CHLORIDE, POTASSIUM CHLORIDE, SODIUM LACTATE AND CALCIUM CHLORIDE: 600; 310; 30; 20 INJECTION, SOLUTION INTRAVENOUS at 14:24

## 2021-09-02 RX ADMIN — INSULIN LISPRO 1 UNITS: 100 INJECTION, SOLUTION INTRAVENOUS; SUBCUTANEOUS at 07:35

## 2021-09-02 RX ADMIN — HEPARIN SODIUM 12 UNITS/KG/HR: 10000 INJECTION, SOLUTION INTRAVENOUS at 01:44

## 2021-09-02 RX ADMIN — INSULIN LISPRO 1 UNITS: 100 INJECTION, SOLUTION INTRAVENOUS; SUBCUTANEOUS at 11:55

## 2021-09-02 RX ADMIN — Medication 100 MCG/HR: at 06:51

## 2021-09-02 RX ADMIN — Medication 1 CAPSULE: at 08:30

## 2021-09-02 RX ADMIN — ALBUMIN (HUMAN) 25 G: 12.5 INJECTION, SOLUTION INTRAVENOUS at 08:42

## 2021-09-02 RX ADMIN — SODIUM CHLORIDE, POTASSIUM CHLORIDE, SODIUM LACTATE AND CALCIUM CHLORIDE 250 ML: 600; 310; 30; 20 INJECTION, SOLUTION INTRAVENOUS at 04:28

## 2021-09-02 RX ADMIN — CEFTRIAXONE SODIUM 2000 MG: 2 INJECTION, POWDER, FOR SOLUTION INTRAMUSCULAR; INTRAVENOUS at 14:24

## 2021-09-02 RX ADMIN — INSULIN LISPRO 1 UNITS: 100 INJECTION, SOLUTION INTRAVENOUS; SUBCUTANEOUS at 23:02

## 2021-09-02 RX ADMIN — SODIUM CHLORIDE, PRESERVATIVE FREE 10 ML: 5 INJECTION INTRAVENOUS at 20:33

## 2021-09-02 RX ADMIN — SODIUM CHLORIDE, POTASSIUM CHLORIDE, SODIUM LACTATE AND CALCIUM CHLORIDE: 600; 310; 30; 20 INJECTION, SOLUTION INTRAVENOUS at 05:50

## 2021-09-02 RX ADMIN — INSULIN LISPRO 1 UNITS: 100 INJECTION, SOLUTION INTRAVENOUS; SUBCUTANEOUS at 02:29

## 2021-09-02 ASSESSMENT — PULMONARY FUNCTION TESTS
PIF_VALUE: 23

## 2021-09-02 ASSESSMENT — PAIN SCALES - GENERAL
PAINLEVEL_OUTOF10: 0
PAINLEVEL_OUTOF10: 0

## 2021-09-02 NOTE — PROGRESS NOTES
Oxygen Delivery and Consumption @ 2305    Patient's Hgb 13.1 g/dl   PaO2 93.4 mmHg   PvO2 55.4 mmHg   SaO2 96.4%   SvO2 85.2%   Flow (C.O.) 4.7 LPM   DO2 822 mL/min   VO2 98 mL/min

## 2021-09-02 NOTE — PROGRESS NOTES
Comprehensive Nutrition Assessment    Type and Reason for Visit:  Initial (TF)    Nutrition Recommendations/Plan:   Start Vital 1.2 TF( also lowest CHO TF) at 10 ml/hr & increase 10 ml every 8 hours as pt tolerates to goal of 40 ml/hr. When TF at goal, start flushing 1 ( 2.5 oz) liquid protein tid. Discussed with Zuhair Juan. Free H20 flush per Dr 100 ml every 8 hours. Nutrition Assessment:    Pt. nutritionally compromised AEB NPO status. At risk for further nutrition compromise r/t Dx + COVID 8/30, intubated 9/1, VV ECMO started 9/1, paralyzed this admit, + Hflu, ARDS and underlying medical condition (hx of GB OR, obesity ). Nutrition recommendations/interventions as per above. Malnutrition Assessment:  Malnutrition Status: At risk for malnutrition (Comment)    Context:  Acute Illness     Findings of the 6 clinical characteristics of malnutrition:  Energy Intake:  Unable to assess  Weight Loss:  Unable to assess (UBW not available)     Body Fat Loss:  No significant body fat loss     Muscle Mass Loss:  No significant muscle mass loss    Fluid Accumulation:  No significant fluid accumulation     Strength:  Not Performed    Estimated Daily Nutrient Needs:  Energy (kcal):  ~1435 kcals ( 19) with ECMO. Pt is + COVID; Weight Used for Energy Requirements:  Ideal     Protein (g):  ~151 grams (2); Weight Used for Protein Requirements:  Ideal        Fluid (ml/day):  per Dr;         Nutrition Related Findings:     Pt dx+ COVID 8/30, intubated 9/1, VV ECMO started 9/1. Dr has ordered TF start. Spoke with Zuhair Juan, plan start Vital 1.2 TF ( peptide based & also lowest CHO) TF at 10 ml/hr & goal with ECMO as tolerated. Nimbex & diprivan is off. Pt with 1 bm last shift. meds include fentanyl,versed, culturelle,  decadron, humalog, protonix, Rocephin  IV albumin. MAP 81. A1c 5.6% with average glucose 108. current glucose 186, CRP 1.36, lactic acid 1.8.  8/31 ferritin 1997.     Wounds:  None (reported)       Current Nutrition Therapies:    Current Tube Feeding (TF) Orders:  · Feeding Route: Orogastric (placed 9/1)  · Formula: Peptide Based (Vital 1.2)  · Schedule: Continuous (goal 40 ml/hr)  · Additives/Modulars: Protein (1 ( 2.5 oz) liquid protein tid)  · Water Flushes: per Dr 100 ml every 8 hours  · Current TF & Flush Orders Provides: start at 10 ml/hr  · Goal TF & Flush Orders Provides: 5447 kcals (1152 TF, 312 modular), 150 grams protein (72 TF, 78 modular), 106 grams CHO, 1079ml free water (779 TF, 300 flushes) in 1485ml (960 TF, 225 modular, 300 flushes)/ 24 hours      Anthropometric Measures:  · Height: 5' 10\" (177.8 cm)  · Current Body Weight: 271 lb 9.7 oz (123.2 kg) (9/2 no edema)   · Admission Body Weight: 263 lb 7.2 oz (119.5 kg) (8/31 no edema)    · Usual Body Weight:  (not available)     · Ideal Body Weight: 166 lbs;     · BMI: 39  · BMI Categories: Obese Class 2 (BMI 35.0 -39.9)       Nutrition Diagnosis:   · Inadequate oral intake related to impaired respiratory function (and ECMO) as evidenced by intubation, nutrition support - enteral nutrition      Nutrition Interventions:   Food and/or Nutrient Delivery:  Start Tube Feeding (Start Vital 1.2 TF at 10 ml/hr & increase 10 ml every 8 hours as pt tolerates to goal of 40 ml/hr. When TF at goal, start flushing 1 ( 2.5 oz) liquid protein tid. Free H20 flush per Dr 100 ml every 8 hours)  Nutrition Education/Counseling:  Education not appropriate   Coordination of Nutrition Care:  Continue to monitor while inpatient, Interdisciplinary Rounds    Goals:  EN appropriate for ECMO pt. Nutrition Monitoring and Evaluation:   Behavioral-Environmental Outcomes:  None Identified   Food/Nutrient Intake Outcomes:  Enteral Nutrition Intake/Tolerance  Physical Signs/Symptoms Outcomes:  Biochemical Data, Chewing or Swallowing, GI Status, Fluid Status or Edema, Hemodynamic Status, Nutrition Focused Physical Findings, Skin, Weight     Discharge Planning:     Too soon to determine Electronically signed by Oswaldo Hollins RD, EDDY on 9/2/21 at 10:59 AM EDT    Contact: [phone]

## 2021-09-02 NOTE — PROGRESS NOTES
5 - patient arrived to 4D 02 from hybrid OR. ECMO cannulas in both groins. L subclavian swan - eusebia catheter in place, unable to assess placement at this time. Rt subclavian CVC with all ports infusing. Rt radial 6 fr arterial sheath being transduced. vasc band in place d/t hematoma in cath lab, site is soft with no hematoma noted at this time. Current infusions: fentanyl @ 50 mcg/ hr, NS @999, propofol @ 40 mcg/kg/min, nimbex @ 2 mcg/kg/min  ECMO flow 5 lpm, speed 5,000 lpm.    2035 - abg/ vbg results communicated to Rosendo Sweeney CNP    2040 - FiO2 decreased to 30% and rate decreased to 16 by Sammy Herron, RT per Rosendo Sweeney CNP. Will repeat gasses in 15 minutes. 2056 - Dr. Verner Loron called for update at this time. Discussed changes made from previous gasses. Telephone orders to decrease rate to 12 and oxy 2 to a sweep of 0 lpm. He stated he would call and discuss the case with Dr. Tiny Alejandro and call me back. 2102- Dr. Verner Loron called for more orders at this time. Telephone orders to place both oxygenators to a sweep of 0.5 lpm and decrease the rate to 10 and peep to 12. Discussed this plan with Rosendo Sweeney CNP.     2218 - sweep increased to 1Lpm on both oxygenators at this time [facility]    2230 - 2 ml of air removed from vasc band at this time. 2300 -2 ml of air removed from vasc band     0115 - sweep gas for oxy 2 decreased to 1 L. [facility], CNP for a total of 3 lpm    0210 - sweep gas for oxy 1 decreased to 1L per [facility] C[facility] for a total of 2 lpm.     6858-  Pump flow decreased to 4 lpm [facility], C[facility]      5455 - nimbex stopped, versed and fentanyl increased by dr. Tiny Alejandro.  See mar

## 2021-09-02 NOTE — PLAN OF CARE
Problem: MECHANICAL VENTILATION  Goal: Will be able to breathe spontaneously, without ventilator support  Description: Will be able to breathe spontaneously, without ventilator support  Outcome: Ongoing  Pt remains on ventilator at this time

## 2021-09-02 NOTE — FLOWSHEET NOTE
ECMO Hourly Circuit Check       0800 0900 1000 1100 1200 1300 1400 1500 1600 1700 1800 1900    Device Secured Yes Yes Yes Yes Yes Yes Yes Yes Yes Yes Yes Yes    Connected to O2        No       No       No       No        No       No       No       No       No       No       No       No    Right connection of tubing to the oxygenator and gas hoses       Yes       Yes       Yes       Yes       Yes       Yes       Yes       Yes       Yes       Yes       Yes       Yes    Absence of kinks and tension       Yes       Yes       Yes       Yes       Yes       Yes       Yes       Yes       Yes       Yes       Yes       Yes    Lines free of clots/fibrin with flashlight assessment       Yes       Yes       Yes       Yes       Yes       Yes       Yes       Yes       Yes       Yes       Yes       Yes    Assess difference in blood color between cannulas       Yes       Yes       Yes       Yes       Yes       Yes       Yes       Yes       Yes       Yes       Yes       Yes    No migration of cannula Yes Yes Yes Yes Yes Yes Yes Yes Yes Yes Yes Yes    Bleeding at insertion site No No No No No No No No No No No No    Knee immobilizer in place N/A N/A N/A N/A N/A N/A N/A N/A N/A N/A N/A N/A    No traction on tubing No No No No No No No No No No No No    Flow Rate 4.0 4.0 6.0 6.0 6.0 6.0 6.0 6.0 6.0 6.0 6.0 6.0    Emergency Supplies at bedside Yes Yes Yes Yes Yes Yes Yes Yes Yes Yes Yes Yes                                                                                                                              Sweep Gas Oxy 1=1  Oxy 2=1 Oxy 1=1  Oxy 2=1 Oxy 1=1  Oxy 2=1 Oxy 1=1  Oxy 2=2 Oxy 1=1  Oxy 2=1 Oxy 1=1  Oxy 2=1 Oxy 1=1  Oxy 2=1 Oxy 1=1  Oxy 2=1 Oxy 1=1  Oxy 2=1 Oxy 1=1  Oxy 2=1 Oxy 1=1  Oxy 2=1 Oxy 1=1  Oxy 2=1    DO2   794  [phone]  [phone]  931  898  898    VO2  69  339  255  [phone]  [phone]  [phone]

## 2021-09-02 NOTE — PROGRESS NOTES
[address] THERAPY MISSED TREATMENT NOTE  [facility] ICU 4D  4D-002-A      Date: 2021  Patient Name: Margarita Bahena        CSN: [id number]   : 1975  (48 y.o.)  Gender: male   Referring Practitioner: Paris Abad MD  Diagnosis: hypoxia         REASON FOR MISSED TREATMENT: Pt intubated and ECMO initiated on 21. Will discontinue OT orders at this time. Please re-order when pt is appropriate.

## 2021-09-02 NOTE — PROGRESS NOTES
1750 pt to hybrid or for ECMO cannulation. pt transported per bed, on vent with resp therapy, providers Dario and Lito Lafleur.

## 2021-09-02 NOTE — FLOWSHEET NOTE
Oxygen Delivery and Consumption    Patient's Hgb 11.0 g/dl   PaO2 123 mmHg   PvO2 36 mmHg   SaO2 99%   SvO2 68%   Flow (C.O.) 6.0 LPM   DO2 898 mL/min   VO2 298 mL/min     Ratio 3.1:1

## 2021-09-02 NOTE — PROCEDURES
[address] [place] [address]                            CARDIAC CATHETERIZATION    PATIENT NAME: Cynthia Colorado                        :        1975  MED REC NO:   [id number]                           ROOM:       0002  ACCOUNT NO:   [de-identified]                           ADMIT DATE: 2021  PROVIDER:     Krissy Lawrence MD    DATE OF PROCEDURE:  2021    CARDIAC CATHETERIZATION    INDICATION:  Acute hypoxic respiratory failure with progressive  worsening PF ratios secondary to COVID-19 pneumonia. DESCRIPTION OF PROCEDURE:  After informed consent was obtained from the  patient and the patient's family, he was brought to the hybrid operating  room and prepped in a sterile fashion. Bilateral femoral veins was  chosen as primary points of access. The left subclavian vein was  already accessed per the ICU and was switched out to a standard sterile  9-[place] sheath. The patient was already sedated, intubated, and  paralyzed. JASPREET probe was then inserted. A general sweep of the cardiac  structures were done. Please see the JASPREET report for formal details. In  brief, he had normal LV and RV function. No significant tricuspid or  mitral regurgitation. No significant aortic regurgitation. There was  no enlargement of the LA and RA. There was no obvious intraatrial shunt  seen. No significant pericardial effusion was seen either. We then  proceeded with the intervention. After infiltration of the right wrist, 2% lidocaine using micropuncture  and modified Seldinger technique under fluoroscopic guidance and  ultrasound guidance, I was able to insert a 6-[place] standard sheath in  the right radial artery. Standard antispasmodic/antithrombotic cocktail  was given intraarterially. I then performed diagnostic coronary  angiography using a 5-[place] JL3.5 and a 5-[place] JR4 catheters.     CORONARY ANGIOGRAM:  LEFT MAIN:  Patent without any significant obstruction. LAD:  Patent without any significant obstruction. Large diagonal branch  without any significant obstruction. LCX:  No significant obstruction. It gives rise to large OM1, OM2  systems without any significant obstruction. RAMUS INTERMEDIUS:  No significant obstruction. RCA:  No significant obstruction. Bifurcates into PDA and PLB, both  without any significant obstruction. RCA is dominant. I then performed right heart catheterization with the standard [place]  catheter via the left subclavian vein. RA 15, RV 43/16, PA 44/21 with a mean of 30, wedge pressure 20, PA  saturation 79%. Cardiac output 6.9, cardiac index 3. VV ECMO Insertion and Initiation:  Thereafter, I infiltrated both  femoral veins sequentially first to the left and then the right with 2%  lidocaine and then using modified Seldinger technique, I was able to  insert a 7-[place] sheath in the left femoral vein and 6-[place] sheath in  the right femoral vein. Via the left femoral vein, 0.035 single-curved  Lunderquist wire was placed in the left SVC. I serially dilated up to  22-[place]. I then placed the preemptive Woggle suture in the patient's  groin for hemostasis, and through this under fluoroscopic and JASPREET  guidance, I inserted a 25-[place]an multihole drainage cannula, placed in  the infradiaphragmatic IVC. Dilator was removed. The cannula was  allowed to bleed back and then it was clamped. I then took  single-curved Lunderquist via the right femoral artery, placed it in the  SVC and then into the left subclavian vein. I upsized the access again  up to 22-Erit[place]an with serial dilation and I then placed a preemptive  Woggle suture for hemostasis. I then inserted a 24-[place] multihole  infusion cannula, placed at the right atrial SVC junction. This was  allowed to bleed back and it was clamped.   Heparin IV had already been  given after the initial access with the 6-[place]an and 7-[place] sheath. ACT was confirmed to be above 250 seconds. Thereafter, a standard VV-ECMO circuit was established and appropriately  deaired. All standards were set up in serial fashion. Thereafter, under  wet-to-wet connection, the outflow cannula was connected, and then under  wet-to-wet connection, the inflow cannula was connected. This was all  done with the assistance of Dr. Jadon Lynn. Once we assured there was no air  within the system, pump was turned on, all clamps were released and pump  was flowing. Initially, there was thought to be some re-circulation,  but this was managed by just pullback of the outflow cannula. Thereafter, saturations were 100% with minimal vent settings. All the  cannulas were sutured in place. The lines were sutured in place. The  right radial artery line was left in place as well. The patient was  transferred to ICU in critical condition. IMMEDIATE COMPLICATIONS:  None. MEDICATIONS:  See EMR. ACCESS:  See above. ESTIMATED BLOOD LOSS:  50 to 100 mL. SUMMARY:  Successful bifemoral venous cannulation and initiation of VV  ECMO for COVID-19 hypoxic respiratory failure; patent coronary arteries;  mildly elevated right heart cath pressures; cardiac output 6.9/cardiac  index 3. PLAN:  1. ICU care. 2.  ECMO Protocol. 3.  Sweep gases to be adjusted based on CO2.  4.  Flow to be commenced or to the current cardiac output to avoid  shunting. 5.  Vent settings to be reduced to avoid ventilator-associated lung  injury. 6.  Continue heparin infusion. 7.  Monitor coagulation parameters. 8.  Maintain paralysis and sedation. 9.  Transfuse to hemoglobin of greater than 10.  10.  Chest x-ray. 11.  Goal CVP 10 to 15. All the above was explained to the patient's family. They are agreeable  and amenable to the plan.         Divya Callejas MD    D: 09/02/2021 7:26:01       T: 09/02/2021 9:55:14     DONTAE/[email or web address]  Job#: [id number]     Doc#: [id number]    CC:

## 2021-09-02 NOTE — FLOWSHEET NOTE
3036- Attempted to turn flow to 6LPM per Dr. Corrinne Angry. RPMs increased to 6500 and flows began to vary from 1LPM to 5LPM.  Unable to achieve flow of 6LPM.  Decreased back down to 4LPNM @ 1640SYAB per Dr. Corrinne Angry and Dr. Trinh Alonzo. 0930- ABG/VBG/DO2/VO2 discussed with Alexis Boyd CNP. Orders received to adjust flow rate to achieve 6LPM as pt tolerates. Flow adjusted to 5. 0LPM at this time. 0945- Flow adjusted to 5. 5LPM at this time. 1000- Flow adjusted to 6. 0LPM at this time. Pt tolerating well at this time. Alexis Boyd CNP at bedside and aware. 1100- ABG/VBGs dicussed with Alexis Boyd CNP. Orders received to draw ABG peripherally. Phone call received from Dr. Trinh Alonzo. Updated on ABG/VBG and inability to calculate DO2/VO2. No further orders received at this time. VBG drawn peripherally and perfusion calculation performed. Reviewed with Alexis Boyd CNP. No new orders received.

## 2021-09-02 NOTE — FLOWSHEET NOTE
Oxygen Delivery and Consumption    Patient's Hgb 12.3g/dl   PaO2 72 mmHg   PvO2 53 mmHg   SaO2 95%   SvO2 87%   Flow (C.O.) 5.0 LPM   DO2 794 mL/min   VO2 69 mL/min     Ratio 11.5:1.  Reviewed with Nadine Eduardo CNP. Orders received to increase flow to 6LPM as tolerates. Flow adjusted to 5LPM at this time, RPMs 5000.

## 2021-09-02 NOTE — PROGRESS NOTES
ECMO DAILY ROUNDING NOTE:    Team Present at bedside. Family Present at bebside:  no  Condition:  critical    RE for ECMO initiation: ARDS  Do2:  614  Vo2:  88  Sweep Gas:  1/1  Pump Flow:  4L  SvO2:  78  SaO2:  92  ACT Goal:  190-210  ACT:  186  Plt:  195  Hgb:  12.3  Lactic Acid:  pending  CXR:  Bilateral infiltrates  Telemetry:  NSR  Paralytic:  no  Sedation Goals:  RASS -3 to -4  Pupils:  constricted  PC 10:  PEEP 12:  RATE 10:  FiO2:  40  Generated :    Electronically signed by Corry Dixon MD.

## 2021-09-02 NOTE — FLOWSHEET NOTE
Oxygen Delivery and Consumption    Patient's Hgb 11 g/dl   PaO2 125 mmHg   PvO2 36 mmHg   SaO2 99%   SvO2 66%   Flow (C.O.) 6.0 LPM   DO2 898 mL/min   VO2 308 mL/min     Ratio 3:1

## 2021-09-02 NOTE — PROGRESS NOTES
ECMO Hourly Circuit Check       2000 2100 2200 2300 0000 0100 0200 0300 0400 0500 0600 0700    Device Secured yes yes yes yes yes yes yes yes yes yes yes yes    Connected to O2  n/a n/a n/a n/a n/a n/a n/a n/a n/a n/a n/a n/a    Right connection of tubing to the oxygenator and gas hoses yes yes yes yes yes yes yes yes yes yes yes yes    Absence of kinks and tension yes yes yes yes yes yes yes yes yes yes yes yes    Lines free of clots/fibrin with flashlight assessment yes yes yes yes yes yes yes yes yes yes yes yes    Assess difference in blood color between cannulas yes yes yes yes yes yes yes yes yes yes yes yes    No migration of cannula none none none none none none none none none None none none    Bleeding at insertion site no no no no no no no no no no no no    Knee immobilizer in place n/a n/a n/a n/a n/a n/a n/a n/a n/a n/a n/a n/a    No traction on tubing yes yes yes yes yes yes yes yes yes yes yes yes    Flow Rate 5 5 4.9 5 5 5 5 5 4.2 4.9 5.1 4.1    Emergency Supplies at bedside yes huseyin yes yes yes yes yes yes yes yes yes yes                                                                    Sweep Gas Oxy 1 -1   Oxy 2- 1 Oxy 1 - 0.5  Oxy 2 - 0.5 Oxy 1 - 0.5  Oxy 2 - 0.5 Oxy 1 - 1  Oxy 2 - 1 Oxy 1 - 2  Oxy 2 - 1 2/2 Oxy 1- 2  Oxy 2 - 1 Oxy 1- 1  Oxy 2 - 1 Oxy 1- 1  Oxy 2 - 1 Oxy 1- 1  Oxy 2 - 1 Oxy 1- 1  Oxy 2 - 1 Oxy 1-1L  Oxy 2-1L    DO2    822 865  807 797  769      VO2    98 138  130 95  26

## 2021-09-02 NOTE — FLOWSHEET NOTE
Oxygen Delivery and Consumption    Patient's Hgb 11.4 g/dl   PaO2 130 mmHg   PvO2 37 mmHg   SaO2 99%   SvO2 69%   Flow (C.O.) 6.0 LPM   DO2 931 mL/min   VO2 292 mL/min     Ratio 3.2:1

## 2021-09-02 NOTE — PROGRESS NOTES
Oxygen Delivery and Consumption    Patient's Hgb 12.3 g/dl   PaO2 68 mmHg   PvO2 59 mmHg   SaO2 94%   SvO2 91%   Flow (C.O.) 4.9 LPM   DO2 769 mL/min   VO2 26 mL/min

## 2021-09-02 NOTE — PROGRESS NOTES
Patient:  Isaac Nice    Unit/Bed:4D-02/002-A  MRN: [id number]   PCP: Darius De Leon MD  Date of Admission: 8/30/2021    Assessment and Plan(All pulmonary edema, renal failure, PE, and respiratory failure diagnoses are acute in nature unless otherwise specified):          Acute hypoxemic respiratory failure: Secondary to COVID-19 with subsequent progression to ARDS. Patient intubated 9/1/2021. Patient undergoing lung rest strategy since ECMO has been administered. COVID-19: Patient failed trial of remdesivir, tocilizumab, and Decadron. Patient with progressive hypoxemia. Pneumonia: Coinfection with Haemophilus influenza. Patient on Rocephin. Patient transition to higher dosing of Rocephin. Antibiotics initiated 8/30/2021. Sepsis: Secondary to combination of Covid and Haemophilus influenza. Present at presentation. ARDS:  Patient intubated 9/1/2021. ECMO initiated 9/1/2021. Patient under lung rest strategy with pressure control 10, PEEP 12, FiO2 40%. Rate 10. Continue with lung rest strategy and ECMO. Volume status: Patient has a right-sided ejection fraction of 6 L. Therefore, need to keep CVP between 10 and 15 in order to adequately preload right ventricle for optimal left ventricle delivery. Goal should be flow of 6.0 L in order to adequately prefill the right ventricle for left ventricular delivery. Lower flows will result in the lower right sided cardiac output and decreased oxygen delivery to the distal tissues. Patient on albumin 25 g IV every 6 hours for 8 doses to increase intravascular volume. He is also on  cc an hour in order to increase intravascular volume. .  Obesity: Aware. Mild neutropenia: Obtain immunoglobulin panel. White blood cell count trending upward. CC: [id number]. HPI: Patient is a 68-year-old white male lifetime non-smoker. He is a farmer by occupation.   He is obese but has no other medical diagnoses including coronary artery disease diabetes or sleep apnea. Patient presented to the emergency room and was admitted on 8/30/2021. He started to develop fever associated with chills cough shortness of breath nasal congestion loss of taste and loss of smell starting 8/28/2021. As he continued to progress his dyspnea continued to worsen. He developed malaise. He indicated that his wife was recently diagnosed with Covid. Patient underwent Covid screen and was positive. CT scan chest showed bilateral infiltrates. He initially was started on low-flow oxygen but continued to require progressive oxygen supplementation. He was treated with remdesivir Decadron and subsequently tocilizumab. He continued to develop progressive dyspnea and had the syndrome of \"happy hypoxia\". As patient continued to worsen, he elected to proceed with intubation and ECMO. Patient intubated 9/1/2021. ECMO cannulation 9/1/2021. ROS: Sedated on mechanical ventilator. PMH:  Per HPI  SHX: Lifetime non-smoker. FHX: Covid exposure from wife. Allergies: No known drug allergies.   Medications:     dextrose      midazolam 5 mg/hr (09/02/21 0658)    heparin (PORCINE) Infusion 21 Units/kg/hr (09/02/21 0708)    fentaNYL 500 mcg in sodium chloride 0.9% 100 ml infusion 150 mcg/hr (09/02/21 0658)    cisatracurium (NIMBEX) infusion Stopped (09/02/21 0657)    propofol Stopped (09/02/21 0540)    lactated ringers 100 mL/hr at 09/02/21 0550      insulin lispro  0-6 Units SubCUTAneous Q4H    cefTRIAXone (ROCEPHIN) IV  2,000 mg IntraVENous Q24H    dexamethasone  10 mg IntraVENous Q24H    pantoprazole  40 mg IntraVENous Daily    And    sodium chloride (PF)  10 mL IntraVENous Daily    calcium replacement protocol   Other RX Placeholder    chlorhexidine  15 mL Mouth/Throat BID    lactobacillus  1 capsule Oral Daily with breakfast    sodium chloride flush  5-40 mL IntraVENous 2 times per day       Vital Signs:   T: 97.7: P: 76 RR: 11 B/P: 103/67: FiO2: 30: O2 Sat:94: I/O: 7359/1315 GCS: 3  Body mass index is 38.97 kg/m². Bridget Us PC: 10/12: TV: 300: RRTotal: 10: Ti:1 sec:   General:   Acutely ill-appearing heavyset white male. HEENT:  normocephalic and atraumatic. No scleral icterus. PERR  Neck: supple. No Thyromegaly. Lungs: Bilateral crackles. No retractions  Cardiac: RRR. No JVD. Abdomen: soft. Nontender. Obese. Nondistended. Extremities:  No clubbing, cyanosis, or edema x 4. Vasculature: capillary refill < 3 seconds. Palpable dorsalis pedis pulses. Skin:  warm and dry. Psych: Sedated on mechanical ventilator. Lymph:  No supraclavicular adenopathy. Neurologic:  No focal deficit. No seizures. Data: (All radiographs, tracings, PFTs, and imaging are personally viewed and interpreted unless otherwise noted).  Covid + 8/30/2021.  Sodium 143, potassium 4.3, chloride 112, bicarb 22, BUN 20, creatinine 0.7, glucose 162. CRP 1.36. Albumin 3.0. WBC 5.2, hemoglobin 12.3, platelets 444. Fibrinogen 329.  Pulmonary lavage 9/1/2021: PCR positive for Haemophilus influenza.  Chest x-ray shows bilateral pulmonary infiltrates with progression. CC time 35 minutes. Time was discontiguous and does not include procedures. Electronically signed by Maureen Jaramillo M.D.

## 2021-09-02 NOTE — PLAN OF CARE
Problem: Nutrition  Goal: Optimal nutrition therapy  Outcome: Ongoing   Nutrition Problem #1: Inadequate oral intake  Intervention: Food and/or Nutrient Delivery: Start Tube Feeding (Start Vital 1.2 TF at 10 ml/hr & increase 10 ml every 8 hours as pt tolerates to goal of 40 ml/hr. When TF at goal, start flushing 1 ( 2.5 oz) liquid protein tid. Free H20 flush per Dr 100 ml every 8 hours)  Nutritional Goals: EN appropriate for ECMO pt.

## 2021-09-02 NOTE — FLOWSHEET NOTE
CVP 6-7, BP trending lower. RPMs have been increased to maintain flow rate at 6. 0LPM.  RPMs now 6800 in order to maintain. Discussed with Cathryn Yan. Orders received to give 500ml bolus of LR.

## 2021-09-02 NOTE — FLOWSHEET NOTE
Oxygen Delivery and Consumption    Patient's Hgb 11.4 g/dl   PaO2 132 mmHg   PvO2 33 mmHg   SaO2 99%   SvO2 64%   Flow (C.O.) 6.0 LPM   DO2 931 mL/min   VO2 339 mL/min     Dicussed previous and current VBG/ABG results with Olivia Loya CNP and Dr. Elaine Vincent. Decision was made by Olivia Loya CNP to draw VBG peripherally. Ratio 2.7:1.  Discussed results with Olivia Loya CNP.

## 2021-09-02 NOTE — FLOWSHEET NOTE
Oxygen Delivery and Consumption     Patient's Hgb 11.4 g/dl   PaO2 131 mmHg   PvO2 40 mmHg   SaO2 99%   SvO2 73%   Flow (C.O.) 6.0 LPM   DO2 931 mL/min   VO2 255 mL/min     Ratio 3.6:1

## 2021-09-02 NOTE — ADT AUTH CERT
Utilization Reviews       [facility], Acute - Care Day 3 (9/1/2021) by Hilda Payne RN       Review Status Review Entered   Completed 9/2/2021 11:41      Criteria Review      Care Day: 3 Care Date: 9/1/2021 Level of Care:    Guideline Day 3    Clinical Status    ( ) * Hemodynamic stability    9/2/2021 11:41 AM EDT by Gabino Fees: 98.5: P: 84 RR: 20 B/P: 137/88: FiO2: 100: O2 Sat:95: I/O: pending GCS: 3 Body mass index is 37.81 kg/m²    ( ) * Afebrile or temperature acceptable for next level of care    ( ) * Tachypnea absent    ( ) * Hypoxemia absent    ( ) * Mental status at baseline    ( ) * Renal function at baseline, or stable and acceptable for next level of care    ( ) * Discharge plans and education understood    Activity    ( ) * Ambulatory or acceptable for next level of care    Routes    ( ) * Oral hydration    ( ) * Oral medications or regimen acceptable for next level of care    9/2/2021 11:41 AM EDT by George Cortez      ALBUMIN HUMAN 5% IV X2, VIT C DAILY, ZITHROMAX PO X1, ANCEF IV X1, ROCEPHIN IV DAILY, DECADRON PO DAILY CHANGED TO IV DAILY, HEPARIN TID, ISOVUE IV X1, KETALAR IV X1, ATIVAN IV X1, MORPHINE IV X2, PROTONIX IV DAILY, SODIUM BICARB IV X1, VIT D DAILY    ( ) * Oral diet or acceptable for next level of care    Interventions    ( ) * Isolation not indicated, or is performable at next level of care    Medications    ( ) * Antimicrobial medication absent or regimen established for next level of care    * Milestone   Additional Notes   PULMONOLOGY 9/1/21:      Assessment and Plan(All pulmonary edema, renal failure, PE, and respiratory failure diagnoses are acute in nature unless otherwise specified):         1.  Acute hypoxemic respiratory failure: Secondary to COVID-19 with subsequent progression to ARDS.  Patient intubated 9/1/2021. Charles Seymour was assessed for ECMO. Charles Seymour was found to be an excellent candidate for ECMO despite having obesity.  After thorough discussion with the patient and family, he elected to proceed with intubation and ECMO. Christi Goltz will undergo minimal mechanical ventilator support and look to early wean from mechanical ventilator after ECMO is initiated. 2. COVID-19: Patient failed trial of remdesivir, tocilizumab, and Decadron.  Patient with progressive hypoxemia. 3. ARDS: Patient will undergo pulmonary lavage to screen for bacterial pulmonary infection.  Patient has already received Rocephin and Zithromax.  Holding on antibiotics.  Initiate ECMO.  Initiate lung rest strategy. 4. Obesity: Aware. 5. Mild neutropenia: Obtain immunoglobulin panel.       CC: COVID-19. HPI: Patient is a 52-year-old white male lifetime non-smoker. 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 Selma Joyce is obese but has no other medical diagnoses including coronary artery disease diabetes or sleep apnea. Patient presented to the emergency room and was admitted on 8/30/2021. Selma Joyce started to develop fever associated with chills cough shortness of breath nasal congestion loss of taste and loss of smell starting 8/28/2021.  As he continued to progress his dyspnea continued to worsen.  He developed malaise.  He indicated that his wife was recently diagnosed with Covid.  Patient underwent Covid screen and was positive.  CT scan chest showed bilateral infiltrates.  He initially was started on low-flow oxygen but continued to require progressive oxygen supplementation.  He was treated with remdesivir Decadron and subsequently tocilizumab.  He continued to develop progressive dyspnea and had the syndrome of \"happy hypoxia\".  As patient continued to worsen, he elected to proceed with intubation and ECMO. Patient intubated 9/1/2021. [address] cannulation 9/1/2021. ROS: Prior to intubation patient complained of shortness of breath.  He denied fever.  He had a cough but no mucus production.  He denies chills.  He denies diarrhea.  Does complain of mild anxiety.  He is currently sedated on mechanical ventilator. PMH:  Per HPI   SHX: Lifetime non-smoker. FHX: Recently exposed to his wife who had Covid. Allergies: NKDA   Medications:     Infusions Meds   · heparin (PORCINE) Infusion    · fentaNYL 500 mcg in sodium chloride 0.9% 100 ml infusion    · midazolam    · dexmedetomidine (PRECEDEX) IV infusion    · sodium chloride    · cisatracurium (NIMBEX) infusion    · propofol    · sodium chloride    · sodium chloride           Scheduled Medications   · dexamethasone 10 mg IntraVENous Q24H   · pantoprazole 40 mg IntraVENous Daily     And   · sodium chloride (PF) 10 mL IntraVENous Daily   · calcium replacement protocol Other RX Placeholder   · [START ON 9/2/2021] ceFAZolin (ANCEF) IVPB 2,000 mg IntraVENous Q8H   · cisatracurium Besylate 10 mg IntraVENous Once   · ketamine 100 mg IntraVENous Once   · morphine       · morphine       · LORazepam       · chlorhexidine 15 mL Mouth/Throat BID   · ceFAZolin 3,000 mg IntraVENous Once   · lactobacillus 1 capsule Oral Daily with breakfast   · sodium chloride flush 5-40 mL IntraVENous 2 times per day              Vital Signs:    T: 98.5: P: 84 RR: 20 B/P: 137/88: FiO2: 100: O2 Sat:95: I/O: pending GCS: 3   Body mass index is 37.81 kg/m². [facility] General:   Heavyset acutely ill-appearing white male. HEENT:  normocephalic and atraumatic.  No scleral icterus. PERR   Neck: supple.  No Thyromegaly. Lungs: Bilateral crackles.  Tachypnea.  Normal respirations. Cardiac: RRR.  No JVD. Abdomen: soft.  Nontender.  Nondistended. Extremities:  No clubbing, cyanosis, or edema x 4.     Vasculature: capillary refill < 3 seconds. Palpable dorsalis pedis pulses. Skin:  warm and dry. Psych:  Alert and oriented x3.  Affect appropriate.  Now sedated on mechanical ventilator. Lymph:  No supraclavicular adenopathy. Neurologic:  No focal deficit. No seizures.       Data: (All radiographs, tracings, PFTs, and imaging are personally viewed and interpreted unless otherwise noted).     · Telemetry shows sinus tachycardia with a rate of 102. · Chest x-ray shows bilateral infiltrates. · Covid + 8/30/2021. · Sodium 140, potassium 3.8, chloride 106, bicarb 23, BUN 24, creatinine 0.7, glucose 152.  CRP 4.0.  Albumin 3.0.  White blood cell count 2.4, hemoglobin 15.1, platelets 409.  Ferritin 1997. · ABG on 100% oxygen: 7.4 5/34/67.  PF ratio 67.    ==================================   PULMONOLOGY   PROCEDURE NOTE:      Central Line Placement: Risks and benefits to the procedure were discussed.  Alternatives and their risks were discussed as well. Patient was placed in the attention position.  Patient was placed in Trendelenburg position.  Patient was prepped using Chlorhexidene prep.  Patient was draped utilizing sterile gloves, hair net, mask, sterile gown and sterile OR towels.  Maximum barrier precautions were utilized.  Utilizing the left subclavian approach, patient received 5 cc of 1% lidocaine.  Utilizing an introducer needle, it was placed subcutaneously advanced under the clavicle and leveled flat.  It was subsequently advanced toward the thoracic inlet, until venous return was obtained.  A guide wire was placed through the introducer needle.  The introducer needle was subsequently withdrawn leaving the guide wire in place.  Utilizing a scalpel, a small incision was made in the skin.  A punch dilator was placed over the guide wire and subsequently withdrawn leaving the guide wire in place.  A cordis catheter was subsequently placed over the guide wire.  The guide wire was subsequently withdrawn leaving this catheter in place.  All ports had good venous return and were subsequently flushed with saline.  The catheter was secured using 2.0 silk.  Secondary cleansing with chlorhexidine prep was subsequently placed.  Tegaderm with bio- patch dressing was utilized for secondary securing and protection.  There were no complications.       EBL:   Less than 5 mL           Indication: [facility]  =======================================   PULMONOLOGY    PROCEDURE NOTE:      ICU PROCEDURE - ENDOTRACHEAL INTUBATION       Tre Lyons                                                  MRN#: [id number]   21                                                  Rubi [email or web address]        : 1975           INDICATION: Progressive respiratory failure       TIME OUT: taken       Permission obtained, risks/benefits reviewed:       ANESTHESIA:    ?Ketamine   ? Ativan   ? Morphine   ? Propofol   ? Other medications:           ESTIMATED BLOOD LOSS:   None.       COMPLICATIONS:   ?N/A   ? Other:       LARYNGOSCOPIC AIRWAY GRADE (CORMACK-LEHANE):?1  ?2a  ?2b ?3  ?4              INTUBATION EQUIPMENT USED:   ? Direct laryngoscope only       OUTCOME: Successful placement of #8.0 Taperguard Evac endotracheal via    ? Oral route       INSERTION DEPTH:  25    cm from    ?lip                    CONFIRMATION OF TUBE POSITION:    ?Capnography - Strong & repeatable exhaled CO2 detection    ?Multiple point auscultation    ?SpO2 response    ? STAT X-ray    ?Bronchoscopic assessment       UNUSUAL FINDINGS:       PROCEDURE:        Using direct laryngoscopy, the vocal cords were visualized and the endotracheal tube was placed through the cords under direct vision.        Good breath sounds were auscultated bilaterally without sounds over abdomen.  Appropriate strong & repeatable exhaled CO2 detection was confirmed.    =======================================   CRITICAL CARE 21:      ICU PROCEDURE - CVC PLACEMENT:     Risks and benefits to the procedure were discussed.  Alternatives and their risks were discussed as well.       INDICATION: Acute hypoxic respiratory failure       SITE: Right Subclavian Vein           CONSENT: was obtained after explaining indication/risks to patient and/or next of kin       TIME OUT: taken       STERILE PREP: Prior to the procedure all involved washed their hands.  Full maximum sterile field/barrier technique was followed (with cap and mask, gloves and sterile gown, as well as broad field sterile drapes). Local disinfection was performed with broad field application of orange dyed chloraprep solution, which was allowed to dry fully prior to the initiation of the procedure.       LOCAL ANESTHETIC: Aqueous lidocaine 1%        ESTIMATED BLOOD LOSS: Minimal       ULTRASOUND GUIDANCE USED: No       PROCEDURE:  Using modified seldinger technique, the appropriate vessel was located with the introducer needle subsequent to the use of a 22g x 1.5 inch \"\" needle. The flexible J-tip wire was passed without difficulty or resistance, followed by minimal skin incisions over the introducer needle.  The introducer needle was withdrawn and discarded, maintaining manual control of the guidewire at all times. After dilation of the tract, a preflushed 3 lumen CVC catheter was advanced over the guidewire without difficulty or resistance to its full extent. The guidewire was withdrawn and discarded and good return of nonpulsatile, dark venous blood assured through all lumes, with easy flushing of the lumens. The line was sutured in place then the site was reprepped with a  chlorprep solution followed by a Biopatch device.  After hemostasis was assured, an op site was applied and all sharps and materials were discarded appropriately.  EBL < 5 ml.       COMPLICATIONS: None       POST PROCEDURE CHEST XRAY HAS BEEN ORDERED

## 2021-09-03 ENCOUNTER — APPOINTMENT (OUTPATIENT)
Dept: GENERAL RADIOLOGY | Age: 46
DRG: 003 | End: 2021-09-03
Payer: COMMERCIAL

## 2021-09-03 LAB
ALBUMIN SERPL-MCNC: 3.8 G/DL (ref 3.5–5.1)
ALLEN TEST: ABNORMAL
ALLEN TEST: NORMAL
ALP BLD-CCNC: 29 U/L (ref 38–126)
ALT SERPL-CCNC: 29 U/L (ref 11–66)
ANION GAP SERPL CALCULATED.3IONS-SCNC: 13 MEQ/L (ref 8–16)
ANION GAP SERPL CALCULATED.3IONS-SCNC: 7 MEQ/L (ref 8–16)
ANION GAP SERPL CALCULATED.3IONS-SCNC: 8 MEQ/L (ref 8–16)
AST SERPL-CCNC: 44 U/L (ref 5–40)
BASE EXCESS (CALCULATED): -0.3 MMOL/L (ref -2.5–2.5)
BASE EXCESS (CALCULATED): 0.1 MMOL/L (ref -2.5–2.5)
BASE EXCESS (CALCULATED): 0.2 MMOL/L (ref -2.5–2.5)
BASE EXCESS (CALCULATED): 0.3 MMOL/L (ref -2.5–2.5)
BASE EXCESS (CALCULATED): 0.4 MMOL/L (ref -2.5–2.5)
BASE EXCESS (CALCULATED): 0.4 MMOL/L (ref -2.5–2.5)
BASE EXCESS (CALCULATED): 0.5 MMOL/L (ref -2.5–2.5)
BASE EXCESS (CALCULATED): 0.5 MMOL/L (ref -2.5–2.5)
BASE EXCESS (CALCULATED): 0.6 MMOL/L (ref -2.5–2.5)
BASE EXCESS (CALCULATED): 0.6 MMOL/L (ref -2.5–2.5)
BASE EXCESS (CALCULATED): 0.7 MMOL/L (ref -2.5–2.5)
BASE EXCESS (CALCULATED): 0.7 MMOL/L (ref -2.5–2.5)
BASE EXCESS (CALCULATED): 1.6 MMOL/L (ref -2.5–2.5)
BASE EXCESS (CALCULATED): 3.9 MMOL/L (ref -2.5–2.5)
BASE EXCESS MIXED: -1.7 MMOL/L (ref -2–3)
BASE EXCESS MIXED: -1.8 MMOL/L (ref -2–3)
BASE EXCESS MIXED: -4.5 MMOL/L (ref -2–3)
BASE EXCESS MIXED: 0 MMOL/L (ref -2–3)
BASE EXCESS MIXED: 0.4 MMOL/L (ref -2–3)
BASE EXCESS MIXED: 0.6 MMOL/L (ref -2–3)
BASE EXCESS MIXED: 0.8 MMOL/L (ref -2–3)
BASE EXCESS MIXED: 1.6 MMOL/L (ref -2–3)
BASE EXCESS MIXED: 3.2 MMOL/L (ref -2–3)
BILIRUB SERPL-MCNC: 0.8 MG/DL (ref 0.3–1.2)
BILIRUBIN DIRECT: 0.4 MG/DL (ref 0–0.3)
BUN BLDV-MCNC: 17 MG/DL (ref 7–22)
BUN BLDV-MCNC: 18 MG/DL (ref 7–22)
BUN BLDV-MCNC: 18 MG/DL (ref 7–22)
CALCIUM IONIZED: 1.14 MMOL/L (ref 1.12–1.32)
CALCIUM SERPL-MCNC: 7.9 MG/DL (ref 8.5–10.5)
CALCIUM SERPL-MCNC: 7.9 MG/DL (ref 8.5–10.5)
CALCIUM SERPL-MCNC: 8.1 MG/DL (ref 8.5–10.5)
CHLORIDE BLD-SCNC: 109 MEQ/L (ref 98–111)
CHLORIDE BLD-SCNC: 109 MEQ/L (ref 98–111)
CHLORIDE BLD-SCNC: 111 MEQ/L (ref 98–111)
CO2: 22 MEQ/L (ref 23–33)
CO2: 25 MEQ/L (ref 23–33)
CO2: 25 MEQ/L (ref 23–33)
COLLECTED BY:: ABNORMAL
COLLECTED BY:: NORMAL
CREAT SERPL-MCNC: 0.5 MG/DL (ref 0.4–1.2)
CREAT SERPL-MCNC: 0.6 MG/DL (ref 0.4–1.2)
CREAT SERPL-MCNC: 0.6 MG/DL (ref 0.4–1.2)
DEVICE: ABNORMAL
DEVICE: NORMAL
ERYTHROCYTE [DISTWIDTH] IN BLOOD BY AUTOMATED COUNT: 14.3 % (ref 11.5–14.5)
ERYTHROCYTE [DISTWIDTH] IN BLOOD BY AUTOMATED COUNT: 14.5 % (ref 11.5–14.5)
ERYTHROCYTE [DISTWIDTH] IN BLOOD BY AUTOMATED COUNT: 47.9 FL (ref 35–45)
ERYTHROCYTE [DISTWIDTH] IN BLOOD BY AUTOMATED COUNT: 48.6 FL (ref 35–45)
FERRITIN: 1900 NG/ML (ref 22–322)
FIBRINOGEN: 214 MG/100ML (ref 155–475)
FIBRINOGEN: 227 MG/100ML (ref 155–475)
FIO2, MIXED VENOUS: 30
GFR SERPL CREATININE-BSD FRML MDRD: > 90 ML/MIN/1.73M2
GLUCOSE BLD-MCNC: 130 MG/DL (ref 70–108)
GLUCOSE BLD-MCNC: 150 MG/DL (ref 70–108)
GLUCOSE BLD-MCNC: 150 MG/DL (ref 70–108)
GLUCOSE, WHOLE BLOOD: 137 MG/DL (ref 70–108)
GLUCOSE, WHOLE BLOOD: 145 MG/DL (ref 70–108)
GLUCOSE, WHOLE BLOOD: 158 MG/DL (ref 70–108)
GLUCOSE, WHOLE BLOOD: 163 MG/DL (ref 70–108)
GLUCOSE, WHOLE BLOOD: 183 MG/DL (ref 70–108)
HCO3, MIXED: 21 MMOL/L (ref 23–28)
HCO3, MIXED: 24 MMOL/L (ref 23–28)
HCO3, MIXED: 24 MMOL/L (ref 23–28)
HCO3, MIXED: 26 MMOL/L (ref 23–28)
HCO3, MIXED: 26 MMOL/L (ref 23–28)
HCO3, MIXED: 27 MMOL/L (ref 23–28)
HCO3, MIXED: 28 MMOL/L (ref 23–28)
HCO3, MIXED: 28 MMOL/L (ref 23–28)
HCO3, MIXED: 30 MMOL/L (ref 23–28)
HCO3: 25 MMOL/L (ref 23–28)
HCO3: 26 MMOL/L (ref 23–28)
HCO3: 27 MMOL/L (ref 23–28)
HCO3: 30 MMOL/L (ref 23–28)
HCT VFR BLD CALC: 31.8 % (ref 42–52)
HCT VFR BLD CALC: 33.5 % (ref 42–52)
HCT VFR BLD CALC: 34.1 % (ref 42–52)
HCT VFR BLD CALC: 35.1 % (ref 42–52)
HEMOGLOBIN: 10.1 GM/DL (ref 14–18)
HEMOGLOBIN: 10.5 GM/DL (ref 14–18)
HEMOGLOBIN: 10.9 GM/DL (ref 14–18)
HEMOGLOBIN: 11.1 GM/DL (ref 14–18)
IFIO2: 30
LACTIC ACID: 1.2 MMOL/L (ref 0.5–2)
LACTIC ACID: 1.3 MMOL/L (ref 0.5–2)
LD: 635 U/L (ref 100–190)
MAGNESIUM: 2.3 MG/DL (ref 1.6–2.4)
MCH RBC QN AUTO: 28.5 PG (ref 26–33)
MCH RBC QN AUTO: 28.8 PG (ref 26–33)
MCHC RBC AUTO-ENTMCNC: 31.3 GM/DL (ref 32.2–35.5)
MCHC RBC AUTO-ENTMCNC: 31.8 GM/DL (ref 32.2–35.5)
MCV RBC AUTO: 90.6 FL (ref 80–94)
MCV RBC AUTO: 90.8 FL (ref 80–94)
MODE: ABNORMAL
MODE: NORMAL
O2 SAT, MIXED: 50 %
O2 SAT, MIXED: 51 %
O2 SAT, MIXED: 55 %
O2 SAT, MIXED: 55 %
O2 SAT, MIXED: 56 %
O2 SAT, MIXED: 60 %
O2 SAT, MIXED: 61 %
O2 SAT, MIXED: 62 %
O2 SAT, MIXED: 63 %
O2 SAT, MIXED: 71 %
O2 SAT, MIXED: 72 %
O2 SAT, MIXED: 77 %
O2 SATURATION: 100 %
O2 SATURATION: 91 %
O2 SATURATION: 91 %
O2 SATURATION: 92 %
O2 SATURATION: 93 %
O2 SATURATION: 93 %
O2 SATURATION: 94 %
O2 SATURATION: 95 %
O2 SATURATION: 95 %
O2 SATURATION: 96 %
PCO2, MIXED VENOUS: 38 MMHG (ref 41–51)
PCO2, MIXED VENOUS: 42 MMHG (ref 41–51)
PCO2, MIXED VENOUS: 45 MMHG (ref 41–51)
PCO2, MIXED VENOUS: 49 MMHG (ref 41–51)
PCO2, MIXED VENOUS: 50 MMHG (ref 41–51)
PCO2, MIXED VENOUS: 52 MMHG (ref 41–51)
PCO2, MIXED VENOUS: 53 MMHG (ref 41–51)
PCO2, MIXED VENOUS: 53 MMHG (ref 41–51)
PCO2, MIXED VENOUS: 54 MMHG (ref 41–51)
PCO2, MIXED VENOUS: 55 MMHG (ref 41–51)
PCO2: 43 MMHG (ref 35–45)
PCO2: 44 MMHG (ref 35–45)
PCO2: 44 MMHG (ref 35–45)
PCO2: 47 MMHG (ref 35–45)
PCO2: 48 MMHG (ref 35–45)
PCO2: 49 MMHG (ref 35–45)
PCO2: 49 MMHG (ref 35–45)
PH BLOOD GAS: 7.35 (ref 7.35–7.45)
PH BLOOD GAS: 7.36 (ref 7.35–7.45)
PH BLOOD GAS: 7.37 (ref 7.35–7.45)
PH BLOOD GAS: 7.38 (ref 7.35–7.45)
PH BLOOD GAS: 7.4 (ref 7.35–7.45)
PH, MIXED: 7.3 (ref 7.31–7.41)
PH, MIXED: 7.3 (ref 7.31–7.41)
PH, MIXED: 7.31 (ref 7.31–7.41)
PH, MIXED: 7.32 (ref 7.31–7.41)
PH, MIXED: 7.33 (ref 7.31–7.41)
PH, MIXED: 7.34 (ref 7.31–7.41)
PH, MIXED: 7.35 (ref 7.31–7.41)
PH, MIXED: 7.35 (ref 7.31–7.41)
PH, MIXED: 7.36 (ref 7.31–7.41)
PH, MIXED: 7.37 (ref 7.31–7.41)
PIP: 22 CMH2O
PIP: 23 CMH2O
PLATELET # BLD: 146 THOU/MM3 (ref 130–400)
PLATELET # BLD: 167 THOU/MM3 (ref 130–400)
PMV BLD AUTO: 9.2 FL (ref 9.4–12.4)
PMV BLD AUTO: 9.5 FL (ref 9.4–12.4)
PO2 MIXED: 29 MMHG (ref 25–40)
PO2 MIXED: 30 MMHG (ref 25–40)
PO2 MIXED: 32 MMHG (ref 25–40)
PO2 MIXED: 32 MMHG (ref 25–40)
PO2 MIXED: 33 MMHG (ref 25–40)
PO2 MIXED: 33 MMHG (ref 25–40)
PO2 MIXED: 34 MMHG (ref 25–40)
PO2 MIXED: 35 MMHG (ref 25–40)
PO2 MIXED: 35 MMHG (ref 25–40)
PO2 MIXED: 39 MMHG (ref 25–40)
PO2 MIXED: 39 MMHG (ref 25–40)
PO2 MIXED: 45 MMHG (ref 25–40)
PO2: 556 MMHG (ref 71–104)
PO2: 64 MMHG (ref 71–104)
PO2: 65 MMHG (ref 71–104)
PO2: 67 MMHG (ref 71–104)
PO2: 67 MMHG (ref 71–104)
PO2: 71 MMHG (ref 71–104)
PO2: 72 MMHG (ref 71–104)
PO2: 73 MMHG (ref 71–104)
PO2: 74 MMHG (ref 71–104)
PO2: 75 MMHG (ref 71–104)
PO2: 76 MMHG (ref 71–104)
PO2: 80 MMHG (ref 71–104)
PO2: 80 MMHG (ref 71–104)
PO2: 84 MMHG (ref 71–104)
POC ACTIVATED CLOTTING TIME KAOLIN: 197 SECONDS (ref 1–150)
POC ACTIVATED CLOTTING TIME KAOLIN: 202 SECONDS (ref 1–150)
POC ACTIVATED CLOTTING TIME KAOLIN: 208 SECONDS (ref 1–150)
POTASSIUM REFLEX MAGNESIUM: 4.2 MEQ/L (ref 3.5–5.2)
POTASSIUM SERPL-SCNC: 4.1 MEQ/L (ref 3.5–5.2)
POTASSIUM SERPL-SCNC: 4.4 MEQ/L (ref 3.5–5.2)
RBC # BLD: 3.51 MILL/MM3 (ref 4.7–6.1)
RBC # BLD: 3.69 MILL/MM3 (ref 4.7–6.1)
SET PEEP: 12 MMHG
SET PRESS SUPP: 10 CMH2O
SET RESPIRATORY RATE: 10 BPM
SITE: ABNORMAL
SITE: NORMAL
SITE: NORMAL
SODIUM BLD-SCNC: 141 MEQ/L (ref 135–145)
SODIUM BLD-SCNC: 142 MEQ/L (ref 135–145)
SODIUM BLD-SCNC: 146 MEQ/L (ref 135–145)
SOURCE, BLOOD GAS: ABNORMAL
SOURCE, BLOOD GAS: NORMAL
SOURCE, BLOOD GAS: NORMAL
TOTAL PROTEIN: 5.3 G/DL (ref 6.1–8)
URINE CULTURE, ROUTINE: NORMAL
WBC # BLD: 5 THOU/MM3 (ref 4.8–10.8)
WBC # BLD: 5.3 THOU/MM3 (ref 4.8–10.8)

## 2021-09-03 PROCEDURE — 2580000003 HC RX 258: Performed by: INTERNAL MEDICINE

## 2021-09-03 PROCEDURE — 2700000000 HC OXYGEN THERAPY PER DAY

## 2021-09-03 PROCEDURE — 36415 COLL VENOUS BLD VENIPUNCTURE: CPT

## 2021-09-03 PROCEDURE — 94761 N-INVAS EAR/PLS OXIMETRY MLT: CPT

## 2021-09-03 PROCEDURE — 2580000003 HC RX 258

## 2021-09-03 PROCEDURE — APPSS180 APP SPLIT SHARED TIME > 60 MINUTES: Performed by: NURSE PRACTITIONER

## 2021-09-03 PROCEDURE — 83615 LACTATE (LD) (LDH) ENZYME: CPT

## 2021-09-03 PROCEDURE — 80053 COMPREHEN METABOLIC PANEL: CPT

## 2021-09-03 PROCEDURE — 82330 ASSAY OF CALCIUM: CPT

## 2021-09-03 PROCEDURE — 6360000002 HC RX W HCPCS: Performed by: NURSE PRACTITIONER

## 2021-09-03 PROCEDURE — 6360000002 HC RX W HCPCS: Performed by: INTERNAL MEDICINE

## 2021-09-03 PROCEDURE — 71045 X-RAY EXAM CHEST 1 VIEW: CPT

## 2021-09-03 PROCEDURE — 82947 ASSAY GLUCOSE BLOOD QUANT: CPT

## 2021-09-03 PROCEDURE — 2580000003 HC RX 258: Performed by: NURSE PRACTITIONER

## 2021-09-03 PROCEDURE — 82728 ASSAY OF FERRITIN: CPT

## 2021-09-03 PROCEDURE — P9041 ALBUMIN (HUMAN),5%, 50ML: HCPCS | Performed by: INTERNAL MEDICINE

## 2021-09-03 PROCEDURE — 83735 ASSAY OF MAGNESIUM: CPT

## 2021-09-03 PROCEDURE — P9047 ALBUMIN (HUMAN), 25%, 50ML: HCPCS | Performed by: NURSE PRACTITIONER

## 2021-09-03 PROCEDURE — 85027 COMPLETE CBC AUTOMATED: CPT

## 2021-09-03 PROCEDURE — 99291 CRITICAL CARE FIRST HOUR: CPT | Performed by: INTERNAL MEDICINE

## 2021-09-03 PROCEDURE — 85018 HEMOGLOBIN: CPT

## 2021-09-03 PROCEDURE — 94003 VENT MGMT INPAT SUBQ DAY: CPT

## 2021-09-03 PROCEDURE — 93005 ELECTROCARDIOGRAM TRACING: CPT | Performed by: NURSE PRACTITIONER

## 2021-09-03 PROCEDURE — 37799 UNLISTED PX VASCULAR SURGERY: CPT

## 2021-09-03 PROCEDURE — 85385 FIBRINOGEN ANTIGEN: CPT

## 2021-09-03 PROCEDURE — 85347 COAGULATION TIME ACTIVATED: CPT

## 2021-09-03 PROCEDURE — 33949 ECMO/ECLS DAILY MGMT ARTERY: CPT | Performed by: INTERNAL MEDICINE

## 2021-09-03 PROCEDURE — 99292 CRITICAL CARE ADDL 30 MIN: CPT | Performed by: INTERNAL MEDICINE

## 2021-09-03 PROCEDURE — 2000000000 HC ICU R&B

## 2021-09-03 PROCEDURE — 6370000000 HC RX 637 (ALT 250 FOR IP): Performed by: INTERNAL MEDICINE

## 2021-09-03 PROCEDURE — 6370000000 HC RX 637 (ALT 250 FOR IP): Performed by: NURSE PRACTITIONER

## 2021-09-03 PROCEDURE — 82248 BILIRUBIN DIRECT: CPT

## 2021-09-03 PROCEDURE — 82803 BLOOD GASES ANY COMBINATION: CPT

## 2021-09-03 PROCEDURE — 83605 ASSAY OF LACTIC ACID: CPT

## 2021-09-03 PROCEDURE — 85014 HEMATOCRIT: CPT

## 2021-09-03 PROCEDURE — C9113 INJ PANTOPRAZOLE SODIUM, VIA: HCPCS | Performed by: NURSE PRACTITIONER

## 2021-09-03 RX ORDER — FUROSEMIDE 10 MG/ML
80 INJECTION INTRAMUSCULAR; INTRAVENOUS ONCE
Status: COMPLETED | OUTPATIENT
Start: 2021-09-03 | End: 2021-09-03

## 2021-09-03 RX ORDER — SODIUM CHLORIDE 9 MG/ML
INJECTION, SOLUTION INTRAVENOUS PRN
Status: DISCONTINUED | OUTPATIENT
Start: 2021-09-03 | End: 2021-09-06

## 2021-09-03 RX ORDER — ALBUMIN (HUMAN) 12.5 G/50ML
25 SOLUTION INTRAVENOUS ONCE
Status: COMPLETED | OUTPATIENT
Start: 2021-09-03 | End: 2021-09-04

## 2021-09-03 RX ADMIN — CEFTRIAXONE SODIUM 2000 MG: 2 INJECTION, POWDER, FOR SOLUTION INTRAMUSCULAR; INTRAVENOUS at 01:42

## 2021-09-03 RX ADMIN — ALBUMIN (HUMAN) 25 G: 12.5 INJECTION, SOLUTION INTRAVENOUS at 07:08

## 2021-09-03 RX ADMIN — HEPARIN SODIUM 18 UNITS/KG/HR: 10000 INJECTION, SOLUTION INTRAVENOUS at 23:08

## 2021-09-03 RX ADMIN — DOCUSATE SODIUM 100 MG: 50 LIQUID ORAL at 08:14

## 2021-09-03 RX ADMIN — Medication 15 ML: at 08:14

## 2021-09-03 RX ADMIN — DEXAMETHASONE SODIUM PHOSPHATE 10 MG: 4 INJECTION, SOLUTION INTRA-ARTICULAR; INTRALESIONAL; INTRAMUSCULAR; INTRAVENOUS; SOFT TISSUE at 10:18

## 2021-09-03 RX ADMIN — Medication 15 ML: at 20:26

## 2021-09-03 RX ADMIN — INSULIN LISPRO 1 UNITS: 100 INJECTION, SOLUTION INTRAVENOUS; SUBCUTANEOUS at 10:18

## 2021-09-03 RX ADMIN — HEPARIN SODIUM 18 UNITS/KG/HR: 10000 INJECTION, SOLUTION INTRAVENOUS at 10:57

## 2021-09-03 RX ADMIN — FENTANYL CITRATE 200 MCG/HR: 50 INJECTION INTRAMUSCULAR; INTRAVENOUS at 08:14

## 2021-09-03 RX ADMIN — ALBUMIN (HUMAN) 25 G: 12.5 INJECTION, SOLUTION INTRAVENOUS at 20:31

## 2021-09-03 RX ADMIN — MIDAZOLAM 9 MG/HR: 5 INJECTION, SOLUTION INTRAMUSCULAR; INTRAVENOUS at 01:42

## 2021-09-03 RX ADMIN — MINERAL OIL AND WHITE PETROLATUM: 150; 830 OINTMENT OPHTHALMIC at 08:14

## 2021-09-03 RX ADMIN — ALBUMIN (HUMAN) 25 G: 12.5 INJECTION, SOLUTION INTRAVENOUS at 15:08

## 2021-09-03 RX ADMIN — FUROSEMIDE 80 MG: 40 INJECTION, SOLUTION INTRAMUSCULAR; INTRAVENOUS at 21:22

## 2021-09-03 RX ADMIN — SODIUM CHLORIDE, PRESERVATIVE FREE 10 ML: 5 INJECTION INTRAVENOUS at 20:31

## 2021-09-03 RX ADMIN — INSULIN LISPRO 1 UNITS: 100 INJECTION, SOLUTION INTRAVENOUS; SUBCUTANEOUS at 20:40

## 2021-09-03 RX ADMIN — NALOXEGOL OXALATE 12.5 MG: 12.5 TABLET, FILM COATED ORAL at 15:08

## 2021-09-03 RX ADMIN — FENTANYL CITRATE 200 MCG/HR: 50 INJECTION INTRAMUSCULAR; INTRAVENOUS at 14:57

## 2021-09-03 RX ADMIN — MIDAZOLAM 9 MG/HR: 5 INJECTION, SOLUTION INTRAMUSCULAR; INTRAVENOUS at 14:58

## 2021-09-03 RX ADMIN — CEFTRIAXONE SODIUM 2000 MG: 2 INJECTION, POWDER, FOR SOLUTION INTRAMUSCULAR; INTRAVENOUS at 14:57

## 2021-09-03 RX ADMIN — Medication 1 CAPSULE: at 08:14

## 2021-09-03 RX ADMIN — SODIUM CHLORIDE, POTASSIUM CHLORIDE, SODIUM LACTATE AND CALCIUM CHLORIDE: 600; 310; 30; 20 INJECTION, SOLUTION INTRAVENOUS at 00:59

## 2021-09-03 RX ADMIN — INSULIN LISPRO 1 UNITS: 100 INJECTION, SOLUTION INTRAVENOUS; SUBCUTANEOUS at 17:03

## 2021-09-03 RX ADMIN — FENTANYL CITRATE 200 MCG/HR: 50 INJECTION INTRAMUSCULAR; INTRAVENOUS at 01:42

## 2021-09-03 RX ADMIN — PANTOPRAZOLE SODIUM 40 MG: 40 INJECTION, POWDER, FOR SOLUTION INTRAVENOUS at 08:14

## 2021-09-03 RX ADMIN — ALBUMIN (HUMAN) 25 G: 0.25 INJECTION, SOLUTION INTRAVENOUS at 23:44

## 2021-09-03 RX ADMIN — SODIUM CHLORIDE, POTASSIUM CHLORIDE, SODIUM LACTATE AND CALCIUM CHLORIDE: 600; 310; 30; 20 INJECTION, SOLUTION INTRAVENOUS at 08:14

## 2021-09-03 RX ADMIN — SODIUM CHLORIDE, PRESERVATIVE FREE 10 ML: 5 INJECTION INTRAVENOUS at 08:38

## 2021-09-03 RX ADMIN — FENTANYL CITRATE 200 MCG/HR: 50 INJECTION INTRAMUSCULAR; INTRAVENOUS at 22:00

## 2021-09-03 RX ADMIN — ALBUMIN (HUMAN) 25 G: 12.5 INJECTION, SOLUTION INTRAVENOUS at 03:05

## 2021-09-03 RX ADMIN — INSULIN LISPRO 1 UNITS: 100 INJECTION, SOLUTION INTRAVENOUS; SUBCUTANEOUS at 03:11

## 2021-09-03 ASSESSMENT — PULMONARY FUNCTION TESTS
PIF_VALUE: 23
PIF_VALUE: 24
PIF_VALUE: 23
PIF_VALUE: 24
PIF_VALUE: 23

## 2021-09-03 NOTE — PROGRESS NOTES
Oxygen Delivery and Consumption    Patient's Hgb 10.1 g/dl   PaO2 70.5 mmHg   PvO2 32.3 mmHg   SaO2 92.7%   SvO2 54.5%   Flow (C.O.) 4.4 LPM   DO2 563 mL/min   VO2 231 mL/min     Ratio 2.4 - 1

## 2021-09-03 NOTE — PROGRESS NOTES
Oxygen Delivery and Consumption    Patient's Hgb 10.9 g/dl   PaO2 72 mmHg   PvO2 39 mmHg   SaO2 94%   SvO2 72%   Flow (C.O.) 4.7 LPM   DO2 655 mL/min   VO2 156 mL/min      Ratio 4.2:1

## 2021-09-03 NOTE — PROGRESS NOTES
Oxygen Delivery and Consumption    Patient's Hgb 10.5 g/dl   PaO2 75 mmHg   PvO2 33 mmHg   SaO2 94%   SvO2 60%   Flow (C.O.) 5.3 LPM   DO2 578 mL/min   VO2 211 mL/min     Ratio 2.7:1    Reported to Jeanie Damon CNP, at bedside.

## 2021-09-03 NOTE — PLAN OF CARE
Problem: MECHANICAL VENTILATION  Goal: Will be able to breathe spontaneously, without ventilator support  Description: Will be able to breathe spontaneously, without ventilator support  Outcome: Ongoing   Vent setting optimized to achieve target tidal volume, respiratory rate and ideal oxygen saturations. SBT will be performed when appropriate.

## 2021-09-03 NOTE — PROGRESS NOTES
ECMO Hourly Circuit Check       0800 0900 1000 1100 1200 1300 1400 1500 1600 1700 1800 1900    Device Secured Yes Yes Yes Yes Yes Yes Yes Yes Yes Yes Yes Yes    Connected to O2        No No No No No No No No No No No No    Right connection of tubing to the oxygenator and gas hoses      Yes Yes Yes Yes Yes Yes Yes Yes Yes Yes Yes Yes    Absence of kinks and tension      Yes Yes Yes Yes Yes Yes Yes Yes Yes Yes Yes Yes    Lines free of clots/fibrin with flashlight assessment      Yes Yes Yes Yes Yes Yes Yes Yes Yes Yes Yes Yes    Assess difference in blood color between cannulas      Yes Yes Yes Yes Yes Yes Yes Yes Yes Yes Yes Yes    No migration of cannula No No No No No No No No No No No No    Bleeding at insertion site No No No No No No No No No No No No    Knee immobilizer in place N/A N/A N/A N/A N/A N/A N/A         No traction on tubing No No No No No No No No No No No No    Flow Rate 4.2 4.2 4.2 4.2 4.2 4.3 3.9 3.8 4.4 4.4 4.4 4.4    Emergency Supplies at bedside Yes Yes Yes Yes Yes Yes Yes Yes Yes Yes Yes Yes                                                                                                                              Sweep Gas Oxy 1: 1LPM  Oxy2: 1LPM Oxy 1: 1LPM  Oxy2: 1LPM Oxy 1: 1LPM  Oxy2: 1LPM Oxy 1: 1LPM  Oxy2: 1LPM Oxy 1: 1LPM  Oxy2: 1LPM Oxy 1: 1LPM  Oxy2: 1LPM Oxy 1: 1LPM  Oxy2: 1LPM Oxy 1: 1LPM  Oxy2: 1LPM Oxy 1: 1LPM  Oxy2: 2LPM Oxy 1: 1LPM  Oxy2: 2LPM Oxy 1: 1LPM  Oxy2: 2LPM Oxy 1: 1LPM  Oxy2: 2LPM    DO2   585   544  544  493  563  576    VO2  199  193  221  226  231  244

## 2021-09-03 NOTE — PROGRESS NOTES
Oxygen Delivery and Consumption    Patient's Hgb 10.9 g/dl   PaO2 72.7 mmHg   PvO2 44.5 mmHg   SaO2 93.6%   SvO2 77.1%   Flow (C.O.) 4.5 LPM   DO2 628 mL/min   VO2 115 mL/min     Ratio 5.5:1

## 2021-09-03 NOTE — PROGRESS NOTES
Oxygen Delivery and Consumption    Patient's Hgb 10.1 g/dl   PaO2 67.1 mmHg   PvO2 29.4 mmHg   SaO2 91.9%   SvO2 49.5%   Flow (C.O.) 3.9 LPM   DO2 493 mL/min   VO2 226 mL/min   Ratio 2.1 - 1   Bhavaniemeterio TADEO Notified

## 2021-09-03 NOTE — PROGRESS NOTES
ECMO Hourly Circuit Check       2000 2100 2200 2300 0000 0100 0200 0300 0400 0500 0600 0700    Device Secured Yes  Yes Yes Yes Yes Yes Yes Yes Yes Yes Yes Yes    Connected to O2         No        No        No        No        No       No        No       No        No        No        No        No    Right connection of tubing to the oxygenator and gas hoses       Yes        Yes       Yes       Yes       Yes       Yes       Yes       Yes       Yes       Yes       Yes        Yes    Absence of kinks and tension       Yes        Yes       Yes       Yes       Yes       Yes       Yes       Yes       Yes       Yes       Yes        Yes    Lines free of clots/fibrin with flashlight assessment       Yes        Yes       Yes       Yes       Yes       Yes       Yes       Yes       Yes       Yes       Yes        Yes    Assess difference in blood color between cannulas       Yes        Yes       Yes       Yes       Yes       Yes       Yes       Yes       Yes       Yes       Yes        Yes    No migration of cannula Yes Yes Yes Yes Yes Yes Yes Yes Yes Yes Yes Yes    Bleeding at insertion site No No No No No No No No No No No No    Knee immobilizer in place No No No No No No No No No No No No    No traction on tubing Yes Yes Yes Yes Yes Yes Yes Yes Yes Yes Yes Yes    Flow Rate 5.5 5.3 5.1 5.1 4.9 4.7 4.6 4.5 4.5 4.3 4.4 4.3    Emergency Supplies at bedside Yes Yes Yes Yes Yes Yes Yes Yes Yes Yes Yes Yes                                                                    Sweep Gas Oxy 1 = 1  Oxy 2 = 1 Oxy 1 = 1  Oxy 2 = 1 Oxy 1 = 1  Oxy 2 = 1 Oxy 1 = 1  Oxy 2 = 1 Oxy 1 = 1  Oxy 2 = 1 Oxy 1 = 1  Oxy 2 = 1 Oxy 1 = 1  Oxy 2 = 1 Oxy 1 = 1  Oxy 2 = 1 Oxy 1 = 1  Oxy 2 = 1 Oxy 1 = 1  Oxy 2 = 1 Oxy 1 = 1  Oxy 2 = 1 Oxy 1 = 1  Oxy 2 = 1    DO2  781  724  655  628  559  578    VO2  260  266  156  115  124  211      2030 - Unable to maintain ECMO flow goal 6 LPM at current RPMs, requiring increas to max at [address] notified. 2100 - Current ECMO flow 5.3 LPM at 7500 RPM, [employer] notified. New orders placed. [facility] notified that ECMO flow continues to decrease on maxed RPMs 7500. New orders being placed. 2320 - Cardiac output shot, results given to [employer] DNP. Orders to start dopamine gtt. 2335 - Dopamine stopped per [employer].      0000 - SvO2 drawn from distal port of swan, 91%    0510 - Post Oxygenator ABG drawn - PaO2 = 556, SaO2 = 100

## 2021-09-03 NOTE — PLAN OF CARE
Problem: Airway Clearance - Ineffective  Goal: Achieve or maintain patent airway  Outcome: Ongoing  Note: Maintains on ventilator and V/V ECMO. Frequent oral/ETT suctioning to maintain clear airway. Continue to monitor. Problem: Gas Exchange - Impaired  Goal: Absence of hypoxia  Outcome: Ongoing  Note: No hypoxia observed this shift. Remains on V/V ECMO and ventilator with minimal settings. Continue to monitor for signs of hypoxia. Problem: Breathing Pattern - Ineffective  Goal: Ability to achieve and maintain a regular respiratory rate  Outcome: Ongoing  Note: Regular respiratory rate/pattern this shift. Continue to monitor. Problem: Body Temperature -  Risk of, Imbalanced  Goal: Ability to maintain a body temperature within defined limits  Outcome: Ongoing  Note: Pt remains afebrile this shift. Continue to monitor with continuous temp monitoring. Problem: Isolation Precautions - Risk of Spread of Infection  Goal: Prevent transmission of infection  Outcome: Ongoing  Note: Remains in droplet plus precautions to prevent the spread of infection. Proper PPE and hand hygiene maintained. Continue to monitor. Problem: Nutrition Deficits  Goal: Optimize nutritional status  Outcome: Ongoing  Note: Tube feeding started today per orders. Problem: Risk for Fluid Volume Deficit  Goal: Maintain normal heart rhythm  Outcome: Ongoing  Note: NSR to SB on monitor. Hemodynamics monitored per SGC. Continue to monitor. Problem: Non-Violent Restraints  Goal: Removal from restraints as soon as assessed to be safe  Outcome: Ongoing  Note: Pt with bilateral wrist restraints for attempting to intermittently pull at lines and tubes. Remains free from signs of injury. Continue to monitor for signs of injury and any discontinuation criteria.        Problem: Non-Violent Restraints  Goal: No harm/injury to patient while restraints in use  Outcome: Ongoing     Problem: Non-Violent Restraints  Goal:

## 2021-09-03 NOTE — PROGRESS NOTES
Cardiology Progress Note      Patient:  Amalia John  YOB: 1975  MRN: [id number]   Acct: [de-identified]  [facility] Date:  8/30/2021  Primary Cardiologist: Sulma Corral    Chief Complaint: ARDS, ECMO    Subjective (Events in last 24 hours):    Stable  Sats appropriate  Flows dropped somewhat but still appropriate  CO 5 on SGC  No fevers  No bleeding  EF preserved  Volume status is stable  Responding at times but on high dose opiates    Objective:   /72   Pulse (!) 49   Temp 97.2 °F (36.2 °C) (Core)   Resp 11   Ht 5' 10\" (1.778 m)   Wt 290 lb 2 oz (131.6 kg)   SpO2 95%   BMI 41.63 kg/m²        TELEMETRY: SB    Physical Exam:  General Appearance: intubated/sedated  Cardiovascular: bradycardic, regular rhythm, normal S1 and S2, no murmurs, rubs, clicks, or gallops, distal pulses intact, no carotid bruits, no JVD; lines intact, no bleeding from groins, ECMO cannulas intact  Pulmonary/Chest: Bilateral crackles  Abdomen: soft, non-tender, non-distended, no bowel sounds, no masses   Extremities: no cyanosis, clubbing or edema, pulse 2+  Skin: warm and dry, no rash or erythema  Head: normocephalic and atraumatic  Eyes: pupils equal, round, and reactive to light  Neck: supple and non-tender without mass, no thyromegaly   Musculoskeletal: normal range of motion, no joint swelling, deformity or tenderness  Neurological: sedated/intubated    Medications:    naloxegol  12.5 mg Oral QAM    insulin lispro  0-6 Units SubCUTAneous Q4H    cefTRIAXone (ROCEPHIN) IV  2,000 mg IntraVENous Q12H    albumin human  25 g IntraVENous Q6H    dexamethasone  10 mg IntraVENous Q24H    pantoprazole  40 mg IntraVENous Daily    And    sodium chloride (PF)  10 mL IntraVENous Daily    calcium replacement protocol   Other RX Placeholder    chlorhexidine  15 mL Mouth/Throat BID    lactobacillus  1 capsule Oral Daily with breakfast    sodium chloride flush  5-40 mL IntraVENous 2 times per day      dextrose      midazolam 9 mg/hr (09/03/21 0142)    lactated ringers 125 mL/hr at 09/03/21 0814    fentaNYL (SUBLIMAZE) 1250 mcg in sodium chloride 0.9 % 250 mL 200 mcg/hr (09/03/21 0814)    heparin (PORCINE) Infusion 18 Units/kg/hr (09/03/21 1057)    propofol Stopped (09/02/21 0540)     glucose, 15 g, PRN  dextrose, 12.5 g, PRN  glucagon (rDNA), 1 mg, PRN  dextrose, 100 mL/hr, PRN  morphine, 4 mg, Q2H PRN  LORazepam, 4 mg, Q4H PRN  docusate, 100 mg, BID PRN  potassium chloride, 20 mEq, PRN  magnesium sulfate, 2,000 mg, PRN  albuterol, 2.5 mg, Q4H PRN  heparin (porcine), 15,000 Units, PRN  artificial tears, , PRN  sodium chloride flush, 5-40 mL, PRN  ondansetron, 4 mg, Q8H PRN   Or  ondansetron, 4 mg, Q6H PRN  polyethylene glycol, 17 g, Daily PRN  acetaminophen, 650 mg, Q6H PRN   Or  acetaminophen, 650 mg, Q6H PRN      Lab Data:    Cardiac Enzymes:  No results for input(s): CKTOTAL, CKMB, CKMBINDEX, TROPONINI in the last 72 hours.     CBC:   Lab Results   Component Value Date    WBC 5.0 09/03/2021    RBC 3.51 09/03/2021    HGB 10.1 09/03/2021    HCT 31.8 09/03/2021     09/03/2021       CMP:    Lab Results   Component Value Date     09/03/2021    K 4.1 09/03/2021    K 4.2 09/03/2021     09/03/2021    CO2 25 09/03/2021    BUN 18 09/03/2021    CREATININE 0.6 09/03/2021    LABGLOM >90 09/03/2021    GLUCOSE 130 09/03/2021    CALCIUM 7.9 09/03/2021       Hepatic Function Panel:    Lab Results   Component Value Date    ALKPHOS 29 09/03/2021    ALT 29 09/03/2021    AST 44 09/03/2021    PROT 5.3 09/03/2021    BILITOT 0.8 09/03/2021    BILIDIR 0.4 09/03/2021    LABALBU 3.8 09/03/2021       Magnesium:    Lab Results   Component Value Date    MG 2.3 09/03/2021       PT/INR:    Lab Results   Component Value Date    INR 1.29 08/30/2021       HgBA1c:    Lab Results   Component Value Date    LABA1C 5.6 09/02/2021       FLP:    Lab Results   Component Value Date    TRIG 85 09/01/2021       TSH:  No results found for: TSH      Assessment:  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  Preserved EF  No sig CAD  Normal RV function    Plan:  · Neuro - intact, responsive but on high dose narcotics  · CV - ECMO lines appropriate, sats stable, no bleeding, see ECMO management note; stable volume status, CVP 10, PA 30-40s, IV Albumin as needed  · Pulm - Minimal vent settings of lung protective strategies, FiO2 30%, PEEP 12, TV 300s, CXR appears worse and progressive compared to yesterday  · ID - Hflu PNA, on ABx, Decadron for COVID, all other COVID therapies have been ineffective thus far  · GI - hold TF, no bowel sounds, start Movantik with narcotics  · Heme - counts stable, may need to consider transfusion if CVP drops or if chugging on lines, Heparin gtt for -200s  · Skin - not moving at this time, will watch for decubitus ulcers when feasible  · Renal - good UOP, no concerns for NIRAJ  · Endo - BS < 180, use Insulin as needed  · Dispo - ICU care, continue current care, weeks of support will likely be needed  · FULL CODE    Family updated. Greater than 120 minutes of time was spent managing critical issues, discussing with the family, and coordinating care.       Electronically signed by Jake Herrera MD on 9/3/2021 at 1:08 PM

## 2021-09-03 NOTE — PROGRESS NOTES
VenoVenous Extracorporeal Membrane Oxygenation Management    Re: ARDS/Hypoxic-Hypercarbic Respiratory Failure      Insertion Date: 9/1/21    Days on ECMO: 3      Access Sites: Stable, no bleeding around femoral cannulas, Woggles in place  Distal Perfusion Cannulas: NA  Tubing: No kinks/condensation, venous limb color changes are appropriate, no thrombus seen  Oxygenator: No clots or air bubbles, spit valve sighed, no indication for change out, 2 serial oxygenators  Pump: No air or clots seen, no concerns for malfunction  Power Console: No issues, plugged in, back up available  Flows: 4-5 L/min  Sweep: 4-5L min of O2, target PaCO2 40-42  Adjunctive Devices: NA      Dispo:  VV-ECMO to stay in place  No need for further manipulations of the cannulas or pump  [facility] is appropriate for continued care  Multidisciplinary rounds completed - no major issues identified for continued management at [facility]  Family updated.       Elsi Kumari MD  Interventional Cardiology

## 2021-09-03 NOTE — PROGRESS NOTES
Oxygen Delivery and Consumption    Patient's Hgb 10.7 g/dl   PaO2 97 mmHg   PvO2 34.3 mmHg   SaO2 97%   SvO2 62%   Flow (C.O.) 5.1 LPM   DO2 724 mL/min   VO2 266 mL/min        Ratio 2.7:1    Results reported to Gian Sims DNP

## 2021-09-03 NOTE — CARE COORDINATION
9/3/21, 11:34 AM EDT    DISCHARGE ON GOING EVALUATION    [facility] day: 4  Location: -02/002-A Reason for admit: Hypoxia [R09.02]  COVID-19 [U07.1]   Procedure:   8/30 CTA Chest: Neg for PE; Moderate groundglass infiltrates scattered throughout both lungs  9/1 Intubation  9/1 SGC left subclavian  9/1 CVC RIJ  9/1 Echo with EF 60%  9/1 JASPREET: EF 55-60%  9/1 Cardiac Cath: no sig CAD; PA pressure 40's; preserved CO/CI; Cannulated for ECMO  9/1 VV ECMO initiated  9/3 CXR:   1. Tubes and lines are as above. 2. Diffuse bilateral airspace disease, unchanged     Barriers to Discharge: Transferred to ICU on 9/1 and intubated. Taken to cath lab and was cannulated for VV ECMO. VV Ecmo initiated on 9/1. Paralytic drip started on 9/1 and was weaned off on 9/2. Plan for high dose decadron x 5 doses. Volume given yesterday and flow increased to 6 to prevent shunting. TF started. Today blood flow down to 4.4; given fluid and albumin. High residuals from TF, so they were stopped. PT/OT signed off. Remains on vent w/ETT on PCMV, peep 12, PC 10, FIO2 30%, sats 95%. Unable to follow commands; no movement x4 to painful stim. Intensivist and Cardiology following. Dietitian consulted. Telemetry, SGC, maria esther, CVC, OG to [place]. Fentanyl @ 200 mcg/hr, heparin drip, IVF, versed @ 9 mg/hr, IV albumin Q6H, IV rocephin, IV decadron daily, SSI, lactobacillus, movantik, IV protonix, Electrolyte replacement protocols. PCP: Awais Madrigal MD  Readmission Risk Score: 14%  Patient Goals/Plan/Treatment Preferences: From home w/wife. Plan pending clinical course.

## 2021-09-03 NOTE — PROGRESS NOTES
Oxygen Delivery and Consumption    Patient's Hgb 10.1 g/dl   PaO2 80.1 mmHg   PvO2 32.5 mmHg   SaO2 94.9%   SvO2 55%   Flow (C.O.) 4.4 LPM   DO2 576 mL/min   VO2 244 mL/min     Ratio 2.3 - 1

## 2021-09-03 NOTE — PROGRESS NOTES
Oxygen Delivery and Consumption    Patient's Hgb 10.1 g/dl   PaO2 75.6 mmHg   PvO2 35.1 mmHg   SaO2 94%   SvO2 61.1%   Flow (C.O.) 4.2 LPM   DO2 544 mL/min   VO2 193 mL/min      Ratio 2.8 - 1

## 2021-09-03 NOTE — PLAN OF CARE
Problem: MECHANICAL VENTILATION  Goal: Will be able to breathe spontaneously, without ventilator support  Description: Will be able to breathe spontaneously, without ventilator support  9/3/2021 0946 by Armida Marcial RCP  Outcome: Ongoing  Note:                                              Patient Weaning Progress    The patient's vent settings was able to be weaned this shift. Ventilator settings that were weaned              [] Mode   [] Pressure support weaned   [] Fio2 weaned   [] Peep weaned             Spontaneous weaning trial  was not attempted. Evac tube was  hooked up with continuous low suction(20-30mmHg)      Cuff was not  deflated to determine cuff leak.         *Specific details of weaning located in Ventilator documentation flowsheets*   9/3/2021 0029 by Kaleb Sanabria RCP  Outcome: Ongoing

## 2021-09-03 NOTE — PROGRESS NOTES
Comprehensive Nutrition Assessment    Type and Reason for Visit:  Reassess (tube feed monitor)    Nutrition Recommendations/Plan:   Recommend restart tube feeding when GI status allows: Vital 1.2 TF( also lowest CHO TF) at 10 ml/hr & increase 10 ml every 8 hours as pt tolerates to goal of 40 ml/hr. When TF at goal, start flushing 1 ( 2.5 oz) liquid protein TID. Free H20 flush per Dr 100 ml every 8 hours. Nutrition Assessment:    Pt. nutritionally compromised AEB NPO status, tube feeding of 10ml/ hour placed on hold earlier today due to absent bowel sounds and elevated residual.  At risk for further nutrition compromise r/t Dx + COVID 8/30, intubated 9/1, VV ECMO started 9/1, paralyzed this admit, + Hflu, ARDS and underlying medical condition (hx of GB OR, obesity ). Nutrition recommendations/interventions as per above. Malnutrition Assessment:  Malnutrition Status: At risk for malnutrition (Comment)    Context:  Acute Illness     Findings of the 6 clinical characteristics of malnutrition:  Energy Intake:  Unable to assess  Weight Loss:  Unable to assess (UBW not available)     Body Fat Loss:  No significant body fat loss     Muscle Mass Loss:  No significant muscle mass loss    Fluid Accumulation:  No significant fluid accumulation     Strength:  Not Performed    Estimated Daily Nutrient Needs:  Energy (kcal):  ~1435 kcals ( 19) with ECMO.  Pt is + COVID; Weight Used for Energy Requirements:  Ideal     Protein (g):  ~151 grams (2); Weight Used for Protein Requirements:  Ideal        Fluid (ml/day):  per Dr;     Nutrition Related Findings:    Patient diagnosed with COVID 8/30, intubated 9/1, VV ECMO started 9/1, tube feeding initiated at 10ml/ hour on 9/2 but placed on hold at [address] today due to elevated residual (110ml) and bowel sounds absent, OG tube placed to intermittent suction per CNP, RN reports movantik to be started and received prn colace; BM x 1 on 9/1; additional medication includes Skin, Weight     Discharge Planning:     Too soon to determine     Electronically signed by Lety Gotti RD, LD on 9/3/21 at 10:56 AM EDT    Contact: [phone]

## 2021-09-03 NOTE — PROGRESS NOTES
CRITICAL CARE PROGRESS NOTE      Patient:  Chu Fernandez    Unit/Bed:4D-02/002-A  YOB: 1975  MRN: [id number]   PCP: Awais Madrigal MD  Date of Admission: 8/30/2021  Chief Complaint:-Acute hypoxic respiratory failure    Assessment and Plan:      Acute hypoxemic respiratory failure: Secondary to COVID-19 with subsequent progression to ARDS. Patient intubated 9/1/2021. Patient undergoing lung rest strategy since ECMO has been administered. COVID-19: Patient failed trial of remdesivir, tocilizumab, and Decadron. Patient with progressive hypoxemia. Pneumonia: Coinfection with Haemophilus influenza. Patient on Rocephin. Patient transition to higher dosing of Rocephin. Antibiotics initiated 8/30/2021. Sepsis: Secondary to combination of Covid and Haemophilus influenza. Present at presentation. ARDS:  Patient intubated 9/1/2021. ECMO initiated 9/1/2021. Patient under lung rest strategy with pressure control 10, PEEP 12, FiO2 40%. Rate 10. Continue with lung rest strategy and ECMO. Volume status: Patient has a right-sided ejection fraction of 6 L. Therefore, need to keep CVP between 10 and 15 in order to adequately preload right ventricle for optimal left ventricle delivery. Goal should be flow of 6.0 L in order to adequately prefill the right ventricle for left ventricular delivery. Lower flows will result in the lower right sided cardiac output and decreased oxygen delivery to the distal tissues. Patient on albumin 25 g IV every 6 hours for 8 doses to increase intravascular volume. He is also on  cc an hour in order to increase intravascular volume. Maintaining the volume at 4.5 which is maximum ability of pump at this time   Obesity: Aware. Mild neutropenia: resolved; Obtain immunoglobulin panel. White blood cell count trending upward. HPI: Patient is a 33-year-old white male lifetime non-smoker. He is a farmer by occupation.   He is obese but has no other medical diagnoses including coronary artery disease diabetes or sleep apnea. Patient presented to the emergency room and was admitted on 8/30/2021. He started to develop fever associated with chills cough shortness of breath nasal congestion loss of taste and loss of smell starting 8/28/2021. As he continued to progress his dyspnea continued to worsen. He developed malaise. He indicated that his wife was recently diagnosed with Covid. Patient underwent Covid screen and was positive. CT scan chest showed bilateral infiltrates. He initially was started on low-flow oxygen but continued to require progressive oxygen supplementation. He was treated with remdesivir Decadron and subsequently tocilizumab. He continued to develop progressive dyspnea and had the syndrome of \"happy hypoxia\". As patient continued to worsen, he elected to proceed with intubation and ECMO. Patient intubated 9/1/2021. ECMO cannulation 9/1/2021. ROS: Sedated on mechanical ventilator. PMH:  Per HPI  SHX: Lifetime non-smoker. FHX: Covid exposure from wife.   Allergies: No known drug allergies      Scheduled Meds:   naloxegol  12.5 mg Oral QAM    insulin lispro  0-6 Units SubCUTAneous Q4H    cefTRIAXone (ROCEPHIN) IV  2,000 mg IntraVENous Q12H    albumin human  25 g IntraVENous Q6H    dexamethasone  10 mg IntraVENous Q24H    pantoprazole  40 mg IntraVENous Daily    And    sodium chloride (PF)  10 mL IntraVENous Daily    calcium replacement protocol   Other RX Placeholder    chlorhexidine  15 mL Mouth/Throat BID    lactobacillus  1 capsule Oral Daily with breakfast    sodium chloride flush  5-40 mL IntraVENous 2 times per day     Continuous Infusions:   sodium chloride      dextrose      midazolam 9 mg/hr (09/03/21 1458)    lactated ringers 125 mL/hr at 09/03/21 0814    fentaNYL (SUBLIMAZE) 1250 mcg in sodium chloride 0.9 % 250 mL 200 mcg/hr (09/03/21 1457)    heparin (PORCINE) Infusion 18 Units/kg/hr (09/03/21 1057)    propofol Stopped (09/02/21 0540) PHYSICAL EXAMINATION:  T:  96.6.  P:  46. RR:  12. B/P:  116/58. FiO2:  30. O2 Sat:  95.  I/O:  7564/2190  Body mass index is 41.63 kg/m². PC: 10/12: TV: 220: RRTotal: 13: Ti:1 sec:  General:   Acute on chronically ill-appearing white male  HEENT:  normocephalic and atraumatic. No scleral icterus. PERR  Neck: supple. No Thyromegaly. Lungs: Diminished to auscultation. No retractions  Cardiac: Sinus bradycardia no JVD. Abdomen: soft. Nontender. Extremities:  No clubbing, cyanosis. Nonpitting bilateral lower extremity edema mild  Vasculature: capillary refill < 3 seconds. Palpable dorsalis pedis pulses. Skin:  warm and dry. Psych: Cough and gag to oral care. Lymph:  No supraclavicular adenopathy. Neurologic:  No focal deficit. No seizures. Data: (All radiographs, tracings, PFTs, and imaging are personally viewed and interpreted unless otherwise noted). Sodium 142, potassium 4.1, chloride 109, CO2 25, BUN 18, creatinine 0.6, anion gap 8.0, glucose 130  WBC 5.0, hemoglobin 10.1, hematocrit 31.8, platelet count 318  Telemetry shows sinus bradycardia  Chest x-ray 9/3/2021 reports bilateral diffuse airway disease. Echocardiogram 9/1/2021 reports ejection fraction 55 to 60%. Meets Continued ICU Level Care Criteria:    [x] Yes   [] No - Transfer Planned to listed location:  [] HOSPITALIST CONTACTED-      Case and plan discussed with Dr. Tony Ross. Patient seen in telemedicine with Dr. Tony Ross. Electronically signed by KATHARINA Springer - [facility]  CRITICAL CARE SPECIALIST  Patient seen by me. Case discussed with NP. Patient remains critically ill with pneumonia on antibiotics. .  Italicized font represents changes to the note made by me. CC time 35 minutes. Time was discontiguous. Time does not include procedures. Time does include my direct assessment of the patient and coordination of care.   Electronically signed by Maricel Ramirez MD on 9/7/2021 at 6:42 AM

## 2021-09-04 ENCOUNTER — APPOINTMENT (OUTPATIENT)
Dept: GENERAL RADIOLOGY | Age: 46
DRG: 003 | End: 2021-09-04
Payer: COMMERCIAL

## 2021-09-04 LAB
ALBUMIN SERPL-MCNC: 4.3 G/DL (ref 3.5–5.1)
ALLEN TEST: ABNORMAL
ALLEN TEST: POSITIVE
ALP BLD-CCNC: 28 U/L (ref 38–126)
ALT SERPL-CCNC: 56 U/L (ref 11–66)
ANION GAP SERPL CALCULATED.3IONS-SCNC: 8 MEQ/L (ref 8–16)
ANION GAP SERPL CALCULATED.3IONS-SCNC: 9 MEQ/L (ref 8–16)
AST SERPL-CCNC: 74 U/L (ref 5–40)
ATYPICAL LYMPHOCYTES: ABNORMAL %
BASE EXCESS (CALCULATED): 2.6 MMOL/L (ref -2.5–2.5)
BASE EXCESS (CALCULATED): 3.1 MMOL/L (ref -2.5–2.5)
BASE EXCESS (CALCULATED): 3.2 MMOL/L (ref -2.5–2.5)
BASE EXCESS (CALCULATED): 3.6 MMOL/L (ref -2.5–2.5)
BASE EXCESS (CALCULATED): 3.7 MMOL/L (ref -2.5–2.5)
BASE EXCESS (CALCULATED): 3.7 MMOL/L (ref -2.5–2.5)
BASE EXCESS (CALCULATED): 4.2 MMOL/L (ref -2.5–2.5)
BASE EXCESS (CALCULATED): 4.6 MMOL/L (ref -2.5–2.5)
BASE EXCESS (CALCULATED): 4.8 MMOL/L (ref -2.5–2.5)
BASE EXCESS MIXED: 3.7 MMOL/L (ref -2–3)
BASE EXCESS MIXED: 3.9 MMOL/L (ref -2–3)
BASE EXCESS MIXED: 4 MMOL/L (ref -2–3)
BASE EXCESS MIXED: 4.3 MMOL/L (ref -2–3)
BASE EXCESS MIXED: 5 MMOL/L (ref -2–3)
BASOPHILS # BLD: 0.2 %
BASOPHILS ABSOLUTE: 0 THOU/MM3 (ref 0–0.1)
BILIRUB SERPL-MCNC: 1.4 MG/DL (ref 0.3–1.2)
BILIRUBIN DIRECT: 0.6 MG/DL (ref 0–0.3)
BUN BLDV-MCNC: 19 MG/DL (ref 7–22)
BUN BLDV-MCNC: 19 MG/DL (ref 7–22)
CALCIUM SERPL-MCNC: 8.4 MG/DL (ref 8.5–10.5)
CALCIUM SERPL-MCNC: 8.5 MG/DL (ref 8.5–10.5)
CHLORIDE BLD-SCNC: 105 MEQ/L (ref 98–111)
CHLORIDE BLD-SCNC: 106 MEQ/L (ref 98–111)
CO2: 28 MEQ/L (ref 23–33)
CO2: 29 MEQ/L (ref 23–33)
COLLECTED BY:: ABNORMAL
CREAT SERPL-MCNC: 0.6 MG/DL (ref 0.4–1.2)
CREAT SERPL-MCNC: 0.6 MG/DL (ref 0.4–1.2)
DEVICE: ABNORMAL
EKG ATRIAL RATE: 48 BPM
EKG P AXIS: 27 DEGREES
EKG P-R INTERVAL: 132 MS
EKG Q-T INTERVAL: 476 MS
EKG QRS DURATION: 106 MS
EKG QTC CALCULATION (BAZETT): 425 MS
EKG R AXIS: -9 DEGREES
EKG T AXIS: -20 DEGREES
EKG VENTRICULAR RATE: 48 BPM
EOSINOPHIL # BLD: 0 %
EOSINOPHILS ABSOLUTE: 0 THOU/MM3 (ref 0–0.4)
ERYTHROCYTE [DISTWIDTH] IN BLOOD BY AUTOMATED COUNT: 14.3 % (ref 11.5–14.5)
ERYTHROCYTE [DISTWIDTH] IN BLOOD BY AUTOMATED COUNT: 46.5 FL (ref 35–45)
FIO2, MIXED VENOUS: 30
GFR SERPL CREATININE-BSD FRML MDRD: > 90 ML/MIN/1.73M2
GFR SERPL CREATININE-BSD FRML MDRD: > 90 ML/MIN/1.73M2
GLUCOSE BLD-MCNC: 128 MG/DL (ref 70–108)
GLUCOSE BLD-MCNC: 134 MG/DL (ref 70–108)
GLUCOSE BLD-MCNC: 154 MG/DL (ref 70–108)
GLUCOSE, WHOLE BLOOD: 123 MG/DL (ref 70–108)
GLUCOSE, WHOLE BLOOD: 128 MG/DL (ref 70–108)
GLUCOSE, WHOLE BLOOD: 137 MG/DL (ref 70–108)
GLUCOSE, WHOLE BLOOD: 149 MG/DL (ref 70–108)
GLUCOSE, WHOLE BLOOD: 150 MG/DL (ref 70–108)
GLUCOSE, WHOLE BLOOD: 166 MG/DL (ref 70–108)
GLUCOSE, WHOLE BLOOD: 169 MG/DL (ref 70–108)
GLUCOSE, WHOLE BLOOD: 171 MG/DL (ref 70–108)
GRAM STAIN RESULT: ABNORMAL
HCO3, MIXED: 29 MMOL/L (ref 23–28)
HCO3, MIXED: 30 MMOL/L (ref 23–28)
HCO3, MIXED: 30 MMOL/L (ref 23–28)
HCO3: 27 MMOL/L (ref 23–28)
HCO3: 28 MMOL/L (ref 23–28)
HCO3: 29 MMOL/L (ref 23–28)
HCT VFR BLD CALC: 33.8 % (ref 42–52)
HEMOGLOBIN: 11 GM/DL (ref 14–18)
IFIO2: 30
IMMATURE GRANS (ABS): 0.36 THOU/MM3 (ref 0–0.07)
IMMATURE GRANULOCYTES: 5.9 %
LACTIC ACID: 1.3 MMOL/L (ref 0.5–2)
LACTIC ACID: 1.5 MMOL/L (ref 0.5–2)
LD: 682 U/L (ref 100–190)
LYMPHOCYTES # BLD: 12.2 %
LYMPHOCYTES ABSOLUTE: 0.7 THOU/MM3 (ref 1–4.8)
MAGNESIUM: 2.1 MG/DL (ref 1.6–2.4)
MAGNESIUM: 2.4 MG/DL (ref 1.6–2.4)
MCH RBC QN AUTO: 29.2 PG (ref 26–33)
MCHC RBC AUTO-ENTMCNC: 32.5 GM/DL (ref 32.2–35.5)
MCV RBC AUTO: 89.7 FL (ref 80–94)
MODE: ABNORMAL
MONOCYTES # BLD: 6.7 %
MONOCYTES ABSOLUTE: 0.4 THOU/MM3 (ref 0.4–1.3)
NUCLEATED RED BLOOD CELLS: 0 /100 WBC
O2 SAT, MIXED: 57 %
O2 SAT, MIXED: 61 %
O2 SATURATION: 91 %
O2 SATURATION: 91 %
O2 SATURATION: 93 %
O2 SATURATION: 94 %
ORGANISM: ABNORMAL
PCO2, MIXED VENOUS: 43 MMHG (ref 41–51)
PCO2, MIXED VENOUS: 45 MMHG (ref 41–51)
PCO2, MIXED VENOUS: 47 MMHG (ref 41–51)
PCO2: 38 MMHG (ref 35–45)
PCO2: 39 MMHG (ref 35–45)
PCO2: 41 MMHG (ref 35–45)
PCO2: 43 MMHG (ref 35–45)
PH BLOOD GAS: 7.44 (ref 7.35–7.45)
PH BLOOD GAS: 7.45 (ref 7.35–7.45)
PH BLOOD GAS: 7.46 (ref 7.35–7.45)
PH BLOOD GAS: 7.46 (ref 7.35–7.45)
PH BLOOD GAS: 7.47 (ref 7.35–7.45)
PH, MIXED: 7.4 (ref 7.31–7.41)
PH, MIXED: 7.4 (ref 7.31–7.41)
PH, MIXED: 7.41 (ref 7.31–7.41)
PH, MIXED: 7.42 (ref 7.31–7.41)
PH, MIXED: 7.45 (ref 7.31–7.41)
PIP: 22 CMH2O
PIP: 23 CMH2O
PLATELET # BLD: 132 THOU/MM3 (ref 130–400)
PLATELET ESTIMATE: ADEQUATE
PMV BLD AUTO: 9.5 FL (ref 9.4–12.4)
PO2 MIXED: 30 MMHG (ref 25–40)
PO2 MIXED: 31 MMHG (ref 25–40)
PO2 MIXED: 31 MMHG (ref 25–40)
PO2 MIXED: 32 MMHG (ref 25–40)
PO2 MIXED: 32 MMHG (ref 25–40)
PO2: 59 MMHG (ref 71–104)
PO2: 59 MMHG (ref 71–104)
PO2: 63 MMHG (ref 71–104)
PO2: 63 MMHG (ref 71–104)
PO2: 66 MMHG (ref 71–104)
PO2: 67 MMHG (ref 71–104)
PO2: 69 MMHG (ref 71–104)
POTASSIUM REFLEX MAGNESIUM: 3.9 MEQ/L (ref 3.5–5.2)
POTASSIUM SERPL-SCNC: 3.9 MEQ/L (ref 3.5–5.2)
RBC # BLD: 3.77 MILL/MM3 (ref 4.7–6.1)
RESPIRATORY CULTURE: ABNORMAL
RESPIRATORY CULTURE: ABNORMAL
SCAN OF BLOOD SMEAR: NORMAL
SEG NEUTROPHILS: 75 %
SEGMENTED NEUTROPHILS ABSOLUTE COUNT: 4.6 THOU/MM3 (ref 1.8–7.7)
SET PEEP: 12 MMHG
SET PRESS SUPP: 10 CMH2O
SET RESPIRATORY RATE: 10 BPM
SITE: ABNORMAL
SODIUM BLD-SCNC: 142 MEQ/L (ref 135–145)
SODIUM BLD-SCNC: 143 MEQ/L (ref 135–145)
SOURCE, BLOOD GAS: ABNORMAL
TOTAL PROTEIN: 5.7 G/DL (ref 6.1–8)
WBC # BLD: 6.1 THOU/MM3 (ref 4.8–10.8)

## 2021-09-04 PROCEDURE — 94761 N-INVAS EAR/PLS OXIMETRY MLT: CPT

## 2021-09-04 PROCEDURE — 83010 ASSAY OF HAPTOGLOBIN QUANT: CPT

## 2021-09-04 PROCEDURE — 2580000003 HC RX 258: Performed by: INTERNAL MEDICINE

## 2021-09-04 PROCEDURE — 82948 REAGENT STRIP/BLOOD GLUCOSE: CPT

## 2021-09-04 PROCEDURE — 83615 LACTATE (LD) (LDH) ENZYME: CPT

## 2021-09-04 PROCEDURE — P9047 ALBUMIN (HUMAN), 25%, 50ML: HCPCS | Performed by: NURSE PRACTITIONER

## 2021-09-04 PROCEDURE — 2580000003 HC RX 258

## 2021-09-04 PROCEDURE — 71045 X-RAY EXAM CHEST 1 VIEW: CPT

## 2021-09-04 PROCEDURE — 33948 ECMO/ECLS DAILY MGMT-VENOUS: CPT | Performed by: INTERNAL MEDICINE

## 2021-09-04 PROCEDURE — 85025 COMPLETE CBC W/AUTO DIFF WBC: CPT

## 2021-09-04 PROCEDURE — 2000000000 HC ICU R&B

## 2021-09-04 PROCEDURE — 6370000000 HC RX 637 (ALT 250 FOR IP): Performed by: NURSE PRACTITIONER

## 2021-09-04 PROCEDURE — APPSS180 APP SPLIT SHARED TIME > 60 MINUTES: Performed by: NURSE PRACTITIONER

## 2021-09-04 PROCEDURE — 6360000002 HC RX W HCPCS: Performed by: INTERNAL MEDICINE

## 2021-09-04 PROCEDURE — 2580000003 HC RX 258: Performed by: NURSE PRACTITIONER

## 2021-09-04 PROCEDURE — 85347 COAGULATION TIME ACTIVATED: CPT

## 2021-09-04 PROCEDURE — 83735 ASSAY OF MAGNESIUM: CPT

## 2021-09-04 PROCEDURE — 2700000000 HC OXYGEN THERAPY PER DAY

## 2021-09-04 PROCEDURE — 94003 VENT MGMT INPAT SUBQ DAY: CPT

## 2021-09-04 PROCEDURE — 82248 BILIRUBIN DIRECT: CPT

## 2021-09-04 PROCEDURE — 82803 BLOOD GASES ANY COMBINATION: CPT

## 2021-09-04 PROCEDURE — 80053 COMPREHEN METABOLIC PANEL: CPT

## 2021-09-04 PROCEDURE — 37799 UNLISTED PX VASCULAR SURGERY: CPT

## 2021-09-04 PROCEDURE — C9113 INJ PANTOPRAZOLE SODIUM, VIA: HCPCS | Performed by: NURSE PRACTITIONER

## 2021-09-04 PROCEDURE — 6360000002 HC RX W HCPCS: Performed by: NURSE PRACTITIONER

## 2021-09-04 PROCEDURE — 36415 COLL VENOUS BLD VENIPUNCTURE: CPT

## 2021-09-04 PROCEDURE — 93010 ELECTROCARDIOGRAM REPORT: CPT | Performed by: NUCLEAR MEDICINE

## 2021-09-04 PROCEDURE — 6370000000 HC RX 637 (ALT 250 FOR IP): Performed by: INTERNAL MEDICINE

## 2021-09-04 PROCEDURE — 82947 ASSAY GLUCOSE BLOOD QUANT: CPT

## 2021-09-04 PROCEDURE — 83605 ASSAY OF LACTIC ACID: CPT

## 2021-09-04 PROCEDURE — 99291 CRITICAL CARE FIRST HOUR: CPT | Performed by: INTERNAL MEDICINE

## 2021-09-04 RX ORDER — ALBUMIN (HUMAN) 12.5 G/50ML
25 SOLUTION INTRAVENOUS ONCE
Status: COMPLETED | OUTPATIENT
Start: 2021-09-04 | End: 2021-09-05

## 2021-09-04 RX ORDER — ALBUMIN (HUMAN) 12.5 G/50ML
25 SOLUTION INTRAVENOUS ONCE
Status: DISCONTINUED | OUTPATIENT
Start: 2021-09-04 | End: 2021-09-04

## 2021-09-04 RX ORDER — MIDAZOLAM HYDROCHLORIDE 1 MG/ML
2 INJECTION INTRAMUSCULAR; INTRAVENOUS ONCE
Status: DISCONTINUED | OUTPATIENT
Start: 2021-09-04 | End: 2021-09-13

## 2021-09-04 RX ORDER — ALBUMIN (HUMAN) 12.5 G/50ML
25 SOLUTION INTRAVENOUS ONCE
Status: COMPLETED | OUTPATIENT
Start: 2021-09-04 | End: 2021-09-04

## 2021-09-04 RX ADMIN — MINERAL OIL AND WHITE PETROLATUM: 150; 830 OINTMENT OPHTHALMIC at 20:35

## 2021-09-04 RX ADMIN — Medication 1 CAPSULE: at 08:10

## 2021-09-04 RX ADMIN — POTASSIUM CHLORIDE 20 MEQ: 29.8 INJECTION, SOLUTION INTRAVENOUS at 06:08

## 2021-09-04 RX ADMIN — INSULIN LISPRO 1 UNITS: 100 INJECTION, SOLUTION INTRAVENOUS; SUBCUTANEOUS at 03:10

## 2021-09-04 RX ADMIN — FENTANYL CITRATE 200 MCG/HR: 50 INJECTION INTRAMUSCULAR; INTRAVENOUS at 19:06

## 2021-09-04 RX ADMIN — Medication 15 ML: at 20:35

## 2021-09-04 RX ADMIN — CEFTRIAXONE SODIUM 2000 MG: 2 INJECTION, POWDER, FOR SOLUTION INTRAMUSCULAR; INTRAVENOUS at 13:32

## 2021-09-04 RX ADMIN — MIDAZOLAM 10 MG/HR: 5 INJECTION, SOLUTION INTRAMUSCULAR; INTRAVENOUS at 00:02

## 2021-09-04 RX ADMIN — FENTANYL CITRATE 200 MCG/HR: 50 INJECTION INTRAMUSCULAR; INTRAVENOUS at 09:57

## 2021-09-04 RX ADMIN — PANTOPRAZOLE SODIUM 40 MG: 40 INJECTION, POWDER, FOR SOLUTION INTRAVENOUS at 08:10

## 2021-09-04 RX ADMIN — Medication 15 ML: at 08:10

## 2021-09-04 RX ADMIN — CEFTRIAXONE SODIUM 2000 MG: 2 INJECTION, POWDER, FOR SOLUTION INTRAMUSCULAR; INTRAVENOUS at 01:12

## 2021-09-04 RX ADMIN — MAGNESIUM SULFATE HEPTAHYDRATE 2000 MG: 40 INJECTION, SOLUTION INTRAVENOUS at 01:41

## 2021-09-04 RX ADMIN — DOCUSATE SODIUM 100 MG: 50 LIQUID ORAL at 08:10

## 2021-09-04 RX ADMIN — MIDAZOLAM 12 MG/HR: 5 INJECTION, SOLUTION INTRAMUSCULAR; INTRAVENOUS at 09:05

## 2021-09-04 RX ADMIN — POTASSIUM CHLORIDE 20 MEQ: 29.8 INJECTION, SOLUTION INTRAVENOUS at 01:39

## 2021-09-04 RX ADMIN — POTASSIUM CHLORIDE 20 MEQ: 29.8 INJECTION, SOLUTION INTRAVENOUS at 02:16

## 2021-09-04 RX ADMIN — ALBUMIN (HUMAN) 25 G: 0.25 INJECTION, SOLUTION INTRAVENOUS at 01:13

## 2021-09-04 RX ADMIN — SODIUM CHLORIDE, PRESERVATIVE FREE 10 ML: 5 INJECTION INTRAVENOUS at 20:35

## 2021-09-04 RX ADMIN — NALOXEGOL OXALATE 12.5 MG: 12.5 TABLET, FILM COATED ORAL at 08:10

## 2021-09-04 RX ADMIN — HEPARIN SODIUM 18 UNITS/KG/HR: 10000 INJECTION, SOLUTION INTRAVENOUS at 22:06

## 2021-09-04 RX ADMIN — INSULIN LISPRO 1 UNITS: 100 INJECTION, SOLUTION INTRAVENOUS; SUBCUTANEOUS at 23:20

## 2021-09-04 RX ADMIN — INSULIN LISPRO 1 UNITS: 100 INJECTION, SOLUTION INTRAVENOUS; SUBCUTANEOUS at 00:07

## 2021-09-04 RX ADMIN — POTASSIUM CHLORIDE 20 MEQ: 29.8 INJECTION, SOLUTION INTRAVENOUS at 08:09

## 2021-09-04 RX ADMIN — MIDAZOLAM HYDROCHLORIDE 2 MG: 1 INJECTION INTRAMUSCULAR; INTRAVENOUS at 04:16

## 2021-09-04 RX ADMIN — MIDAZOLAM 12 MG/HR: 5 INJECTION, SOLUTION INTRAMUSCULAR; INTRAVENOUS at 17:30

## 2021-09-04 RX ADMIN — ALBUMIN (HUMAN) 25 G: 0.25 INJECTION, SOLUTION INTRAVENOUS at 23:20

## 2021-09-04 RX ADMIN — HEPARIN SODIUM 18 UNITS/KG/HR: 10000 INJECTION, SOLUTION INTRAVENOUS at 10:31

## 2021-09-04 RX ADMIN — DEXAMETHASONE SODIUM PHOSPHATE 10 MG: 4 INJECTION, SOLUTION INTRA-ARTICULAR; INTRALESIONAL; INTRAMUSCULAR; INTRAVENOUS; SOFT TISSUE at 09:57

## 2021-09-04 RX ADMIN — FENTANYL CITRATE 200 MCG/HR: 50 INJECTION INTRAMUSCULAR; INTRAVENOUS at 04:12

## 2021-09-04 ASSESSMENT — PULMONARY FUNCTION TESTS
PIF_VALUE: 24
PIF_VALUE: 23

## 2021-09-04 ASSESSMENT — PAIN SCALES - GENERAL: PAINLEVEL_OUTOF10: 0

## 2021-09-04 NOTE — PROGRESS NOTES
Oxygen Delivery and Consumption    Patient's Hgb 10.1 g/dl   PaO2 65 mmHg   PvO2 30.4 mmHg   SaO2 91%   SvO2 51.2%   Flow (C.O.) 4.1 LPM   DO2 513 mL/min   VO2 226 mL/min       Ratio 2.3:1     ABG/VBG results communicated to Saida Tijerina DNP at this time, at bedside for further assessment.

## 2021-09-04 NOTE — PROGRESS NOTES
Oxygen Delivery and Consumption    Patient's Hgb 11 g/dl   PaO2 63 mmHg   PvO2 30.6 mmHg   SaO2 93.2%   SvO2 61.1%   Flow (C.O.) 3.9 LPM   DO2 528 mL/min   VO2 183 mL/min     Ratio 2.8:1

## 2021-09-04 NOTE — PROGRESS NOTES
Oxygen Delivery and Consumption    Patient's Hgb 11.1 g/dl   PaO2 66 mmHg   PvO2 32 mmHg   SaO2 93%   SvO2 61%   Flow (C.O.) 4 LPM   DO2 533 mL/min   VO2 185 mL/min     Ratio 2.9:1

## 2021-09-04 NOTE — PROGRESS NOTES
Oxygen Delivery and Consumption    Patient's Hgb 11.1 g/dl   PaO2 66.6 mmHg   PvO2 33.8 mmHg   SaO2 92.6%   SvO2 61.9%   Flow (C.O.) 4 LPM   DO2 561 mL/min   VO2 188 mL/min     Ratio 3:1

## 2021-09-04 NOTE — PROGRESS NOTES
compression, set at 7000 RPM, Flow 4.1    2045 - ABG/VBG results reported, Hattie Aguayo DNP at bedside for assessment     2050 - Hemodynamics ran from [facility], calculations given to Hattie Aguayo. [id number] [address], Hattie Aguayo notified. Verbal orders to increase sweep gas to 4 LPM. Oxy 1 increased to 2 LPM at this time. Repeat ABG at 0000    0124 - Dr. Monico Nageotte called with ABG/VBG & CXR results. Only able to maintain flow 3.7 LPM. Verbal orders to loosen left groin woggle. 18 - Dr. Monico Nageotte updated patient having new frequent PVC/PAC.  New orders placed

## 2021-09-04 NOTE — PROGRESS NOTES
Oxygen Delivery and Consumption    Patient's Hgb 11.1 g/dl   PaO2 58.8 mmHg   PvO2 31.8 mmHg   SaO2 91.3%   SvO2 60.9%   Flow (C.O.) 3.8 LPM   DO2 521 mL/min   VO2 173 mL/min     Ratio 3:1

## 2021-09-04 NOTE — PROGRESS NOTES
Oxygen Delivery and Consumption    Patient's Hgb 11.1 g/dl   PaO2 63 mmHg   PvO2 29.9 mmHg   SaO2 92.5%   SvO2 56.6%   Flow (C.O.) 3.9 LPM   DO2 533 mL/min   VO2 207 mL/min     Ratio 2.57:1    Results reported to Dr. Maty Vega

## 2021-09-04 NOTE — PROGRESS NOTES
ECMO DAILY ROUNDING NOTE:     Team Present at bedside. Family Present at bebside:  no  Condition:  critical     RE for ECMO initiation: ARDS  JY8:  305  Vo2: 226  Sweep Gas:  2/2  Pump Flow:  3.8 L   SvO2:  61  SaO2:  93  ACT Goal:  190-210  ACT:  202  Plt:  132  Hgb:  11  Lactic Acid:  1.3  CXR:  Bilateral infiltrates  Telemetry:  NSR  Paralytic:  no  Sedation Goals:  RASS -3 to -4  Pupils:  constricted  PC 12:  PEEP 12:  RATE 10:  FiO2:  40  Generated     Electronically signed by Gatito Ramos. KATHARINA Whitney CNP on 9/4/2021 at 5:52 PM    Patient seen with Dr. Chipper Lesches with telemetry medicine  Electronically signed by Rafita Solomon.  Chipper Lesches MD.

## 2021-09-04 NOTE — PROGRESS NOTES
CRITICAL CARE PROGRESS NOTE      Patient:  Luis Armando Tracy    Unit/Bed:4D-02/002-A  YOB: 1975  MRN: [id number]   PCP: Brenden Moss MD  Date of Admission: 8/30/2021  Chief Complaint:- Acute hypoxic respiratory failure    Assessment and Plan:      1. Acute hypoxemic respiratory failure: Secondary to COVID-19 with subsequent progression to ARDS.  Patient intubated 9/1/2021.    Patient undergoing lung rest strategy since ECMO has been administered. 2. COVID-19: Patient failed trial of remdesivir, tocilizumab, and Decadron.  Patient with progressive hypoxemia. 3. Pneumonia: Coinfection with Haemophilus influenza.  Patient on Rocephin.  Patient transition to higher dosing of Rocephin. Antibiotics initiated 8/30/2021.  4. Sepsis: Secondary to combination of Covid and Haemophilus influenza.  Present at presentation. 5. ARDS:  Patient intubated 9/1/2021.  ECMO initiated 9/1/2021.  Patient under lung rest strategy with pressure control 10, PEEP 12, FiO2 40%.  Rate 10.  Continue with lung rest strategy and ECMO. 6. Volume status: Patient has a right-sided ejection fraction of 6 L.  Therefore, need to keep CVP between 10 and 15 in order to adequately preload right ventricle for optimal left ventricle delivery.  Goal should be flow of 6.0 L in order to adequately prefill the right ventricle for left ventricular delivery.  Lower flows will result in the lower right sided cardiac output and decreased oxygen delivery to the distal tissues.  Patient on albumin 25 g IV every 6 hours for 8 doses to increase intravascular volume.  He is also on  cc an hour in order to increase intravascular volume. Maintaining the volume at 4.5 which is maximum ability of pump at this time   7. Obesity: Aware. 8. Mild neutropenia: resolved;  Obtain immunoglobulin panel.  White blood cell count trending upward.        HPI: Patient is a 40-year-old white male lifetime non-smoker. 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 Shraddha Gonzalez is obese but has no other medical diagnoses including coronary artery disease diabetes or sleep apnea. Patient presented to the emergency room and was admitted on 8/30/2021. Fadi Freeman started to develop fever associated with chills cough shortness of breath nasal congestion loss of taste and loss of smell starting 8/28/2021.  As he continued to progress his dyspnea continued to worsen.  He developed malaise.  He indicated that his wife was recently diagnosed with Covid.  Patient underwent Covid screen and was positive.  CT scan chest showed bilateral infiltrates.  He initially was started on low-flow oxygen but continued to require progressive oxygen supplementation.  He was treated with remdesivir Decadron and subsequently tocilizumab.  He continued to develop progressive dyspnea and had the syndrome of \"happy hypoxia\".  As patient continued to worsen, he elected to proceed with intubation and ECMO. Patient intubated 9/1/2021. [address] cannulation 9/1/2021.     ROS: Sedated on mechanical ventilator. PMH:  Per HPI  SHX: Lifetime non-smoker. FHX: Covid exposure from wife. Allergies: No known drug allergies    Scheduled Meds:   midazolam  2 mg IntraVENous Once    naloxegol  12.5 mg Oral QAM    insulin lispro  0-6 Units SubCUTAneous Q4H    cefTRIAXone (ROCEPHIN) IV  2,000 mg IntraVENous Q12H    dexamethasone  10 mg IntraVENous Q24H    pantoprazole  40 mg IntraVENous Daily    And    sodium chloride (PF)  10 mL IntraVENous Daily    calcium replacement protocol   Other RX Placeholder    chlorhexidine  15 mL Mouth/Throat BID    lactobacillus  1 capsule Oral Daily with breakfast    sodium chloride flush  5-40 mL IntraVENous 2 times per day     Continuous Infusions:   sodium chloride      dextrose      midazolam 12 mg/hr (09/04/21 0412)    fentaNYL (SUBLIMAZE) 1250 mcg in sodium chloride 0.9 % 250 mL 200 mcg/hr (09/04/21 0412)    heparin (PORCINE) Infusion 18 Units/kg/hr (09/03/21 3637)       PHYSICAL EXAMINATION:  T:  96.3.   P: 45. RR:  11. B/P:  138/68. FiO2:  30. O2 Sat:  95.  I/O:  5810/6835  Body mass index is 41.63 kg/m². PC: 12/12: TV: 312: RRTotal: 10: Ti:1 sec  General: Chronically ill-appearing white male  HEENT:  normocephalic and atraumatic. No scleral icterus. PERR  Neck: supple. No Thyromegaly. Lungs: Diminished to auscultation. No retractions  Cardiac: RRR. No JVD. Abdomen: soft. Nontender. Extremities:  No clubbing, cyanosis. Trace bilateral lower extremity edema  Vasculature: capillary refill < 3 seconds. Palpable dorsalis pedis pulses. Skin:  warm and dry. Psych: Sedated on mechanical ventilation  Lymph:  No supraclavicular adenopathy. Neurologic:  No focal deficit. No seizures. Data: (All radiographs, tracings, PFTs, and imaging are personally viewed and interpreted unless otherwise noted).  Sodium 142, potassium 3.9, chloride 106, CO2 28, BUN 19, creatinine 0.6, anion gap 8.0, glucose 137   WBC 6.1, hemoglobin 11.0, hematocrit 33.8, platelet count 874   Telemetry shows sinus bradycardia       PCR positive for H. Flu   CTA Chest no PE, moderate ground glass       Meets Continued ICU Level Care Criteria:    [x] Yes   [] No - Transfer Planned to listed location:  [] HOSPITALIST CONTACTED-      Case and plan discussed with Dr. Kory Mena. Patient seen with Dr. Kory Mena with telemetry medicine    Electronically signed by Radha Humphrey. KATHARINA Tinajero - CNP  CRITICAL CARE SPECIALIST  Patient seen by me. Case discussed with NP. Extensive review with NP regarding plan of care. Case reviewed with Dr. Julius Parish as well. Issues with decreasing flow in ECMO circuit. Italicized font represents changes to the note made by me. CC time 35 minutes. Time was discontiguous. Time does not include procedures. Time does include my direct assessment of the patient and coordination of care.   Electronically signed by Zora Estrella MD on 9/7/2021 at 6:39 AM

## 2021-09-04 NOTE — PROGRESS NOTES
Oxygen Delivery and Consumption    Patient's Hgb 11.1 g/dl   PaO2 68.6 mmHg   PvO2 31.1 mmHg   SaO2 94.4%   SvO2 60.6%   Flow (C.O.) 3.9 LPM   DO2 553 mL/min   VO2 196 mL/min     Ratio 2.8:1

## 2021-09-04 NOTE — PROGRESS NOTES
ECMO Hourly Circuit Check       0800 0900 1000 1100 1200 1300 1400 1500 1600 1700 1800 1900    Device Secured Yes Yes Yes Yes Yes Yes Yes Yes Yes Yes Yes Yes    Connected to O2  N/A N/A N/A N/A N/A N/A N/A N/A N/A       Right connection of tubing to the oxygenator and gas hoses Yes Yes Yes Yes Yes Yes Yes Yes Yes Yes Yes Yes    Absence of kinks and tension Yes Yes Yes Yes Yes Yes Yes Yes Yes Yes Yes Yes    Lines free of clots/fibrin with flashlight assessment Yes Yes Yes Yes Yes Yes Yes Yes Yes Yes Yes Yes    Assess difference in blood color between cannulas Yes Yes Yes Yes Yes Yes Yes Yes Yes Yes Yes Yes    No migration of cannula Yes Yes Yes Yes Yes Yes Yes Yes Yes Yes Yes Yes    Bleeding at insertion site No No No No No No No         Knee immobilizer in place N/A N/A N/A N/A N/A N/A N/A N/A N/A N/A N/A N/A    No traction on tubing Yes Yes Yes Yes Yes Yes Yes Yes Yes Yes Yes Yes    Flow Rate 3.8 3.8 3.8 3.8 3.8 3.7 3.7 3.7 3.7 3.7 3.7 3.7    Emergency Supplies at bedside Yes Yes Yes Yes Yes Yes Yes Yes Yes Yes Yes Yes                                                                                                                              Sweep Gas Oxy 1 - 2LPM  Oxy 2 - 2LPM Oxy 1 - 2LPM  Oxy 2 - 2LPM Oxy 1 - 2LPM  Oxy 2 - 2LPM Oxy 1 - 2LPM  Oxy 2 - 2LPM Oxy 1 - 2LPM  Oxy 2 - 2LPM Oxy 1 - 2LPM  Oxy 2 - 2LPM Oxy 1 - 2LPM  Oxy 2 - 2LPM Oxy 1 - 2LPM  Oxy 2 - 2LPM Oxy 1 - 2LPM  Oxy 2 - 2LPM Oxy 1 - 2LPM  Oxy 2 - 2LPM Oxy 1 - 2LPM  Oxy 2 - 2LPM Oxy 1 - 2LPM  Oxy 2 - 2LPM    DO2                 VO2                  0813: Hemodynamics: CO 4.85 CI 1.98 CVP 9 .4 SVR 1467

## 2021-09-05 ENCOUNTER — APPOINTMENT (OUTPATIENT)
Dept: GENERAL RADIOLOGY | Age: 46
DRG: 003 | End: 2021-09-05
Payer: COMMERCIAL

## 2021-09-05 LAB
ABO: NORMAL
ALBUMIN SERPL-MCNC: 3.6 G/DL (ref 3.5–5.1)
ALBUMIN SERPL-MCNC: 3.7 G/DL (ref 3.5–5.1)
ALBUMIN SERPL-MCNC: 3.9 G/DL (ref 3.5–5.1)
ALLEN TEST: ABNORMAL
ALP BLD-CCNC: 23 U/L (ref 38–126)
ALP BLD-CCNC: 27 U/L (ref 38–126)
ALP BLD-CCNC: 28 U/L (ref 38–126)
ALT SERPL-CCNC: 75 U/L (ref 11–66)
ALT SERPL-CCNC: 79 U/L (ref 11–66)
ALT SERPL-CCNC: 93 U/L (ref 11–66)
AMORPHOUS: ABNORMAL
ANION GAP SERPL CALCULATED.3IONS-SCNC: 8 MEQ/L (ref 8–16)
ANION GAP SERPL CALCULATED.3IONS-SCNC: 9 MEQ/L (ref 8–16)
ANION GAP SERPL CALCULATED.3IONS-SCNC: 9 MEQ/L (ref 8–16)
ANTIBODY SCREEN: NORMAL
AST SERPL-CCNC: 117 U/L (ref 5–40)
AST SERPL-CCNC: 88 U/L (ref 5–40)
AST SERPL-CCNC: 94 U/L (ref 5–40)
BACTERIA: ABNORMAL
BASE EXCESS (CALCULATED): 0.3 MMOL/L (ref -2.5–2.5)
BASE EXCESS (CALCULATED): 0.4 MMOL/L (ref -2.5–2.5)
BASE EXCESS (CALCULATED): 1.2 MMOL/L (ref -2.5–2.5)
BASE EXCESS (CALCULATED): 1.4 MMOL/L (ref -2.5–2.5)
BASE EXCESS (CALCULATED): 2.6 MMOL/L (ref -2.5–2.5)
BASE EXCESS (CALCULATED): 3.2 MMOL/L (ref -2.5–2.5)
BASE EXCESS (CALCULATED): 3.4 MMOL/L (ref -2.5–2.5)
BASE EXCESS (CALCULATED): 3.9 MMOL/L (ref -2.5–2.5)
BASE EXCESS (CALCULATED): 4 MMOL/L (ref -2.5–2.5)
BASE EXCESS MIXED: 0.9 MMOL/L (ref -2–3)
BASE EXCESS MIXED: 3.7 MMOL/L (ref -2–3)
BASOPHILS # BLD: 0.6 %
BASOPHILS ABSOLUTE: 0.1 THOU/MM3 (ref 0–0.1)
BILIRUB SERPL-MCNC: 1.2 MG/DL (ref 0.3–1.2)
BILIRUB SERPL-MCNC: 1.2 MG/DL (ref 0.3–1.2)
BILIRUB SERPL-MCNC: 1.5 MG/DL (ref 0.3–1.2)
BILIRUBIN DIRECT: 0.3 MG/DL (ref 0–0.3)
BILIRUBIN DIRECT: 0.4 MG/DL (ref 0–0.3)
BILIRUBIN DIRECT: 0.5 MG/DL (ref 0–0.3)
BILIRUBIN URINE: ABNORMAL
BLOOD, URINE: ABNORMAL
BUN BLDV-MCNC: 22 MG/DL (ref 7–22)
BUN BLDV-MCNC: 23 MG/DL (ref 7–22)
BUN BLDV-MCNC: 24 MG/DL (ref 7–22)
CALCIUM IONIZED: 1.15 MMOL/L (ref 1.12–1.32)
CALCIUM SERPL-MCNC: 8.2 MG/DL (ref 8.5–10.5)
CALCIUM SERPL-MCNC: 8.3 MG/DL (ref 8.5–10.5)
CALCIUM SERPL-MCNC: 8.4 MG/DL (ref 8.5–10.5)
CASTS: ABNORMAL /LPF
CHARACTER, URINE: CLEAR
CHLORIDE BLD-SCNC: 104 MEQ/L (ref 98–111)
CHLORIDE BLD-SCNC: 106 MEQ/L (ref 98–111)
CHLORIDE BLD-SCNC: 106 MEQ/L (ref 98–111)
CO2: 27 MEQ/L (ref 23–33)
COLLECTED BY:: ABNORMAL
COLOR: ABNORMAL
CREAT SERPL-MCNC: 0.6 MG/DL (ref 0.4–1.2)
CRYSTALS: ABNORMAL
D-DIMER QUANTITATIVE: 354 NG/ML FEU (ref 0–500)
DEVICE: ABNORMAL
EOSINOPHIL # BLD: 0.1 %
EOSINOPHILS ABSOLUTE: 0 THOU/MM3 (ref 0–0.4)
EPITHELIAL CELLS, UA: ABNORMAL /HPF
ERYTHROCYTE [DISTWIDTH] IN BLOOD BY AUTOMATED COUNT: 13.7 % (ref 11.5–14.5)
ERYTHROCYTE [DISTWIDTH] IN BLOOD BY AUTOMATED COUNT: 43.8 FL (ref 35–45)
FIO2, MIXED VENOUS: 40
GFR SERPL CREATININE-BSD FRML MDRD: > 90 ML/MIN/1.73M2
GLUCOSE BLD-MCNC: 137 MG/DL (ref 70–108)
GLUCOSE BLD-MCNC: 145 MG/DL (ref 70–108)
GLUCOSE BLD-MCNC: 149 MG/DL (ref 70–108)
GLUCOSE, URINE: NEGATIVE MG/DL
GLUCOSE, WHOLE BLOOD: 110 MG/DL (ref 70–108)
GLUCOSE, WHOLE BLOOD: 130 MG/DL (ref 70–108)
GLUCOSE, WHOLE BLOOD: 133 MG/DL (ref 70–108)
GLUCOSE, WHOLE BLOOD: 139 MG/DL (ref 70–108)
GLUCOSE, WHOLE BLOOD: 140 MG/DL (ref 70–108)
GLUCOSE, WHOLE BLOOD: 150 MG/DL (ref 70–108)
GLUCOSE, WHOLE BLOOD: 155 MG/DL (ref 70–108)
HCO3, MIXED: 25 MMOL/L (ref 23–28)
HCO3, MIXED: 29 MMOL/L (ref 23–28)
HCO3: 25 MMOL/L (ref 23–28)
HCO3: 25 MMOL/L (ref 23–28)
HCO3: 26 MMOL/L (ref 23–28)
HCO3: 26 MMOL/L (ref 23–28)
HCO3: 28 MMOL/L (ref 23–28)
HCO3: 29 MMOL/L (ref 23–28)
HCT VFR BLD CALC: 28.1 % (ref 42–52)
HCT VFR BLD CALC: 30.8 % (ref 42–52)
HCT VFR BLD CALC: 32.6 % (ref 42–52)
HEMOGLOBIN: 10.1 GM/DL (ref 14–18)
HEMOGLOBIN: 11 GM/DL (ref 14–18)
HEMOGLOBIN: 9.2 GM/DL (ref 14–18)
ICTOTEST: NEGATIVE
IFIO2: 30
IFIO2: 30
IFIO2: 35
IFIO2: 40
IMMATURE GRANS (ABS): 0.88 THOU/MM3 (ref 0–0.07)
IMMATURE GRANULOCYTES: 9.7 %
KETONES, URINE: NEGATIVE
LACTIC ACID: 1.5 MMOL/L (ref 0.5–2)
LACTIC ACID: 1.6 MMOL/L (ref 0.5–2)
LD: 822 U/L (ref 100–190)
LD: 924 U/L (ref 100–190)
LEUKOCYTE EST, POC: ABNORMAL
LYMPHOCYTES # BLD: 9.9 %
LYMPHOCYTES ABSOLUTE: 0.9 THOU/MM3 (ref 1–4.8)
MAGNESIUM: 2.3 MG/DL (ref 1.6–2.4)
MCH RBC QN AUTO: 28.8 PG (ref 26–33)
MCHC RBC AUTO-ENTMCNC: 32.7 GM/DL (ref 32.2–35.5)
MCV RBC AUTO: 87.8 FL (ref 80–94)
MODE: ABNORMAL
MONOCYTES # BLD: 7.2 %
MONOCYTES ABSOLUTE: 0.7 THOU/MM3 (ref 0.4–1.3)
MUCUS: ABNORMAL
NITRITE, URINE: POSITIVE
NUCLEATED RED BLOOD CELLS: 0 /100 WBC
O2 SAT, MIXED: 55 %
O2 SAT, MIXED: 84 %
O2 SATURATION: 88 %
O2 SATURATION: 88 %
O2 SATURATION: 90 %
O2 SATURATION: 91 %
O2 SATURATION: 92 %
O2 SATURATION: 92 %
O2 SATURATION: 94 %
PCO2, MIXED VENOUS: 39 MMHG (ref 41–51)
PCO2, MIXED VENOUS: 49 MMHG (ref 41–51)
PCO2: 37 MMHG (ref 35–45)
PCO2: 40 MMHG (ref 35–45)
PCO2: 40 MMHG (ref 35–45)
PCO2: 41 MMHG (ref 35–45)
PCO2: 41 MMHG (ref 35–45)
PCO2: 42 MMHG (ref 35–45)
PCO2: 43 MMHG (ref 35–45)
PCO2: 43 MMHG (ref 35–45)
PCO2: 44 MMHG (ref 35–45)
PH BLOOD GAS: 7.41 (ref 7.35–7.45)
PH BLOOD GAS: 7.42 (ref 7.35–7.45)
PH BLOOD GAS: 7.42 (ref 7.35–7.45)
PH BLOOD GAS: 7.43 (ref 7.35–7.45)
PH BLOOD GAS: 7.45 (ref 7.35–7.45)
PH UA: 7 (ref 5–9)
PH, MIXED: 7.38 (ref 7.31–7.41)
PH, MIXED: 7.42 (ref 7.31–7.41)
PIP: 22 CMH2O
PIP: 23 CMH2O
PLATELET # BLD: 124 THOU/MM3 (ref 130–400)
PMV BLD AUTO: 9.8 FL (ref 9.4–12.4)
PO2 MIXED: 30 MMHG (ref 25–40)
PO2 MIXED: 47 MMHG (ref 25–40)
PO2: 53 MMHG (ref 71–104)
PO2: 55 MMHG (ref 71–104)
PO2: 57 MMHG (ref 71–104)
PO2: 59 MMHG (ref 71–104)
PO2: 60 MMHG (ref 71–104)
PO2: 61 MMHG (ref 71–104)
PO2: 62 MMHG (ref 71–104)
PO2: 62 MMHG (ref 71–104)
PO2: 68 MMHG (ref 71–104)
POC LACTIC ACID: 1.4 MMOL/L (ref 0.5–1.9)
POTASSIUM REFLEX MAGNESIUM: 4.2 MEQ/L (ref 3.5–5.2)
POTASSIUM SERPL-SCNC: 4 MEQ/L (ref 3.5–5.2)
POTASSIUM SERPL-SCNC: 4 MEQ/L (ref 3.5–5.2)
PROTEIN UA: 300 MG/DL
RBC # BLD: 3.2 MILL/MM3 (ref 4.7–6.1)
RBC URINE: ABNORMAL /HPF
REASON FOR REJECTION: NORMAL
REJECTED TEST: NORMAL
RH FACTOR: NORMAL
SCAN OF BLOOD SMEAR: NORMAL
SEG NEUTROPHILS: 72.5 %
SEGMENTED NEUTROPHILS ABSOLUTE COUNT: 6.6 THOU/MM3 (ref 1.8–7.7)
SET PEEP: 12 MMHG
SET PRESS SUPP: 10 CMH2O
SET RESPIRATORY RATE: 10 BPM
SITE: ABNORMAL
SODIUM BLD-SCNC: 140 MEQ/L (ref 135–145)
SODIUM BLD-SCNC: 141 MEQ/L (ref 135–145)
SODIUM BLD-SCNC: 142 MEQ/L (ref 135–145)
SOURCE, BLOOD GAS: ABNORMAL
SPECIFIC GRAVITY UA: 1.02 (ref 1–1.03)
TOTAL PROTEIN: 4.7 G/DL (ref 6.1–8)
TOTAL PROTEIN: 4.9 G/DL (ref 6.1–8)
TOTAL PROTEIN: 5.4 G/DL (ref 6.1–8)
UROBILINOGEN, URINE: 1 EU/DL (ref 0–1)
WBC # BLD: 9.1 THOU/MM3 (ref 4.8–10.8)
WBC UA: ABNORMAL /HPF

## 2021-09-05 PROCEDURE — 2700000000 HC OXYGEN THERAPY PER DAY

## 2021-09-05 PROCEDURE — 71045 X-RAY EXAM CHEST 1 VIEW: CPT

## 2021-09-05 PROCEDURE — 6360000002 HC RX W HCPCS: Performed by: NURSE PRACTITIONER

## 2021-09-05 PROCEDURE — 85347 COAGULATION TIME ACTIVATED: CPT

## 2021-09-05 PROCEDURE — 82947 ASSAY GLUCOSE BLOOD QUANT: CPT

## 2021-09-05 PROCEDURE — 82330 ASSAY OF CALCIUM: CPT

## 2021-09-05 PROCEDURE — 85025 COMPLETE CBC W/AUTO DIFF WBC: CPT

## 2021-09-05 PROCEDURE — C9113 INJ PANTOPRAZOLE SODIUM, VIA: HCPCS | Performed by: NURSE PRACTITIONER

## 2021-09-05 PROCEDURE — 74018 RADEX ABDOMEN 1 VIEW: CPT

## 2021-09-05 PROCEDURE — 2580000003 HC RX 258

## 2021-09-05 PROCEDURE — 82248 BILIRUBIN DIRECT: CPT

## 2021-09-05 PROCEDURE — 6370000000 HC RX 637 (ALT 250 FOR IP)

## 2021-09-05 PROCEDURE — 86922 COMPATIBILITY TEST ANTIGLOB: CPT

## 2021-09-05 PROCEDURE — 94003 VENT MGMT INPAT SUBQ DAY: CPT

## 2021-09-05 PROCEDURE — P9041 ALBUMIN (HUMAN),5%, 50ML: HCPCS | Performed by: INTERNAL MEDICINE

## 2021-09-05 PROCEDURE — 82803 BLOOD GASES ANY COMBINATION: CPT

## 2021-09-05 PROCEDURE — 83735 ASSAY OF MAGNESIUM: CPT

## 2021-09-05 PROCEDURE — 2580000003 HC RX 258: Performed by: NURSE PRACTITIONER

## 2021-09-05 PROCEDURE — 86901 BLOOD TYPING SEROLOGIC RH(D): CPT

## 2021-09-05 PROCEDURE — 6370000000 HC RX 637 (ALT 250 FOR IP): Performed by: INTERNAL MEDICINE

## 2021-09-05 PROCEDURE — 33948 ECMO/ECLS DAILY MGMT-VENOUS: CPT | Performed by: INTERNAL MEDICINE

## 2021-09-05 PROCEDURE — 86923 COMPATIBILITY TEST ELECTRIC: CPT

## 2021-09-05 PROCEDURE — 6360000002 HC RX W HCPCS: Performed by: INTERNAL MEDICINE

## 2021-09-05 PROCEDURE — 80053 COMPREHEN METABOLIC PANEL: CPT

## 2021-09-05 PROCEDURE — 2580000003 HC RX 258: Performed by: INTERNAL MEDICINE

## 2021-09-05 PROCEDURE — 81001 URINALYSIS AUTO W/SCOPE: CPT

## 2021-09-05 PROCEDURE — 83051 HEMOGLOBIN PLASMA: CPT

## 2021-09-05 PROCEDURE — 83010 ASSAY OF HAPTOGLOBIN QUANT: CPT

## 2021-09-05 PROCEDURE — 37799 UNLISTED PX VASCULAR SURGERY: CPT

## 2021-09-05 PROCEDURE — 86900 BLOOD TYPING SEROLOGIC ABO: CPT

## 2021-09-05 PROCEDURE — 85018 HEMOGLOBIN: CPT

## 2021-09-05 PROCEDURE — 83605 ASSAY OF LACTIC ACID: CPT

## 2021-09-05 PROCEDURE — 2000000000 HC ICU R&B

## 2021-09-05 PROCEDURE — P9016 RBC LEUKOCYTES REDUCED: HCPCS

## 2021-09-05 PROCEDURE — 83615 LACTATE (LD) (LDH) ENZYME: CPT

## 2021-09-05 PROCEDURE — 85379 FIBRIN DEGRADATION QUANT: CPT

## 2021-09-05 PROCEDURE — 36415 COLL VENOUS BLD VENIPUNCTURE: CPT

## 2021-09-05 PROCEDURE — 86850 RBC ANTIBODY SCREEN: CPT

## 2021-09-05 PROCEDURE — 85014 HEMATOCRIT: CPT

## 2021-09-05 PROCEDURE — 6370000000 HC RX 637 (ALT 250 FOR IP): Performed by: NURSE PRACTITIONER

## 2021-09-05 RX ORDER — SODIUM CHLORIDE, SODIUM LACTATE, POTASSIUM CHLORIDE, CALCIUM CHLORIDE 600; 310; 30; 20 MG/100ML; MG/100ML; MG/100ML; MG/100ML
INJECTION, SOLUTION INTRAVENOUS CONTINUOUS
Status: DISCONTINUED | OUTPATIENT
Start: 2021-09-05 | End: 2021-09-11

## 2021-09-05 RX ORDER — SODIUM CHLORIDE 9 MG/ML
INJECTION, SOLUTION INTRAVENOUS PRN
Status: DISCONTINUED | OUTPATIENT
Start: 2021-09-05 | End: 2021-09-06

## 2021-09-05 RX ORDER — SODIUM CHLORIDE, SODIUM LACTATE, POTASSIUM CHLORIDE, AND CALCIUM CHLORIDE .6; .31; .03; .02 G/100ML; G/100ML; G/100ML; G/100ML
1000 INJECTION, SOLUTION INTRAVENOUS ONCE
Status: COMPLETED | OUTPATIENT
Start: 2021-09-05 | End: 2021-09-05

## 2021-09-05 RX ORDER — ALBUMIN, HUMAN INJ 5% 5 %
25 SOLUTION INTRAVENOUS ONCE
Status: COMPLETED | OUTPATIENT
Start: 2021-09-05 | End: 2021-09-05

## 2021-09-05 RX ORDER — TOBRAMYCIN INHALATION SOLUTION 300 MG/5ML
300 INHALANT RESPIRATORY (INHALATION) 2 TIMES DAILY
Status: DISPENSED | OUTPATIENT
Start: 2021-09-05 | End: 2021-09-13

## 2021-09-05 RX ORDER — 0.9 % SODIUM CHLORIDE 0.9 %
2000 INTRAVENOUS SOLUTION INTRAVENOUS ONCE
Status: COMPLETED | OUTPATIENT
Start: 2021-09-05 | End: 2021-09-05

## 2021-09-05 RX ORDER — ALBUMIN, HUMAN INJ 5% 5 %
25 SOLUTION INTRAVENOUS EVERY 6 HOURS
Status: DISCONTINUED | OUTPATIENT
Start: 2021-09-05 | End: 2021-09-08

## 2021-09-05 RX ORDER — SODIUM CHLORIDE, SODIUM LACTATE, POTASSIUM CHLORIDE, AND CALCIUM CHLORIDE .6; .31; .03; .02 G/100ML; G/100ML; G/100ML; G/100ML
500 INJECTION, SOLUTION INTRAVENOUS ONCE
Status: COMPLETED | OUTPATIENT
Start: 2021-09-05 | End: 2021-09-05

## 2021-09-05 RX ADMIN — SODIUM CHLORIDE, POTASSIUM CHLORIDE, SODIUM LACTATE AND CALCIUM CHLORIDE: 600; 310; 30; 20 INJECTION, SOLUTION INTRAVENOUS at 11:47

## 2021-09-05 RX ADMIN — NALOXEGOL OXALATE 12.5 MG: 12.5 TABLET, FILM COATED ORAL at 08:33

## 2021-09-05 RX ADMIN — SODIUM CHLORIDE, POTASSIUM CHLORIDE, SODIUM LACTATE AND CALCIUM CHLORIDE 500 ML: 600; 310; 30; 20 INJECTION, SOLUTION INTRAVENOUS at 00:52

## 2021-09-05 RX ADMIN — HEPARIN SODIUM 18 UNITS/KG/HR: 10000 INJECTION, SOLUTION INTRAVENOUS at 08:33

## 2021-09-05 RX ADMIN — FENTANYL CITRATE 200 MCG/HR: 50 INJECTION INTRAMUSCULAR; INTRAVENOUS at 02:18

## 2021-09-05 RX ADMIN — FENTANYL CITRATE 200 MCG/HR: 50 INJECTION INTRAMUSCULAR; INTRAVENOUS at 23:16

## 2021-09-05 RX ADMIN — Medication 1 CAPSULE: at 08:32

## 2021-09-05 RX ADMIN — DEXAMETHASONE SODIUM PHOSPHATE 10 MG: 4 INJECTION, SOLUTION INTRA-ARTICULAR; INTRALESIONAL; INTRAMUSCULAR; INTRAVENOUS; SOFT TISSUE at 13:37

## 2021-09-05 RX ADMIN — Medication 15 ML: at 08:33

## 2021-09-05 RX ADMIN — MIDAZOLAM 13 MG/HR: 5 INJECTION, SOLUTION INTRAMUSCULAR; INTRAVENOUS at 10:14

## 2021-09-05 RX ADMIN — DOCUSATE SODIUM 100 MG: 50 LIQUID ORAL at 08:33

## 2021-09-05 RX ADMIN — FENTANYL CITRATE 200 MCG/HR: 50 INJECTION INTRAMUSCULAR; INTRAVENOUS at 16:55

## 2021-09-05 RX ADMIN — CEFTRIAXONE SODIUM 2000 MG: 2 INJECTION, POWDER, FOR SOLUTION INTRAMUSCULAR; INTRAVENOUS at 03:10

## 2021-09-05 RX ADMIN — MIDAZOLAM 13 MG/HR: 5 INJECTION, SOLUTION INTRAMUSCULAR; INTRAVENOUS at 16:55

## 2021-09-05 RX ADMIN — PANTOPRAZOLE SODIUM 40 MG: 40 INJECTION, POWDER, FOR SOLUTION INTRAVENOUS at 08:32

## 2021-09-05 RX ADMIN — SODIUM CHLORIDE, POTASSIUM CHLORIDE, SODIUM LACTATE AND CALCIUM CHLORIDE 1000 ML: 600; 310; 30; 20 INJECTION, SOLUTION INTRAVENOUS at 03:55

## 2021-09-05 RX ADMIN — SODIUM CHLORIDE 2000 ML: 9 INJECTION, SOLUTION INTRAVENOUS at 10:00

## 2021-09-05 RX ADMIN — POLYETHYLENE GLYCOL 3350 17 G: 17 POWDER, FOR SOLUTION ORAL at 08:33

## 2021-09-05 RX ADMIN — Medication 15 ML: at 21:12

## 2021-09-05 RX ADMIN — CEFTRIAXONE SODIUM 2000 MG: 2 INJECTION, POWDER, FOR SOLUTION INTRAMUSCULAR; INTRAVENOUS at 13:36

## 2021-09-05 RX ADMIN — INSULIN LISPRO 1 UNITS: 100 INJECTION, SOLUTION INTRAVENOUS; SUBCUTANEOUS at 03:56

## 2021-09-05 RX ADMIN — INSULIN LISPRO 1 UNITS: 100 INJECTION, SOLUTION INTRAVENOUS; SUBCUTANEOUS at 21:12

## 2021-09-05 RX ADMIN — SODIUM CHLORIDE, PRESERVATIVE FREE 10 ML: 5 INJECTION INTRAVENOUS at 21:12

## 2021-09-05 RX ADMIN — SODIUM CHLORIDE, PRESERVATIVE FREE 10 ML: 5 INJECTION INTRAVENOUS at 08:32

## 2021-09-05 RX ADMIN — MIDAZOLAM 13 MG/HR: 5 INJECTION, SOLUTION INTRAMUSCULAR; INTRAVENOUS at 02:18

## 2021-09-05 RX ADMIN — MINERAL OIL AND WHITE PETROLATUM: 150; 830 OINTMENT OPHTHALMIC at 21:12

## 2021-09-05 RX ADMIN — FENTANYL CITRATE 200 MCG/HR: 50 INJECTION INTRAMUSCULAR; INTRAVENOUS at 09:00

## 2021-09-05 RX ADMIN — ALBUMIN (HUMAN) 25 G: 12.5 INJECTION, SOLUTION INTRAVENOUS at 11:47

## 2021-09-05 RX ADMIN — SODIUM CHLORIDE, POTASSIUM CHLORIDE, SODIUM LACTATE AND CALCIUM CHLORIDE: 600; 310; 30; 20 INJECTION, SOLUTION INTRAVENOUS at 22:11

## 2021-09-05 RX ADMIN — ALBUMIN (HUMAN) 25 G: 12.5 INJECTION, SOLUTION INTRAVENOUS at 17:25

## 2021-09-05 RX ADMIN — ALBUMIN (HUMAN) 25 G: 12.5 INJECTION, SOLUTION INTRAVENOUS at 22:12

## 2021-09-05 RX ADMIN — HEPARIN SODIUM 18 UNITS/KG/HR: 10000 INJECTION, SOLUTION INTRAVENOUS at 22:11

## 2021-09-05 ASSESSMENT — PULMONARY FUNCTION TESTS
PIF_VALUE: 23

## 2021-09-05 NOTE — PROGRESS NOTES
Oxygen Delivery and Consumption    Patient's Hgb 9.2 g/dl   PaO2 61.8 mmHg   PvO2 29.7 mmHg   SaO2 91.5%   SvO2 54.6%   Flow (C.O.) 3.2 LPM   DO2 369 mL/min   VO2 149 mL/min     Ratio 2.4 - 1  Dr. Shabbir Eng notified. 4057: Orders to transfuse 1 unit PRBC per Dr. Shabbir Eng.

## 2021-09-05 NOTE — PROGRESS NOTES
ECMO Hourly Circuit Check       2000 2100 2200 2300 0000 0100 0200 0300 0400 0500 0600 0700    Device Secured Yes Yes Yes Yes Yes Yes Yes Yes Yes Yes Yes Yes    Connected to O2         No         No        No        No        No       No        No       No       No        No       No        No    Right connection of tubing to the oxygenator and gas hoses       Yes        Yes       Yes       Yes       Yes       Yes       Yes       Yes       Yes       Yes       Yes       Yes    Absence of kinks and tension       Yes        Yes       Yes       Yes       Yes       Yes       Yes       Yes       Yes       Yes       Yes       Yes    Lines free of clots/fibrin with flashlight assessment       Yes        Yes       Yes       Yes       Yes       Yes       Yes       Yes       Yes       Yes       Yes       Yes    Assess difference in blood color between cannulas       Yes        Yes       Yes       Yes       Yes       Yes       Yes       Yes       Yes       Yes       Yes       Yes    No migration of cannula Yes Yes Yes Yes Yes Yes Yes Yes Yes Yes Yes Yes    Bleeding at insertion site No No No No No No No No No No No No    Knee immobilizer in place No No No No No No No No No No No No    No traction on tubing Yes Yes Yes Yes Yes Yes Yes Yes Yes Yes Yes Yes    Flow Rate 3.6 3.5 3.4 3.4 3.2 2.9 3.2 3.3 3.2 3.3 3 3.2    Emergency Supplies at bedside Yes Yes Yes Yes Yes Yes Yes Yes Yes Yes Yes Yes                                                                    Sweep Gas Oxy 1 = 2  Oxy 2 = 2 Oxy 1 = 2  Oxy 2 = 2 Oxy 1 = 2  Oxy 2 = 2 Oxy 1 = 2  Oxy 2 = 2 Oxy 1 = 2  Oxy 2 = 2 Oxy 1 = 2  Oxy 2 = 2 Oxy 1 = 2  Oxy 2 = 2 Oxy 1 = 2  Oxy 2 = 2 Oxy 1 = 2  Oxy 2 = 2 Oxy 1 = 2  Oxy 2 = 2 Oxy 1 = 2  Oxy 2 = 2 Oxy 1 = 2  Oxy 2 = 2    DO2                VO2                  0030 - ECMO flow noted to be decreased at 3.1 LPM, [employer] notified    0040 - Flows on ECMO variable decreasing to 0.5 LPM, [employer] at bedside. RPM decreased to 7000    0046 - Flows on ECMO dropping to 0.5, RPM decreased to 6800    0049 - Flows on ECMO dropping to 0.2, RPM decreased to 6600. LR Bolus started    0130 - Jace Barreto at bedside, RPMs increased to 7500 per provider    3500 - Urine noted to be arnold red, Jace Barreto notified.  RPM decreased to 6800

## 2021-09-05 NOTE — PROGRESS NOTES
ECMO Hourly Circuit Check       0800 0900 1000 1100 1200 1300 1400 1500 1600 1700 1800 1900    Device Secured Yes Yes Yes Yes Yes Yes Yes Yes Yes Yes Yes     Connected to O2        No No No No No No No No No No      No     Right connection of tubing to the oxygenator and gas hoses Yes Yes Yes Yes Yes Yes Yes Yes Yes Yes Yes     Absence of kinks and tension Yes Yes Yes Yes Yes Yes Yes Yes Yes Yes Yes     Lines free of clots/fibrin with flashlight assessment Yes Yes Yes Yes Yes Yes Yes Yes Yes Yes Yes     Assess difference in blood color between cannulas Yes Yes Yes Yes Yes Yes Yes Yes Yes Yes Yes     No migration of cannula Yes Yes Yes Yes Yes Yes Yes Yes Yes Yes Yes     Bleeding at insertion site Yes Yes Yes Yes Yes Yes Yes Yes Yes Yes Yes     Knee immobilizer in place N/A N/A N/A N/A N/A N/A N/A N/A N/A N/A N/A     No traction on tubing Yes Yes Yes Yes Yes Yes Yes Yes Yes Yes Yes     Flow Rate 3.0 2.5 2.5 2.5 2.9 2.9 2.9 2.9 2.8 2.9 2.9     Emergency Supplies at bedside Yes Yes Yes Yes Yes Yes Yes Yes Yes Yes Yes                                                                                                                               Sweep Gas Oxy 1 - 2LPM  Oxy 2 - 2LPM Oxy 1 - 2LPM  Oxy 2 - 2LPM Oxy 1 - 2LPM  Oxy 2 - 2LPM Oxy 1 - 2LPM  Oxy 2 - 2LPM Oxy 1 - 2LPM  Oxy 2 - 2LPM Oxy 1 - 2LPM  Oxy 2 - 2LPM Oxy 1 - 2LPM  Oxy 2 - 2LPM Oxy 1 - 2LPM  Oxy 2 - 2LPM Oxy 1 - 2LPM  Oxy 2 - 2LPM Oxy 1 - 2LPM  Oxy 2 - 2LPM Oxy 1 - 2LPM  Oxy 2 - 2LPM     DO2                 VO2                  0901: Unable to maintain flow rate above 3.0, fluctuating between 2.5 - 0.1, infrequent chugging noted in outflow catheter. Orders to obtain Abd xray for assessment of placement. 4700: Abd xray and Chest xray reviewed with Dr. Phillip Cortez, Flow rate 2.2 - 0.5 LPM, RPM at 5000, informed Dr. Phillip Cortez that Dr. Contreras Forward ordered 2L NS bolus, Orders to run cardiac numbers.     0957: Cardiac numbers reviewed with Dr. Phillip Cortez, orders for 500ml albumin over 1 hour, then 25% albumin every 6 hours, repeat cardiac numbers every 6 hours, keep CVP at 10 or greater. 1006: Liver profile reviewed with Dr. Raleigh Dixon ALT 93,  Bilirubin 1.5, orders to start LR at 100ml/hr    1200: Dr. Raleigh Dixon notified of ABG results. Arterial Blood Gas result:  pO2 61.4; pCO2 39.8; pH 7.40;  HCO3 25, %O2 Sat 91.3. No new orders at this time.

## 2021-09-05 NOTE — PROGRESS NOTES
ECMO DAILY ROUNDING NOTE:    Team Present at bedside. Family Present at bebside:  yes  Condition:  critical    RE for ECMO initiation: ARDS  Do2:  369  Vo2:  149  Sweep Gas:  2/2  Pump Flow:  2.9  SvO2:  54  SaO2:  90  ACT Goal:  190-210  ACT:  191  Plt:  124  Hgb:  11  Lactic Acid:  1.39  Telemetry:  NSR  Paralytic:  No  Sedation Goals:  RASS -2  PC 10:  PEEP 12:  RATE 10:  FiO2:  40  Generated :    Electronically signed by Seng Mena MD.

## 2021-09-06 ENCOUNTER — APPOINTMENT (OUTPATIENT)
Dept: GENERAL RADIOLOGY | Age: 46
DRG: 003 | End: 2021-09-06
Payer: COMMERCIAL

## 2021-09-06 LAB
ACINETOBACTER CALCOACETICUS-BAUMANNII BY PCR: NOT DETECTED
ACTIVATED CLOTTING TIME: 290 SECONDS (ref 1–150)
ADENOVIRUS BY PCR: NOT DETECTED
ALBUMIN SERPL-MCNC: 3.7 G/DL (ref 3.5–5.1)
ALLEN TEST: ABNORMAL
ALP BLD-CCNC: 24 U/L (ref 38–126)
ALT SERPL-CCNC: 83 U/L (ref 11–66)
ANION GAP SERPL CALCULATED.3IONS-SCNC: 7 MEQ/L (ref 8–16)
ANION GAP SERPL CALCULATED.3IONS-SCNC: 7 MEQ/L (ref 8–16)
AST SERPL-CCNC: 72 U/L (ref 5–40)
BASE EXCESS (CALCULATED): -0.6 MMOL/L (ref -2.5–2.5)
BASE EXCESS (CALCULATED): 1 MMOL/L (ref -2.5–2.5)
BASE EXCESS (CALCULATED): 1.1 MMOL/L (ref -2.5–2.5)
BASE EXCESS (CALCULATED): 1.4 MMOL/L (ref -2.5–2.5)
BASE EXCESS (CALCULATED): 1.4 MMOL/L (ref -2.5–2.5)
BASE EXCESS (CALCULATED): 1.5 MMOL/L (ref -2.5–2.5)
BASE EXCESS (CALCULATED): 1.9 MMOL/L (ref -2.5–2.5)
BASE EXCESS (CALCULATED): 2 MMOL/L (ref -2.5–2.5)
BASE EXCESS (CALCULATED): 2.2 MMOL/L (ref -2.5–2.5)
BASE EXCESS (CALCULATED): 2.2 MMOL/L (ref -2.5–2.5)
BASE EXCESS (CALCULATED): 2.5 MMOL/L (ref -2.5–2.5)
BASE EXCESS (CALCULATED): 5.6 MMOL/L (ref -2.5–2.5)
BASE EXCESS MIXED: 0.1 MMOL/L (ref -2–3)
BASE EXCESS MIXED: 2.2 MMOL/L (ref -2–3)
BASE EXCESS MIXED: 2.5 MMOL/L (ref -2–3)
BASOPHILS # BLD: 0.1 %
BASOPHILS # BLD: 0.5 %
BASOPHILS ABSOLUTE: 0 THOU/MM3 (ref 0–0.1)
BASOPHILS ABSOLUTE: 0.1 THOU/MM3 (ref 0–0.1)
BILIRUB SERPL-MCNC: 1 MG/DL (ref 0.3–1.2)
BILIRUBIN DIRECT: 0.4 MG/DL (ref 0–0.3)
BUN BLDV-MCNC: 20 MG/DL (ref 7–22)
BUN BLDV-MCNC: 21 MG/DL (ref 7–22)
CALCIUM IONIZED SERUM: 1.17 MMOL/L (ref 1.12–1.32)
CALCIUM SERPL-MCNC: 7.8 MG/DL (ref 8.5–10.5)
CALCIUM SERPL-MCNC: 8.3 MG/DL (ref 8.5–10.5)
CHLAMYDIA PNEUMONIAE BY PCR: NOT DETECTED
CHLORIDE BLD-SCNC: 106 MEQ/L (ref 98–111)
CHLORIDE BLD-SCNC: 107 MEQ/L (ref 98–111)
CHLORIDE, WHOLE BLOOD: 104 MEQ/L (ref 98–109)
CO2: 25 MEQ/L (ref 23–33)
CO2: 27 MEQ/L (ref 23–33)
COLLECTED BY:: ABNORMAL
COLLECTED BY:: NORMAL
COLLECTED BY:: NORMAL
CREAT SERPL-MCNC: 0.5 MG/DL (ref 0.4–1.2)
CREAT SERPL-MCNC: 0.6 MG/DL (ref 0.4–1.2)
DEVICE: ABNORMAL
DEVICE: NORMAL
ENTEROBACTER CLOACAE COMPLEX BY PCR: NOT DETECTED
EOSINOPHIL # BLD: 0.2 %
EOSINOPHIL # BLD: 0.8 %
EOSINOPHILS ABSOLUTE: 0 THOU/MM3 (ref 0–0.4)
EOSINOPHILS ABSOLUTE: 0.1 THOU/MM3 (ref 0–0.4)
ERYTHROCYTE [DISTWIDTH] IN BLOOD BY AUTOMATED COUNT: 14.6 % (ref 11.5–14.5)
ERYTHROCYTE [DISTWIDTH] IN BLOOD BY AUTOMATED COUNT: 14.6 % (ref 11.5–14.5)
ERYTHROCYTE [DISTWIDTH] IN BLOOD BY AUTOMATED COUNT: 14.7 % (ref 11.5–14.5)
ERYTHROCYTE [DISTWIDTH] IN BLOOD BY AUTOMATED COUNT: 15 % (ref 11.5–14.5)
ERYTHROCYTE [DISTWIDTH] IN BLOOD BY AUTOMATED COUNT: 45.7 FL (ref 35–45)
ERYTHROCYTE [DISTWIDTH] IN BLOOD BY AUTOMATED COUNT: 46.4 FL (ref 35–45)
ERYTHROCYTE [DISTWIDTH] IN BLOOD BY AUTOMATED COUNT: 46.5 FL (ref 35–45)
ERYTHROCYTE [DISTWIDTH] IN BLOOD BY AUTOMATED COUNT: 47.2 FL (ref 35–45)
ESCHERICHIA COLI BY PCR: NOT DETECTED
FIO2, MIXED VENOUS: 70
GFR SERPL CREATININE-BSD FRML MDRD: > 90 ML/MIN/1.73M2
GFR SERPL CREATININE-BSD FRML MDRD: > 90 ML/MIN/1.73M2
GLUCOSE BLD-MCNC: 105 MG/DL (ref 70–108)
GLUCOSE BLD-MCNC: 124 MG/DL (ref 70–108)
GLUCOSE BLD-MCNC: 96 MG/DL (ref 70–108)
GLUCOSE, WHOLE BLOOD: 101 MG/DL (ref 70–108)
GLUCOSE, WHOLE BLOOD: 101 MG/DL (ref 70–108)
GLUCOSE, WHOLE BLOOD: 103 MG/DL (ref 70–108)
GLUCOSE, WHOLE BLOOD: 103 MG/DL (ref 70–108)
GLUCOSE, WHOLE BLOOD: 117 MG/DL (ref 70–108)
GLUCOSE, WHOLE BLOOD: 123 MG/DL (ref 70–108)
HAEMOPHILUS INFLUENZAE BY PCR: DETECTED
HCO3, MIXED: 25 MMOL/L (ref 23–28)
HCO3, MIXED: 27 MMOL/L (ref 23–28)
HCO3, MIXED: 28 MMOL/L (ref 23–28)
HCO3: 24 MMOL/L (ref 23–28)
HCO3: 25 MMOL/L (ref 23–28)
HCO3: 26 MMOL/L (ref 23–28)
HCO3: 27 MMOL/L (ref 23–28)
HCO3: 28 MMOL/L (ref 23–28)
HCO3: 29 MMOL/L (ref 23–28)
HCT VFR BLD CALC: 27.8 % (ref 42–52)
HCT VFR BLD CALC: 28.6 % (ref 42–52)
HCT VFR BLD CALC: 30.3 % (ref 42–52)
HCT VFR BLD CALC: 30.3 % (ref 42–52)
HCT VFR BLD CALC: 31.6 % (ref 42–52)
HEMOGLOBIN: 10 GM/DL (ref 14–18)
HEMOGLOBIN: 10.3 GM/DL (ref 14–18)
HEMOGLOBIN: 10.7 GM/DL (ref 14–18)
HEMOGLOBIN: 9.3 GM/DL (ref 14–18)
HEMOGLOBIN: 9.6 GM/DL (ref 14–18)
IFIO2: 30
IFIO2: 40
IFIO2: 50
IFIO2: 60
IFIO2: 70
IMMATURE GRANS (ABS): 2.31 THOU/MM3 (ref 0–0.07)
IMMATURE GRANS (ABS): 3.58 THOU/MM3 (ref 0–0.07)
IMMATURE GRANULOCYTES: 17 %
IMMATURE GRANULOCYTES: 22.7 %
INFLUENZA A BY PCR: NOT DETECTED
INFLUENZA B BY PCR: NOT DETECTED
KLEBSIELLA AEROGENES BY PCR: NOT DETECTED
KLEBSIELLA OXYTOCA BY PCR: NOT DETECTED
KLEBSIELLA PNEUMONIAE GROUP BY PCR: NOT DETECTED
LACTIC ACID: 1 MMOL/L (ref 0.5–2)
LACTIC ACID: 1.3 MMOL/L (ref 0.5–2)
LEGIONELLA PNEUMOPHILIA BY PCR: NOT DETECTED
LV EF: 53 %
LVEF MODALITY: NORMAL
LYMPHOCYTES # BLD: 14 %
LYMPHOCYTES # BLD: 9.6 %
LYMPHOCYTES ABSOLUTE: 1.3 THOU/MM3 (ref 1–4.8)
LYMPHOCYTES ABSOLUTE: 2.2 THOU/MM3 (ref 1–4.8)
MAGNESIUM: 2.1 MG/DL (ref 1.6–2.4)
MAGNESIUM: 2.1 MG/DL (ref 1.6–2.4)
MCH RBC QN AUTO: 29 PG (ref 26–33)
MCH RBC QN AUTO: 29.2 PG (ref 26–33)
MCH RBC QN AUTO: 29.4 PG (ref 26–33)
MCH RBC QN AUTO: 29.4 PG (ref 26–33)
MCHC RBC AUTO-ENTMCNC: 33 GM/DL (ref 32.2–35.5)
MCHC RBC AUTO-ENTMCNC: 33.6 GM/DL (ref 32.2–35.5)
MCHC RBC AUTO-ENTMCNC: 33.9 GM/DL (ref 32.2–35.5)
MCHC RBC AUTO-ENTMCNC: 34 GM/DL (ref 32.2–35.5)
MCV RBC AUTO: 86.1 FL (ref 80–94)
MCV RBC AUTO: 86.6 FL (ref 80–94)
MCV RBC AUTO: 87.5 FL (ref 80–94)
MCV RBC AUTO: 87.8 FL (ref 80–94)
METAPNEUMOVIRUS BY PCR: NOT DETECTED
MODE: ABNORMAL
MODE: NORMAL
MONOCYTES # BLD: 6.3 %
MONOCYTES # BLD: 6.3 %
MONOCYTES ABSOLUTE: 0.9 THOU/MM3 (ref 0.4–1.3)
MONOCYTES ABSOLUTE: 1 THOU/MM3 (ref 0.4–1.3)
MORAXELLA CATARRHALIS BY PCR: NOT DETECTED
MYCOPLASMA PNEUMONIAE BY PCR: NOT DETECTED
NON-SARS CORONAVIRUS: NOT DETECTED
NUCLEATED RED BLOOD CELLS: 0 /100 WBC
NUCLEATED RED BLOOD CELLS: 0 /100 WBC
O2 SAT, MIXED: 62 %
O2 SAT, MIXED: 69 %
O2 SAT, MIXED: 71 %
O2 SATURATION: 100 %
O2 SATURATION: 88 %
O2 SATURATION: 89 %
O2 SATURATION: 89 %
PARAINFLUENZA VIRUS BY PCR: NOT DETECTED
PCO2, MIXED VENOUS: 43 MMHG (ref 41–51)
PCO2, MIXED VENOUS: 43 MMHG (ref 41–51)
PCO2, MIXED VENOUS: 49 MMHG (ref 41–51)
PCO2: 36 MMHG (ref 35–45)
PCO2: 36 MMHG (ref 35–45)
PCO2: 38 MMHG (ref 35–45)
PCO2: 40 MMHG (ref 35–45)
PCO2: 42 MMHG (ref 35–45)
PCO2: 42 MMHG (ref 35–45)
PCO2: 44 MMHG (ref 35–45)
PCO2: 46 MMHG (ref 35–45)
PCO2: 48 MMHG (ref 35–45)
PH BLOOD GAS: 7.37 (ref 7.35–7.45)
PH BLOOD GAS: 7.38 (ref 7.35–7.45)
PH BLOOD GAS: 7.41 (ref 7.35–7.45)
PH BLOOD GAS: 7.43 (ref 7.35–7.45)
PH BLOOD GAS: 7.44 (ref 7.35–7.45)
PH BLOOD GAS: 7.44 (ref 7.35–7.45)
PH BLOOD GAS: 7.45 (ref 7.35–7.45)
PH BLOOD GAS: 7.52 (ref 7.35–7.45)
PH, MIXED: 7.37 (ref 7.31–7.41)
PH, MIXED: 7.38 (ref 7.31–7.41)
PH, MIXED: 7.42 (ref 7.31–7.41)
PHOSPHORUS: 3.2 MG/DL (ref 2.4–4.7)
PIP: 20 CMH2O
PIP: 22 CMH2O
PLATELET # BLD: 106 THOU/MM3 (ref 130–400)
PLATELET # BLD: 113 THOU/MM3 (ref 130–400)
PLATELET # BLD: 115 THOU/MM3 (ref 130–400)
PLATELET # BLD: 116 THOU/MM3 (ref 130–400)
PLATELET ESTIMATE: ADEQUATE
PMV BLD AUTO: 10.2 FL (ref 9.4–12.4)
PMV BLD AUTO: 10.3 FL (ref 9.4–12.4)
PO2 MIXED: 34 MMHG (ref 25–40)
PO2 MIXED: 36 MMHG (ref 25–40)
PO2 MIXED: 38 MMHG (ref 25–40)
PO2: 256 MMHG (ref 71–104)
PO2: 306 MMHG (ref 71–104)
PO2: 315 MMHG (ref 71–104)
PO2: 324 MMHG (ref 71–104)
PO2: 334 MMHG (ref 71–104)
PO2: 368 MMHG (ref 71–104)
PO2: 393 MMHG (ref 71–104)
PO2: 403 MMHG (ref 71–104)
PO2: 55 MMHG (ref 71–104)
PO2: 56 MMHG (ref 71–104)
PO2: 57 MMHG (ref 71–104)
PO2: 627 MMHG (ref 71–104)
POC ACTIVATED CLOTTING TIME KAOLIN: 186 SECONDS (ref 1–150)
POC ACTIVATED CLOTTING TIME KAOLIN: 186 SECONDS (ref 1–150)
POC ACTIVATED CLOTTING TIME KAOLIN: 191 SECONDS (ref 1–150)
POC ACTIVATED CLOTTING TIME KAOLIN: 197 SECONDS (ref 1–150)
POC ACTIVATED CLOTTING TIME KAOLIN: 202 SECONDS (ref 1–150)
POC ACTIVATED CLOTTING TIME KAOLIN: 208 SECONDS (ref 1–150)
POC ACTIVATED CLOTTING TIME KAOLIN: 219 SECONDS (ref 1–150)
POC CREATININE WHOLE BLOOD: 0.8 MG/DL (ref 0.5–1.2)
POC LACTIC ACID: 0.9 MMOL/L (ref 0.5–1.9)
POTASSIUM REFLEX MAGNESIUM: 4.2 MEQ/L (ref 3.5–5.2)
POTASSIUM SERPL-SCNC: 3.9 MEQ/L (ref 3.5–5.2)
POTASSIUM, WHOLE BLOOD: 3.5 MEQ/L (ref 3.5–4.9)
PROTEUS SPECIES BY PCR: NOT DETECTED
PSEUDOMONAS AERUGINOSA BY PCR: NOT DETECTED
RBC # BLD: 3.27 MILL/MM3 (ref 4.7–6.1)
RBC # BLD: 3.45 MILL/MM3 (ref 4.7–6.1)
RBC # BLD: 3.5 MILL/MM3 (ref 4.7–6.1)
RBC # BLD: 3.67 MILL/MM3 (ref 4.7–6.1)
RESISTANT GENE CTX-M BY PCR: ABNORMAL
RESISTANT GENE IMP BY PCR: ABNORMAL
RESISTANT GENE KPC BY PCR: ABNORMAL
RESISTANT GENE MECA/C & MREJ BY PCR: ABNORMAL
RESISTANT GENE NDM BY PCR: ABNORMAL
RESISTANT GENE OXA-48-LIKE BY PCR: ABNORMAL
RESISTANT GENE VIM BY PCR: ABNORMAL
RESPIRATORY SYNCYTIAL VIRUS BY PCR: NOT DETECTED
RHINOVIRUS ENTEROVIRUS PCR: NOT DETECTED
SCAN OF BLOOD SMEAR: NORMAL
SEG NEUTROPHILS: 56.1 %
SEG NEUTROPHILS: 66.4 %
SEGMENTED NEUTROPHILS ABSOLUTE COUNT: 8.9 THOU/MM3 (ref 1.8–7.7)
SEGMENTED NEUTROPHILS ABSOLUTE COUNT: 9 THOU/MM3 (ref 1.8–7.7)
SERRATIA MARCESCENS BY PCR: NOT DETECTED
SET PEEP: 10 MMHG
SET PEEP: 12 MMHG
SET RESPIRATORY RATE: 10 BPM
SET RESPIRATORY RATE: 16 BPM
SODIUM BLD-SCNC: 139 MEQ/L (ref 135–145)
SODIUM BLD-SCNC: 140 MEQ/L (ref 135–145)
SODIUM, WHOLE BLOOD: 138 MEQ/L (ref 138–146)
SOURCE, BLOOD GAS: ABNORMAL
SOURCE: ABNORMAL
SPECIMEN ACCEPTABILITY: ABNORMAL
STAPH AUREUS BY PCR: NOT DETECTED
STREP AGALACTIAE BY PCR: NOT DETECTED
STREP PNEUMONIAE BY PCR: NOT DETECTED
STREP PYOGENES BY PCR: NOT DETECTED
TOTAL PROTEIN: 4.7 G/DL (ref 6.1–8)
WBC # BLD: 13.6 THOU/MM3 (ref 4.8–10.8)
WBC # BLD: 14.7 THOU/MM3 (ref 4.8–10.8)
WBC # BLD: 15.8 THOU/MM3 (ref 4.8–10.8)
WBC # BLD: 19.1 THOU/MM3 (ref 4.8–10.8)

## 2021-09-06 PROCEDURE — 80053 COMPREHEN METABOLIC PANEL: CPT

## 2021-09-06 PROCEDURE — 82248 BILIRUBIN DIRECT: CPT

## 2021-09-06 PROCEDURE — 82947 ASSAY GLUCOSE BLOOD QUANT: CPT

## 2021-09-06 PROCEDURE — 33949 ECMO/ECLS DAILY MGMT ARTERY: CPT | Performed by: INTERNAL MEDICINE

## 2021-09-06 PROCEDURE — 87581 M.PNEUMON DNA AMP PROBE: CPT

## 2021-09-06 PROCEDURE — 82435 ASSAY OF BLOOD CHLORIDE: CPT

## 2021-09-06 PROCEDURE — C1769 GUIDE WIRE: HCPCS

## 2021-09-06 PROCEDURE — 82948 REAGENT STRIP/BLOOD GLUCOSE: CPT

## 2021-09-06 PROCEDURE — C1751 CATH, INF, PER/CENT/MIDLINE: HCPCS

## 2021-09-06 PROCEDURE — 05HY33Z INSERTION OF INFUSION DEVICE INTO UPPER VEIN, PERCUTANEOUS APPROACH: ICD-10-PCS | Performed by: INTERNAL MEDICINE

## 2021-09-06 PROCEDURE — 99291 CRITICAL CARE FIRST HOUR: CPT | Performed by: INTERNAL MEDICINE

## 2021-09-06 PROCEDURE — 33952 ECMO/ECLS INSJ PRPH CANNULA: CPT | Performed by: INTERNAL MEDICINE

## 2021-09-06 PROCEDURE — 84132 ASSAY OF SERUM POTASSIUM: CPT

## 2021-09-06 PROCEDURE — 93503 INSERT/PLACE HEART CATHETER: CPT | Performed by: INTERNAL MEDICINE

## 2021-09-06 PROCEDURE — 71045 X-RAY EXAM CHEST 1 VIEW: CPT

## 2021-09-06 PROCEDURE — C1894 INTRO/SHEATH, NON-LASER: HCPCS

## 2021-09-06 PROCEDURE — 84100 ASSAY OF PHOSPHORUS: CPT

## 2021-09-06 PROCEDURE — 93325 DOPPLER ECHO COLOR FLOW MAPG: CPT

## 2021-09-06 PROCEDURE — 85027 COMPLETE CBC AUTOMATED: CPT

## 2021-09-06 PROCEDURE — 99292 CRITICAL CARE ADDL 30 MIN: CPT | Performed by: INTERNAL MEDICINE

## 2021-09-06 PROCEDURE — 87631 RESP VIRUS 3-5 TARGETS: CPT

## 2021-09-06 PROCEDURE — APPSS180 APP SPLIT SHARED TIME > 60 MINUTES: Performed by: NURSE PRACTITIONER

## 2021-09-06 PROCEDURE — 94761 N-INVAS EAR/PLS OXIMETRY MLT: CPT

## 2021-09-06 PROCEDURE — 2580000003 HC RX 258: Performed by: INTERNAL MEDICINE

## 2021-09-06 PROCEDURE — 06PY33Z REMOVAL OF INFUSION DEVICE FROM LOWER VEIN, PERCUTANEOUS APPROACH: ICD-10-PCS | Performed by: INTERNAL MEDICINE

## 2021-09-06 PROCEDURE — 82330 ASSAY OF CALCIUM: CPT

## 2021-09-06 PROCEDURE — C9113 INJ PANTOPRAZOLE SODIUM, VIA: HCPCS | Performed by: NURSE PRACTITIONER

## 2021-09-06 PROCEDURE — 82565 ASSAY OF CREATININE: CPT

## 2021-09-06 PROCEDURE — 82803 BLOOD GASES ANY COMBINATION: CPT

## 2021-09-06 PROCEDURE — 2580000003 HC RX 258

## 2021-09-06 PROCEDURE — 2000000000 HC ICU R&B

## 2021-09-06 PROCEDURE — 3609027000 HC BRONCHOSCOPY

## 2021-09-06 PROCEDURE — 85018 HEMOGLOBIN: CPT

## 2021-09-06 PROCEDURE — 33948 ECMO/ECLS DAILY MGMT-VENOUS: CPT | Performed by: INTERNAL MEDICINE

## 2021-09-06 PROCEDURE — 87541 LEGION PNEUMO DNA AMP PROB: CPT

## 2021-09-06 PROCEDURE — 2709999900 HC NON-CHARGEABLE SUPPLY

## 2021-09-06 PROCEDURE — 2500000003 HC RX 250 WO HCPCS

## 2021-09-06 PROCEDURE — 36415 COLL VENOUS BLD VENIPUNCTURE: CPT

## 2021-09-06 PROCEDURE — 84295 ASSAY OF SERUM SODIUM: CPT

## 2021-09-06 PROCEDURE — 6360000002 HC RX W HCPCS: Performed by: INTERNAL MEDICINE

## 2021-09-06 PROCEDURE — 83735 ASSAY OF MAGNESIUM: CPT

## 2021-09-06 PROCEDURE — 2700000000 HC OXYGEN THERAPY PER DAY

## 2021-09-06 PROCEDURE — 85014 HEMATOCRIT: CPT

## 2021-09-06 PROCEDURE — 6360000002 HC RX W HCPCS: Performed by: NURSE PRACTITIONER

## 2021-09-06 PROCEDURE — 87205 SMEAR GRAM STAIN: CPT

## 2021-09-06 PROCEDURE — 85347 COAGULATION TIME ACTIVATED: CPT

## 2021-09-06 PROCEDURE — 6370000000 HC RX 637 (ALT 250 FOR IP): Performed by: NURSE PRACTITIONER

## 2021-09-06 PROCEDURE — 93312 ECHO TRANSESOPHAGEAL: CPT

## 2021-09-06 PROCEDURE — 2500000003 HC RX 250 WO HCPCS: Performed by: NURSE PRACTITIONER

## 2021-09-06 PROCEDURE — 87486 CHLMYD PNEUM DNA AMP PROBE: CPT

## 2021-09-06 PROCEDURE — 2580000003 HC RX 258: Performed by: NURSE PRACTITIONER

## 2021-09-06 PROCEDURE — 93320 DOPPLER ECHO COMPLETE: CPT

## 2021-09-06 PROCEDURE — 87798 DETECT AGENT NOS DNA AMP: CPT

## 2021-09-06 PROCEDURE — 33966 ECMO/ECLS RMVL PRPH CANNULA: CPT | Performed by: INTERNAL MEDICINE

## 2021-09-06 PROCEDURE — 94003 VENT MGMT INPAT SUBQ DAY: CPT

## 2021-09-06 PROCEDURE — 37799 UNLISTED PX VASCULAR SURGERY: CPT

## 2021-09-06 PROCEDURE — 85025 COMPLETE CBC W/AUTO DIFF WBC: CPT

## 2021-09-06 PROCEDURE — 87070 CULTURE OTHR SPECIMN AEROBIC: CPT

## 2021-09-06 PROCEDURE — 94640 AIRWAY INHALATION TREATMENT: CPT

## 2021-09-06 PROCEDURE — 6370000000 HC RX 637 (ALT 250 FOR IP): Performed by: INTERNAL MEDICINE

## 2021-09-06 PROCEDURE — B24BZZ4 ULTRASONOGRAPHY OF HEART WITH AORTA, TRANSESOPHAGEAL: ICD-10-PCS | Performed by: INTERNAL MEDICINE

## 2021-09-06 PROCEDURE — 83605 ASSAY OF LACTIC ACID: CPT

## 2021-09-06 RX ORDER — SODIUM CHLORIDE 9 MG/ML
INJECTION, SOLUTION INTRAVENOUS PRN
Status: COMPLETED | OUTPATIENT
Start: 2021-09-06 | End: 2021-09-07

## 2021-09-06 RX ORDER — ATROPINE SULFATE 0.1 MG/ML
INJECTION INTRAVENOUS
Status: DISPENSED
Start: 2021-09-06 | End: 2021-09-07

## 2021-09-06 RX ORDER — NOREPINEPHRINE BIT/0.9 % NACL 16MG/250ML
INFUSION BOTTLE (ML) INTRAVENOUS
Status: COMPLETED
Start: 2021-09-06 | End: 2021-09-06

## 2021-09-06 RX ORDER — NOREPINEPHRINE BIT/0.9 % NACL 16MG/250ML
2-100 INFUSION BOTTLE (ML) INTRAVENOUS CONTINUOUS
Status: DISCONTINUED | OUTPATIENT
Start: 2021-09-06 | End: 2021-09-10

## 2021-09-06 RX ORDER — KETAMINE HCL IN NACL, ISO-OSM 100MG/10ML
SYRINGE (ML) INJECTION
Status: DISPENSED
Start: 2021-09-06 | End: 2021-09-07

## 2021-09-06 RX ORDER — SODIUM CHLORIDE 9 MG/ML
INJECTION, SOLUTION INTRAVENOUS PRN
Status: DISCONTINUED | OUTPATIENT
Start: 2021-09-06 | End: 2021-09-08 | Stop reason: ALTCHOICE

## 2021-09-06 RX ORDER — CHLORHEXIDINE GLUCONATE 0.12 MG/ML
15 RINSE ORAL 2 TIMES DAILY
Status: DISCONTINUED | OUTPATIENT
Start: 2021-09-06 | End: 2021-09-13

## 2021-09-06 RX ORDER — HEPARIN SODIUM 1000 [USP'U]/ML
5000 INJECTION, SOLUTION INTRAVENOUS; SUBCUTANEOUS ONCE
Status: COMPLETED | OUTPATIENT
Start: 2021-09-06 | End: 2021-09-06

## 2021-09-06 RX ORDER — FUROSEMIDE 10 MG/ML
40 INJECTION INTRAMUSCULAR; INTRAVENOUS ONCE
Status: DISCONTINUED | OUTPATIENT
Start: 2021-09-06 | End: 2021-09-06

## 2021-09-06 RX ADMIN — SODIUM CHLORIDE, PRESERVATIVE FREE 10 ML: 5 INJECTION INTRAVENOUS at 20:33

## 2021-09-06 RX ADMIN — CEFTRIAXONE SODIUM 2000 MG: 2 INJECTION, POWDER, FOR SOLUTION INTRAMUSCULAR; INTRAVENOUS at 02:41

## 2021-09-06 RX ADMIN — HEPARIN SODIUM 18 UNITS/KG/HR: 10000 INJECTION, SOLUTION INTRAVENOUS at 21:29

## 2021-09-06 RX ADMIN — NALOXEGOL OXALATE 12.5 MG: 12.5 TABLET, FILM COATED ORAL at 08:21

## 2021-09-06 RX ADMIN — MIDAZOLAM 13 MG/HR: 5 INJECTION, SOLUTION INTRAMUSCULAR; INTRAVENOUS at 01:22

## 2021-09-06 RX ADMIN — Medication 2 MCG/MIN: at 20:32

## 2021-09-06 RX ADMIN — Medication 15 ML: at 08:21

## 2021-09-06 RX ADMIN — FENTANYL CITRATE 200 MCG/HR: 50 INJECTION INTRAMUSCULAR; INTRAVENOUS at 12:38

## 2021-09-06 RX ADMIN — MIDAZOLAM 13 MG/HR: 5 INJECTION, SOLUTION INTRAMUSCULAR; INTRAVENOUS at 09:16

## 2021-09-06 RX ADMIN — Medication 1 CAPSULE: at 08:21

## 2021-09-06 RX ADMIN — Medication 300 MG: at 08:30

## 2021-09-06 RX ADMIN — FENTANYL CITRATE 200 MCG/HR: 50 INJECTION INTRAMUSCULAR; INTRAVENOUS at 19:24

## 2021-09-06 RX ADMIN — SODIUM CHLORIDE, PRESERVATIVE FREE 10 ML: 5 INJECTION INTRAVENOUS at 08:21

## 2021-09-06 RX ADMIN — Medication 15 ML: at 20:33

## 2021-09-06 RX ADMIN — CEFTRIAXONE SODIUM 2000 MG: 2 INJECTION, POWDER, FOR SOLUTION INTRAMUSCULAR; INTRAVENOUS at 18:06

## 2021-09-06 RX ADMIN — Medication 300 MG: at 18:11

## 2021-09-06 RX ADMIN — CISATRACURIUM BESYLATE 0.5 MCG/KG/MIN: 10 INJECTION INTRAVENOUS at 15:06

## 2021-09-06 RX ADMIN — SODIUM CHLORIDE, PRESERVATIVE FREE 10 ML: 5 INJECTION INTRAVENOUS at 08:23

## 2021-09-06 RX ADMIN — DOCUSATE SODIUM 100 MG: 50 LIQUID ORAL at 08:21

## 2021-09-06 RX ADMIN — HEPARIN SODIUM 19 UNITS/KG/HR: 10000 INJECTION, SOLUTION INTRAVENOUS at 09:15

## 2021-09-06 RX ADMIN — HEPARIN SODIUM 5000 UNITS: 1000 INJECTION, SOLUTION INTRAVENOUS; SUBCUTANEOUS at 11:10

## 2021-09-06 RX ADMIN — FENTANYL CITRATE 200 MCG/HR: 50 INJECTION INTRAMUSCULAR; INTRAVENOUS at 06:05

## 2021-09-06 RX ADMIN — MIDAZOLAM 13 MG/HR: 5 INJECTION, SOLUTION INTRAMUSCULAR; INTRAVENOUS at 18:05

## 2021-09-06 RX ADMIN — PANTOPRAZOLE SODIUM 40 MG: 40 INJECTION, POWDER, FOR SOLUTION INTRAVENOUS at 08:20

## 2021-09-06 ASSESSMENT — PAIN SCALES - GENERAL: PAINLEVEL_OUTOF10: 0

## 2021-09-06 ASSESSMENT — PULMONARY FUNCTION TESTS
PIF_VALUE: 21
PIF_VALUE: 23
PIF_VALUE: 21
PIF_VALUE: 23

## 2021-09-06 NOTE — PROGRESS NOTES
ECMO Hourly Circuit Check       2000 2100 2200 2300 0000 0100 0200 0300 0400 0500 0600 0700    Device Secured Yes Yes Yes Yes Yes Yes Yes Yes Yes Yes Yes Yes    Connected to O2         No         No         No        No       No       No        No       No       No        No        No        No    Right connection of tubing to the oxygenator and gas hoses       Yes       Yes       Yes       Yes       Yes      Yes       Yes      Yes       Yes       Yes       Yes       Yes    Absence of kinks and tension       Yes       Yes       Yes       Yes       Yes      Yes       Yes      Yes       Yes       Yes       Yes       Yes    Lines free of clots/fibrin with flashlight assessment       Yes       Yes       Yes       Yes       Yes      Yes       Yes      Yes       Yes       Yes       Yes       Yes    Assess difference in blood color between cannulas       Yes       Yes       Yes       Yes       Yes      Yes       Yes      Yes       Yes       Yes       Yes       Yes    No migration of cannula Yes Yes Yes Yes Yes Yes Yes Yes Yes Yes Yes Yes    Bleeding at insertion site No No No No No No No No No No No No    Knee immobilizer in place No No No No No No No No No No No No    No traction on tubing Yes Yes Yes Yes Yes Yes Yes Yes Yes Yes Yes Yes    Flow Rate 2.9 2.9 2.8 2.8 2.7 2.8 2.8 2.8 2.9 2.7 2.4 2.4    Emergency Supplies at bedside Yes Yes Yes Yes Yes Yes Yes Yes Yes Yes Yes Yes                                                                    Sweep Gas Oxy 1 = 2  Oxy 2 = 2 Oxy 1 = 2  Oxy 2 = 2 Oxy 1 = 2  Oxy 2 = 2 Oxy 1 = 2  Oxy 2 = 2 Oxy 1 = 2  Oxy 2 = 2 Oxy 1 = 2  Oxy 2 = 2 Oxy 1 = 2  Oxy 2 = 2 Oxy 1 = 2  Oxy 2 = 2 Oxy 1 = 2  Oxy 2 = 2 Oxy 1 = 2  Oxy 2 = 2 Oxy 1 = 2  Oxy 2 = 2 Oxy 1 = 2  Oxy 2 = 2    DO2                VO2                    2030 - [employer] updated on patients left thigh being more firm/larger. At bedside to assess    2054 - Dr. Ollie Thomas called for update on patient.  Updated on low ECMO flows, hemodynamics, right thigh firm/large    2118 - Dr. Angelo Jhons updated on SvO2 83 & hemodynamics, weight increase to 140 kg, +24L    2212 - Dr. Angelo Johns requesting patient be placed in Trendelenburg position for 1 hour.      2245 - Flows fluctuating and drop down to 0.2 on ECMO, patient returned supine    2247 - Flows continue to fluctuate and drop, RPMs decreased to 6200.    2250 - Flows remain low and fluctuating, RPMs decreased to 6000    0515 - Flows fluctuating, RPM decreased to 5800    0520 - RPMs decreased to 5600    0522 - Flow fluctuating, RPMs decreased to 5400    0525 - Flow fluctuating, RPMs decreased to 5200     0645 - Cassandra Yi at bedside to assess right thigh

## 2021-09-06 NOTE — FLOWSHEET NOTE
1337-Radha Melton at bedside. RPMs increased to 6000 per Dr. Armin Ash. Decreased flows. Attempted to adjust cannula per Dr. Armin Ash. Decision made to take pt to cath lab.    1400- Pt transported to hybrid OR with transport monitor attached. 1429- 10mg Nimbex given per Amrita Batista and gtt started per orders from [place], [address] (see eMAR). BIS 40.    1454- BIS 40    1635-  Transported to ICU on transport monitor. 1644- Pt arrived to ICU. Placed on monitor, vitals obtained, assessment completed. Amrita Batista at bedside. C-clamps in place bilateral groins. Slight bruising observed and marked. Remains unchanged with each assessment. 1814- [address] numbers reviewed with Amrita Batista. No new orders received. 1834- Arterial pressures trending low. Venkat Nolan APRN of ABPs and UOP. No new orders received at this time. 200- Pt's wife Ramos Negro, mother-in law and mother updated at bedside. 1910- Bedside report given to Remy Beyer RN.    2014- Updated Britt, APRN on continued low ABP. Orders received.

## 2021-09-06 NOTE — H&P
questions and wished to proceed; the consent form was signed. MEDICAL HISTORY  [x]ASHD/ANGINA/MI/CHF   []Hypertension  []Diabetes  []Hyperlipidemia  []Smoking  []Family Hx of ASHD  [x]Additional information:       has no past medical history on file. SURGICAL HISTORY   has a past surgical history that includes Cholecystectomy.   Additional information:       ALLERGIES   Allergies as of 08/30/2021    (No Known Allergies)     Additional information:       MEDICATIONS   Aspirin  [] 81 mg  [] 325 mg  [x] None  Antiplatelet drug therapy use last 5 days  [x]No []Yes  Coumadin Use Last 5 Days [x]No []Yes  Other anticoagulant use last 5 days  []No [x]Yes    Current Facility-Administered Medications:     0.9 % sodium chloride infusion, , IntraVENous, PRN, Ehsan Pham MD    chlorhexidine (PERIDEX) 0.12 % solution 15 mL, 15 mL, Mouth/Throat, BID, KATHARINA Holman CNP, 15 mL at 09/06/21 0821    0.9 % sodium chloride infusion, , IntraVENous, PRN, Ehsan Pham MD    0.9 % sodium chloride infusion, , IntraVENous, PRN, KATHARINA Salmon CNP    0.9 % sodium chloride infusion, , IntraVENous, PRN, Marleni Dsouza MD    ketamine (KETALAR) 50 MG/5ML injection, , , ,     cisatracurium besylate (NIMBEX) 200 mg in sodium chloride 0.9 % 100 mL infusion, 0.5-10 mcg/kg/min, IntraVENous, Continuous, KATHARINA Salmon CNP    atropine 1 MG/10ML injection, , , ,     [Held by provider] albumin human 5 % IV solution 25 g, 25 g, IntraVENous, Q6H, Ehsan Pham MD, Stopped at 09/06/21 0000    [Held by provider] lactated ringers infusion, , IntraVENous, Continuous, Ehsan Pham MD, Last Rate: 100 mL/hr at 09/05/21 2211, New Bag at 09/05/21 2211    tobramycin (PF) (LUISANA) nebulizer solution 300 mg, 300 mg, Nebulization, BID, Ehsan Pham MD, 300 mg at 09/06/21 0830    midazolam (VERSED) injection 2 mg, 2 mg, IntraVENous, Once, Gerri Jude Kirks, APRN - CNP    midazolam (VERSED) 100 mg in dextrose 5 % 100 mL infusion, 1-15 mg/hr, IntraVENous, Continuous, Gerri Jude Kirks, APRN - CNP, Last Rate: 13 mL/hr at 09/06/21 0916, 13 mg/hr at 09/06/21 0916    naloxegol (MOVANTIK) tablet 12.5 mg, 12.5 mg, Oral, QAM, Gaudencio Dials, APRN - CNP, 12.5 mg at 09/06/21 2477    insulin lispro (HUMALOG) injection vial 0-6 Units, 0-6 Units, SubCUTAneous, Q4H, Gerri Jude Kirks, APRN - CNP, 1 Units at 09/05/21 2112    glucose (GLUTOSE) 40 % oral gel 15 g, 15 g, Oral, PRN, Gerri Dominiqueino Kirks, APRN - CNP    dextrose 50 % IV solution, 12.5 g, IntraVENous, PRN, Gerri Jude Kirks, APRN - CNP    glucagon (rDNA) injection 1 mg, 1 mg, IntraMUSCular, PRN, Gerri Jude Kirks, APRN - CNP    dextrose 5 % solution, 100 mL/hr, IntraVENous, PRN, Gerri Jude Kirks, APRN - CNP    cefTRIAXone (ROCEPHIN) 2000 mg IVPB in D5W 50ml minibag, 2,000 mg, IntraVENous, Q12H, Ehsan Pham MD, Stopped at 09/06/21 0311    morphine injection 4 mg, 4 mg, IntraVENous, Q2H PRN, Ehsan Pham MD    LORazepam (ATIVAN) injection 4 mg, 4 mg, IntraVENous, Q4H PRN, Ehsan Pham MD    fentaNYL (SUBLIMAZE) 1250 mcg in sodium chloride 0.9 % 250 mL, 12.5-200 mcg/hr, IntraVENous, Continuous, Gaudencio Dials, APRN - CNP, Last Rate: 40 mL/hr at 09/06/21 1238, 200 mcg/hr at 09/06/21 1238    heparin 25,000 units in dextrose 5% 250 mL (premix) infusion, 5-30 Units/kg/hr, IntraVENous, Continuous, Gaudencio Dials, APRN - CNP, Last Rate: 21.5 mL/hr at 09/06/21 1216, 18 Units/kg/hr at 09/06/21 1216    docusate (COLACE) 50 MG/5ML liquid 100 mg, 100 mg, Per NG tube, BID PRN, KATHARINA Salmon - CNP, 100 mg at 09/06/21 0821    pantoprazole (PROTONIX) injection 40 mg, 40 mg, IntraVENous, Daily, 40 mg at 09/06/21 0820 **AND** sodium chloride (PF) 0.9 % injection 10 mL, 10 mL, IntraVENous, Daily, KATHARINA Salmon CNP, 10 mL at 09/06/21 0821    potassium chloride 20 mEq/50 mL IVPB (Central present, without HSM, masses, or tenderness    Extremities: without C,C,E.  Pulses 2+ bilaterally  Mental Status: Sedated/intubated        PLANNED PROCEDURE   []Cath  []PCI                []Pacemaker/AICD  []JASPREET             []Cardioversion []Peripheral angiography/PTA  []Other:      SEDATION  Planned agent:[x]Midazolam []Meperidine [x]Sublimaze []Morphine  []Diazepam  [x]Other:     ASA Classification:  []1 []2 []3 [x]4 []5  Class 1: A normal healthy patient  Class 2: Pt with mild to moderate systemic disease  Class 3: Severe systemic disease or disturbance  Class 4: Severe systemic disorders that are already life threatening. Class 5: Moribund pt with little chances of survival, for more than 24 hours. Mallampati I Airway Classification:   []1 []2 []3 []4 - intubated    [x]Pre-procedure diagnostic studies complete and results available. Comment:    [x]Previous sedation/anesthesia experiences assessed. Comment:    [x]The patient is an appropriate candidate to undergo the planned procedure sedation and anesthesia. (Refer to nursing sedation/analgesia documentation record)  [x]Formulation and discussion of sedation/procedure plan, risks, and expectations with patient and/or responsible adult completed. [x]Patient examined immediately prior to the procedure.  (Refer to nursing sedation/analgesia documentation record)    Gael Dumont MD MD   Electronically signed 9/6/2021 at 2:47 PM

## 2021-09-06 NOTE — PROGRESS NOTES
1400 Patient to Hybrid OR per ICU staff, consent verified with ICU staff   1401 Patient arrived on V/V [facility] (Bilateral groins) and ventilator   1430 Patient placed on table Right IJ bilateral groins prepped, patient drapped in sterile fashion, refer to flowsheet for VS. ICU staff administering all medications refer to [facility]   1445 JASPREET completed per Dr. Sulema Lala, JASPREET probe in place during procedure   1500 5 Lido given Right IJ  1506 RIJ 12 Setswana 10cm sheath prepped and advanced. 1509 Wire advanced Right IJ   1512 Right IJ sheath removed, 14Fr, 18Fr, 26Fr  dilators used   1520 31Fr protech duo sheath prepped and advanced   1521 Heparin 6000 units given, ECHMO pump to 1.4L flow 2600RPM  1524 Left femoral site cut, Right circuit clamped reconnecting to protech duo   [address] [facility] connected to protech duo, left and right femoral sites clamped off   1526 [facility] pump turned on 7500RPM 4.9L Flow   1538 Right fem sheath removed closed per slip knot  1539 Left fem sheath removed closed per slip knot, manual pressure held on bilat groin access. 1540   1544 suturing right IJ circuit in place   1612 C-Clamp on right femoral site, fem stop on left femoral site. 1620 patient placed on transport monitor  1625 patient moved to bed, Dr. Sulema Lala updating family. Patients RN present for procedure report given.    1635 patient transported to ICU per ICU staff

## 2021-09-06 NOTE — FLOWSHEET NOTE
ECMO Hourly Circuit Check       0800 0900 1000 1100 1200 1300 1400 1500 1600 1700 1800 1900    Device Secured Yes Yes Yes Yes Yes Yes AIRAM AIRAM AIRAM Yes Yes Yes    Connected to O2        N/A       N/A       N/A       N/A       N/A       N/A          N/A       N/A       N/A    Right connection of tubing to the oxygenator and gas hoses       Yes       Yes       Yes       Yes       Yes       Yes          Yes       Yes       Yes    Absence of kinks and tension       Yes       Yes       Yes       Yes       Yes       Yes          Yes       Yes       Yes    Lines free of clots/fibrin with flashlight assessment       Yes       Yes        Yes       Yes       Yes       Yes          Yes       Yes       Yes    Assess difference in blood color between cannulas       Yes       Yes       Yes       Yes       Yes       Yes          Yes       Yes       Yes    No migration of cannula Yes Yes Yes Yes Yes Yes    Yes Yes Yes    Bleeding at insertion site No No No No No no    Yes Yes Yes    Knee immobilizer in place N/A N/A N/A N/A N/A N/A    N/A N/A N/A    No traction on tubing Yes Yes Yes Yes Yes Yes    Yes Yes Yes    Flow Rate 2.4 2.4 2.4 2.4 2.4 2.4    4.1 4.1 4.1    Emergency Supplies at bedside Yes Yes Yes Yes Yes Yes    Yes Yes Yes                                                                                                                              Sweep Gas Oxy 1=2  Oxy 2=2 Oxy 1=2  Oxy 2=2 Oxy 1=2  Oxy 2=2 Oxy 1=2  Oxy 2=2 Oxy 1=2  Oxy 2=2 Oxy 1=2  Oxy 2=2    Oxy 1=2  Oxy 2=2 Oxy 1=2  Oxy 2=2 Oxy 1=2  Oxy 2=2    DO2           755      VO2          241            1220-0252--AIRAM. Pt in hybrid OR for procedure.

## 2021-09-06 NOTE — PROGRESS NOTES
CRITICAL CARE PROGRESS NOTE      Patient:  Nallely Sarkar    Unit/Bed:4D-02/002-A  YOB: 1975  MRN: [id number]   PCP: Supriya Watts MD  Date of Admission: 8/30/2021  Chief Complaint:- Acute hypoxic respiratory failure    Assessment and Plan:      Acute hypoxemic respiratory failure: Secondary to COVID-19 with subsequent progression to ARDS. Patient intubated 9/1/2021. Patient undergoing lung rest strategy since ECMO has been administered. COVID-19: Patient failed trial of remdesivir, tocilizumab, and Decadron. Patient with progressive hypoxemia. Pneumonia: Coinfection with Haemophilus influenza. Patient on Rocephin. Patient transition to higher dosing of Rocephin. Antibiotics initiated 8/30/2021. Sepsis: Secondary to combination of Covid and Haemophilus influenza. Present at presentation. ARDS:  Patient intubated 9/1/2021. ECMO initiated 9/1/2021. Patient under lung rest strategy with pressure control 10, PEEP 12, FiO2 60%. Rate 10. Continue with lung rest strategy and ECMO. Elevated liver enzymes: Monitor. Bilirubin was increasing. Haptoglobin pending. Bilirubin has decreased with RPM reduction on pump. Obesity: Aware. Leukocytosis: Continue antibiotic therapy. Normocytic anemia: Transfuse for hemoglobin to be 10 or better. Mild neutropenia: resolved; Obtain immunoglobulin panel. White blood cell count trending upward. HPI: Patient is a 57-year-old white male lifetime non-smoker. He is a farmer by occupation. He is obese but has no other medical diagnoses including coronary artery disease diabetes or sleep apnea. Patient presented to the emergency room and was admitted on 8/30/2021. He started to develop fever associated with chills cough shortness of breath nasal congestion loss of taste and loss of smell starting 8/28/2021. As he continued to progress his dyspnea continued to worsen. He developed malaise.   He indicated that his wife was recently diagnosed with Covid. Patient underwent Covid screen and was positive. CT scan chest showed bilateral infiltrates. He initially was started on low-flow oxygen but continued to require progressive oxygen supplementation. He was treated with remdesivir Decadron and subsequently tocilizumab. He continued to develop progressive dyspnea and had the syndrome of \"happy hypoxia\". As patient continued to worsen, he elected to proceed with intubation and ECMO. Patient intubated 9/1/2021. ECMO cannulation 9/1/2021. ROS: Sedated on mechanical ventilator. PMH:  Per HPI  SHX: Lifetime non-smoker. FHX: Covid exposure from wife. Allergies: No known drug allergies     Scheduled Meds:   chlorhexidine  15 mL Mouth/Throat BID    [Held by provider] albumin human  25 g IntraVENous Q6H    tobramycin (PF)  300 mg Nebulization BID    midazolam  2 mg IntraVENous Once    naloxegol  12.5 mg Oral QAM    insulin lispro  0-6 Units SubCUTAneous Q4H    cefTRIAXone (ROCEPHIN) IV  2,000 mg IntraVENous Q12H    pantoprazole  40 mg IntraVENous Daily    And    sodium chloride (PF)  10 mL IntraVENous Daily    calcium replacement protocol   Other RX Placeholder    lactobacillus  1 capsule Oral Daily with breakfast    sodium chloride flush  5-40 mL IntraVENous 2 times per day     Continuous Infusions:   sodium chloride      sodium chloride      [Held by provider] lactated ringers 100 mL/hr at 09/05/21 2211    midazolam 13 mg/hr (09/06/21 0122)    dextrose      fentaNYL (SUBLIMAZE) 1250 mcg in sodium chloride 0.9 % 250 mL 200 mcg/hr (09/06/21 0605)    heparin (PORCINE) Infusion 19 Units/kg/hr (09/06/21 0304)       PHYSICAL EXAMINATION:  T:  96.6. P:  72. RR:  10. B/P:  104/56. FiO2:  60. O2 Sat:  91.  I/O:  9276/2345  Body mass index is 43.97 kg/m². PC: 10/12: TV: 294: RRTotal: 10: Ti:1 sec:  General: Acute on chronically ill-appearing white male  HEENT:  normocephalic and atraumatic. No scleral icterus. PERR  Neck: supple. No Thyromegaly.   Lungs: diminished to auscultation. No retractions  Cardiac: RRR. No JVD. Abdomen: soft. Nontender. Extremities:  No clubbing, cyanosis trace lower extremity edema bilaterally  Vasculature: capillary refill < 3 seconds. Palpable dorsalis pedis pulses. Skin:  warm and dry. Psych: Positive cough and gag sedated on mechanical ventilation  Lymph:  No supraclavicular adenopathy. Neurologic:  No focal deficit. No seizures. Data: (All radiographs, tracings, PFTs, and imaging are personally viewed and interpreted unless otherwise noted). Sodium 140, potassium 4.2, chloride 106, CO2 27, BUN 20, creatinine 0.5, anion gap 10.0, glucose 124  WBC 13.6, hemoglobin 10.0, hematocrit 30.3, platelet count 486. Telemetry shows normal sinus rhythm  Chest x-ray 9/6/2021 reports stable bilateral consolidations consistent with pneumonia. Meets Continued ICU Level Care Criteria:    [x] Yes   [] No - Transfer Planned to listed location:  [] HOSPITALIST CONTACTED- DR     Case and plan discussed with Dr. Mustapha Morales and Dr. Elaine Vincent. Patient seen with Dr. Mustapha Morales in tele medicine        Electronically signed by Dinesh Mondragon. KATHARINA Martel - CNP  CRITICAL CARE SPECIALIST  Patient seen by me. Case discussed with NP. Extensive conversations with Dr Elaine Vincent. Patient required recannulation at right IJ due to diminished flow from groin lines. Patient continues with antibiotics for pneumonia. Sepsis improved as well. Italicized font represents changes to the note made by me. CC time 35 minutes. Time was discontiguous. Time does not include procedures. Time does include my direct assessment of the patient and coordination of care.   Electronically signed by Nawaf Pearce MD on 9/7/2021 at 6:36 AM

## 2021-09-06 NOTE — PROGRESS NOTES
Oxygen Delivery and Consumption    Patient's Hgb 10 g/dl   PaO2 56.2 mmHg   PvO2 33.5 mmHg   SaO2 89.1%   SvO2 61.6%   Flow (C.O.) 2.3 LPM   DO2 278 mL/min   VO2 85 mL/min     Ratio 3.3:1

## 2021-09-06 NOTE — BRIEF OP NOTE
[facility]  Sedation/Analgesia Post Sedation Record    Pt Name: Margarita Bahena  Account number: [de-identified]  MRN: [id number]  YOB: 1975  Procedure Performed By: MD MD Devin Spain, [address]  Primary Care Physician: Annmarie Shea MD  Date: 9/6/2021    POST-PROCEDURE    Physicians/Assistants: MD MD Devin Spain RPVI    Procedure Performed:Protek DUO, Femoral ECMO Decannulation      Sedation/Anesthesia: Versed/ Fentanyl and 2% xylocaine local anesthesia. Estimated Blood Loss: 100 cc    Specimens Removed: None         Disposition of Specimen: N/A        Complications: No Immediate Complications.        Post-procedure Diagnosis/Findings:       R IJ 31Fr ProteK Duo   Bifemoral venous decannulation with Woggle  4.5 Lpm of flow at 6500 RPM  Good position of distal cannula  JASPREET confirms tip of cannula is beyond PA  ACT goal ~200-250s once groins are stable         MD MD Devin Spain RPVI  Electronically signed 9/6/2021 at 4:06 PM  Interventional Cardiology

## 2021-09-06 NOTE — FLOWSHEET NOTE
[facility] 88 PROGRESS NOTE      Patient: Maximus SanchezClark  Room #: 2J-63/685-U            YOB: 1975  Age: 48 y.o. Gender: male            Admit Date & Time: 8/30/2021  3:55 PM    Assessment:  As  rounded through the unit, Alex's wife and mother were encountered in the aguilar. Pt's wife shared that pt is on ECMO and is going for more testing in a few minutes. Pt's wife and mother will be allowed to visit with pt for a few minutes prior to the testing. Pt's family was tearful, but expressed a strong belief in prayer and healing. Interventions:   provided a listening and supportive presence.  encouraged family to continue talking and praying with the pt.  offered a blessing with the pt's wife and mother. Outcomes:  Pt's family expressed gratitude for the encounter. Plan:  1. Provide spiritual care and support. 2.  Reassess pt and pt's family for spiritual needs if pt's condition changes, especially declines. Electronically signed by Miriam Gonzalez on 9/6/2021 at 12:50 PM.  [facility]  [phone]       09/06/21 1235   Encounter Summary   Services provided to: Family   Referral/Consult From: [facility]; Family members   Continue Visiting Yes  (9/6 V)   Complexity of Encounter Low   Length of Encounter 15 minutes   Spiritual Assessment Completed Yes   Routine   Type Follow up   Assessment Approachable; Anxious   Intervention Active listening;Explored feelings, thoughts, concerns;Explored coping resources; [place];Sustaining presence/ Ministry of presence   Outcome Acceptance;Comfort;Engaged in conversation;Expressed feelings/needs/concerns

## 2021-09-06 NOTE — PROGRESS NOTES
Cardiology Progress Note      Patient:  Deloris Goel  YOB: 1975  MRN: [id number]   Acct: [de-identified]  Admit Date:  8/30/2021  Primary Cardiologist: Jermaine Gamez    Chief Complaint: ARDS/COVID    Subjective (Events in last 24 hours):    Sats continue to drop  Have tried to increase preload  24 L +  BP mildly depressed  Bronch done today with findings of PNA>pulmonary edema  Needing more vent support as ECMO flows are poor - concern for thrombosis/kinking/cannula interaction in IVC  PaO2 has dropped to < 60  Flows 1.5-2L/min with patient CO 6 Lpm  Right thigh is swelling      Objective:   BP (!) 90/59   Pulse 76   Temp 96.6 °F (35.9 °C) (Core)   Resp 13   Ht 5' 10\" (1.778 m)   Wt (!) 306 lb 7 oz (139 kg)   SpO2 91%   BMI 43.97 kg/m²        TELEMETRY: NSR    Physical Exam:  General Appearance: intubated/sedated  Cardiovascular: bradycardic, regular rhythm, normal S1 and S2, no murmurs, rubs, clicks, or gallops, distal pulses intact, no carotid bruits, no JVD; lines intact, no bleeding from groins, ECMO cannulas intact  Pulmonary/Chest: Bilateral crackles  Abdomen: soft, non-tender, non-distended, no bowel sounds, no masses   Extremities: no cyanosis, clubbing, 2+ RLE> LLE, pulse 2+  Skin: warm and dry, no rash or erythema  Head: normocephalic and atraumatic  Eyes: pupils equal, round, and reactive to light  Neck: supple and non-tender without mass, no thyromegaly   Musculoskeletal: normal range of motion, no joint swelling, deformity or tenderness  Neurological: sedated/intubated    Medications:    ketamine        chlorhexidine  15 mL Mouth/Throat BID    [Held by provider] albumin human  25 g IntraVENous Q6H    tobramycin (PF)  300 mg Nebulization BID    midazolam  2 mg IntraVENous Once    naloxegol  12.5 mg Oral QAM    insulin lispro  0-6 Units SubCUTAneous Q4H    cefTRIAXone (ROCEPHIN) IV  2,000 mg IntraVENous Q12H    pantoprazole  40 mg IntraVENous Daily    And    sodium chloride (PF)  10 mL IntraVENous Daily    calcium replacement protocol   Other RX Placeholder    lactobacillus  1 capsule Oral Daily with breakfast    sodium chloride flush  5-40 mL IntraVENous 2 times per day      sodium chloride      sodium chloride      sodium chloride      sodium chloride      cisatracurium (NIMBEX) infusion      [Held by provider] lactated ringers 100 mL/hr at 09/05/21 2211    midazolam 13 mg/hr (09/06/21 0916)    dextrose      fentaNYL (SUBLIMAZE) 1250 mcg in sodium chloride 0.9 % 250 mL 200 mcg/hr (09/06/21 1238)    heparin (PORCINE) Infusion 18 Units/kg/hr (09/06/21 1216)     sodium chloride, , PRN  sodium chloride, , PRN  sodium chloride, , PRN  sodium chloride, , PRN  glucose, 15 g, PRN  dextrose, 12.5 g, PRN  glucagon (rDNA), 1 mg, PRN  dextrose, 100 mL/hr, PRN  morphine, 4 mg, Q2H PRN  LORazepam, 4 mg, Q4H PRN  docusate, 100 mg, BID PRN  potassium chloride, 20 mEq, PRN  magnesium sulfate, 2,000 mg, PRN  albuterol, 2.5 mg, Q4H PRN  heparin (porcine), 15,000 Units, PRN  artificial tears, , PRN  sodium chloride flush, 5-40 mL, PRN  ondansetron, 4 mg, Q8H PRN   Or  ondansetron, 4 mg, Q6H PRN  polyethylene glycol, 17 g, Daily PRN  acetaminophen, 650 mg, Q6H PRN   Or  acetaminophen, 650 mg, Q6H PRN    Lab Data:    Cardiac Enzymes:  No results for input(s): CKTOTAL, CKMB, CKMBINDEX, TROPONINI in the last 72 hours.     CBC:   Lab Results   Component Value Date    WBC 14.7 09/06/2021    RBC 3.27 09/06/2021    HGB 9.6 09/06/2021    HCT 28.6 09/06/2021     09/06/2021       CMP:    Lab Results   Component Value Date     09/06/2021    K 4.2 09/06/2021     09/06/2021    CO2 27 09/06/2021    BUN 20 09/06/2021    CREATININE 0.5 09/06/2021    LABGLOM >90 09/06/2021    GLUCOSE 124 09/06/2021    CALCIUM 8.3 09/06/2021       Hepatic Function Panel:    Lab Results   Component Value Date    ALKPHOS 24 09/06/2021    ALT 83 09/06/2021    AST 72 09/06/2021    PROT 4.7 09/06/2021    BILITOT 1.0 09/06/2021    BILIDIR 0.4 09/06/2021    LABALBU 3.7 09/06/2021       Magnesium:    Lab Results   Component Value Date    MG 2.1 09/06/2021       PT/INR:    Lab Results   Component Value Date    INR 1.29 08/30/2021       HgBA1c:    Lab Results   Component Value Date    LABA1C 5.6 09/02/2021       FLP:    Lab Results   Component Value Date    TRIG 85 09/01/2021       TSH:  No results found for: TSH      Assessment:  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  Preserved EF  No sig CAD  Normal RV function    Plan:  · Neuro - intact, responsive but on high dose narcotics  · CV - ECMO is failing, flows falling, possible drainage cannula, ECMO conference between all members suggest that we should proceed with switch out to [address] at this time, as he is requiring more and more vent support to maintain oxygenation  · Pulm - FiO2 has increased to 70% and PEEP up to 15, CXR worse, Bronch shows significant PNA, less pulmonary edema  · ID - Hflu PNA, on ABx, Decadron for COVID, all other COVID therapies have been ineffective thus far  · GI - hold TF, no bowel sounds, on Movantik with narcotics  · Heme - Requiring intermittent PRBCs  · Skin - not moving at this time, will watch for decubitus ulcers when feasible  · Renal - good UOP, no concerns for NIRAJ  · Endo - BS < 180, use Insulin as needed  · Dispo - ICU care, continue current care, weeks of support will likely be needed  · FULL CODE    Family updated  Greater than 120 minutes of time was spent managing critical issues, discussing with the family, and coordinating care.       Electronically signed by Joshua Murrell MD on 9/6/2021 at 1:19 PM

## 2021-09-06 NOTE — FLOWSHEET NOTE
Nadine Eduardo CNP at bedside for most recent ABGs. Results reported to Dr. Portia Chopra and Dr. Esthela Christie per Nadine Eduardo, [address] Attempted to increase RPMs and flow rate at this time. RPMs increased to 5500 with flow rates dropping to 1.0 intermittently.   RPMs decreased to

## 2021-09-06 NOTE — PROGRESS NOTES
ECMO DAILY ROUNDING NOTE:     Team Present at bedside. Family Present at bebside:  yes  Condition:  critical     RE for ECMO initiation: ARDS  Do2: 278  Vo2: 85  Sweep Gas:  2/2  Pump Flow: 2.1  SvO2: 61  SaO2:  88  ACT Goal:  190-210  ACT:  197  Plt:  113  Hgb:  10  Lactic Acid:  1.3  Telemetry:  NSR  Paralytic:  No  Sedation Goals:  RASS -2  PC 10:  PEEP 12:  RATE 10:  FiO2:  40  Generated :    Electronically signed by Antone Bun. Rockney Meigs - CNP on 9/6/2021 at 5:14 PM    Patient seen with Dr. Jasmyn Roper in Telemedicine   Electronically signed by Monico Serrano.  Jasmyn Roper MD.

## 2021-09-06 NOTE — PLAN OF CARE
Problem: Gas Exchange - Impaired  Goal: Absence of hypoxia  Outcome: Not Met This Shift     Problem: Gas Exchange - Impaired  Goal: Promote optimal lung function  Outcome: Not Met This Shift     Problem: Breathing Pattern - Ineffective  Goal: Ability to achieve and maintain a regular respiratory rate  Outcome: Not Met This Shift     Problem: MECHANICAL VENTILATION  Goal: Will be able to breathe spontaneously, without ventilator support  Description: Will be able to breathe spontaneously, without ventilator support  Outcome: Not Met This Shift     Problem: Gas Exchange - Impaired:  Goal: Ability to maintain adequate ventilation will improve  Description: Ability to maintain adequate ventilation will improve  Outcome: Not Met This Shift     Problem: Gas Exchange - Impaired:  Goal: Levels of oxygenation will improve  Description: Levels of oxygenation will improve  Outcome: Not Met This Shift

## 2021-09-06 NOTE — PROCEDURES
[address] [place] [address]                            CARDIAC CATHETERIZATION    PATIENT NAME: Shahrzad Andres                        :        1975  MED REC NO:   [id number]                           ROOM:       0002  ACCOUNT NO:   [de-identified]                           ADMIT DATE: 2021  PROVIDER:     Martha Gibson MD    DATE OF PROCEDURE:  2021    INDICATIONS:  Bifemoral venous ECMO failure with concern for cannula  thrombosis/kinking, low flows with inappropriately low oxygenation. MEDICAL DECISION MAKING:  The patient was requiring significant amounts  of oxygenation and ventilation for his lungs. This is negating the lung  rest that we are trying to provide and avoid severe trauma. Over the  last 24 to 48 hours, the patient's ECMO flows have dropped from 4 liters  to approximately 1.5 to 2 liters per minute. Saturations have also been  decreasing steadily into the 85% range. PaO2 has dropped from 100 to 55  on a systemic ABG. After multiple maneuvers of cannula manipulation,  volume loading, changing positions of the patient, no further options  were made other than exchanging out for jugular venous ProtekDuo VV-ECMO  system; although, this would reduce the amount of flow that we will be  able to provide the patient. The patient's cardiac output was  approximately 5 liters per minute, which necessitated anywhere between 3  and 3.5 liters of ECMO flow to provide adequate oxygenation. After  multiple discussions with the entire ECMO team including perfusion, the  plan was to proceed with ProtekDuo VV-ECMO cannulation via the right  internal jugular vein and then bifemoral venous decannulation with  Woggle slip knots for hemostasis. All the above was explained to the  patient's family including the risks, benefits and alternatives, and all  elected to proceed.     DESCRIPTION OF PROCEDURE:  The patient was brought to the hybrid  operating room and prepped in sterile fashion. Bilateral femoral veins  with the ECMO cannulas were prepped in sterile fashion. Right internal  jugular vein was prepped in sterile fashion as well. After infiltration  of the right neck with 2% lidocaine using micropuncture and modified  Seldinger technique under fluoroscopic guidance and ultrasound guidance,  I was able to insert a 12-Bangladeshi short sheath into the right internal  jugular vein. I was assisted by Neda Cameron, nurse practitioner; as  well as the entire cath lab team in addition to Respiratory Therapy, and  ICU nurses. Thereafter, I used a standard [place]-Rayo catheter and  floated this into the right distal pulmonary artery. Through this, I  inserted a single-curved Lunderquist wire that was placed using  fluoroscopic guidance into the distal right PA. Over this, I did serial  dilations with 12-Bangladeshi all the way up to 26-Bangladeshi. Thereafter, I  inserted a 31-Bangladeshi ProtekDuo cannula using fluoroscopic guidance. I  also had JASPREET guidance demonstrating that we had appropriate position of  the cannula. The cannula was drained and deaired and then clamped. Thereafter, once the cannula was in place, we also ensured that the ACT  was above 250 seconds. Extra bolus of 5000 units of heparin was also  given. Thereafter, I turned the ECMO with current VV-ECMO system to 1  liter/minute of flow, clamped the proximal and distal cannulas. I then  cut both cannulas. I then under wet-to-wet connection inserted the  proximal cannula directly to the ProtekDuo system and then hooked up the  distal cannula to the ProtekDuo system. Once we were sure of the  connections and the deairing, cannula was released first with the  proximal port and then with the distal port.   We had good flow and flows  were reestablished at 4 to 5 liters per minute at 7000 RPM.  We had some  mild shrugging that required repositioning of the cannula forward to  prevent any collapse in the SVC, which was obviated after repositioning  of the cannula. The cannula was then sutured in place completely. Once  we had stabilized the patient, vent settings were then readjusted as he  was on complete ventilators and oxygenated support with 100% FiO2 and 15  PEEP. This was rapidly brought down to avoid a barotrauma and oxygen  tension injury. I then turned my attention to the femoral veins. As  both cannulas were clamped, I then removed the return cannula and then  completed the Woggle suture with manual pressure additionally. I then  removed the left femoral venous cannula and completed the Woggle suture  on this side. Manual pressure was held for hemostasis. ECMO circuit was inspected. No bubbles were seen. No clot was seen in  the oxygenators and color change was appropriate between the tubings. Right internal jugular venous access was stable. A preemptive Woggle  suture was placed in case of need for extra hemostasis around the access  site. Thereafter, all the sites were stable and secured. All of them were  dressed appropriately. No further intervention was undertaken. The  patient was stable and moved back to the ICU for continued management. Vent settings were turned down to PEEP of 10 and FiO2 of 30%. IMMEDIATE COMPLICATIONS:  None. MEDICATIONS:  See EMR. ACCESS:  See above. ESTIMATED BLOOD LOSS:  100 mL. SUMMARY:  Successful 31-Luxembourgish ProtekDuo insertion via the right  internal jugular vein; successful decannulation of bifemoral venous  VV-ECMO; procedure completed under JASPREET and fluoroscopic guidance. PLAN:  1. Continue ECMO support. 2.  ICU protocol for VV-ECMO. 3.  Run ACTs 200 to 250 seconds after the groin hemostasis is  documented. 4.  Wrap the right lower extremity to help with venous return. 5.  SCDs. 6.  Repeat labs in 2 to 4 hours. 7.  Follow CO2 to adjust sweeps. 8.  Chest x-ray to be done once we shift to the ICU.     All the above was explained to the patient's family. They were  agreeable and amenable to the above plan.         Kip Brewster MD    D: 09/06/2021 16:19:02       T: 09/06/2021 17:35:58     [email or web address]  Job#: [id number]     Doc#: [id number]    CC:

## 2021-09-07 ENCOUNTER — APPOINTMENT (OUTPATIENT)
Dept: ULTRASOUND IMAGING | Age: 46
DRG: 003 | End: 2021-09-07
Payer: COMMERCIAL

## 2021-09-07 ENCOUNTER — APPOINTMENT (OUTPATIENT)
Dept: GENERAL RADIOLOGY | Age: 46
DRG: 003 | End: 2021-09-07
Payer: COMMERCIAL

## 2021-09-07 LAB
ALBUMIN SERPL-MCNC: 3.1 G/DL (ref 3.5–5.1)
ALLEN TEST: ABNORMAL
ALLEN TEST: NORMAL
ALP BLD-CCNC: 29 U/L (ref 38–126)
ALT SERPL-CCNC: 166 U/L (ref 11–66)
ANION GAP SERPL CALCULATED.3IONS-SCNC: 9 MEQ/L (ref 8–16)
AST SERPL-CCNC: 130 U/L (ref 5–40)
BASE EXCESS (CALCULATED): -0.1 MMOL/L (ref -2.5–2.5)
BASE EXCESS (CALCULATED): -0.3 MMOL/L (ref -2.5–2.5)
BASE EXCESS (CALCULATED): -0.7 MMOL/L (ref -2.5–2.5)
BASE EXCESS (CALCULATED): 0 MMOL/L (ref -2.5–2.5)
BASE EXCESS (CALCULATED): 0.2 MMOL/L (ref -2.5–2.5)
BASE EXCESS (CALCULATED): 1.2 MMOL/L (ref -2.5–2.5)
BASE EXCESS (CALCULATED): 1.3 MMOL/L (ref -2.5–2.5)
BASE EXCESS (CALCULATED): 1.6 MMOL/L (ref -2.5–2.5)
BASE EXCESS (CALCULATED): 2.3 MMOL/L (ref -2.5–2.5)
BASE EXCESS MIXED: -1.9 MMOL/L (ref -2–3)
BASOPHILS # BLD: 0.1 %
BASOPHILS ABSOLUTE: 0 THOU/MM3 (ref 0–0.1)
BILIRUB SERPL-MCNC: 0.9 MG/DL (ref 0.3–1.2)
BILIRUBIN DIRECT: 0.4 MG/DL (ref 0–0.3)
BUN BLDV-MCNC: 20 MG/DL (ref 7–22)
CALCIUM SERPL-MCNC: 7.8 MG/DL (ref 8.5–10.5)
CHLORIDE BLD-SCNC: 106 MEQ/L (ref 98–111)
CO2: 24 MEQ/L (ref 23–33)
COLLECTED BY:: ABNORMAL
COLLECTED BY:: NORMAL
COLLECTED BY:: NORMAL
COMMENT: ABNORMAL
COMMENT: ABNORMAL
CREAT SERPL-MCNC: 0.6 MG/DL (ref 0.4–1.2)
DEVICE: ABNORMAL
DEVICE: NORMAL
DEVICE: NORMAL
DIFFERENTIAL TYPE: ABNORMAL
EOSINOPHIL # BLD: 0.9 %
EOSINOPHILS ABSOLUTE: 0.2 THOU/MM3 (ref 0–0.4)
ERYTHROCYTE [DISTWIDTH] IN BLOOD BY AUTOMATED COUNT: 15.1 % (ref 11.5–14.5)
ERYTHROCYTE [DISTWIDTH] IN BLOOD BY AUTOMATED COUNT: 15.2 % (ref 11.5–14.5)
ERYTHROCYTE [DISTWIDTH] IN BLOOD BY AUTOMATED COUNT: 15.3 % (ref 11.5–14.5)
ERYTHROCYTE [DISTWIDTH] IN BLOOD BY AUTOMATED COUNT: 47.2 FL (ref 35–45)
ERYTHROCYTE [DISTWIDTH] IN BLOOD BY AUTOMATED COUNT: 47.6 FL (ref 35–45)
ERYTHROCYTE [DISTWIDTH] IN BLOOD BY AUTOMATED COUNT: 48.1 FL (ref 35–45)
FIO2, MIXED VENOUS: 30
GFR SERPL CREATININE-BSD FRML MDRD: > 90 ML/MIN/1.73M2
GLUCOSE BLD-MCNC: 119 MG/DL (ref 70–108)
GLUCOSE, WHOLE BLOOD: 106 MG/DL (ref 70–108)
GLUCOSE, WHOLE BLOOD: 115 MG/DL (ref 70–108)
GLUCOSE, WHOLE BLOOD: 133 MG/DL (ref 70–108)
GLUCOSE, WHOLE BLOOD: 140 MG/DL (ref 70–108)
GLUCOSE, WHOLE BLOOD: 141 MG/DL (ref 70–108)
GLUCOSE, WHOLE BLOOD: 153 MG/DL (ref 70–108)
HCO3, MIXED: 24 MMOL/L (ref 23–28)
HCO3: 23 MMOL/L (ref 23–28)
HCO3: 24 MMOL/L (ref 23–28)
HCO3: 24 MMOL/L (ref 23–28)
HCO3: 25 MMOL/L (ref 23–28)
HCO3: 26 MMOL/L (ref 23–28)
HCO3: 26 MMOL/L (ref 23–28)
HCO3: 27 MMOL/L (ref 23–28)
HCO3: 27 MMOL/L (ref 23–28)
HCT VFR BLD CALC: 27.5 % (ref 42–52)
HCT VFR BLD CALC: 27.6 % (ref 42–52)
HCT VFR BLD CALC: 30.1 % (ref 42–52)
HEMOGLOBIN: 10.2 GM/DL (ref 14–18)
HEMOGLOBIN: 9.2 GM/DL (ref 14–18)
HEMOGLOBIN: 9.3 GM/DL (ref 14–18)
IFIO2: 30
IMMATURE GRANS (ABS): 5.85 THOU/MM3 (ref 0–0.07)
IMMATURE GRANULOCYTES: 24.5 %
LACTIC ACID: 0.9 MMOL/L (ref 0.5–2)
LACTIC ACID: 1.7 MMOL/L (ref 0.5–2)
LYMPHOCYTES # BLD: 14.1 %
LYMPHOCYTES ABSOLUTE: 3.4 THOU/MM3 (ref 1–4.8)
MAGNESIUM: 2 MG/DL (ref 1.6–2.4)
MCH RBC QN AUTO: 29.2 PG (ref 26–33)
MCH RBC QN AUTO: 29.3 PG (ref 26–33)
MCH RBC QN AUTO: 29.5 PG (ref 26–33)
MCHC RBC AUTO-ENTMCNC: 33.3 GM/DL (ref 32.2–35.5)
MCHC RBC AUTO-ENTMCNC: 33.8 GM/DL (ref 32.2–35.5)
MCHC RBC AUTO-ENTMCNC: 33.9 GM/DL (ref 32.2–35.5)
MCV RBC AUTO: 86.2 FL (ref 80–94)
MCV RBC AUTO: 87 FL (ref 80–94)
MCV RBC AUTO: 87.9 FL (ref 80–94)
MODE: ABNORMAL
MODE: NORMAL
MONOCYTES # BLD: 6.9 %
MONOCYTES ABSOLUTE: 1.6 THOU/MM3 (ref 0.4–1.3)
NUCLEATED RED BLOOD CELLS: 1 /100 WBC
O2 SAT, MIXED: 62 %
O2 SATURATION: 100 %
O2 SATURATION: 98 %
O2 SATURATION: 99 %
O2 SATURATION: 99 %
PATHOLOGIST REVIEW: ABNORMAL
PCO2, MIXED VENOUS: 44 MMHG (ref 41–51)
PCO2: 35 MMHG (ref 35–45)
PCO2: 35 MMHG (ref 35–45)
PCO2: 37 MMHG (ref 35–45)
PCO2: 38 MMHG (ref 35–45)
PCO2: 41 MMHG (ref 35–45)
PCO2: 41 MMHG (ref 35–45)
PCO2: 42 MMHG (ref 35–45)
PCO2: 42 MMHG (ref 35–45)
PCO2: 43 MMHG (ref 35–45)
PCO2: 44 MMHG (ref 35–45)
PCO2: 44 MMHG (ref 35–45)
PH BLOOD GAS: 7.36 (ref 7.35–7.45)
PH BLOOD GAS: 7.39 (ref 7.35–7.45)
PH BLOOD GAS: 7.4 (ref 7.35–7.45)
PH BLOOD GAS: 7.41 (ref 7.35–7.45)
PH BLOOD GAS: 7.41 (ref 7.35–7.45)
PH BLOOD GAS: 7.42 (ref 7.35–7.45)
PH BLOOD GAS: 7.43 (ref 7.35–7.45)
PH BLOOD GAS: 7.43 (ref 7.35–7.45)
PH BLOOD GAS: 7.44 (ref 7.35–7.45)
PH, MIXED: 7.35 (ref 7.31–7.41)
PIP: 20 CMH2O
PLATELET # BLD: 115 THOU/MM3 (ref 130–400)
PLATELET # BLD: 124 THOU/MM3 (ref 130–400)
PLATELET # BLD: 131 THOU/MM3 (ref 130–400)
PLATELET ESTIMATE: ADEQUATE
PMV BLD AUTO: 10.6 FL (ref 9.4–12.4)
PMV BLD AUTO: 10.7 FL (ref 9.4–12.4)
PMV BLD AUTO: 10.9 FL (ref 9.4–12.4)
PO2 MIXED: 34 MMHG (ref 25–40)
PO2: 111 MMHG (ref 71–104)
PO2: 114 MMHG (ref 71–104)
PO2: 185 MMHG (ref 71–104)
PO2: 186 MMHG (ref 71–104)
PO2: 187 MMHG (ref 71–104)
PO2: 258 MMHG (ref 71–104)
PO2: 270 MMHG (ref 71–104)
PO2: 313 MMHG (ref 71–104)
PO2: 320 MMHG (ref 71–104)
PO2: 523 MMHG (ref 71–104)
PO2: 99 MMHG (ref 71–104)
POTASSIUM REFLEX MAGNESIUM: 3.6 MEQ/L (ref 3.5–5.2)
RBC # BLD: 3.14 MILL/MM3 (ref 4.7–6.1)
RBC # BLD: 3.19 MILL/MM3 (ref 4.7–6.1)
RBC # BLD: 3.46 MILL/MM3 (ref 4.7–6.1)
SCAN OF BLOOD SMEAR: NORMAL
SEG NEUTROPHILS: 53.5 %
SEGMENTED NEUTROPHILS ABSOLUTE COUNT: 12.8 THOU/MM3 (ref 1.8–7.7)
SET PEEP: 10 MMHG
SET PRESS SUPP: 10 CMH2O
SET RESPIRATORY RATE: 10 BPM
SET RESPIRATORY RATE: 10 BPM
SET RESPIRATORY RATE: 16 BPM
SODIUM BLD-SCNC: 139 MEQ/L (ref 135–145)
SOURCE, BLOOD GAS: ABNORMAL
SOURCE, BLOOD GAS: NORMAL
TOTAL PROTEIN: 4.3 G/DL (ref 6.1–8)
WBC # BLD: 23.9 THOU/MM3 (ref 4.8–10.8)
WBC # BLD: 25.2 THOU/MM3 (ref 4.8–10.8)
WBC # BLD: 26.2 THOU/MM3 (ref 4.8–10.8)

## 2021-09-07 PROCEDURE — 80053 COMPREHEN METABOLIC PANEL: CPT

## 2021-09-07 PROCEDURE — 33958 ECMO/ECLS REPOS PERPH CNULA: CPT | Performed by: INTERNAL MEDICINE

## 2021-09-07 PROCEDURE — 6370000000 HC RX 637 (ALT 250 FOR IP): Performed by: NURSE PRACTITIONER

## 2021-09-07 PROCEDURE — 0BJ08ZZ INSPECTION OF TRACHEOBRONCHIAL TREE, VIA NATURAL OR ARTIFICIAL OPENING ENDOSCOPIC: ICD-10-PCS | Performed by: INTERNAL MEDICINE

## 2021-09-07 PROCEDURE — 71045 X-RAY EXAM CHEST 1 VIEW: CPT

## 2021-09-07 PROCEDURE — 2580000003 HC RX 258: Performed by: INTERNAL MEDICINE

## 2021-09-07 PROCEDURE — 82803 BLOOD GASES ANY COMBINATION: CPT

## 2021-09-07 PROCEDURE — 83735 ASSAY OF MAGNESIUM: CPT

## 2021-09-07 PROCEDURE — 2580000003 HC RX 258: Performed by: NURSE PRACTITIONER

## 2021-09-07 PROCEDURE — 85347 COAGULATION TIME ACTIVATED: CPT

## 2021-09-07 PROCEDURE — 05WY03Z REVISION OF INFUSION DEVICE IN UPPER VEIN, OPEN APPROACH: ICD-10-PCS | Performed by: INTERNAL MEDICINE

## 2021-09-07 PROCEDURE — 2000000000 HC ICU R&B

## 2021-09-07 PROCEDURE — C9113 INJ PANTOPRAZOLE SODIUM, VIA: HCPCS | Performed by: NURSE PRACTITIONER

## 2021-09-07 PROCEDURE — 33948 ECMO/ECLS DAILY MGMT-VENOUS: CPT | Performed by: INTERNAL MEDICINE

## 2021-09-07 PROCEDURE — 83605 ASSAY OF LACTIC ACID: CPT

## 2021-09-07 PROCEDURE — 6360000002 HC RX W HCPCS: Performed by: NURSE PRACTITIONER

## 2021-09-07 PROCEDURE — 37799 UNLISTED PX VASCULAR SURGERY: CPT

## 2021-09-07 PROCEDURE — 94003 VENT MGMT INPAT SUBQ DAY: CPT

## 2021-09-07 PROCEDURE — 99291 CRITICAL CARE FIRST HOUR: CPT | Performed by: INTERNAL MEDICINE

## 2021-09-07 PROCEDURE — 85025 COMPLETE CBC W/AUTO DIFF WBC: CPT

## 2021-09-07 PROCEDURE — 36430 TRANSFUSION BLD/BLD COMPNT: CPT

## 2021-09-07 PROCEDURE — 2500000003 HC RX 250 WO HCPCS: Performed by: NURSE PRACTITIONER

## 2021-09-07 PROCEDURE — 6360000002 HC RX W HCPCS: Performed by: INTERNAL MEDICINE

## 2021-09-07 PROCEDURE — 36415 COLL VENOUS BLD VENIPUNCTURE: CPT

## 2021-09-07 PROCEDURE — 76882 US LMTD JT/FCL EVL NVASC XTR: CPT

## 2021-09-07 PROCEDURE — 2720000010 HC SURG SUPPLY STERILE

## 2021-09-07 PROCEDURE — 6370000000 HC RX 637 (ALT 250 FOR IP): Performed by: INTERNAL MEDICINE

## 2021-09-07 PROCEDURE — 82947 ASSAY GLUCOSE BLOOD QUANT: CPT

## 2021-09-07 PROCEDURE — 3609027000 HC BRONCHOSCOPY

## 2021-09-07 PROCEDURE — 2580000003 HC RX 258

## 2021-09-07 PROCEDURE — 31645 BRNCHSC W/THER ASPIR 1ST: CPT | Performed by: INTERNAL MEDICINE

## 2021-09-07 PROCEDURE — 94640 AIRWAY INHALATION TREATMENT: CPT

## 2021-09-07 PROCEDURE — 94761 N-INVAS EAR/PLS OXIMETRY MLT: CPT

## 2021-09-07 PROCEDURE — P9041 ALBUMIN (HUMAN),5%, 50ML: HCPCS | Performed by: INTERNAL MEDICINE

## 2021-09-07 PROCEDURE — 85027 COMPLETE CBC AUTOMATED: CPT

## 2021-09-07 RX ORDER — SODIUM CHLORIDE 9 MG/ML
INJECTION, SOLUTION INTRAVENOUS PRN
Status: DISCONTINUED | OUTPATIENT
Start: 2021-09-07 | End: 2021-09-08 | Stop reason: ALTCHOICE

## 2021-09-07 RX ORDER — FUROSEMIDE 10 MG/ML
80 INJECTION INTRAMUSCULAR; INTRAVENOUS ONCE
Status: COMPLETED | OUTPATIENT
Start: 2021-09-07 | End: 2021-09-07

## 2021-09-07 RX ORDER — LEVOFLOXACIN 5 MG/ML
750 INJECTION, SOLUTION INTRAVENOUS EVERY 24 HOURS
Status: DISCONTINUED | OUTPATIENT
Start: 2021-09-07 | End: 2021-09-14

## 2021-09-07 RX ADMIN — CEFTRIAXONE SODIUM 2000 MG: 2 INJECTION, POWDER, FOR SOLUTION INTRAMUSCULAR; INTRAVENOUS at 02:08

## 2021-09-07 RX ADMIN — ALBUMIN (HUMAN) 25 G: 12.5 INJECTION, SOLUTION INTRAVENOUS at 09:56

## 2021-09-07 RX ADMIN — Medication 15 ML: at 21:20

## 2021-09-07 RX ADMIN — FENTANYL CITRATE 200 MCG/HR: 50 INJECTION INTRAMUSCULAR; INTRAVENOUS at 15:39

## 2021-09-07 RX ADMIN — FENTANYL CITRATE 200 MCG/HR: 50 INJECTION INTRAMUSCULAR; INTRAVENOUS at 21:49

## 2021-09-07 RX ADMIN — SODIUM CHLORIDE, PRESERVATIVE FREE 10 ML: 5 INJECTION INTRAVENOUS at 12:02

## 2021-09-07 RX ADMIN — FLUCONAZOLE IN SODIUM CHLORIDE 200 MG: 2 INJECTION, SOLUTION INTRAVENOUS at 14:00

## 2021-09-07 RX ADMIN — MIDAZOLAM 11 MG/HR: 5 INJECTION, SOLUTION INTRAMUSCULAR; INTRAVENOUS at 21:50

## 2021-09-07 RX ADMIN — FLUCONAZOLE 400 MG: 2 INJECTION, SOLUTION INTRAVENOUS at 10:58

## 2021-09-07 RX ADMIN — Medication 300 MG: at 08:30

## 2021-09-07 RX ADMIN — Medication 15 ML: at 10:32

## 2021-09-07 RX ADMIN — SODIUM CHLORIDE, PRESERVATIVE FREE 10 ML: 5 INJECTION INTRAVENOUS at 09:30

## 2021-09-07 RX ADMIN — MIDAZOLAM 11 MG/HR: 5 INJECTION, SOLUTION INTRAMUSCULAR; INTRAVENOUS at 03:11

## 2021-09-07 RX ADMIN — SODIUM CHLORIDE, PRESERVATIVE FREE 10 ML: 5 INJECTION INTRAVENOUS at 12:01

## 2021-09-07 RX ADMIN — ALBUMIN (HUMAN) 25 G: 12.5 INJECTION, SOLUTION INTRAVENOUS at 23:48

## 2021-09-07 RX ADMIN — FENTANYL CITRATE 200 MCG/HR: 50 INJECTION INTRAMUSCULAR; INTRAVENOUS at 09:44

## 2021-09-07 RX ADMIN — SODIUM CHLORIDE: 9 INJECTION, SOLUTION INTRAVENOUS at 20:35

## 2021-09-07 RX ADMIN — FUROSEMIDE 80 MG: 10 INJECTION, SOLUTION INTRAMUSCULAR; INTRAVENOUS at 09:56

## 2021-09-07 RX ADMIN — NALOXEGOL OXALATE 12.5 MG: 12.5 TABLET, FILM COATED ORAL at 09:29

## 2021-09-07 RX ADMIN — INSULIN LISPRO 1 UNITS: 100 INJECTION, SOLUTION INTRAVENOUS; SUBCUTANEOUS at 17:50

## 2021-09-07 RX ADMIN — PANTOPRAZOLE SODIUM 40 MG: 40 INJECTION, POWDER, FOR SOLUTION INTRAVENOUS at 09:29

## 2021-09-07 RX ADMIN — POTASSIUM CHLORIDE 20 MEQ: 29.8 INJECTION, SOLUTION INTRAVENOUS at 07:04

## 2021-09-07 RX ADMIN — LEVOFLOXACIN 750 MG: 5 INJECTION, SOLUTION INTRAVENOUS at 12:00

## 2021-09-07 RX ADMIN — Medication 300 MG: at 20:45

## 2021-09-07 RX ADMIN — CISATRACURIUM BESYLATE 1.5 MCG/KG/MIN: 10 INJECTION INTRAVENOUS at 09:43

## 2021-09-07 RX ADMIN — ALBUMIN (HUMAN) 25 G: 12.5 INJECTION, SOLUTION INTRAVENOUS at 17:00

## 2021-09-07 RX ADMIN — Medication 15 ML: at 09:30

## 2021-09-07 RX ADMIN — SODIUM CHLORIDE, PRESERVATIVE FREE 10 ML: 5 INJECTION INTRAVENOUS at 21:21

## 2021-09-07 RX ADMIN — Medication 1 CAPSULE: at 09:29

## 2021-09-07 RX ADMIN — FENTANYL CITRATE 200 MCG/HR: 50 INJECTION INTRAMUSCULAR; INTRAVENOUS at 02:03

## 2021-09-07 RX ADMIN — POTASSIUM CHLORIDE 20 MEQ: 29.8 INJECTION, SOLUTION INTRAVENOUS at 05:59

## 2021-09-07 ASSESSMENT — PAIN SCALES - GENERAL
PAINLEVEL_OUTOF10: 0
PAINLEVEL_OUTOF10: 0

## 2021-09-07 ASSESSMENT — PULMONARY FUNCTION TESTS
PIF_VALUE: 21
PIF_VALUE: 22
PIF_VALUE: 21
PIF_VALUE: 23

## 2021-09-07 NOTE — PROCEDURES
Catheter repositioning. Medical necessity. Patient had significant choking with loss of flow and subsequent desaturation. Dressing to the right internal jugular catheter was taken down. Catheter was noted to be at 44 cm at the skin. At positioning, this was 46 cm. There was clear eggression of catheter. Sutures were removed. Patient was prepped using chlorhexidine prep. Patient was draped utilizing sterile gloves and sterile OR towels. After catheter was sterilely cleansed, it was advanced to 46 cm. Catheter was resutured. There was no chugging. Catheter secured at 45 cm at the skin. No choking. Iodinated gauze was placed at the insertion point of the catheter. Area was draped utilizing Tegaderm dressing. There were no complications. Electronically signed by Jude Roper MD.

## 2021-09-07 NOTE — PROGRESS NOTES
1930 Report received from Erlin Purdy RN at bedside. Pt lines, gtts and rates assessed at this time. Al verified to be correct. Pt cannulation site assessed with slight oozing at this time. Bilateral groin sites both stable with slight oozing present on dressing. Pt lines and oxygenators assessed, see ECMO documentation note. 1935 BP 98/46(59) Levophed to 5mcg/min  1940 BP 97/46(59) Levophed to 6mcg/min  1945 /49(61) Levophed to 7mcg/min. 1948 Orders received for 2u PRBCs. 2011 Aliyah, RT at bedside. . No change to Heparin gtt as goal 240-250. , no coverage required at this time. No orders for VBG draw this shift. 2020 /51(63) Levophed to 8mcg. 2038 /50(63) Levophed to 9mcg. 2041 1u PRBC infusing. Viinikantikaylan Zepeda, RT at bedside. . No change at this time to gtt. 2219 1u PRBC infusing. 2324 Alaris pump alarming. Channel error noted on Levophed channel. 2325 New channel obtained, Levophed gtt switched to new channel and restarted at this time. 2326 BP 97/50(62) Levophed to 11mcg. Sarmad Gonzalez, RT at bedside. , Heparin gtt to be increased to 22u/kg/hr. , no coverage required at this time. 0100 Copious amount of blood oral secretions suctioned from airway, and increased bleeding from cannula site noted. New bleeding from R a/c noted and from [facility] site. 1234 [facility], RT at bedside. . No change at this time to Heparin gtt. Hgb back at 10.2 and Plt 94. [place] CNP notified. Orders receive to notify Dr. Tony Ross. 7094 Dr. Tony Ross updated on pt condition, increased bleeding, decreased plt levels. See new orders. 0235 Heparin gtt stopped x5 hours at this time per Dr. Tony Ross. Heparin gtt to be resumed at 0735 with ACT goal of 170-190. Will clarify start rate with Dr. Tony Ross in AM.    3324 1u Platelets infusing. 8817 1u PRBCs infusing.

## 2021-09-07 NOTE — FLOWSHEET NOTE
1400 Mr Spencer Robins was diuresed today but given Albumin to compensate for his BP as he remains on pressors. Despite this, his BP has continued to drop today and he requires higher doses of Levophed. His Hgb had dropped to 9.2 but there was no new signs of bleeding. He continues to have some oozing from the catheter insert site right IJ. An ultrasound done right groin showed edema but no hematoma. He was given 1 unit PRBC's for this. The Nimbex is off and he has a cough reflex. He was even able to open his eyes to command.

## 2021-09-07 NOTE — PROCEDURES
BRONCHOSCOPY    Indication: Pneumonia and hemoptysis  Risks and benefits to the procedure were discussed. Alternatives and their risks were discussed as well. Sedation: Versed      EBL:  None. Findings:   Chronic airway inflammation with pitting airways disease and striation formation.  Active pneumonia with bilateral lung mucus production.  No active bleeding. Hemoptysis secondary to suction trauma. Samples:   Patient received extensive lavage to the lower lobes bilaterally with subsequent clearing. Right lower lobe received 300 cc. Left lower lobe received 300 cc. Therapeutic aspiration. Complications:  None    Procedure:  After informed consent was obtained, patient received sedation as detailed above. Utilizing the endotracheal tube, the bronchoscope was advanced to the level of the trachea and subsequently the ravindra. The ravindra was noted to be crisp. Scope was taken to the left and right lung fields. Samples taken as noted above. Bronchoscope was withdrawn. Electronically signed by Nereyda Hernadez M.D.

## 2021-09-07 NOTE — PROGRESS NOTES
Order was in the chart for bronchoscopy procedure. Verified that consent was signed. Time-out was done. ICU disposable  mobile scope  was used. Assisted Dr. Phillip Cortez with bronchoscopy procedure. Bronchial washings were not obtained. Patient tolerated the procedure well. The bronchoscope was disposed of in a red bag and placed in dirty utility room.

## 2021-09-07 NOTE — PROGRESS NOTES
Comprehensive Nutrition Assessment    Type and Reason for Visit:  Reassess (TF check)    Nutrition Recommendations/Plan:   If unable to extubate pt today, discussed with Jona Byrd RN restart Vital 1.2 TF at 10 ml/hr as tolerated . If tolerated at 10 ml/hr, plan  increase 10 ml every 8 hours as pt tolerates to goal of 40 ml/hr. When TF at goal, start flushing 1 ( 2.5 oz) liquid protein TID. Free H20 flush per Dr 100 ml every 8 hours. Discussed with Andrea Nelson no bm recorded since 9/1 ( 6 days). Note movantik ordered. ? KUB check. Nutrition Assessment:    Pt. nutritionally declining  AEB NPO status, tube feeding off this morning, TF on & off since 9/2  d/t  elevated residual & note last bm 6 days ago,   At risk for further nutrition compromise r/t Dx + COVID 8/30, intubated 9/1, VV ECMO started 9/1, paralyzed this admit, + Hflu, ARDS and underlying medical condition (hx of GB OR, obesity ).  Nutrition recommendations/interventions as per above. Malnutrition Assessment:  Malnutrition Status: At risk for malnutrition (Comment)    Context:  Acute Illness     Findings of the 6 clinical characteristics of malnutrition:  Energy Intake:  Less than 50% for 5 days or  more  Weight Loss:  Unable to assess (UBW not available)     Body Fat Loss:  No significant body fat loss     Muscle Mass Loss:  No significant muscle mass loss    Fluid Accumulation:  No significant fluid accumulation     Strength:  Not Performed    Estimated Daily Nutrient Needs:  Energy (kcal):  ~1435 kcals ( 19) with ECMO. Pt is + COVID; Weight Used for Energy Requirements:  Ideal     Protein (g):  ~151 grams (2); Weight Used for Protein Requirements:  Ideal        Fluid (ml/day):  per ; Timothy Vance Used for Fluid Requirements:         Nutrition Related Findings:    Patient diagnosed with COVID 8/30, intubated 9/1, VV ECMO started 9/1, tube feeding initiated at 10ml/ hour on 9/2 but placed on hold on & off since then.  Note TF 9/3-9/4 held, 9/4-9/5 24% of prescribed TF infused, 9/5-9/6 61% of prescribed TF infused, 9/6-9/7 no TF infused, TF off this morning, for bronch & per Lola Thorpe hoping to extubate. OG to suction with 200 ml recorded last 24 hours. If unable to extubate pt today, discussed with Rick Rojas restart Vital 1.2 TF at 10 ml/hr as tolerated. Note last bm recorded 9/1. Discussed with idalia OLMOS. MAP 79. Glucose 119, , , WBC 23.9  additional medication includes fentanyl, versed, culturelle, humalog, protonix, levophed, movantik, lasix. Nimbex on this morning, now off. Wounds:  None (reported)       Current Nutrition Therapies:  TF held at this time.   Current Tube Feeding (TF) Orders:  · Feeding Route: Orogastric (placed 9/1)  · Formula: Peptide Based (Vital 1.2)  · Schedule: Continuous (goal 40 ml/hr)  · Additives/Modulars: Protein (1 ( 2.5 oz) liquid protein tid)  · Water Flushes: per Dr 100 ml every 8 hours  · Current TF & Flush Orders Provides: TF off this morning , bronch & TF hold for elevated residuals  · Goal TF & Flush Orders Provides: 1464 kcals (1152 TF, 312 modular), 150 grams protein (72 TF, 78 modular), 106 grams CHO, 1079ml free water (779 TF, 300 flushes) in 1485ml (960 TF, 225 modular, 300 flushes)/ 24 hours      Anthropometric Measures:  · Height: 5' 10\" (177.8 cm)  · Current Body Weight: 306 lb 7 oz (139 kg) (9/6 +1 edema , rt leg swollen)   · Admission Body Weight: 263 lb 7.2 oz (119.5 kg) (8/31 no edema)    · Usual Body Weight:  (not available)     · Ideal Body Weight: 166 lbs; % Ideal Body Weight     · BMI: 44  · BMI Categories: Obese Class 3 (BMI 40.0 or greater)       Nutrition Diagnosis:   · Inadequate oral intake related to impaired respiratory function (and ECMO) as evidenced by intubation, nutrition support - enteral nutrition      Nutrition Interventions:   Food and/or Nutrient Delivery:  Continue NPO (restart TF as tolerated if unable to extubate)  Nutrition Education/Counseling:  Education not appropriate   Coordination of Nutrition Care:  Continue to monitor while inpatient, Interdisciplinary Rounds    Goals:  EN appropriate for ECMO pt. Nutrition Monitoring and Evaluation:   Behavioral-Environmental Outcomes:  None Identified   Food/Nutrient Intake Outcomes:  Enteral Nutrition Intake/Tolerance  Physical Signs/Symptoms Outcomes:  Biochemical Data, Chewing or Swallowing, GI Status, Fluid Status or Edema, Hemodynamic Status, Nutrition Focused Physical Findings, Skin, Weight     Discharge Planning:     Too soon to determine     Electronically signed by Wiliam Diaz RD, LD on 9/7/21 at 2:46 PM EDT    Contact: [phone]

## 2021-09-07 NOTE — PROGRESS NOTES
ECMO Hourly Circuit Check       2000 2100 2200 18 0000 0100 56 0300 0400 0500 0600 0700    Device Secured y y y y y y y y y y y y    Connected to O2          n         n         n         n         n         n         n         n         n         n         n         n    Right connection of tubing to the oxygenator and gas hoses            y           y           y           y           y           y           y           y           y           y           y           y    Absence of kinks and tension         y         y         y         y         y         y         y         y         y         y         y         y    Lines free of clots/fibrin with flashlight assessment          y         y         y         y         y         y         y         y         y         y         y         y    Assess difference in blood color between cannulas           y           y           y           y           y           y           y           y           y           y           y           y    No migration of cannula y y y y y y y y y y y y    Bleeding at insertion site Moderate ooze Moderate ooze Moderate ooze Moderate ooze Dressing saturated Dressing saturated Dressing saturated Dressing saturated Dressing saturated Dressing saturated Dressing saturated Dressing saturated    Knee immobilizer in place n/a n/a n/a n/a n/a n/a n/a n/a n/a n/a n/a n/a    No traction on tubing y y y y y y y y y y y y    Flow Rate 4.0 4.1 4.1 4.0 4.1 4.1 4.0 4.0 4.0 4.0 4.0 4.1    Emergency Supplies at bedside y y y y y y y y y y y y                                                                    Sweep Gas Oxy 1- 2L  Oxy 2- 2L Oxy 1- 2L  Oxy 2- 2L Oxy 1- 2L  Oxy 2- 2L Oxy 1- 2L  Oxy 2- 2L Oxy 1- 2L  Oxy 2- 2L Oxy 1- 2L  Oxy 2- 2L Oxy 1- 2L  Oxy 2- 2L Oxy 1- 2L  Oxy 2- 2L Oxy 1- 2L  Oxy 2- 2L Oxy 1- 2L  Oxy 2- 2L Oxy 1- 2L  Oxy 2- 2L Oxy 1- 2L  Oxy 2- 2L    DO2 606    600    584       VO2 194    193    188         0621 - RPM increased to 6200 to achieve 4.0 LPM goal due to flow decrease to 3.7 LPM.

## 2021-09-07 NOTE — FLOWSHEET NOTE
Braydne Ra ECMO Hourly Circuit Check       0800 0900 1000 1100 1200 1300 1400 1500 1600 1700 1800 200    Device Secured y y y y y y y y y y y y    Connected to O2  na na na na na na na na na na na na    Right connection of tubing to the oxygenator and gas hoses y y y y y y y y y y y y    Absence of kinks and tension y y y y y y y y y y y y    Lines free of clots/fibrin with flashlight assessment y y y y y y y y y y y y    Assess difference in blood color between cannulas y y y y y y y y y y y y    No migration of cannula n y n n n n n n n n n n    Bleeding at insertion site sl sl n n n n n n n n n y    Knee immobilizer in place na na na na na na na na na na na na    No traction on tubing n n n n n n n n n n n n    Flow Rate 4.0 3.2 3.2 3.2 3.2 3.2 3.1 3.2 3.2 3.1 3.1 3.1    Emergency Supplies at bedside y y y y y y y y y y y y                                                                                                                              Sweep Gas Oxy 1=2  Oxy 2=2 Oxy 1=2  Oxy 2=2 Oxy 1=2  Oxy 2=2 Oxy 1=2  Oxy 2=2 Oxy 1=2  Oxy 2=2 Oxy 1=2  Oxy 2=2 Oxy 1=2  Oxy 2=2 Oxy 1=2  Oxy 2=2 Oxy 1=2  Oxy 2=2 Oxy 1=2  Oxy 2=2 Oxy 1=2  Oxy 2=2 Oxy 1=2  Oxy 2=2    DO2  569    569    430       VO2 226    226    164

## 2021-09-07 NOTE — PROGRESS NOTES
Patient:  Ainsley Collado    Unit/Bed:4D-02/002-A  MRN: [id number]   PCP: Perla Reynoso MD  Date of Admission: 8/30/2021    Assessment and Plan(All pulmonary edema, renal failure, PE, and respiratory failure diagnoses are acute in nature unless otherwise specified):        Acute hypoxemic respiratory failure: Secondary to COVID-19 with subsequent progression to ARDS.  Patient intubated 9/1/2021.   ECMO initiated 9/1/2021. Continue with lung rest strategy. Likely can extubate later this week. Patient required ECMO recannulation on 9/6/2021 secondary to kinked catheter with groin line. Now with internal jugular Protek duo which was placed 9/6/2021. COVID-19: Patient failed trial of remdesivir, tocilizumab, and Decadron.  Patient with progressive hypoxemia. Pneumonia: Coinfection with Haemophilus influenza. Patient initially treated with Rocephin. Patient transition to higher dosing of Rocephin. Antibiotics initiated 8/30/2021. Organism is beta-lactamase positive. Rocephin ineffective. Patient transition to [address] [place] [address] Rd on 9/7/2021. Nebulized tobramycin was added on 9/5/2021. Sepsis: Secondary to combination of Covid and Haemophilus influenza. Present at presentation. Hypotension: Secondary to heavy sedation and positive pressure ventilation. Requiring low-dose pressors. ARDS:  Patient intubated 9/1/2021. ECMO initiated 9/1/2021. Patient under lung rest strategy with pressure control 10, PEEP 12, FiO2 40%. Rate 10. Continue with lung rest strategy and ECMO. Patient may extubate from ventilator later this week. Obesity: Aware. Mild neutropenia: IgG and IgM level are normal.  IgA level is slightly diminished. Resolved. .    CC: Acute respiratory failure  HPI: Patient is a 49-year-old white male lifetime non-smoker. 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 [facility] is obese but has no other medical diagnoses including coronary artery disease diabetes or sleep apnea.   Patient presented to the emergency room and was admitted on 8/30/2021. Hector Lim started to develop fever associated with chills cough shortness of breath nasal congestion loss of taste and loss of smell starting 8/28/2021.  As he continued to progress his dyspnea continued to worsen.  He developed malaise.  He indicated that his wife was recently diagnosed with Covid.  Patient underwent Covid screen and was positive.  CT scan chest showed bilateral infiltrates.  He initially was started on low-flow oxygen but continued to require progressive oxygen supplementation.  He was treated with remdesivir Decadron and subsequently tocilizumab.  He continued to develop progressive dyspnea and had the syndrome of \"happy hypoxia\".  As patient continued to worsen, he elected to proceed with intubation and ECMO. Patient intubated 9/1/2021. [address] cannulation 9/1/2021. As the catheter started to warm, it became more compliant and developed kinking causing impaired outflow. As the flow continued to deteriorate, it was clear patient would need replacement of bilateral groin catheters. Patient underwent recannulation of the right internal jugular with Protek duo and removal of the bilateral groin lines. Flow immediately returned without complications. Patient did develop coinfection. Patient had COVID-19 pneumonia worsened by beta lactamase positive Haemophilus influenza. Rocephin was initiated 8/30/2021. Patient was initially treated with Rocephin without improvement. Patient was transitioned to [address] [place] [address] on 9/7/2021. Nebulized tobramycin was added 9/5/2021. ROS: Sedated on mechanical ventilator. PMH:  Per HPI  SHX: Lifetime non-smoker.   [address] [facility] Covid exposure from his wife.:   Allergies: NKDA  Medications:     sodium chloride      sodium chloride      sodium chloride      sodium chloride      cisatracurium (NIMBEX) infusion 1 mcg/kg/min (09/06/21 2015)    norepinephrine 3 mcg/min (09/07/21 0202)    [Held by provider] lactated ringers 100 mL/hr at 09/05/21 0643    midazolam 11 mg/hr (09/07/21 0311)    dextrose      fentaNYL (SUBLIMAZE) 1250 mcg in sodium chloride 0.9 % 250 mL 200 mcg/hr (09/07/21 0203)    heparin (PORCINE) Infusion 21 Units/kg/hr (09/07/21 0605)      furosemide  80 mg IntraVENous Once    fluconazole  600 mg IntraVENous Q24H    chlorhexidine  15 mL Mouth/Throat BID    albumin human  25 g IntraVENous Q6H    tobramycin (PF)  300 mg Nebulization BID    midazolam  2 mg IntraVENous Once    naloxegol  12.5 mg Oral QAM    insulin lispro  0-6 Units SubCUTAneous Q4H    cefTRIAXone (ROCEPHIN) IV  2,000 mg IntraVENous Q12H    pantoprazole  40 mg IntraVENous Daily    And    sodium chloride (PF)  10 mL IntraVENous Daily    calcium replacement protocol   Other RX Placeholder    lactobacillus  1 capsule Oral Daily with breakfast    sodium chloride flush  5-40 mL IntraVENous 2 times per day       Vital Signs:   T: 95.7: P: 82 RR: 16 B/P: 89/72: FiO2: 30: O2 Sat:100: I/O: 2724/1715 GCS: 3  Body mass index is 43.97 kg/m². Brayden Ra PC: 10/12: TV: 200: RRTotal: 16: Ti:1 sec:  General:   Acutely ill-appearing obese white male. HEENT:  normocephalic and atraumatic. Mild scleral icterus. PERR  Neck: supple. No Thyromegaly. Right Protek duo internal jugular. Slight edema. Lungs: Bilateral rhonchi. .  No retractions  Cardiac: RRR. No JVD. Abdomen: soft. Nontender. Obese. Extremities:  No clubbing, cyanosis x 4. Right lower extremity edema. Vasculature: capillary refill < 3 seconds. Palpable dorsalis pedis pulses. Skin:  warm and dry. Psych: Sedated on mechanical ventilator. Lymph:  No supraclavicular adenopathy. Neurologic: On paralytic. Data: (All radiographs, tracings, PFTs, and imaging are personally viewed and interpreted unless otherwise noted).  COVID 8/30/21: +   Pulmonary Lavage 9/1/21: PCUR positive for H. Influenza. Beta- lactamase positive.  Pulmonary lavage 9/6/2021: Positive for Haemophilus influenza and Candida albicans.    Chest x-ray shows bilateral infiltrates.  Telemetry shows sinus rhythm.  Sodium 139, potassium 3.6, chloride 106, bicarb 24, BUN 20, creatinine 0.6, glucose 119. Albumin 3.1. Alkaline phosphatase 29. . . White blood cell count 23.9, hemoglobin 10.2, platelets 345. CC time 35 minutes. Time was discontiguous and does not include procedures. Electronically signed by Jennifer Hoffman M.D.

## 2021-09-07 NOTE — PROGRESS NOTES
Oxygen Delivery and Consumption    Patient's Hgb 10.3 g/dl   PaO2 319.6 mmHg   PvO2 38 mmHg   SaO2 99%   SvO2 71%   Flow (C.O.) 4.1 LPM   DO2 600 mL/min   VO2 193 mL/min     Ratio - 3.1:1

## 2021-09-07 NOTE — FLOWSHEET NOTE
Oxygen Delivery and Consumption    Patient's Hgb 9.2 g/dl   PaO2 111 mmHg   PvO2 34 mmHg   SaO2 99%   SvO2 62%   Flow (C.O.) 3.1 LPM   DO2 389 mL/min   VO2 149 mL/min   Oxygen Delivery and Consumption 2.6 :1

## 2021-09-07 NOTE — PROGRESS NOTES
Oxygen Delivery and Consumption    Patient's Hgb 10.7 g/dl   PaO2 315 mmHg   PvO2 38 mmHg   SaO2 99%   SvO2 71%   Flow (C.O.) 4.0 LPM   DO2 606 mL/min   VO2 194 mL/min     Ratio - 3.1:1

## 2021-09-07 NOTE — FLOWSHEET NOTE
Oxygen Delivery and Consumption    Patient's Hgb 10.2 g/dl   PaO2 114 mmHg   PvO2 34 mmHg   SaO2 99%   SvO2 62%   Flow (C.O.) 4.0 LPM   DO2 569 mL/min   VO2 226 mL/min   Oxygen Delivery and Consumption ratio 2.5 : 1

## 2021-09-07 NOTE — FLOWSHEET NOTE
Oxygen Delivery and Consumption    Patient's Hgb 10.7 g/dl   PaO2 256 mmHg   PvO2 38 mmHg   SaO2 100%   SvO2 71%   Flow (C.O.) 4.1 LPM   DO2 604 mL/min   VO2 192 mL/min     Ratio 3:1

## 2021-09-07 NOTE — PROGRESS NOTES
ECMO DAILY ROUNDING NOTE:    Team Present at bedside. Family Present at bebside:  No  Condition: Critical    RE for ECMO initiation:  COVID-19 positive test (U07.1, COVID-19) with Acute Pneumonia (J12.89, Other viral pneumonia)  (If respiratory failure or sepsis present, add as separate assessment)      Do2:  569  Vo2:  226  Sweep Gas:  2/2  Pump Flow:  4L  SvO2:  62  SaO2:  99  ACT Goal:  190-210  ACT:  235  Plt:  131  Hgb:  10.2  Lactic Acid:  0.9  CXR: Bilateral infiltrates  Telemetry: Sinus rhythm  Paralytic: Yes  Sedation Goals: Bis less than 60  Pupils: Constricted  PC 10:  PEEP 12:  RATE 10:  FiO2: 30 generated :    Electronically signed by Tammie Moon MD.

## 2021-09-07 NOTE — PROGRESS NOTES
Oxygen Delivery and Consumption    Patient's Hgb 10.3 g/dl   PaO2 313 mmHg   PvO2 38 mmHg   SaO2 99%   SvO2 71%   Flow (C.O.) 4.0 LPM   DO2 584 mL/min   VO2 188 mL/min     Ratio - 3.1:1

## 2021-09-07 NOTE — FLOWSHEET NOTE
ECMO Hourly Circuit Check       2000 2100 2200 2300 0000 0100 0200 0300 0400 0500 0600 0700    Device Secured yes yes yes yes yes yes yes yes yes yes yes yes    Connected to O2  no no no no no no no no no no no no    Right connection of tubing to the oxygenator and gas hoses yes yes yes yes yes yes yes yes yes yes yes yes    Absence of kinks and tension yes yes yes yes yes yes yes yes yes yes yes yes    Lines free of clots/fibrin with flashlight assessment yes yes yes yes yes yes yes yes yes yes yes yes    Assess difference in blood color between cannulas yes yes yes yes yes yes yes yes yes yes yes yes    No migration of cannula none none none none none none none none none none none none    Bleeding at insertion site yes yes yes yes yes yes yes yes yes yes yes yes    Knee immobilizer in place na na na na na na na na na na na na    No traction on tubing none none none none none none none none none none none none    Flow Rate 3.2 3.2 3.2 3.2 3.1 3.1 3.1 3.5 4.2 4.0 3.8 3.6    Emergency Supplies at bedside yes yes yes yes yes yes yes yes yes yes yes yes                                                                    Sweep Gas Oxy 1=2  Oxy 2=2 Oxy 1=2  Oxy 2=2 Oxy 1=2  Oxy 2=2 Oxy 1=2  Oxy 2=2 Oxy 1=2  Oxy 2=2 Oxy 1=2  Oxy 2=2 Oxy 1=2  Oxy 2=2 Oxy 1=2  Oxy 2=2 Oxy 1=2  Oxy 2=2 Oxy 1=2  Oxy 2=2 Oxy 1=2  Oxy 2=2 Oxy 1=2  Oxy 2=2    DO2            521    VO2            231

## 2021-09-08 ENCOUNTER — APPOINTMENT (OUTPATIENT)
Dept: ULTRASOUND IMAGING | Age: 46
DRG: 003 | End: 2021-09-08
Payer: COMMERCIAL

## 2021-09-08 ENCOUNTER — APPOINTMENT (OUTPATIENT)
Dept: GENERAL RADIOLOGY | Age: 46
DRG: 003 | End: 2021-09-08
Payer: COMMERCIAL

## 2021-09-08 LAB
ABO: NORMAL
ACINETOBACTER CALCOACETICUS-BAUMANNII BY PCR: NOT DETECTED
ADENOVIRUS BY PCR: NOT DETECTED
ALBUMIN SERPL-MCNC: 4 G/DL (ref 3.5–5.1)
ALBUMIN SERPL-MCNC: 4 G/DL (ref 3.5–5.1)
ALLEN TEST: ABNORMAL
ALLEN TEST: NORMAL
ALP BLD-CCNC: 32 U/L (ref 38–126)
ALP BLD-CCNC: 32 U/L (ref 38–126)
ALT SERPL-CCNC: 148 U/L (ref 11–66)
ALT SERPL-CCNC: 149 U/L (ref 11–66)
ANION GAP SERPL CALCULATED.3IONS-SCNC: 12 MEQ/L (ref 8–16)
ANTIBODY SCREEN: NORMAL
AST SERPL-CCNC: 100 U/L (ref 5–40)
AST SERPL-CCNC: 101 U/L (ref 5–40)
BASE EXCESS (CALCULATED): -0.3 MMOL/L (ref -2.5–2.5)
BASE EXCESS (CALCULATED): -0.8 MMOL/L (ref -2.5–2.5)
BASE EXCESS (CALCULATED): -1 MMOL/L (ref -2.5–2.5)
BASE EXCESS (CALCULATED): -1.2 MMOL/L (ref -2.5–2.5)
BASE EXCESS (CALCULATED): -1.2 MMOL/L (ref -2.5–2.5)
BASE EXCESS (CALCULATED): 0.3 MMOL/L (ref -2.5–2.5)
BASE EXCESS (CALCULATED): 0.5 MMOL/L (ref -2.5–2.5)
BASE EXCESS MIXED: -0.8 MMOL/L (ref -2–3)
BASOPHILS # BLD: 0.9 %
BASOPHILS ABSOLUTE: 0.2 THOU/MM3 (ref 0–0.1)
BILIRUB SERPL-MCNC: 1.7 MG/DL (ref 0.3–1.2)
BILIRUB SERPL-MCNC: 1.7 MG/DL (ref 0.3–1.2)
BILIRUBIN DIRECT: 0.7 MG/DL (ref 0–0.3)
BUN BLDV-MCNC: 18 MG/DL (ref 7–22)
CALCIUM SERPL-MCNC: 8.1 MG/DL (ref 8.5–10.5)
CHLAMYDIA PNEUMONIAE BY PCR: NOT DETECTED
CHLORIDE BLD-SCNC: 105 MEQ/L (ref 98–111)
CO2: 23 MEQ/L (ref 23–33)
COLLECTED BY:: ABNORMAL
COLLECTED BY:: NORMAL
CREAT SERPL-MCNC: 0.7 MG/DL (ref 0.4–1.2)
DEVICE: ABNORMAL
DEVICE: NORMAL
ENTEROBACTER CLOACAE COMPLEX BY PCR: NOT DETECTED
EOSINOPHIL # BLD: 0.7 %
EOSINOPHILS ABSOLUTE: 0.1 THOU/MM3 (ref 0–0.4)
ERYTHROCYTE [DISTWIDTH] IN BLOOD BY AUTOMATED COUNT: 14.8 % (ref 11.5–14.5)
ERYTHROCYTE [DISTWIDTH] IN BLOOD BY AUTOMATED COUNT: 14.8 % (ref 11.5–14.5)
ERYTHROCYTE [DISTWIDTH] IN BLOOD BY AUTOMATED COUNT: 14.9 % (ref 11.5–14.5)
ERYTHROCYTE [DISTWIDTH] IN BLOOD BY AUTOMATED COUNT: 15.3 % (ref 11.5–14.5)
ERYTHROCYTE [DISTWIDTH] IN BLOOD BY AUTOMATED COUNT: 45.7 FL (ref 35–45)
ERYTHROCYTE [DISTWIDTH] IN BLOOD BY AUTOMATED COUNT: 45.8 FL (ref 35–45)
ERYTHROCYTE [DISTWIDTH] IN BLOOD BY AUTOMATED COUNT: 46.3 FL (ref 35–45)
ERYTHROCYTE [DISTWIDTH] IN BLOOD BY AUTOMATED COUNT: 47.2 FL (ref 35–45)
ESCHERICHIA COLI BY PCR: NOT DETECTED
FIO2, MIXED VENOUS: 30
GFR SERPL CREATININE-BSD FRML MDRD: > 90 ML/MIN/1.73M2
GLUCOSE BLD-MCNC: 129 MG/DL (ref 70–108)
GLUCOSE, WHOLE BLOOD: 116 MG/DL (ref 70–108)
GLUCOSE, WHOLE BLOOD: 120 MG/DL (ref 70–108)
GLUCOSE, WHOLE BLOOD: 125 MG/DL (ref 70–108)
GLUCOSE, WHOLE BLOOD: 130 MG/DL (ref 70–108)
GLUCOSE, WHOLE BLOOD: 130 MG/DL (ref 70–108)
GLUCOSE, WHOLE BLOOD: 132 MG/DL (ref 70–108)
HAEMOPHILUS INFLUENZAE BY PCR: NOT DETECTED
HCO3, MIXED: 24 MMOL/L (ref 23–28)
HCO3: 22 MMOL/L (ref 23–28)
HCO3: 23 MMOL/L (ref 23–28)
HCO3: 24 MMOL/L (ref 23–28)
HCO3: 25 MMOL/L (ref 23–28)
HCT VFR BLD CALC: 21.8 % (ref 42–52)
HCT VFR BLD CALC: 23.3 % (ref 42–52)
HCT VFR BLD CALC: 27.7 % (ref 42–52)
HCT VFR BLD CALC: 29.4 % (ref 42–52)
HCT VFR BLD CALC: 29.5 % (ref 42–52)
HEMOGLOBIN: 10.1 GM/DL (ref 14–18)
HEMOGLOBIN: 10.2 GM/DL (ref 14–18)
HEMOGLOBIN: 7.4 GM/DL (ref 14–18)
HEMOGLOBIN: 8 GM/DL (ref 14–18)
HEMOGLOBIN: 9.7 GM/DL (ref 14–18)
IFIO2: 30
IMMATURE GRANS (ABS): 4.38 THOU/MM3 (ref 0–0.07)
IMMATURE GRANULOCYTES: 25.2 %
INFLUENZA A BY PCR: NOT DETECTED
INFLUENZA B BY PCR: NOT DETECTED
KLEBSIELLA AEROGENES BY PCR: NOT DETECTED
KLEBSIELLA OXYTOCA BY PCR: NOT DETECTED
KLEBSIELLA PNEUMONIAE GROUP BY PCR: NOT DETECTED
LACTIC ACID: 1.4 MMOL/L (ref 0.5–2)
LACTIC ACID: 1.7 MMOL/L (ref 0.5–2)
LEGIONELLA PNEUMOPHILIA BY PCR: NOT DETECTED
LYMPHOCYTES # BLD: 12.5 %
LYMPHOCYTES ABSOLUTE: 2.2 THOU/MM3 (ref 1–4.8)
MAGNESIUM: 1.8 MG/DL (ref 1.6–2.4)
MCH RBC QN AUTO: 29.6 PG (ref 26–33)
MCH RBC QN AUTO: 29.6 PG (ref 26–33)
MCH RBC QN AUTO: 29.8 PG (ref 26–33)
MCH RBC QN AUTO: 29.8 PG (ref 26–33)
MCHC RBC AUTO-ENTMCNC: 33.9 GM/DL (ref 32.2–35.5)
MCHC RBC AUTO-ENTMCNC: 34.3 GM/DL (ref 32.2–35.5)
MCHC RBC AUTO-ENTMCNC: 34.6 GM/DL (ref 32.2–35.5)
MCHC RBC AUTO-ENTMCNC: 35 GM/DL (ref 32.2–35.5)
MCV RBC AUTO: 85.2 FL (ref 80–94)
MCV RBC AUTO: 86.3 FL (ref 80–94)
MCV RBC AUTO: 86.3 FL (ref 80–94)
MCV RBC AUTO: 87.2 FL (ref 80–94)
METAPNEUMOVIRUS BY PCR: NOT DETECTED
MODE: ABNORMAL
MODE: NORMAL
MONOCYTES # BLD: 7 %
MONOCYTES ABSOLUTE: 1.2 THOU/MM3 (ref 0.4–1.3)
MORAXELLA CATARRHALIS BY PCR: NOT DETECTED
MYCOPLASMA PNEUMONIAE BY PCR: NOT DETECTED
NON-SARS CORONAVIRUS: NOT DETECTED
NUCLEATED RED BLOOD CELLS: 5 /100 WBC
O2 SAT, MIXED: 55 %
O2 SATURATION: 96 %
O2 SATURATION: 97 %
O2 SATURATION: 97 %
O2 SATURATION: 98 %
O2 SATURATION: 98 %
O2 SATURATION: 99 %
O2 SATURATION: 99 %
PARAINFLUENZA VIRUS BY PCR: NOT DETECTED
PCO2, MIXED VENOUS: 41 MMHG (ref 41–51)
PCO2: 23 MMHG (ref 35–45)
PCO2: 32 MMHG (ref 35–45)
PCO2: 33 MMHG (ref 35–45)
PCO2: 36 MMHG (ref 35–45)
PCO2: 37 MMHG (ref 35–45)
PH BLOOD GAS: 7.41 (ref 7.35–7.45)
PH BLOOD GAS: 7.42 (ref 7.35–7.45)
PH BLOOD GAS: 7.42 (ref 7.35–7.45)
PH BLOOD GAS: 7.43 (ref 7.35–7.45)
PH BLOOD GAS: 7.44 (ref 7.35–7.45)
PH BLOOD GAS: 7.46 (ref 7.35–7.45)
PH BLOOD GAS: 7.59 (ref 7.35–7.45)
PH, MIXED: 7.38 (ref 7.31–7.41)
PIP: 20 CMH2O
PIP: 30 CMH2O
PLATELET # BLD: 60 THOU/MM3 (ref 130–400)
PLATELET # BLD: 77 THOU/MM3 (ref 130–400)
PLATELET # BLD: 94 THOU/MM3 (ref 130–400)
PLATELET # BLD: 99 THOU/MM3 (ref 130–400)
PMV BLD AUTO: 10.3 FL (ref 9.4–12.4)
PMV BLD AUTO: 10.4 FL (ref 9.4–12.4)
PMV BLD AUTO: 10.5 FL (ref 9.4–12.4)
PMV BLD AUTO: 10.5 FL (ref 9.4–12.4)
PO2 MIXED: 30 MMHG (ref 25–40)
PO2: 101 MMHG (ref 71–104)
PO2: 106 MMHG (ref 71–104)
PO2: 109 MMHG (ref 71–104)
PO2: 116 MMHG (ref 71–104)
PO2: 78 MMHG (ref 71–104)
PO2: 85 MMHG (ref 71–104)
PO2: 87 MMHG (ref 71–104)
POC ACTIVATED CLOTTING TIME KAOLIN: 131 SECONDS (ref 1–150)
POC ACTIVATED CLOTTING TIME KAOLIN: 131 SECONDS (ref 1–150)
POC ACTIVATED CLOTTING TIME KAOLIN: 153 SECONDS (ref 1–150)
POC ACTIVATED CLOTTING TIME KAOLIN: 169 SECONDS (ref 1–150)
POC ACTIVATED CLOTTING TIME KAOLIN: 169 SECONDS (ref 1–150)
POC ACTIVATED CLOTTING TIME KAOLIN: 197 SECONDS (ref 1–150)
POC ACTIVATED CLOTTING TIME KAOLIN: 202 SECONDS (ref 1–150)
POC ACTIVATED CLOTTING TIME KAOLIN: 224 SECONDS (ref 1–150)
POC ACTIVATED CLOTTING TIME KAOLIN: 230 SECONDS (ref 1–150)
POC ACTIVATED CLOTTING TIME KAOLIN: 230 SECONDS (ref 1–150)
POC ACTIVATED CLOTTING TIME KAOLIN: 235 SECONDS (ref 1–150)
POC ACTIVATED CLOTTING TIME KAOLIN: 241 SECONDS (ref 1–150)
POC ACTIVATED CLOTTING TIME KAOLIN: 246 SECONDS (ref 1–150)
POC ACTIVATED CLOTTING TIME KAOLIN: 252 SECONDS (ref 1–150)
POTASSIUM REFLEX MAGNESIUM: 4.1 MEQ/L (ref 3.5–5.2)
PROTEUS SPECIES BY PCR: NOT DETECTED
PSEUDOMONAS AERUGINOSA BY PCR: NOT DETECTED
RBC # BLD: 2.5 MILL/MM3 (ref 4.7–6.1)
RBC # BLD: 2.7 MILL/MM3 (ref 4.7–6.1)
RBC # BLD: 3.25 MILL/MM3 (ref 4.7–6.1)
RBC # BLD: 3.42 MILL/MM3 (ref 4.7–6.1)
RESISTANT GENE CTX-M BY PCR: NORMAL
RESISTANT GENE IMP BY PCR: NORMAL
RESISTANT GENE KPC BY PCR: NORMAL
RESISTANT GENE MECA/C & MREJ BY PCR: NORMAL
RESISTANT GENE NDM BY PCR: NORMAL
RESISTANT GENE OXA-48-LIKE BY PCR: NORMAL
RESISTANT GENE VIM BY PCR: NORMAL
RESPIRATORY SYNCYTIAL VIRUS BY PCR: NOT DETECTED
RH FACTOR: NORMAL
RHINOVIRUS ENTEROVIRUS PCR: NOT DETECTED
SEG NEUTROPHILS: 53.7 %
SEGMENTED NEUTROPHILS ABSOLUTE COUNT: 9.3 THOU/MM3 (ref 1.8–7.7)
SERRATIA MARCESCENS BY PCR: NOT DETECTED
SET PEEP: 10 MMHG
SET PEEP: 15 MMHG
SET PRESS SUPP: 10 CMH2O
SET PRESS SUPP: 15 CMH2O
SET RESPIRATORY RATE: 10 BPM
SET RESPIRATORY RATE: 10 BPM
SET RESPIRATORY RATE: 16 BPM
SODIUM BLD-SCNC: 140 MEQ/L (ref 135–145)
SOURCE, BLOOD GAS: ABNORMAL
SOURCE, BLOOD GAS: NORMAL
SOURCE: NORMAL
SPECIMEN ACCEPTABILITY: NORMAL
STAPH AUREUS BY PCR: NOT DETECTED
STREP AGALACTIAE BY PCR: NOT DETECTED
STREP PNEUMONIAE BY PCR: NOT DETECTED
STREP PYOGENES BY PCR: NOT DETECTED
TOTAL PROTEIN: 4.7 G/DL (ref 6.1–8)
TOTAL PROTEIN: 4.8 G/DL (ref 6.1–8)
WBC # BLD: 14.8 THOU/MM3 (ref 4.8–10.8)
WBC # BLD: 17.4 THOU/MM3 (ref 4.8–10.8)
WBC # BLD: 17.8 THOU/MM3 (ref 4.8–10.8)
WBC # BLD: 25.7 THOU/MM3 (ref 4.8–10.8)

## 2021-09-08 PROCEDURE — 2000000000 HC ICU R&B

## 2021-09-08 PROCEDURE — 85027 COMPLETE CBC AUTOMATED: CPT

## 2021-09-08 PROCEDURE — 36415 COLL VENOUS BLD VENIPUNCTURE: CPT

## 2021-09-08 PROCEDURE — 86923 COMPATIBILITY TEST ELECTRIC: CPT

## 2021-09-08 PROCEDURE — 33958 ECMO/ECLS REPOS PERPH CNULA: CPT | Performed by: INTERNAL MEDICINE

## 2021-09-08 PROCEDURE — 99291 CRITICAL CARE FIRST HOUR: CPT | Performed by: INTERNAL MEDICINE

## 2021-09-08 PROCEDURE — 83605 ASSAY OF LACTIC ACID: CPT

## 2021-09-08 PROCEDURE — 31624 DX BRONCHOSCOPE/LAVAGE: CPT | Performed by: INTERNAL MEDICINE

## 2021-09-08 PROCEDURE — P9037 PLATE PHERES LEUKOREDU IRRAD: HCPCS

## 2021-09-08 PROCEDURE — 86900 BLOOD TYPING SEROLOGIC ABO: CPT

## 2021-09-08 PROCEDURE — 82803 BLOOD GASES ANY COMBINATION: CPT

## 2021-09-08 PROCEDURE — 6360000002 HC RX W HCPCS: Performed by: INTERNAL MEDICINE

## 2021-09-08 PROCEDURE — P9016 RBC LEUKOCYTES REDUCED: HCPCS

## 2021-09-08 PROCEDURE — 87070 CULTURE OTHR SPECIMN AEROBIC: CPT

## 2021-09-08 PROCEDURE — 85018 HEMOGLOBIN: CPT

## 2021-09-08 PROCEDURE — 94640 AIRWAY INHALATION TREATMENT: CPT

## 2021-09-08 PROCEDURE — 37799 UNLISTED PX VASCULAR SURGERY: CPT

## 2021-09-08 PROCEDURE — 87798 DETECT AGENT NOS DNA AMP: CPT

## 2021-09-08 PROCEDURE — 2580000003 HC RX 258: Performed by: NURSE PRACTITIONER

## 2021-09-08 PROCEDURE — 94003 VENT MGMT INPAT SUBQ DAY: CPT

## 2021-09-08 PROCEDURE — 82947 ASSAY GLUCOSE BLOOD QUANT: CPT

## 2021-09-08 PROCEDURE — 85347 COAGULATION TIME ACTIVATED: CPT

## 2021-09-08 PROCEDURE — 6360000002 HC RX W HCPCS: Performed by: NURSE PRACTITIONER

## 2021-09-08 PROCEDURE — 87541 LEGION PNEUMO DNA AMP PROB: CPT

## 2021-09-08 PROCEDURE — 3609027000 HC BRONCHOSCOPY

## 2021-09-08 PROCEDURE — 86901 BLOOD TYPING SEROLOGIC RH(D): CPT

## 2021-09-08 PROCEDURE — 87486 CHLMYD PNEUM DNA AMP PROBE: CPT

## 2021-09-08 PROCEDURE — 87205 SMEAR GRAM STAIN: CPT

## 2021-09-08 PROCEDURE — 71045 X-RAY EXAM CHEST 1 VIEW: CPT

## 2021-09-08 PROCEDURE — C9113 INJ PANTOPRAZOLE SODIUM, VIA: HCPCS | Performed by: NURSE PRACTITIONER

## 2021-09-08 PROCEDURE — 0BJ08ZZ INSPECTION OF TRACHEOBRONCHIAL TREE, VIA NATURAL OR ARTIFICIAL OPENING ENDOSCOPIC: ICD-10-PCS | Performed by: INTERNAL MEDICINE

## 2021-09-08 PROCEDURE — 87631 RESP VIRUS 3-5 TARGETS: CPT

## 2021-09-08 PROCEDURE — P9035 PLATELET PHERES LEUKOREDUCED: HCPCS

## 2021-09-08 PROCEDURE — 86850 RBC ANTIBODY SCREEN: CPT

## 2021-09-08 PROCEDURE — 2580000003 HC RX 258

## 2021-09-08 PROCEDURE — P9041 ALBUMIN (HUMAN),5%, 50ML: HCPCS | Performed by: INTERNAL MEDICINE

## 2021-09-08 PROCEDURE — 85014 HEMATOCRIT: CPT

## 2021-09-08 PROCEDURE — 2720000010 HC SURG SUPPLY STERILE

## 2021-09-08 PROCEDURE — 89220 SPUTUM SPECIMEN COLLECTION: CPT

## 2021-09-08 PROCEDURE — 6370000000 HC RX 637 (ALT 250 FOR IP): Performed by: INTERNAL MEDICINE

## 2021-09-08 PROCEDURE — 33948 ECMO/ECLS DAILY MGMT-VENOUS: CPT | Performed by: INTERNAL MEDICINE

## 2021-09-08 PROCEDURE — 83735 ASSAY OF MAGNESIUM: CPT

## 2021-09-08 PROCEDURE — 80053 COMPREHEN METABOLIC PANEL: CPT

## 2021-09-08 PROCEDURE — 85025 COMPLETE CBC W/AUTO DIFF WBC: CPT

## 2021-09-08 PROCEDURE — 76705 ECHO EXAM OF ABDOMEN: CPT

## 2021-09-08 PROCEDURE — 6370000000 HC RX 637 (ALT 250 FOR IP): Performed by: NURSE PRACTITIONER

## 2021-09-08 PROCEDURE — 87581 M.PNEUMON DNA AMP PROBE: CPT

## 2021-09-08 RX ORDER — HEPARIN SODIUM 10000 [USP'U]/100ML
5-30 INJECTION, SOLUTION INTRAVENOUS CONTINUOUS
Status: DISPENSED | OUTPATIENT
Start: 2021-09-08 | End: 2021-09-10

## 2021-09-08 RX ORDER — SODIUM CHLORIDE 9 MG/ML
INJECTION, SOLUTION INTRAVENOUS PRN
Status: DISCONTINUED | OUTPATIENT
Start: 2021-09-08 | End: 2021-09-08 | Stop reason: ALTCHOICE

## 2021-09-08 RX ADMIN — Medication 15 ML: at 08:42

## 2021-09-08 RX ADMIN — LEVOFLOXACIN 750 MG: 5 INJECTION, SOLUTION INTRAVENOUS at 12:49

## 2021-09-08 RX ADMIN — SODIUM CHLORIDE, PRESERVATIVE FREE 10 ML: 5 INJECTION INTRAVENOUS at 20:24

## 2021-09-08 RX ADMIN — Medication 300 MG: at 20:11

## 2021-09-08 RX ADMIN — MIDAZOLAM 11 MG/HR: 5 INJECTION, SOLUTION INTRAMUSCULAR; INTRAVENOUS at 17:48

## 2021-09-08 RX ADMIN — MIDAZOLAM 11 MG/HR: 5 INJECTION, SOLUTION INTRAMUSCULAR; INTRAVENOUS at 08:22

## 2021-09-08 RX ADMIN — NALOXEGOL OXALATE 12.5 MG: 12.5 TABLET, FILM COATED ORAL at 11:48

## 2021-09-08 RX ADMIN — SODIUM CHLORIDE, PRESERVATIVE FREE 10 ML: 5 INJECTION INTRAVENOUS at 09:46

## 2021-09-08 RX ADMIN — Medication 1 CAPSULE: at 11:48

## 2021-09-08 RX ADMIN — SODIUM CHLORIDE, PRESERVATIVE FREE 10 ML: 5 INJECTION INTRAVENOUS at 12:51

## 2021-09-08 RX ADMIN — FENTANYL CITRATE 200 MCG/HR: 50 INJECTION INTRAMUSCULAR; INTRAVENOUS at 12:10

## 2021-09-08 RX ADMIN — Medication 300 MG: at 08:15

## 2021-09-08 RX ADMIN — FLUCONAZOLE 400 MG: 2 INJECTION, SOLUTION INTRAVENOUS at 12:49

## 2021-09-08 RX ADMIN — Medication 15 ML: at 20:15

## 2021-09-08 RX ADMIN — MIDAZOLAM 11 MG/HR: 5 INJECTION, SOLUTION INTRAMUSCULAR; INTRAVENOUS at 10:11

## 2021-09-08 RX ADMIN — FENTANYL CITRATE 200 MCG/HR: 50 INJECTION INTRAMUSCULAR; INTRAVENOUS at 05:14

## 2021-09-08 RX ADMIN — PANTOPRAZOLE SODIUM 40 MG: 40 INJECTION, POWDER, FOR SOLUTION INTRAVENOUS at 09:46

## 2021-09-08 RX ADMIN — FLUCONAZOLE IN SODIUM CHLORIDE 200 MG: 2 INJECTION, SOLUTION INTRAVENOUS at 11:46

## 2021-09-08 RX ADMIN — ALBUMIN (HUMAN) 25 G: 12.5 INJECTION, SOLUTION INTRAVENOUS at 04:25

## 2021-09-08 RX ADMIN — FENTANYL CITRATE 200 MCG/HR: 50 INJECTION INTRAMUSCULAR; INTRAVENOUS at 17:48

## 2021-09-08 ASSESSMENT — PULMONARY FUNCTION TESTS
PIF_VALUE: 31
PIF_VALUE: 26
PIF_VALUE: 21
PIF_VALUE: 31
PIF_VALUE: 21
PIF_VALUE: 31

## 2021-09-08 NOTE — FLOWSHEET NOTE
09/08/21 0340   ECMO Pump   Patient on ECMO On   Pump Flow (L/min) 4.2 LPM   Pump Speed (Rotations/min) 6200 RPM   Pump Mode V/V   ECMO Sweep Gas   Sweep Flow (L/min) 4 LPM   RPMs increased to 6200LPM & Flow 4. 2LPM. Pt tolerating increase at this time. Goal rate of flow 4.0-4. 5LPM per verbal order from Dr. Phi Gallegos

## 2021-09-08 NOTE — FLOWSHEET NOTE
09/08/21 0535   ECMO Pump   Patient on ECMO On   Pump Flow (L/min) 4.2 LPM   Pump Speed (Rotations/min) 7000 RPM   Pump Mode V/V   ECMO Sweep Gas   Sweep Flow (L/min) 4 LPM

## 2021-09-08 NOTE — FLOWSHEET NOTE
09/08/21 0637   ECMO Pump   Patient on ECMO On   Pump Flow (L/min) 3.2 LPM   Pump Speed (Rotations/min) 6000 RPM   Pump Mode V/V   ECMO Sweep Gas   Sweep Flow (L/min) 4 LPM   RPMs reduced to 6000 per Dr. Shabbir Eng.

## 2021-09-08 NOTE — FLOWSHEET NOTE
Oxygen Delivery and Consumption    Patient's Hgb 9.7 g/dl   PaO2 109 mmHg   PvO2 29.5 mmHg   SaO2 99%   SvO2 55%   Flow (C.O.) 3.1 LPM   DO2 409 mL/min   VO2 185 mL/min   Oxygen Delivery and Consumption 2.2 :1  Sweep gases to 2 total (Oxy 1=1, Oxy 2=1)

## 2021-09-08 NOTE — FLOWSHEET NOTE
09/08/21 0502   ECMO Pump   Patient on ECMO On   Pump Flow (L/min) 4 LPM   Pump Speed (Rotations/min) 6600 RPM   Pump Mode V/V   ECMO Sweep Gas   Sweep Flow (L/min) 4 LPM

## 2021-09-08 NOTE — PROCEDURES
BRONCHOSCOPY    Indication: Hemoptysis. Pneumonia. Risks and benefits to the procedure were discussed. Alternatives and their risks were discussed as well. Sedation: Ketamine      EBL:  None. Findings:   Prominent pitting airways disease.  Decreased mucus within the airway.  Old clotted blood within the airway. No active bleeding. Clots were removed with suction.  Right lower lobe lavage: 120 cc collected, 30 cc returned. Samples:   Right lower lobe lavage. Complications:  None    Procedure:  After informed consent was obtained, patient received sedation as detailed above. Utilizing the endotracheal tube, the bronchoscope was advanced to the level of the trachea and subsequently the ravindra. The ravindra was noted to be crisp. Scope was taken to the left and right lung fields. Samples taken as noted above. Bronchoscope was withdrawn. Electronically signed by Gabriel Donahue M.D. IMPRESSION:  1  Diffuse diverticular changes throughout the colon with mild inflammatory changes surrounding the distal descending colon/proximal sigmoid colon, suspicious for early, acute noncomplicated diverticulitis  2   Nonobstructing bilateral renal calculi  3   Mild increased density of the renal pyramids bilaterally, suggesting medullary nephrocalcinosis  4   Abnormal appearance of the prostate gland, suggesting prior prostatitis  Consider follow-up outpatient neurology consultation  5   Mild hepatosplenomegaly

## 2021-09-08 NOTE — PROCEDURES
ECMO DAILY ROUNDING NOTE:    Team Present at bedside. Family Present at bebside:  Yes  Condition:  Critical    RE for ECMO initiation: ARDS  Do2:  316  Vo2:  139  Sweep Gas:  2/2  Pump Flow:  4.2L  SvO2:  55  SaO2:  97  ACT:  131  Plt:  99  Hgb:  7.4  Lactic Acid:  1.4L  CXR:  Bilateral infiltrates  Telemetry:  NSR  Paralytic:  NO  Sedation Goals:  RASS -2  Pupils:  PER  PC 15:  PEEP 15:  RATE 10:  FiO2:  30  Generated :    Electronically signed by Lamine Gallo MD.

## 2021-09-08 NOTE — FLOWSHEET NOTE
09/08/21 0549   ECMO Pump   Patient on ECMO On   Pump Flow (L/min) 3.5 LPM   Pump Speed (Rotations/min) 6500 RPM   Pump Mode V/V   ECMO Sweep Gas   Sweep Flow (L/min) 4 LPM   Decrease in RPMs not increasing Flow rate at this time.  Will start to increase RPMs again with goal flow rate of 4.0-4.5

## 2021-09-08 NOTE — PROCEDURES
ECMO catheter adjustment:  Indication: Chugging with impeded flow. Utilizing sterile technique including sterile gown, mask, and gloves, the area was prepped utilizing chlorhexidine prep. The previous sutures were taken down. Catheter was free-flowing. It was noted to be at 45 cm. This is where it was before. Active chugging noted. Catheter was cleansed again utilizing chlorhexidine prep. Catheter was advanced to 47 cm. Chugging ceased. Catheter was cleansed again with chlorhexidine prep. Catheter was sutured in position utilizing 2-0 silk. After securing of catheter was verified, the area was covered with Tegaderm dressing. Prior to Tegaderm dressing placement, iodinated gauze was placed at the insertion of catheter to the skin. Electronically signed by Madison Lancaster  [place] MD.

## 2021-09-08 NOTE — PROGRESS NOTES
Order was in the chart for bronchoscopy procedure. Verified that consent was signed. Time-out was done. ICU disposable  mobile scope  was used. Assisted Dr. Sherin Rosado with bronchoscopy procedure. Bronchial washings were obtained. Patient tolerated the procedure well. The bronchoscope was disposed of in a red bag and placed in dirty utility room.

## 2021-09-08 NOTE — FLOWSHEET NOTE
09/08/21 0235   ECMO Pump   Patient on ECMO On   Pump Flow (L/min) 3.5 LPM   Pump Speed (Rotations/min) 5400 RPM   Pump Mode V/V   ECMO Sweep Gas   Sweep Flow (L/min) 4 LPM     RPMs increased to 5400LPM. Flow 3. 5LPM at this time pt tolerating increase. Goal rate of flow 4.0-4. 5LPM per verbal order from Dr. Darlin Alcantara

## 2021-09-08 NOTE — PLAN OF CARE
Problem: MECHANICAL VENTILATION  Goal: Will be able to breathe spontaneously, without ventilator support  Description: Will be able to breathe spontaneously, without ventilator support  Outcome: Not Met This Shift   Patient remains on the ventilator, settings adjusted to maintain target tidal volumes, respiratory rate and SpO2. Will wean when appropriate.

## 2021-09-08 NOTE — FLOWSHEET NOTE
ECMO Hourly Circuit Check       0800 0900 1000 1100 1200 1300 1400 1500 1600 1700 1800 200    Device Secured y y y y y y y y y y y y    Connected to O2  na na na na na na na na na na na na    Right connection of tubing to the oxygenator and gas hoses y y y y y y y y y y y y    Absence of kinks and tension y y y y y y y y y y y y    Lines free of clots/fibrin with flashlight assessment y y y y y y y y y y y y    Assess difference in blood color between cannulas y y y y y y y y y y y y    No migration of cannula n n n n n n n n n n n n    Bleeding at insertion site y y y y sl n n n n n n n    Knee immobilizer in place na na na na na na na na na na na na    No traction on tubing n n n n n n n n n n n n    Flow Rate 3.8 3.7 3.8 3.7 3.1 3.1 3.0 3.1 3.1 3.2 3.1 3.2    Emergency Supplies at bedside y y y y y y y y y y y y                                                                                                                              Sweep Gas Oxy 1=2  Oxy 2=2 Oxy 1=2  Oxy 2=2 Oxy 1=2  Oxy 2=2 Oxy 1=2  Oxy 2=2 Oxy 1=2  Oxy 2=2 Oxy 1=2  Oxy 2=2 Oxy 1=2  Oxy 2=2 Oxy 1=2  Oxy 2=2 Oxy 1=2  Oxy 2=2 Oxy 1=1  Oxy 2=1 Oxy 1=1  Oxy 2=1 Oxy 1=1  Oxy 2=1    DO2  508    316    409       VO2 218    139    185

## 2021-09-08 NOTE — FLOWSHEET NOTE
09/08/21 0317   ECMO Pump   Patient on ECMO On   Pump Flow (L/min) 3.8 LPM   Pump Speed (Rotations/min) 5800 RPM   Pump Mode V/V   ECMO Sweep Gas   Sweep Flow (L/min) 4 LPM   RPMs increased to 5800LPM. Flow 3. 8LPM at this time. Pt tolerating increase. Goal rate of flow 4.0-4. 5LPM per verbal order from Dr. Esthela Christie

## 2021-09-08 NOTE — FLOWSHEET NOTE
09/08/21 0558   ECMO Pump   Patient on ECMO On   Pump Flow (L/min) 3.6 LPM   Pump Speed (Rotations/min) 6800 RPM   Pump Mode V/V   ECMO Sweep Gas   Sweep Flow (L/min) 4 LPM   Increased at this time to try to reach 4. 0LPM flow rate.

## 2021-09-08 NOTE — PROGRESS NOTES
Patient:  Verónica Malone    Unit/Bed:4D-02/002-A  MRN: [id number]   PCP: Juan Armenta MD  Date of Admission: 8/30/2021    Assessment and Plan(All pulmonary edema, renal failure, PE, and respiratory failure diagnoses are acute in nature unless otherwise specified):          Acute hypoxemic respiratory failure: Secondary to COVID-19 with subsequent progression to ARDS.  Patient intubated 9/1/2021.   ECMO initiated 9/1/2021. Patient required ECMO recannulation on 9/6/2021 secondary to kinked catheter with groin line. Now with internal jugular Protek duo which was placed 9/6/2021.   S/P lung rest strategy. Patient undergoing lung protection strategy targeting a plateau pressure of 30 or less. Pressure control 15 PEEP 15 in order to recruit alveoli. COVID-19: Patient failed trial of remdesivir, tocilizumab, and Decadron.  Patient with progressive hypoxemia. Pneumonia: Coinfection with Haemophilus influenza.  Patient initially treated with Rocephin. Patient transition to higher dosing of Rocephin. Antibiotics initiated 8/30/2021. Organism is beta-lactamase positive. Rocephin ineffective. Patient transition to [address] [place] [address] Rd on 9/7/2021. Nebulized tobramycin was added on 9/5/2021. Repeat bronchoscopy completed 9/8/2021 showed significant reduction in mucus production. Sepsis: Secondary to combination of Covid and Haemophilus influenza.  Present at presentation. Hypotension: Initially due to to heavy sedation and positive pressure ventilation. Requiring low-dose pressors. Subsequently worsened secondary to anemia of acute blood loss. Anemia secondary to acute blood loss: This is primarily secondary to high ACT for clot prevention with low flow ECMO state. [place] spontaneous bleeding. Heparin discontinued 9/8/2021. Patient requiring multiple transfusions. Obtain retroperitoneal ultrasound to exclude retroperitoneal hemorrhage.   ARDS:  Patient intubated 9/1/2021.  ECMO initiated 9/1/2021.  Patient under lung rest strategy with pressure control 10, PEEP 12, FiO2 40%.  Rate 10.  Continue with lung rest strategy and ECMO. Patient may extubate from ventilator later this week. Hemoptysis: Secondary to spontaneous alveolar hemorrhage related to anticoagulation and high ACT requirements. Heparin discontinued 9/8/2021. Bronchoscopy completed 9/8/2021 showed old blood within the airway but no active bleeding. Heparin on hold. Patient undergoing higher flow on pump to decrease risk of circuit clotting. Thrombocytopenia: Secondary to acute blood loss from hemorrhage. Infusing platelets. Trending. Hepatostasis: Initially secondary to groin catheter placement. Improving. Obesity: Aware. Mild neutropenia: IgG and IgM level are normal.  IgA level is slightly diminished. Resolved. .    CC: ARDS  HPI: Patient is a 40-year-old white male lifetime non-smoker. 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 Gibran Cameron is obese but has no other medical diagnoses including coronary artery disease diabetes or sleep apnea. Patient presented to the emergency room and was admitted on 8/30/2021. Gibran Cameron started to develop fever associated with chills cough shortness of breath nasal congestion loss of taste and loss of smell starting 8/28/2021.  As he continued to progress his dyspnea continued to worsen.  He developed malaise.  He indicated that his wife was recently diagnosed with Covid.  Patient underwent Covid screen and was positive.  CT scan chest showed bilateral infiltrates.  He initially was started on low-flow oxygen but continued to require progressive oxygen supplementation.  He was treated with remdesivir Decadron and subsequently tocilizumab.  He continued to develop progressive dyspnea and had the syndrome of \"happy hypoxia\".  As patient continued to worsen, he elected to proceed with intubation and ECMO. Patient intubated 9/1/2021. [address] cannulation 9/1/2021.   As the catheter started to warm, it became more compliant and developed kinking causing impaired outflow. As the flow continued to deteriorate, it was clear patient would need replacement of bilateral groin catheters. Patient underwent recannulation of the right internal jugular with Protek duo and removal of the bilateral groin lines. Flow immediately returned without complications. Patient did develop coinfection. Patient had COVID-19 pneumonia worsened by beta lactamase positive Haemophilus influenza. Rocephin was initiated 8/30/2021. Patient was initially treated with Rocephin without improvement. Patient was transitioned to [address] [place] [address] on 9/7/2021. Nebulized tobramycin was added 9/5/2021. ROS: Sedated on mechanical ventilator. 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 Lifetime non-smoker.   FHX: COVID exposure from wife  Allergies: NKDA  Medications:     heparin (PORCINE) Infusion 15 Units/kg/hr (09/08/21 0742)    sodium chloride      sodium chloride      sodium chloride      sodium chloride      sodium chloride      sodium chloride      norepinephrine 9 mcg/min (09/08/21 0621)    [Held by provider] lactated ringers 100 mL/hr at 09/05/21 2211    midazolam 11 mg/hr (09/08/21 0822)    dextrose      fentaNYL (SUBLIMAZE) 1250 mcg in sodium chloride 0.9 % 250 mL Stopped (09/08/21 0821)      fluconazole  400 mg IntraVENous Q24H    And    fluconazole  200 mg IntraVENous Q24H    levofloxacin  750 mg IntraVENous Q24H    chlorhexidine  15 mL Mouth/Throat BID    albumin human  25 g IntraVENous Q6H    tobramycin (PF)  300 mg Nebulization BID    midazolam  2 mg IntraVENous Once    naloxegol  12.5 mg Oral QAM    insulin lispro  0-6 Units SubCUTAneous Q4H    pantoprazole  40 mg IntraVENous Daily    And    sodium chloride (PF)  10 mL IntraVENous Daily    calcium replacement protocol   Other RX Placeholder    lactobacillus  1 capsule Oral Daily with breakfast    sodium chloride flush  5-40 mL IntraVENous 2 times per day       Vital Signs:   T: 98.2: P: 89 RR: 16 B/P: 103/59: FiO2: 30: O2 Sat:100: I/O: 5603/3360 GCS: 5  Body mass index is 43.43 kg/m². [place] PC: 15/15: TV: 300: RRTotal: 12: Ti:1 sec:  General:   Obese acutely ill-appearing white male. HEENT:  normocephalic and atraumatic. No scleral icterus. PERR  Neck: supple. No Thyromegaly. Right internal jugular Protek duo catheter. Surrounding edema. Lungs: Crackles bilaterally. No retractions  Cardiac: RRR. No JVD. Abdomen: soft. Nontender. Nondistended. Slight bruise to the right lower quadrant. Extremities:  No clubbing, cyanosis x 4. Right lower extremity edema in the thigh and groin. This was present previously. Prior documentation was in error. There is scrotal edema with dependently draining hematoma. Vasculature: capillary refill < 3 seconds. Palpable dorsalis pedis pulses. Skin:  warm and moist.  Psych: Sedated on mechanical ventilator. Lymph:  No supraclavicular adenopathy. Neurologic:  No focal deficit. No seizures. Data: (All radiographs, tracings, PFTs, and imaging are personally viewed and interpreted unless otherwise noted). · COVID 8/30/21: +  · Pulmonary Lavage 9/1/21: PCUR positive for H. Influenza. Beta- lactamase positive. · Pulmonary lavage 9/6/2021: Positive for Haemophilus influenza and Candida albicans. · Right lower extremity ultrasound report 9/7/2021: Subcutaneous edema without evidence of hematoma. · Telemetry shows sinus rhythm. · Chest x-ray shows bilateral infiltrates with some sparing to the left upper lobe. · Sodium 140, potassium 4.1, chloride 105, bicarb 23, BUN 18, creatinine 0.7, glucose 129. Albumin 4. Alkaline phosphatase 32. . . Total bilirubin 1.7. White blood cell count 17.4, hemoglobin 8, platelets 77.    CC time 35 minutes. Time was discontiguous and does not include procedures. Electronically signed by Jennifer Hoffman M.D.

## 2021-09-08 NOTE — FLOWSHEET NOTE
0800 Mr Kinsey New rests in the bed, sedated with Fentanyl and Versed. He requires 8 ug of Levophed to keep his BP above ordered parameters. He is on a Heparin gtt to keep adequate coagulation for ECMO but this has been stopped due to bleeding. He has a swan left SC site with some oozing. He has a 3 lumen CVC right SC site with oozing as well. The ECMO catheter has some bleeding around the site. Both groins are bruised but soft. His right leg appears swollen and this leg was wrapped with ace per Dr Day Huerta. Dr Marylen Perish has redressed the ECMO site and tightened the woggle stitch. He has a temp sensing Rosales patent to gravity. He has an OGT currently connected to [employer]. His ECMo blood flow jumps between 3.2 and 4.2. his sweep gas is 4 total. He has an art line right radial site clear. His groin sites have been redressed and appear stable. They each have a woggle devise in place. 0900 The ECMO cath has developed some \"chugging\". Dr Marylen Perish has returned to reposition again. Mrs Kinsey New has come to see and speak with Dr Marylen Perish. Plan to bronch later today and recruit alveoli for possible extubation. A repeat CBC shows hgb has dropped to 7. 4. will give 3 units PRBC's for this. 1100 Dr Marylen Perish in for bronchoscopy. Mr Kinsey New tolerates this well. The ECMO flow has been decreased to 3 liters to prevent \"chugging\". The Heparin gtt remains off for now. 1500 3 rd unit PRBC's completed. 1600 ABG's and CBC drawn and sent to the lab. Dr Marylen Perish made aware of the gases and sweep gases decreased to 2. Vent settings changed to RR decreased from 16 to 10.

## 2021-09-08 NOTE — FLOWSHEET NOTE
Oxygen Delivery and Consumption    Patient's Hgb 7.4 g/dl   PaO2 87 mmHg   PvO2 29.5 mmHg   SaO2 97%   SvO2 55%   Flow (C.O.) 3.1 LPM   DO2 316 mL/min   VO2 139 mL/min   Oxygen Delivery and Consumption ratio 2.3 :1

## 2021-09-08 NOTE — PROGRESS NOTES
Oxygen Delivery and Consumption    Patient's Hgb 10.2 g/dl   PaO2 101.1 mmHg   PvO2 29.5 mmHg   SaO2 98%   SvO2 55%   Flow (C.O.) 3.8 LPM   DO2 521 mL/min   VO2 231 mL/min     Ratio 2.25:1

## 2021-09-09 ENCOUNTER — APPOINTMENT (OUTPATIENT)
Dept: GENERAL RADIOLOGY | Age: 46
DRG: 003 | End: 2021-09-09
Payer: COMMERCIAL

## 2021-09-09 LAB
ALBUMIN SERPL-MCNC: 3.4 G/DL (ref 3.5–5.1)
ALLEN TEST: ABNORMAL
ALLEN TEST: NORMAL
ALP BLD-CCNC: 41 U/L (ref 38–126)
ALT SERPL-CCNC: 116 U/L (ref 11–66)
ANION GAP SERPL CALCULATED.3IONS-SCNC: 9 MEQ/L (ref 8–16)
AST SERPL-CCNC: 74 U/L (ref 5–40)
BASE EXCESS (CALCULATED): -0.3 MMOL/L (ref -2.5–2.5)
BASE EXCESS (CALCULATED): 0.1 MMOL/L (ref -2.5–2.5)
BASE EXCESS (CALCULATED): 0.3 MMOL/L (ref -2.5–2.5)
BASE EXCESS (CALCULATED): 0.4 MMOL/L (ref -2.5–2.5)
BASE EXCESS (CALCULATED): 0.7 MMOL/L (ref -2.5–2.5)
BASE EXCESS (CALCULATED): 1.2 MMOL/L (ref -2.5–2.5)
BASE EXCESS (CALCULATED): 1.4 MMOL/L (ref -2.5–2.5)
BASE EXCESS (CALCULATED): 1.9 MMOL/L (ref -2.5–2.5)
BASE EXCESS MIXED: 0.2 MMOL/L (ref -2–3)
BASOPHILS # BLD: 0.4 %
BASOPHILS ABSOLUTE: 0 THOU/MM3 (ref 0–0.1)
BILIRUB SERPL-MCNC: 1.2 MG/DL (ref 0.3–1.2)
BILIRUBIN DIRECT: 0.4 MG/DL (ref 0–0.3)
BUN BLDV-MCNC: 17 MG/DL (ref 7–22)
CALCIUM SERPL-MCNC: 8 MG/DL (ref 8.5–10.5)
CHLORIDE BLD-SCNC: 105 MEQ/L (ref 98–111)
CO2: 25 MEQ/L (ref 23–33)
COLLECTED BY:: ABNORMAL
COLLECTED BY:: NORMAL
CREAT SERPL-MCNC: 0.7 MG/DL (ref 0.4–1.2)
DEVICE: ABNORMAL
DEVICE: NORMAL
DEVICE: NORMAL
EOSINOPHIL # BLD: 0.9 %
EOSINOPHILS ABSOLUTE: 0.1 THOU/MM3 (ref 0–0.4)
ERYTHROCYTE [DISTWIDTH] IN BLOOD BY AUTOMATED COUNT: 15.1 % (ref 11.5–14.5)
ERYTHROCYTE [DISTWIDTH] IN BLOOD BY AUTOMATED COUNT: 15.5 % (ref 11.5–14.5)
ERYTHROCYTE [DISTWIDTH] IN BLOOD BY AUTOMATED COUNT: 15.7 % (ref 11.5–14.5)
ERYTHROCYTE [DISTWIDTH] IN BLOOD BY AUTOMATED COUNT: 15.8 % (ref 11.5–14.5)
ERYTHROCYTE [DISTWIDTH] IN BLOOD BY AUTOMATED COUNT: 15.9 % (ref 11.5–14.5)
ERYTHROCYTE [DISTWIDTH] IN BLOOD BY AUTOMATED COUNT: 47.4 FL (ref 35–45)
ERYTHROCYTE [DISTWIDTH] IN BLOOD BY AUTOMATED COUNT: 48.7 FL (ref 35–45)
ERYTHROCYTE [DISTWIDTH] IN BLOOD BY AUTOMATED COUNT: 49.1 FL (ref 35–45)
ERYTHROCYTE [DISTWIDTH] IN BLOOD BY AUTOMATED COUNT: 49.5 FL (ref 35–45)
ERYTHROCYTE [DISTWIDTH] IN BLOOD BY AUTOMATED COUNT: 50.2 FL (ref 35–45)
GFR SERPL CREATININE-BSD FRML MDRD: > 90 ML/MIN/1.73M2
GLUCOSE BLD-MCNC: 122 MG/DL (ref 70–108)
GLUCOSE, WHOLE BLOOD: 101 MG/DL (ref 70–108)
GLUCOSE, WHOLE BLOOD: 102 MG/DL (ref 70–108)
GLUCOSE, WHOLE BLOOD: 103 MG/DL (ref 70–108)
GLUCOSE, WHOLE BLOOD: 109 MG/DL (ref 70–108)
GLUCOSE, WHOLE BLOOD: 113 MG/DL (ref 70–108)
GLUCOSE, WHOLE BLOOD: 115 MG/DL (ref 70–108)
GLUCOSE, WHOLE BLOOD: 116 MG/DL (ref 70–108)
GRAM STAIN RESULT: ABNORMAL
HAPTOGLOBIN: < 10 MG/DL (ref 30–200)
HAPTOGLOBIN: < 10 MG/DL (ref 30–200)
HCO3, MIXED: 26 MMOL/L (ref 23–28)
HCO3: 24 MMOL/L (ref 23–28)
HCO3: 25 MMOL/L (ref 23–28)
HCO3: 25 MMOL/L (ref 23–28)
HCO3: 26 MMOL/L (ref 23–28)
HCT VFR BLD CALC: 27.4 % (ref 42–52)
HCT VFR BLD CALC: 29.2 % (ref 42–52)
HCT VFR BLD CALC: 29.7 % (ref 42–52)
HCT VFR BLD CALC: 29.9 % (ref 42–52)
HCT VFR BLD CALC: 30 % (ref 42–52)
HEMOGLOBIN PLASMA: NORMAL
HEMOGLOBIN: 10 GM/DL (ref 14–18)
HEMOGLOBIN: 10.1 GM/DL (ref 14–18)
HEMOGLOBIN: 9.3 GM/DL (ref 14–18)
HEMOGLOBIN: 9.6 GM/DL (ref 14–18)
HEMOGLOBIN: 9.9 GM/DL (ref 14–18)
IFIO2: 30
IMMATURE GRANS (ABS): 2.34 THOU/MM3 (ref 0–0.07)
IMMATURE GRANULOCYTES: 19.5 %
LACTIC ACID: 0.8 MMOL/L (ref 0.5–2)
LACTIC ACID: 1.1 MMOL/L (ref 0.5–2)
LYMPHOCYTES # BLD: 12 %
LYMPHOCYTES ABSOLUTE: 1.4 THOU/MM3 (ref 1–4.8)
MAGNESIUM: 2 MG/DL (ref 1.6–2.4)
MCH RBC QN AUTO: 29.4 PG (ref 26–33)
MCH RBC QN AUTO: 29.7 PG (ref 26–33)
MCH RBC QN AUTO: 29.8 PG (ref 26–33)
MCH RBC QN AUTO: 30.1 PG (ref 26–33)
MCH RBC QN AUTO: 30.2 PG (ref 26–33)
MCHC RBC AUTO-ENTMCNC: 32.9 GM/DL (ref 32.2–35.5)
MCHC RBC AUTO-ENTMCNC: 33.1 GM/DL (ref 32.2–35.5)
MCHC RBC AUTO-ENTMCNC: 33.7 GM/DL (ref 32.2–35.5)
MCHC RBC AUTO-ENTMCNC: 33.7 GM/DL (ref 32.2–35.5)
MCHC RBC AUTO-ENTMCNC: 33.9 GM/DL (ref 32.2–35.5)
MCV RBC AUTO: 88.2 FL (ref 80–94)
MCV RBC AUTO: 88.7 FL (ref 80–94)
MCV RBC AUTO: 89 FL (ref 80–94)
MCV RBC AUTO: 89.5 FL (ref 80–94)
MCV RBC AUTO: 90.7 FL (ref 80–94)
MODE: ABNORMAL
MODE: NORMAL
MONOCYTES # BLD: 8.1 %
MONOCYTES ABSOLUTE: 1 THOU/MM3 (ref 0.4–1.3)
NUCLEATED RED BLOOD CELLS: 3 /100 WBC
O2 SAT, MIXED: 59 %
O2 SATURATION: 94 %
O2 SATURATION: 94 %
O2 SATURATION: 95 %
O2 SATURATION: 95 %
O2 SATURATION: 97 %
O2 SATURATION: 97 %
O2 SATURATION: 99 %
O2 SATURATION: 99 %
ORGANISM: ABNORMAL
ORGANISM: ABNORMAL
PCO2, MIXED VENOUS: 44 MMHG (ref 41–51)
PCO2: 33 MMHG (ref 35–45)
PCO2: 34 MMHG (ref 35–45)
PCO2: 36 MMHG (ref 35–45)
PCO2: 37 MMHG (ref 35–45)
PCO2: 38 MMHG (ref 35–45)
PCO2: 40 MMHG (ref 35–45)
PCO2: 41 MMHG (ref 35–45)
PCO2: 43 MMHG (ref 35–45)
PH BLOOD GAS: 7.4 (ref 7.35–7.45)
PH BLOOD GAS: 7.41 (ref 7.35–7.45)
PH BLOOD GAS: 7.42 (ref 7.35–7.45)
PH BLOOD GAS: 7.45 (ref 7.35–7.45)
PH BLOOD GAS: 7.46 (ref 7.35–7.45)
PH BLOOD GAS: 7.47 (ref 7.35–7.45)
PH, MIXED: 7.37 (ref 7.31–7.41)
PIP: 25 CMH2O
PIP: 28 CMH2O
PLATELET # BLD: 67 THOU/MM3 (ref 130–400)
PLATELET # BLD: 71 THOU/MM3 (ref 130–400)
PLATELET # BLD: 72 THOU/MM3 (ref 130–400)
PLATELET # BLD: 79 THOU/MM3 (ref 130–400)
PLATELET # BLD: 87 THOU/MM3 (ref 130–400)
PMV BLD AUTO: 10.2 FL (ref 9.4–12.4)
PMV BLD AUTO: 10.6 FL (ref 9.4–12.4)
PMV BLD AUTO: 10.7 FL (ref 9.4–12.4)
PO2 MIXED: 32 MMHG (ref 25–40)
PO2: 111 MMHG (ref 71–104)
PO2: 113 MMHG (ref 71–104)
PO2: 70 MMHG (ref 71–104)
PO2: 71 MMHG (ref 71–104)
PO2: 74 MMHG (ref 71–104)
PO2: 75 MMHG (ref 71–104)
PO2: 80 MMHG (ref 71–104)
PO2: 83 MMHG (ref 71–104)
POC CREATININE WHOLE BLOOD: 0.8 MG/DL (ref 0.5–1.2)
POC LACTIC ACID: 0.9 MMOL/L (ref 0.5–1.9)
POTASSIUM REFLEX MAGNESIUM: 4 MEQ/L (ref 3.5–5.2)
RBC # BLD: 3.08 MILL/MM3 (ref 4.7–6.1)
RBC # BLD: 3.22 MILL/MM3 (ref 4.7–6.1)
RBC # BLD: 3.32 MILL/MM3 (ref 4.7–6.1)
RBC # BLD: 3.37 MILL/MM3 (ref 4.7–6.1)
RBC # BLD: 3.4 MILL/MM3 (ref 4.7–6.1)
RESPIRATORY CULTURE: ABNORMAL
SEG NEUTROPHILS: 59.1 %
SEGMENTED NEUTROPHILS ABSOLUTE COUNT: 7.1 THOU/MM3 (ref 1.8–7.7)
SET PEEP: 10 MMHG
SET PEEP: 14 MMHG
SET PEEP: 15 MMHG
SET PEEP: 15 MMHG
SET PRESS SUPP: 14 CMH2O
SET PRESS SUPP: 15 CMH2O
SET PRESS SUPP: 15 CMH2O
SET RESPIRATORY RATE: 10 BPM
SITE: NORMAL
SODIUM BLD-SCNC: 139 MEQ/L (ref 135–145)
SOURCE, BLOOD GAS: ABNORMAL
SOURCE, BLOOD GAS: NORMAL
TOTAL PROTEIN: 4.5 G/DL (ref 6.1–8)
WBC # BLD: 11.5 THOU/MM3 (ref 4.8–10.8)
WBC # BLD: 12 THOU/MM3 (ref 4.8–10.8)
WBC # BLD: 12.3 THOU/MM3 (ref 4.8–10.8)
WBC # BLD: 16.7 THOU/MM3 (ref 4.8–10.8)
WBC # BLD: 17.7 THOU/MM3 (ref 4.8–10.8)

## 2021-09-09 PROCEDURE — 94003 VENT MGMT INPAT SUBQ DAY: CPT

## 2021-09-09 PROCEDURE — 82565 ASSAY OF CREATININE: CPT

## 2021-09-09 PROCEDURE — 2000000000 HC ICU R&B

## 2021-09-09 PROCEDURE — 36415 COLL VENOUS BLD VENIPUNCTURE: CPT

## 2021-09-09 PROCEDURE — 83605 ASSAY OF LACTIC ACID: CPT

## 2021-09-09 PROCEDURE — 85347 COAGULATION TIME ACTIVATED: CPT

## 2021-09-09 PROCEDURE — 6360000002 HC RX W HCPCS: Performed by: NURSE PRACTITIONER

## 2021-09-09 PROCEDURE — 2580000003 HC RX 258

## 2021-09-09 PROCEDURE — 6360000002 HC RX W HCPCS: Performed by: INTERNAL MEDICINE

## 2021-09-09 PROCEDURE — 2580000003 HC RX 258: Performed by: NURSE PRACTITIONER

## 2021-09-09 PROCEDURE — 94640 AIRWAY INHALATION TREATMENT: CPT

## 2021-09-09 PROCEDURE — 71045 X-RAY EXAM CHEST 1 VIEW: CPT

## 2021-09-09 PROCEDURE — 99291 CRITICAL CARE FIRST HOUR: CPT | Performed by: INTERNAL MEDICINE

## 2021-09-09 PROCEDURE — 80053 COMPREHEN METABOLIC PANEL: CPT

## 2021-09-09 PROCEDURE — 37799 UNLISTED PX VASCULAR SURGERY: CPT

## 2021-09-09 PROCEDURE — 2580000003 HC RX 258: Performed by: INTERNAL MEDICINE

## 2021-09-09 PROCEDURE — 83735 ASSAY OF MAGNESIUM: CPT

## 2021-09-09 PROCEDURE — P9035 PLATELET PHERES LEUKOREDUCED: HCPCS

## 2021-09-09 PROCEDURE — 6370000000 HC RX 637 (ALT 250 FOR IP): Performed by: NURSE PRACTITIONER

## 2021-09-09 PROCEDURE — 82803 BLOOD GASES ANY COMBINATION: CPT

## 2021-09-09 PROCEDURE — 33948 ECMO/ECLS DAILY MGMT-VENOUS: CPT | Performed by: INTERNAL MEDICINE

## 2021-09-09 PROCEDURE — 85027 COMPLETE CBC AUTOMATED: CPT

## 2021-09-09 PROCEDURE — 94761 N-INVAS EAR/PLS OXIMETRY MLT: CPT

## 2021-09-09 PROCEDURE — 85025 COMPLETE CBC W/AUTO DIFF WBC: CPT

## 2021-09-09 PROCEDURE — C9113 INJ PANTOPRAZOLE SODIUM, VIA: HCPCS | Performed by: NURSE PRACTITIONER

## 2021-09-09 PROCEDURE — 82947 ASSAY GLUCOSE BLOOD QUANT: CPT

## 2021-09-09 PROCEDURE — 6370000000 HC RX 637 (ALT 250 FOR IP): Performed by: INTERNAL MEDICINE

## 2021-09-09 RX ORDER — SODIUM CHLORIDE 9 MG/ML
INJECTION, SOLUTION INTRAVENOUS PRN
Status: COMPLETED | OUTPATIENT
Start: 2021-09-09 | End: 2021-09-09

## 2021-09-09 RX ORDER — SODIUM CHLORIDE 9 MG/ML
INJECTION, SOLUTION INTRAVENOUS PRN
Status: DISCONTINUED | OUTPATIENT
Start: 2021-09-09 | End: 2021-09-15

## 2021-09-09 RX ADMIN — PANTOPRAZOLE SODIUM 40 MG: 40 INJECTION, POWDER, FOR SOLUTION INTRAVENOUS at 08:20

## 2021-09-09 RX ADMIN — NALOXEGOL OXALATE 12.5 MG: 12.5 TABLET, FILM COATED ORAL at 08:27

## 2021-09-09 RX ADMIN — FENTANYL CITRATE 175 MCG/HR: 50 INJECTION INTRAMUSCULAR; INTRAVENOUS at 14:27

## 2021-09-09 RX ADMIN — FENTANYL CITRATE 175 MCG/HR: 50 INJECTION INTRAMUSCULAR; INTRAVENOUS at 07:17

## 2021-09-09 RX ADMIN — SODIUM CHLORIDE, PRESERVATIVE FREE 10 ML: 5 INJECTION INTRAVENOUS at 08:57

## 2021-09-09 RX ADMIN — Medication 1 CAPSULE: at 08:20

## 2021-09-09 RX ADMIN — HEPARIN SODIUM 14 UNITS/KG/HR: 10000 INJECTION, SOLUTION INTRAVENOUS at 00:39

## 2021-09-09 RX ADMIN — Medication 15 ML: at 08:56

## 2021-09-09 RX ADMIN — PANTOPRAZOLE SODIUM 40 MG: 40 INJECTION, POWDER, FOR SOLUTION INTRAVENOUS at 08:28

## 2021-09-09 RX ADMIN — Medication 1 CAPSULE: at 08:28

## 2021-09-09 RX ADMIN — MIDAZOLAM 11 MG/HR: 5 INJECTION, SOLUTION INTRAMUSCULAR; INTRAVENOUS at 04:21

## 2021-09-09 RX ADMIN — MIDAZOLAM 10 MG/HR: 5 INJECTION, SOLUTION INTRAMUSCULAR; INTRAVENOUS at 14:27

## 2021-09-09 RX ADMIN — Medication 300 MG: at 10:00

## 2021-09-09 RX ADMIN — HEPARIN SODIUM 13 UNITS/KG/HR: 10000 INJECTION, SOLUTION INTRAVENOUS at 07:17

## 2021-09-09 RX ADMIN — SODIUM CHLORIDE, PRESERVATIVE FREE 10 ML: 5 INJECTION INTRAVENOUS at 20:53

## 2021-09-09 RX ADMIN — FLUCONAZOLE 400 MG: 2 INJECTION, SOLUTION INTRAVENOUS at 11:42

## 2021-09-09 RX ADMIN — LEVOFLOXACIN 750 MG: 5 INJECTION, SOLUTION INTRAVENOUS at 10:12

## 2021-09-09 RX ADMIN — SODIUM CHLORIDE, PRESERVATIVE FREE 10 ML: 5 INJECTION INTRAVENOUS at 08:29

## 2021-09-09 RX ADMIN — Medication 300 MG: at 22:00

## 2021-09-09 RX ADMIN — FLUCONAZOLE IN SODIUM CHLORIDE 200 MG: 2 INJECTION, SOLUTION INTRAVENOUS at 13:20

## 2021-09-09 RX ADMIN — FENTANYL CITRATE 200 MCG/HR: 50 INJECTION INTRAMUSCULAR; INTRAVENOUS at 00:38

## 2021-09-09 RX ADMIN — HEPARIN SODIUM 13 UNITS/KG/HR: 10000 INJECTION, SOLUTION INTRAVENOUS at 22:02

## 2021-09-09 RX ADMIN — SODIUM CHLORIDE: 9 INJECTION, SOLUTION INTRAVENOUS at 06:00

## 2021-09-09 RX ADMIN — Medication 15 ML: at 20:53

## 2021-09-09 RX ADMIN — NALOXEGOL OXALATE 12.5 MG: 12.5 TABLET, FILM COATED ORAL at 08:20

## 2021-09-09 RX ADMIN — FENTANYL CITRATE 175 MCG/HR: 50 INJECTION INTRAMUSCULAR; INTRAVENOUS at 21:57

## 2021-09-09 ASSESSMENT — PULMONARY FUNCTION TESTS
PIF_VALUE: 29
PIF_VALUE: 25
PIF_VALUE: 30
PIF_VALUE: 29
PIF_VALUE: 31
PIF_VALUE: 31

## 2021-09-09 NOTE — FLOWSHEET NOTE
09/09/21 0637   ECMO Pump   Patient on ECMO On   Pump Flow (L/min) 3 LPM   Pump Speed (Rotations/min) 4700 RPM   Pump Mode V/V   ECMO Sweep Gas   Sweep Flow (L/min) 2 LPM   Pt flow decreased to 3.0 per Dr. Corrinne Angry at bedside

## 2021-09-09 NOTE — FLOWSHEET NOTE
0800 Mr Jign Epps rests in the bed, sedated with Fentanyl and Versed. He requires 3 ug of Levophed to keep his BP above ordered parameters. He is on a Heparin gtt to keep adequate coagulation for ECMO. He has a swan left SC site with some oozing. He has a 3 lumen CVC right SC site with oozing as well. The ECMO catheter has some bleeding around the site. Both groins are bruised but soft. His right leg appears swollen and this leg remains wrapped with ace per Dr Warner Hendricks has a temp sensing Rosales patent to gravity. He has an O 3.2 and 4.2. his sweep gas is 4 total. He has an art line right radial site clear. His groin sites have been redressGTwith tube feeding Vital at 20 cc/hr. He has about 15 cc's residual with this. His ECMo blood flow has been turned down to to 2.5 per Dr Yuli Gutiérrez and we will plan to remove the ECMO catheter later this AM. He is receiving a unit of PRBC's and will get another per Dr Yuli Gutiérrez.

## 2021-09-09 NOTE — FLOWSHEET NOTE
1200 Mr Tabitha Garcia has had 2 units PRBC's today. The Levophed gtt has been weaned to 2 ug to keep MAP 65. His wife is at the bedside. Plan to de cannulate ECMO later today. Platelets not available from the lab until later this afternoon. 1400  [facility] has come to see. Sweep gas decreased to 0.5 and 0.5 and flow down to 2.0 at 3500 rpm. wlill not remove cannula until tomorrow. His wife made aware.

## 2021-09-09 NOTE — CARE COORDINATION
9/9/21, 12:11 PM EDT    DISCHARGE ON GOING EVALUATION    [facility] day: 10  Location: -02/002-A Reason for admit: Hypoxia [R09.02]  COVID-19 [U07.1]   Procedure:   8/30 CTA Chest: Neg for PE; Moderate groundglass infiltrates scattered throughout both lungs  9/1 Intubation  9/1 SGC left subclavian  9/1 CVC RIJ  9/1 Echo with EF 60%  9/1 JASPREET: EF 55-60%  9/1 Cardiac Cath: no sig CAD; PA pressure 40's; preserved CO/CI; Cannulated for ECMO  9/1 VV ECMO initiated  9/6 ECMO Cannulation sites changed  9/7 Bronch w/washings sent: Chronic airway inflammation with pitting airways disease and striation formation; Active pneumonia with bilateral lung mucus production; No active bleeding. Hemoptysis secondary to suction trauma  9/7 US RUE: There is prominent edema in the subcutaneous fat at the right groin. No discrete hematoma is identified. 9/8 Bronch w/washings: Prominent pitting airways disease; Decreased mucus within the airway; Old clotted blood within the airway. No active bleeding. Clots were removed with suction  9/8 US Abdomen: No definite retroperitoneal hematoma identified  9/9 CXR:   Cardiomegaly present.       Moderate patchy opacities bilateral lungs, unchanged     Barriers to Discharge: ECMO cannulation sited changed on 9/6 d/t flow issues; now in neck. Bronchs done on 9/7 and 9/8 - see note above. Weaning ECMO - possible decannulation later today if tolerates weaning. Plan to attempt extubation once stable off ECMO. Received 2 PRBC and 2 pk platelets today. VV ECMO continues. Remains on vent w/ETT on PCMV, peep 15, PC 15, FIO2 30%, sats 100%. Afebrile. NSR. Unable to follow commands; ROMERO x4 to painful stim. Respiratory culture +mold and candida albicans. Intensivist and Cardiology following. Dietitian consulted. Telemetry, SGC, maria esther, CVC, OG w/TF @ 20 ml/hr, nichols.  Fentanyl @ 175 mcg/hr, heparin drip, versed @ 10 mg/hr, levo @ 3 mcg/min, IV diflucan, SSI, lactobacillus, IV levaquin, movantik, IV protonix, tobramycin nebs, Electrolyte replacement protocols. Alb 3.4, alt 116, ast 74, bili 0.4, wbc 12, hgb 9.9, plt 72. PCP: Rufus Booth MD  Readmission Risk Score: 17%  Patient Goals/Plan/Treatment Preferences: From home w/wife. Plan pending clinical course.

## 2021-09-09 NOTE — PROGRESS NOTES
ECMO DAILY ROUNDING NOTE:    Team Present at bedside. Family Present at bebside:  No  Condition:  critical    RE for ECMO initiation: ARDS  Do2:  409  Vo2:  185  Sweep Gas:  1/1  Pump Flow:  2.8  SvO2:  55  SaO2:  98.7  ACT Goal:  170-190  ACT:  180  Plt:  87  Hgb:  9.3  Lactic Acid:  1.4  CXR:  Improved aeration. Telemetry:  NSR  Paralytic:  No  Sedation Goals:  RASS -2  Pupils:  PERR  PC 14:  PEEP 14:  RATE 10:  FiO2:  30  Generated :    Electronically signed by Tammie Moon MD.

## 2021-09-09 NOTE — FLOWSHEET NOTE
Oxygen Delivery and Consumption    Patient's Hgb 9.9 g/dl   PaO2 80 mmHg   PvO2 32 mmHg   SaO2 97%   SvO2 59%   Flow (C.O.) 2.4 LPM   DO2 315 mL/min   VO2 124 mL/min   Oxygen Delivery and Consumption ratio 2.5 :1

## 2021-09-09 NOTE — FLOWSHEET NOTE
Pt was unresponsive but he was blessed.       09/09/21 3369   Encounter Summary   Services provided to: Patient   Referral/Consult From: Rounding   Continue Visiting Yes  (9/9 NR)   Complexity of Encounter Low   Length of Encounter 15 minutes   Routine   Type Follow up   Assessment Unable to respond   Intervention Paron

## 2021-09-09 NOTE — PROGRESS NOTES
Comprehensive Nutrition Assessment    Type and Reason for Visit:  Reassess    Nutrition Recommendations/Plan:   Recommend increase TF (Vital 1.2 at 20 ml/hr) as tolerated to goal rate of 40 ml/hr. Flush 2.5 oz. Proteinex TID. Free water flush per MD (currently 100 ml every 8 hours). Continue bowel medications to promote BM. Nutrition Assessment:    Pt improving from a nutritional standpoint AEB tolerating TF at 20 ml/hr (however not yet at goal rate). Remains at risk for further nutritional compromise r/t COVID+, VV ECMO, intubation/respiratory failure, Hflu, ARDs and underlying medical condition (hx GB OR, obeseity). Nutrition recommendations/interventions as per above. Malnutrition Assessment:  Malnutrition Status: At risk for malnutrition (Comment)    Context:  Acute Illness     Findings of the 6 clinical characteristics of malnutrition:  Energy Intake:  7 - 50% or less of estimated energy requirements for 5 or more days  Weight Loss:  Unable to assess (UBW not available)     Body Fat Loss:  No significant body fat loss     Muscle Mass Loss:  No significant muscle mass loss    Fluid Accumulation:  No significant fluid accumulation     Strength:  Not Performed    Estimated Daily Nutrient Needs:  Energy (kcal):  ~1435 kcals ( 19) with ECMO. Pt is + COVID; Weight Used for Energy Requirements:  Ideal     Protein (g):  ~151 grams (2); Weight Used for Protein Requirements:  Ideal        Fluid (ml/day):  per Dr; Method Used for Fluid Requirements:  Other (Comment)      Nutrition Related Findings:    Pt. Remains intubated (since 9/1), VV ECMO started 9/1, COVID diagnosed 8/30; TF initiated 9/2 however on hold multiple times d/t procedures, etc; no BM since 9/1; belly soft, +bowel sounds;  Rx includes Levaquin, Colace, Glycolax, Movantik, Culturelle, Insulin, Zofran; 9/9: Glucose 122, BUN 17, Cr 0.7, Magnesium 2.0, Potassium 4.0; per RN - plan EMCO to stop and possible extubation      Wounds:  None (reported)       Current Nutrition Therapies:    Diet NPO  ADULT TUBE FEEDING; Orogastric; Peptide Based; Continuous; 10; Yes; 10; Q 8 hours; 40; 100; Q 8 hours  Current Tube Feeding (TF) Orders:  · Feeding Route: Orogastric (placed 9/1)  · Formula: Peptide Based (Vital 1.2)  · Schedule: Continuous (20 ml/hr; goal 40 ml/hr)  · Additives/Modulars: Protein (1 ( 2.5 oz) liquid protein tid)  · Water Flushes: per Dr 100 ml every 8 hours  · Goal TF & Flush Orders Provides: 5362 kcals (1152 TF, 312 modular), 150 grams protein (72 TF, 78 modular), 106 grams CHO, 1079ml free water (779 TF, 300 flushes) in 1485ml (960 TF, 225 modular, 300 flushes)/ 24 hours      Anthropometric Measures:  · Height: 5' 10\" (177.8 cm)  · Current Body Weight: 310 lb 3 oz (140.7 kg) (9/9 +1, +2 edema)   · Admission Body Weight: 263 lb 7.2 oz (119.5 kg) (8/31 no edema)    · Usual Body Weight:  (not available)     · Ideal Body Weight: 166 lbs;     · BMI: 44.5  · BMI Categories: Obese Class 3 (BMI 40.0 or greater)       Nutrition Diagnosis:   · Inadequate oral intake related to impaired respiratory function (and ECMO) as evidenced by intubation, nutrition support - enteral nutrition      Nutrition Interventions:   Food and/or Nutrient Delivery:  Continue Current Tube Feeding  Nutrition Education/Counseling:  Education not appropriate   Coordination of Nutrition Care:  Continue to monitor while inpatient, Interdisciplinary Rounds    Goals:  Pt. will tolerate EN appropriate for COVID, ECMO       Nutrition Monitoring and Evaluation:   Behavioral-Environmental Outcomes:  None Identified   Food/Nutrient Intake Outcomes:  Enteral Nutrition Intake/Tolerance  Physical Signs/Symptoms Outcomes:  Biochemical Data, Chewing or Swallowing, GI Status, Fluid Status or Edema, Hemodynamic Status, Nutrition Focused Physical Findings, Skin, Weight     Discharge Planning:     Too soon to determine     Electronically signed by Gurvinder Marquez RD, LD on 9/9/21 at 10:27 AM EDT    Contact: [phone]

## 2021-09-09 NOTE — FLOWSHEET NOTE
Oxygen Delivery and Consumption    Patient's Hgb 9.3 g/dl   PaO2 83 mmHg   PvO2 32 mmHg   SaO2 97%   SvO2 59%   Flow (C.O.) 2 LPM   DO2 308 mL/min   VO2 122 mL/min   Oxygen Delivery and Consumption ratio 2.5 :1

## 2021-09-09 NOTE — PROGRESS NOTES
stability of bleeding. ARDS:  Patient intubated 9/1/2021.  ECMO initiated 9/1/2021.  Patient undergoing ECMO wean. Plan is to wean ECMO first and then ventilator. Thrombocytopenia: Secondary to acute blood loss from hemorrhage. Infusing platelets. Trending. Hepatostasis: Initially secondary to groin catheter placement. Improving. Obesity: Aware. Mild neutropenia: IgG and IgM level are normal.  IgA level is slightly diminished.  Resolved. Hemoptysis: Secondary to spontaneous alveolar hemorrhage related to anticoagulation and high ACT requirements. Heparin discontinued 9/8/2021. Bronchoscopy completed 9/8/2021 showed old blood within the airway but no active bleeding. Heparin held. Resolved . Rod Gallegos CC:  ARDS  HPI: Patient is a 47-year-old white male lifetime non-smoker. 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 [facility] is obese but has no other medical diagnoses including coronary artery disease diabetes or sleep apnea. Patient presented to the emergency room and was admitted on 8/30/2021. [facility] started to develop fever associated with chills cough shortness of breath nasal congestion loss of taste and loss of smell starting 8/28/2021.  As he continued to progress his dyspnea continued to worsen.  He developed malaise.  He indicated that his wife was recently diagnosed with Covid.  Patient underwent Covid screen and was positive.  CT scan chest showed bilateral infiltrates.  He initially was started on low-flow oxygen but continued to require progressive oxygen supplementation.  He was treated with remdesivir Decadron and subsequently tocilizumab.  He continued to develop progressive dyspnea and had the syndrome of \"happy hypoxia\".  As patient continued to worsen, he elected to proceed with intubation and ECMO.   Patient intubated 9/1/2021. Pablo Michel cannulation 9/1/2021.  As the catheter started to warm, it became more compliant and developed kinking causing impaired outflow.  As the flow continued to deteriorate, it was clear patient would need replacement of bilateral groin catheters.  Patient underwent recannulation of the right internal jugular with Protek duo and removal of the bilateral groin lines.  Flow immediately returned without complications. Patient did develop coinfection.  Patient had COVID-19 pneumonia worsened by beta lactamase positive Haemophilus influenza.  Rocephin was initiated 8/30/2021.  Patient was initially treated with Rocephin without improvement.  Patient was transitioned to [address] [place] [address] on 9/7/2021.  Nebulized tobramycin was added 9/5/2021. ROS: Sedated on mechanical ventilator. 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 Lifetime non-smoker. FHX: COVID exposure from wife  Allergies: NKDA  Medications:     heparin (PORCINE) Infusion 13 Units/kg/hr (09/09/21 0717)    norepinephrine 9 mcg/min (09/08/21 0621)    [Held by provider] lactated ringers 100 mL/hr at 09/05/21 2211    midazolam 10 mg/hr (09/09/21 0605)    dextrose      fentaNYL (SUBLIMAZE) 1250 mcg in sodium chloride 0.9 % 250 mL 175 mcg/hr (09/09/21 0717)      fluconazole  400 mg IntraVENous Q24H    And    fluconazole  200 mg IntraVENous Q24H    levofloxacin  750 mg IntraVENous Q24H    chlorhexidine  15 mL Mouth/Throat BID    tobramycin (PF)  300 mg Nebulization BID    midazolam  2 mg IntraVENous Once    naloxegol  12.5 mg Oral QAM    insulin lispro  0-6 Units SubCUTAneous Q4H    pantoprazole  40 mg IntraVENous Daily    And    sodium chloride (PF)  10 mL IntraVENous Daily    calcium replacement protocol   Other RX Placeholder    lactobacillus  1 capsule Oral Daily with breakfast    sodium chloride flush  5-40 mL IntraVENous 2 times per day       Vital Signs:   T: 98.4: P: 83 RR: 10 B/P: 126/66: FiO2: 30: O2 Sat:100: I/O: 6453/970 GCS: 8  Body mass index is 43.43 kg/m². Wilson Hilt PC: 14/14: TV: 500: RRTotal: 10: Ti:1 sec:   General:   Obese acutely ill-appearing white male. HEENT:  normocephalic and atraumatic. No scleral icterus. PERR. Facial edema. Neck: supple. No Thyromegaly. Right internal jugular Protek duo catheter. Surrounding edema. Lungs: Crackles bilaterally. No retractions  Cardiac: RRR. No JVD. Abdomen: soft. Nontender. Nondistended. Slight bruise to the right lower quadrant. Extremities:  No clubbing, cyanosis x 4. Right lower extremity edema in the thigh and groin. This was present previously. Prior documentation was in error. There is scrotal edema with dependently draining hematoma. Vasculature: capillary refill < 3 seconds. Palpable dorsalis pedis pulses. Skin:  warm and moist.  Psych: Sedated on mechanical ventilator. Lymph:  No supraclavicular adenopathy. Neurologic:  No focal deficit. No seizures. Data: (All radiographs, tracings, PFTs, and imaging are personally viewed and interpreted unless otherwise noted). · COVID 8/30/21: +  · Pulmonary Lavage 9/1/21: PCUR positive for H. Influenza.  Beta- lactamase positive. · Pulmonary lavage 9/6/2021: Positive for Haemophilus influenza and Candida albicans. · Right lower extremity ultrasound report 9/7/2021: Subcutaneous edema without evidence of hematoma. · Telemetry shows sinus rhythm. · CXR shows improved aeration. · Sodium 139, potassium 4, chloride 105, bicarb 25, BUN 17, creatinine 0.7, glucose 122. Albumin 3.4. Alkaline phosphatase 41. . AST 74. Total bilirubin 0.4. White blood cell count 16.7, hemoglobin 9.3, platelets 87    CC time 35 minutes. Time was discontiguous and does not include procedures. Electronically signed by Catrina Gallegos M.D.

## 2021-09-09 NOTE — FLOWSHEET NOTE
ECMO Hourly Circuit Check       0800 0900 1000 1100 1200 1300 1400 1500 1600 1700 1800 200    Device Secured y y y y y y y y y y y y    Connected to O2  na na na na na na na na na na na na    Right connection of tubing to the oxygenator and gas hoses y y y y y y y y y y y y    Absence of kinks and tension y y y y y y y y y y y y    Lines free of clots/fibrin with flashlight assessment y y y y y y y y y y y y    Assess difference in blood color between cannulas y y y y y y y y y y y y    No migration of cannula n n n n n n n n n n n n    Bleeding at insertion site y y y y y y y y n n n n    Knee immobilizer in place na na na na na na na na na na na na    No traction on tubing n n n n n n n n n n n n    Flow Rate 2.5 2.5 2.5 2.3 2.3 2.3 2.0 2.0 2.1 2.1 2.0 2.0    Emergency Supplies at bedside y y y y y y y y y y y y                                                                                                                              Sweep Gas Oxy 1=1  Oxy 2=1 Oxy 1=1  Oxy 2=1 Oxy 1=1  Oxy 2=1 Oxy 1=1  Oxy 2=1 Oxy 1=1  Oxy 2=1 Oxy 1=1  Oxy 2=1 Oxy 1=0.5  Oxy 2=0.5 Oxy 1=0.5  Oxy 2=0.5 Oxy 1=0.5  Oxy 2=0.5 Oxy 1=0.5  Oxy 2=0.5 Oxy 1=0.5  Oxy 2=0.5 Oxy 1=0.5  Oxy 2=0.5    DO2  308    315    272       VO2 122    124    104

## 2021-09-10 ENCOUNTER — APPOINTMENT (OUTPATIENT)
Dept: GENERAL RADIOLOGY | Age: 46
DRG: 003 | End: 2021-09-10
Payer: COMMERCIAL

## 2021-09-10 ENCOUNTER — APPOINTMENT (OUTPATIENT)
Dept: CARDIAC CATH/INVASIVE PROCEDURES | Age: 46
DRG: 003 | End: 2021-09-10
Payer: COMMERCIAL

## 2021-09-10 LAB
ALBUMIN SERPL-MCNC: 3 G/DL (ref 3.5–5.1)
ALLEN TEST: ABNORMAL
ALLEN TEST: NORMAL
ALLEN TEST: NORMAL
ALP BLD-CCNC: 39 U/L (ref 38–126)
ALT SERPL-CCNC: 146 U/L (ref 11–66)
ANION GAP SERPL CALCULATED.3IONS-SCNC: 8 MEQ/L (ref 8–16)
AST SERPL-CCNC: 100 U/L (ref 5–40)
BASE EXCESS (CALCULATED): 1.2 MMOL/L (ref -2.5–2.5)
BASE EXCESS (CALCULATED): 1.3 MMOL/L (ref -2.5–2.5)
BASE EXCESS (CALCULATED): 1.6 MMOL/L (ref -2.5–2.5)
BASE EXCESS (CALCULATED): 1.7 MMOL/L (ref -2.5–2.5)
BASE EXCESS (CALCULATED): 1.9 MMOL/L (ref -2.5–2.5)
BASE EXCESS (CALCULATED): 1.9 MMOL/L (ref -2.5–2.5)
BASE EXCESS MIXED: 2.4 MMOL/L (ref -2–3)
BASOPHILS # BLD: 0.6 %
BASOPHILS ABSOLUTE: 0.1 THOU/MM3 (ref 0–0.1)
BILIRUB SERPL-MCNC: 1 MG/DL (ref 0.3–1.2)
BILIRUBIN DIRECT: 0.3 MG/DL (ref 0–0.3)
BUN BLDV-MCNC: 13 MG/DL (ref 7–22)
CALCIUM IONIZED: 1.19 MMOL/L (ref 1.12–1.32)
CALCIUM SERPL-MCNC: 8.1 MG/DL (ref 8.5–10.5)
CHLORIDE BLD-SCNC: 106 MEQ/L (ref 98–111)
CO2: 25 MEQ/L (ref 23–33)
COLLECTED BY:: ABNORMAL
COLLECTED BY:: NORMAL
CREAT SERPL-MCNC: 0.6 MG/DL (ref 0.4–1.2)
DEVICE: ABNORMAL
DEVICE: NORMAL
DEVICE: NORMAL
EOSINOPHIL # BLD: 0.8 %
EOSINOPHILS ABSOLUTE: 0.1 THOU/MM3 (ref 0–0.4)
ERYTHROCYTE [DISTWIDTH] IN BLOOD BY AUTOMATED COUNT: 15.9 % (ref 11.5–14.5)
ERYTHROCYTE [DISTWIDTH] IN BLOOD BY AUTOMATED COUNT: 16.2 % (ref 11.5–14.5)
ERYTHROCYTE [DISTWIDTH] IN BLOOD BY AUTOMATED COUNT: 17.2 % (ref 11.5–14.5)
ERYTHROCYTE [DISTWIDTH] IN BLOOD BY AUTOMATED COUNT: 17.9 % (ref 11.5–14.5)
ERYTHROCYTE [DISTWIDTH] IN BLOOD BY AUTOMATED COUNT: 49.8 FL (ref 35–45)
ERYTHROCYTE [DISTWIDTH] IN BLOOD BY AUTOMATED COUNT: 50.8 FL (ref 35–45)
ERYTHROCYTE [DISTWIDTH] IN BLOOD BY AUTOMATED COUNT: 51.5 FL (ref 35–45)
ERYTHROCYTE [DISTWIDTH] IN BLOOD BY AUTOMATED COUNT: 53.5 FL (ref 35–45)
GFR SERPL CREATININE-BSD FRML MDRD: > 90 ML/MIN/1.73M2
GLUCOSE BLD-MCNC: 105 MG/DL (ref 70–108)
GLUCOSE BLD-MCNC: 108 MG/DL (ref 70–108)
GLUCOSE BLD-MCNC: 97 MG/DL (ref 70–108)
GLUCOSE, WHOLE BLOOD: 100 MG/DL (ref 70–108)
GLUCOSE, WHOLE BLOOD: 107 MG/DL (ref 70–108)
GLUCOSE, WHOLE BLOOD: 119 MG/DL (ref 70–108)
GRAM STAIN RESULT: ABNORMAL
HCO3, MIXED: 28 MMOL/L (ref 23–28)
HCO3: 26 MMOL/L (ref 23–28)
HCO3: 26 MMOL/L (ref 23–28)
HCO3: 27 MMOL/L (ref 23–28)
HCT VFR BLD CALC: 29.2 % (ref 42–52)
HCT VFR BLD CALC: 30.3 % (ref 42–52)
HCT VFR BLD CALC: 30.7 % (ref 42–52)
HCT VFR BLD CALC: 32.3 % (ref 42–52)
HCT VFR BLD CALC: 33.1 % (ref 42–52)
HEMOGLOBIN: 10.3 GM/DL (ref 14–18)
HEMOGLOBIN: 10.3 GM/DL (ref 14–18)
HEMOGLOBIN: 10.7 GM/DL (ref 14–18)
HEMOGLOBIN: 10.8 GM/DL (ref 14–18)
HEMOGLOBIN: 9.7 GM/DL (ref 14–18)
IFIO2: 30
IFIO2: 30
IFIO2: 40
IFIO2: 40
IFIO2: 50
IMMATURE GRANS (ABS): 1.29 THOU/MM3 (ref 0–0.07)
IMMATURE GRANULOCYTES: 13.7 %
LACTIC ACID: 0.8 MMOL/L (ref 0.5–2)
LACTIC ACID: 1 MMOL/L (ref 0.5–2)
LV EF: 58 %
LVEF MODALITY: NORMAL
LYMPHOCYTES # BLD: 11.5 %
LYMPHOCYTES ABSOLUTE: 1.1 THOU/MM3 (ref 1–4.8)
MAGNESIUM: 1.9 MG/DL (ref 1.6–2.4)
MCH RBC QN AUTO: 30.3 PG (ref 26–33)
MCH RBC QN AUTO: 30.3 PG (ref 26–33)
MCH RBC QN AUTO: 30.5 PG (ref 26–33)
MCH RBC QN AUTO: 30.7 PG (ref 26–33)
MCHC RBC AUTO-ENTMCNC: 32.3 GM/DL (ref 32.2–35.5)
MCHC RBC AUTO-ENTMCNC: 33.2 GM/DL (ref 32.2–35.5)
MCHC RBC AUTO-ENTMCNC: 33.4 GM/DL (ref 32.2–35.5)
MCHC RBC AUTO-ENTMCNC: 33.6 GM/DL (ref 32.2–35.5)
MCV RBC AUTO: 90.3 FL (ref 80–94)
MCV RBC AUTO: 91.2 FL (ref 80–94)
MCV RBC AUTO: 92.4 FL (ref 80–94)
MCV RBC AUTO: 93.8 FL (ref 80–94)
MODE: ABNORMAL
MODE: NORMAL
MODE: NORMAL
MONOCYTES # BLD: 7.2 %
MONOCYTES ABSOLUTE: 0.7 THOU/MM3 (ref 0.4–1.3)
NUCLEATED RED BLOOD CELLS: 1 /100 WBC
O2 SAT, MIXED: 63 %
O2 SATURATION: 85 %
O2 SATURATION: 93 %
O2 SATURATION: 94 %
O2 SATURATION: 94 %
O2 SATURATION: 96 %
O2 SATURATION: 96 %
ORGANISM: ABNORMAL
PCO2, MIXED VENOUS: 48 MMHG (ref 41–51)
PCO2: 43 MMHG (ref 35–45)
PCO2: 44 MMHG (ref 35–45)
PCO2: 44 MMHG (ref 35–45)
PCO2: 45 MMHG (ref 35–45)
PH BLOOD GAS: 7.39 (ref 7.35–7.45)
PH BLOOD GAS: 7.4 (ref 7.35–7.45)
PH BLOOD GAS: 7.4 (ref 7.35–7.45)
PH, MIXED: 7.37 (ref 7.31–7.41)
PIP: 25 CMH2O
PIP: 28 CMH2O
PLATELET # BLD: 62 THOU/MM3 (ref 130–400)
PLATELET # BLD: 65 THOU/MM3 (ref 130–400)
PLATELET # BLD: 70 THOU/MM3 (ref 130–400)
PLATELET # BLD: 75 THOU/MM3 (ref 130–400)
PMV BLD AUTO: 10 FL (ref 9.4–12.4)
PMV BLD AUTO: 10.1 FL (ref 9.4–12.4)
PMV BLD AUTO: 10.3 FL (ref 9.4–12.4)
PMV BLD AUTO: 10.5 FL (ref 9.4–12.4)
PO2 MIXED: 34 MMHG (ref 25–40)
PO2: 51 MMHG (ref 71–104)
PO2: 68 MMHG (ref 71–104)
PO2: 69 MMHG (ref 71–104)
PO2: 73 MMHG (ref 71–104)
PO2: 80 MMHG (ref 71–104)
PO2: 82 MMHG (ref 71–104)
POC ACTIVATED CLOTTING TIME KAOLIN: 125 SECONDS (ref 1–150)
POC ACTIVATED CLOTTING TIME KAOLIN: 158 SECONDS (ref 1–150)
POC ACTIVATED CLOTTING TIME KAOLIN: 175 SECONDS (ref 1–150)
POC ACTIVATED CLOTTING TIME KAOLIN: 180 SECONDS (ref 1–150)
POC ACTIVATED CLOTTING TIME KAOLIN: 186 SECONDS (ref 1–150)
POC O2 SATURATION: 65 % (ref 94–97)
POTASSIUM REFLEX MAGNESIUM: 3.8 MEQ/L (ref 3.5–5.2)
POTASSIUM SERPL-SCNC: 4.2 MEQ/L (ref 3.5–5.2)
RBC # BLD: 3.16 MILL/MM3 (ref 4.7–6.1)
RBC # BLD: 3.4 MILL/MM3 (ref 4.7–6.1)
RBC # BLD: 3.53 MILL/MM3 (ref 4.7–6.1)
RBC # BLD: 3.54 MILL/MM3 (ref 4.7–6.1)
RESPIRATORY CULTURE: ABNORMAL
RESPIRATORY CULTURE: ABNORMAL
SEG NEUTROPHILS: 66.2 %
SEGMENTED NEUTROPHILS ABSOLUTE COUNT: 6.3 THOU/MM3 (ref 1.8–7.7)
SET PEEP: 10 MMHG
SET PEEP: 12 MMHG
SET PEEP: 18 MMHG
SET PRESS SUPP: 14 CMH2O
SET PRESS SUPP: 40 CMH2O
SET RESPIRATORY RATE: 10 BPM
SITE: NORMAL
SODIUM BLD-SCNC: 139 MEQ/L (ref 135–145)
SOURCE, BLOOD GAS: ABNORMAL
SOURCE, BLOOD GAS: NORMAL
TOTAL PROTEIN: 4.3 G/DL (ref 6.1–8)
WBC # BLD: 7.8 THOU/MM3 (ref 4.8–10.8)
WBC # BLD: 8.3 THOU/MM3 (ref 4.8–10.8)
WBC # BLD: 9 THOU/MM3 (ref 4.8–10.8)
WBC # BLD: 9.5 THOU/MM3 (ref 4.8–10.8)

## 2021-09-10 PROCEDURE — 85027 COMPLETE CBC AUTOMATED: CPT

## 2021-09-10 PROCEDURE — C1894 INTRO/SHEATH, NON-LASER: HCPCS

## 2021-09-10 PROCEDURE — 99291 CRITICAL CARE FIRST HOUR: CPT | Performed by: INTERNAL MEDICINE

## 2021-09-10 PROCEDURE — C1887 CATHETER, GUIDING: HCPCS

## 2021-09-10 PROCEDURE — 85025 COMPLETE CBC W/AUTO DIFF WBC: CPT

## 2021-09-10 PROCEDURE — 6360000002 HC RX W HCPCS: Performed by: INTERNAL MEDICINE

## 2021-09-10 PROCEDURE — 2580000003 HC RX 258

## 2021-09-10 PROCEDURE — 83735 ASSAY OF MAGNESIUM: CPT

## 2021-09-10 PROCEDURE — 71045 X-RAY EXAM CHEST 1 VIEW: CPT

## 2021-09-10 PROCEDURE — P9035 PLATELET PHERES LEUKOREDUCED: HCPCS

## 2021-09-10 PROCEDURE — 33966 ECMO/ECLS RMVL PRPH CANNULA: CPT | Performed by: INTERNAL MEDICINE

## 2021-09-10 PROCEDURE — 93451 RIGHT HEART CATH: CPT | Performed by: INTERNAL MEDICINE

## 2021-09-10 PROCEDURE — 94640 AIRWAY INHALATION TREATMENT: CPT

## 2021-09-10 PROCEDURE — 6360000002 HC RX W HCPCS: Performed by: NURSE PRACTITIONER

## 2021-09-10 PROCEDURE — 36415 COLL VENOUS BLD VENIPUNCTURE: CPT

## 2021-09-10 PROCEDURE — 82947 ASSAY GLUCOSE BLOOD QUANT: CPT

## 2021-09-10 PROCEDURE — C9113 INJ PANTOPRAZOLE SODIUM, VIA: HCPCS | Performed by: NURSE PRACTITIONER

## 2021-09-10 PROCEDURE — 33948 ECMO/ECLS DAILY MGMT-VENOUS: CPT | Performed by: INTERNAL MEDICINE

## 2021-09-10 PROCEDURE — 82803 BLOOD GASES ANY COMBINATION: CPT

## 2021-09-10 PROCEDURE — 2000000000 HC ICU R&B

## 2021-09-10 PROCEDURE — 37799 UNLISTED PX VASCULAR SURGERY: CPT

## 2021-09-10 PROCEDURE — 80053 COMPREHEN METABOLIC PANEL: CPT

## 2021-09-10 PROCEDURE — 84132 ASSAY OF SERUM POTASSIUM: CPT

## 2021-09-10 PROCEDURE — 94761 N-INVAS EAR/PLS OXIMETRY MLT: CPT

## 2021-09-10 PROCEDURE — 2700000000 HC OXYGEN THERAPY PER DAY

## 2021-09-10 PROCEDURE — 85014 HEMATOCRIT: CPT

## 2021-09-10 PROCEDURE — 2580000003 HC RX 258: Performed by: INTERNAL MEDICINE

## 2021-09-10 PROCEDURE — 2580000003 HC RX 258: Performed by: NURSE PRACTITIONER

## 2021-09-10 PROCEDURE — 83605 ASSAY OF LACTIC ACID: CPT

## 2021-09-10 PROCEDURE — 82948 REAGENT STRIP/BLOOD GLUCOSE: CPT

## 2021-09-10 PROCEDURE — 82330 ASSAY OF CALCIUM: CPT

## 2021-09-10 PROCEDURE — 82810 BLOOD GASES O2 SAT ONLY: CPT

## 2021-09-10 PROCEDURE — 93312 ECHO TRANSESOPHAGEAL: CPT

## 2021-09-10 PROCEDURE — 5A1522H EXTRACORPOREAL OXYGENATION, MEMBRANE, PERIPHERAL VENO-VENOUS: ICD-10-PCS | Performed by: INTERNAL MEDICINE

## 2021-09-10 PROCEDURE — 93320 DOPPLER ECHO COMPLETE: CPT

## 2021-09-10 PROCEDURE — 6370000000 HC RX 637 (ALT 250 FOR IP): Performed by: INTERNAL MEDICINE

## 2021-09-10 PROCEDURE — 2500000003 HC RX 250 WO HCPCS: Performed by: NURSE PRACTITIONER

## 2021-09-10 PROCEDURE — 94003 VENT MGMT INPAT SUBQ DAY: CPT

## 2021-09-10 PROCEDURE — 85347 COAGULATION TIME ACTIVATED: CPT

## 2021-09-10 PROCEDURE — 6370000000 HC RX 637 (ALT 250 FOR IP): Performed by: NURSE PRACTITIONER

## 2021-09-10 PROCEDURE — 93325 DOPPLER ECHO COLOR FLOW MAPG: CPT

## 2021-09-10 PROCEDURE — 99292 CRITICAL CARE ADDL 30 MIN: CPT | Performed by: INTERNAL MEDICINE

## 2021-09-10 PROCEDURE — 85018 HEMOGLOBIN: CPT

## 2021-09-10 RX ORDER — FUROSEMIDE 10 MG/ML
80 INJECTION INTRAMUSCULAR; INTRAVENOUS ONCE
Status: COMPLETED | OUTPATIENT
Start: 2021-09-10 | End: 2021-09-10

## 2021-09-10 RX ADMIN — POTASSIUM CHLORIDE 20 MEQ: 29.8 INJECTION, SOLUTION INTRAVENOUS at 23:46

## 2021-09-10 RX ADMIN — MIDAZOLAM 10 MG/HR: 5 INJECTION, SOLUTION INTRAMUSCULAR; INTRAVENOUS at 23:50

## 2021-09-10 RX ADMIN — POTASSIUM CHLORIDE 20 MEQ: 29.8 INJECTION, SOLUTION INTRAVENOUS at 17:32

## 2021-09-10 RX ADMIN — POTASSIUM CHLORIDE 20 MEQ: 29.8 INJECTION, SOLUTION INTRAVENOUS at 16:05

## 2021-09-10 RX ADMIN — NALOXEGOL OXALATE 12.5 MG: 12.5 TABLET, FILM COATED ORAL at 12:03

## 2021-09-10 RX ADMIN — Medication 300 MG: at 20:33

## 2021-09-10 RX ADMIN — FUROSEMIDE 80 MG: 40 INJECTION, SOLUTION INTRAMUSCULAR; INTRAVENOUS at 13:34

## 2021-09-10 RX ADMIN — FENTANYL CITRATE 175 MCG/HR: 50 INJECTION INTRAMUSCULAR; INTRAVENOUS at 06:04

## 2021-09-10 RX ADMIN — FENTANYL CITRATE 175 MCG/HR: 50 INJECTION INTRAMUSCULAR; INTRAVENOUS at 12:51

## 2021-09-10 RX ADMIN — FENTANYL CITRATE 175 MCG/HR: 50 INJECTION INTRAMUSCULAR; INTRAVENOUS at 20:49

## 2021-09-10 RX ADMIN — SODIUM CHLORIDE, PRESERVATIVE FREE 10 ML: 5 INJECTION INTRAVENOUS at 13:46

## 2021-09-10 RX ADMIN — Medication 15 ML: at 20:58

## 2021-09-10 RX ADMIN — PANTOPRAZOLE SODIUM 40 MG: 40 INJECTION, POWDER, FOR SOLUTION INTRAVENOUS at 08:30

## 2021-09-10 RX ADMIN — Medication 15 ML: at 13:46

## 2021-09-10 RX ADMIN — CHLOROTHIAZIDE SODIUM 250 MG: 500 INJECTION, POWDER, LYOPHILIZED, FOR SOLUTION INTRAVENOUS at 14:46

## 2021-09-10 RX ADMIN — FLUCONAZOLE 400 MG: 2 INJECTION, SOLUTION INTRAVENOUS at 12:49

## 2021-09-10 RX ADMIN — MIDAZOLAM 10 MG/HR: 5 INJECTION, SOLUTION INTRAMUSCULAR; INTRAVENOUS at 12:52

## 2021-09-10 RX ADMIN — Medication 2 MCG/MIN: at 20:26

## 2021-09-10 RX ADMIN — SODIUM CHLORIDE, PRESERVATIVE FREE 10 ML: 5 INJECTION INTRAVENOUS at 13:45

## 2021-09-10 RX ADMIN — Medication 1 CAPSULE: at 12:03

## 2021-09-10 RX ADMIN — MIDAZOLAM 10 MG/HR: 5 INJECTION, SOLUTION INTRAMUSCULAR; INTRAVENOUS at 01:23

## 2021-09-10 RX ADMIN — FLUCONAZOLE IN SODIUM CHLORIDE 200 MG: 2 INJECTION, SOLUTION INTRAVENOUS at 13:45

## 2021-09-10 RX ADMIN — LEVOFLOXACIN 750 MG: 5 INJECTION, SOLUTION INTRAVENOUS at 11:17

## 2021-09-10 ASSESSMENT — PULMONARY FUNCTION TESTS
PIF_VALUE: 25
PIF_VALUE: 22
PIF_VALUE: 23
PIF_VALUE: 26

## 2021-09-10 NOTE — FLOWSHEET NOTE
1600 Woggle sutures removed both groin sites and the sites were covered with a band aid. Both groins remains bruised but soft.

## 2021-09-10 NOTE — BRIEF OP NOTE
[address] [address]  Sedation/Analgesia Post Sedation Record    Pt Name: Isaac Nice  Account number: [de-identified]  MRN: [id number]  YOB: 1975  Procedure Performed By: MD MD Chapin Raymond, [address]  Primary Care Physician: Darius De Leon MD  Date: 9/10/2021    POST-PROCEDURE    Physicians/Assistants: MD MD Chapin Raymond, JESÚS    Procedure Performed:ECMO Decannulation      Sedation/Anesthesia: Versed/ Fentanyl and 2% xylocaine local anesthesia. Estimated Blood Loss: < 50 ml. Specimens Removed: None         Disposition of Specimen: N/A        Complications: No Immediate Complications.        Post-procedure Diagnosis/Findings:       Successful IJV closure with figure 8/mattress  JASPREET guidance  SGC re-insertion         MD MD Chapin Raymond, JESÚS  Electronically signed 9/10/2021 at 10:15 AM  Interventional Cardiology

## 2021-09-10 NOTE — FLOWSHEET NOTE
0800 Mr Yenny Crump rests in the bed, sedated with Fentanyl and Versed. He requires 2ug of Levophed to keep his BP above ordered parameters. The Heparin has been stopped anticipating ECMO cath removal. He has a swan left SC site with some oozing. He has a 3 lumen CVC right SC site with oozing as well. The ECMO catheter has some bleeding around the site. Both groins are bruised but soft. His right leg appears swollen and this leg remains wrapped with ace per Dr Nikky Borges has a temp sensing Rosales patent to gravity. ECMO flow at 4.1 currently and his sweep gas is 0. He has an art line right radial site clear. His groin sites appear stable. tube feeding Vital at 20 cc/hr. He has about 15 cc's residual with this.  Tube feeding stopped as we are planning to remove the ECMO cath this AM.

## 2021-09-10 NOTE — FLOWSHEET NOTE
1400 Mr Alistair Westbrook appears to be diuresing well. Right IJ site remains swollen but no active bleeding at the site.

## 2021-09-10 NOTE — PROCEDURES
[address] [place] [address]                            CARDIAC CATHETERIZATION    PATIENT NAME: Сергей Sanches                        :        1975  MED REC NO:   [id number]                           ROOM:       0002  ACCOUNT NO:   [de-identified]                           ADMIT DATE: 2021  PROVIDER:     Raymundo Spears MD    DATE OF PROCEDURE:  09/10/2021    INDICATION FOR PROCEDURE:  VV-ECMO decannulation after recovered from  COVID-19 respiratory failure. DESCRIPTION OF THE PROCEDURE:  After informed consent was obtained from  the patient's family, he was brought to the hybrid operating room and  prepped in sterile fashion. Right internal jugular vein and protective  cannula were prepped and sterilized as much as feasible for left  subclavian venous access, was also sterilized as much as feasible. A  JASPREET probe was inserted by myself under sterile technique using a sterile  PROcovers and was performed while weaning the ECMO circuit. We  progressively went from 4 to 2 to 1.5 liters per minute. No right  ventricular failure or dilation was noted. No worsening of tricuspid  regurgitation was noted. The patient's heart tolerated the wean without  any issues on the right side. Saturations were maintained with this  ventilator support. The patient ultimately deemed appropriate after  doing the weaning trial in the ICU. Thereafter all sutures were  disconnected. Sterile iodine was used to re-prep the area once more  after sutures were removed. I then placed two figure-of-eight mattress  sutures around the access site. Then, the entire cannula was  disconnected from the circuitry and the pump was turned off. Dr. Marisol Verma  then removed the ECMO cannula while I sutured down the access site and  maintained and obtained hemostasis. Once we had complete hemostasis of  the neck, the patient was observed for any swelling.   There was none.   Standard hand-held ultrasound was done confirming that the IJ was intact  and the carotid was intact as well without any continued bleeding or  swelling. There was superficial edema noted that was appropriate for  the access site. Thereafter, I then turned my attention to the left  subclavian vein. This was exchanged over 0.035 J-tipped guidewire with  a new Cordis sheath. We then floated standard Tovey-Rayo catheter  throughout the intracardiac chambers measured saturations which was 63%  in the pulmonary artery. At that time, the oxygen saturation was 96%. Given these findings, no further intervention was done and Tovey-Rayo  catheter was sutured in place. The patient is already on IV  antibiotics. The patient tolerated the procedure well. He only transfused  appropriately due to the expected blood loss in the cannula circuitry. IMMEDIATE COMPLICATIONS:  None. MEDICATIONS:  See EMR. ACCESS:  See above. ESTIMATED BLOOD LOSS:  100 and 150 mL in the circuit of the ECMO. SUMMARY:  Successful VV-ECMO decannulation; Tovey-Rayo catheter  insertion. PLAN:  1. Bedrest.  2.  Optimal medical therapy. 3.  Risk factor management. 4.  Routine access site care. 5.  Transfuse as necessary. 6.  Aggressive diuresis. 7.  Decrease IV fluids  8. Follow intracardiac pressures. 9.  Follow ABGs. 10.  Titrate IV pressors off as tolerated. All of the above was explained to the patient and patient's family. They were agreeable and amenable to the plan.         David Kingston MD    D: 09/10/2021 14:20:45       T: 09/10/2021 15:18:18     [email or web address]  Job#: [id number]     Doc#: [id number]    CC:

## 2021-09-10 NOTE — PROGRESS NOTES
[facility]  Sedation/Analgesia History & Physical    Pt Name: Harry Mcrae  Account number: [de-identified]  MRN: [id number]  YOB: 1975  Provider Performing Procedure: Gael Dumont MD MD  Referring Provider: Bertha Mejia MD   Primary Care Physician: Jarett Haddad MD  Date: 9/10/2021    PRE-PROCEDURE    Code Status: FULL CODE  Brief History/Pre-Procedure Diagnosis:   ECMO Decannulation    Consent: : I have discussed with the patient risks, benefits, and alternatives (and relevant risks, benefits, and side effects related to alternatives or not receiving care), and likelihood of the success. The patient and/or representative appear to understand and agree to proceed. The discussion encompasses risks, benefits, and side effects related to the alternatives and the risks related to not receiving the proposed care, treatment, and services. The indication, risks and benefits of the procedure and possible therapeutic consequences and alternatives were discussed with the patient. The patient was given the opportunity to ask questions and to have them answered to his/her satisfaction. Risks of the procedure include but are not limited to the following: Bleeding, hematoma including retroperitoneal hemmorhage, infection, pain and discomfort, injury to the aorta and other blood vessels, rhythm disturbance, low blood pressure, myocardial infarction, stroke, kidney damage/failure, myocardial perforation, allergic reactions to sedatives/contrast material, loss of pulse/vascular compromise requiring surgery, aneurysm/pseudoaneurysm formation, possible loss of a limb/hand/leg, needing blood transfusion, requiring emergent open heart surgery or emergent coronary intervention, the need for intubation/respiratory support, the requirement for defibrillation/cardioversion, and death. Alternatives to and omission of the suggested procedure were discussed.  The patient had no further questions and wished to proceed; the consent form was signed. MEDICAL HISTORY  []ASHD/ANGINA/MI/CHF   []Hypertension  []Diabetes  []Hyperlipidemia  []Smoking  []Family Hx of ASHD  [x]Additional information:       has no past medical history on file. SURGICAL HISTORY   has a past surgical history that includes Cholecystectomy.   Additional information:       ALLERGIES   Allergies as of 08/30/2021    (No Known Allergies)     Additional information:       MEDICATIONS   Aspirin  [] 81 mg  [] 325 mg  [x] None  Antiplatelet drug therapy use last 5 days  [x]No []Yes  Coumadin Use Last 5 Days [x]No []Yes  Other anticoagulant use last 5 days  [x]No []Yes    Current Facility-Administered Medications:     0.9 % sodium chloride infusion, , IntraVENous, PRN, KATHARINA Hogue CNP    fluconazole (DIFLUCAN) 400 mg IVPB, 400 mg, IntraVENous, Q24H, Last Rate: 100 mL/hr at 09/09/21 1142, 400 mg at 09/09/21 1142 **AND** fluconazole (DIFLUCAN) 200 mg IVPB, 200 mg, IntraVENous, Q24H, Carito Best MD, Last Rate: 100 mL/hr at 09/09/21 1320, 200 mg at 09/09/21 1320    levoFLOXacin (LEVAQUIN) 750 MG/150ML infusion 750 mg, 750 mg, IntraVENous, Q24H, Carito Best MD, Stopped at 09/09/21 1147    chlorhexidine (PERIDEX) 0.12 % solution 15 mL, 15 mL, Mouth/Throat, BID, KATHARINA Guajardo CNP, 15 mL at 09/09/21 2053    norepinephrine (LEVOPHED) 16 mg in sodium chloride 0.9 % 250 mL infusion, 2-100 mcg/min, IntraVENous, Continuous, KATHARINA Hogue CNP, Last Rate: 1.9 mL/hr at 09/10/21 0545, 2 mcg/min at 09/10/21 0545    [Held by provider] lactated ringers infusion, , IntraVENous, Continuous, Carito Best MD, Last Rate: 100 mL/hr at 09/05/21 2211, New Bag at 09/05/21 2211    tobramycin (PF) (LUISANA) nebulizer solution 300 mg, 300 mg, Nebulization, BID, Carito Best MD, 300 mg at 09/09/21 2200    midazolam (VERSED) injection 2 mg, 2 mg, IntraVENous, Once, KTAHARINA Guajardo - CNP    midazolam (VERSED) albuterol (PROVENTIL) nebulizer solution 2.5 mg, 2.5 mg, Nebulization, Q4H PRN, KATHARINA Kelly CNP    heparin (porcine) injection 15,000 Units, 15,000 Units, IntraVENous, PRN, KATHARINA Kelly - CNP    lubrifresh P.M. (artificial tears) ophthalmic ointment, , Both Eyes, PRN, KATHARINA Dunaway CNP, Given at 09/05/21 2112    lactobacillus (CULTURELLE) capsule 1 capsule, 1 capsule, Oral, Daily with breakfast, Sarah Vega MD, 1 capsule at 09/09/21 3613    sodium chloride flush 0.9 % injection 5-40 mL, 5-40 mL, IntraVENous, 2 times per day, Tono Stout MD, 10 mL at 09/09/21 2053    sodium chloride flush 0.9 % injection 5-40 mL, 5-40 mL, IntraVENous, PRN, Tono Stout MD    ondansetron (ZOFRAN-ODT) disintegrating tablet 4 mg, 4 mg, Oral, Q8H PRN **OR** ondansetron (ZOFRAN) injection 4 mg, 4 mg, IntraVENous, Q6H PRN, Tono Stout MD    polyethylene glycol (GLYCOLAX) packet 17 g, 17 g, Oral, Daily PRN, Tono Stout MD, 17 g at 09/05/21 1445    acetaminophen (TYLENOL) tablet 650 mg, 650 mg, Oral, Q6H PRN **OR** acetaminophen (TYLENOL) suppository 650 mg, 650 mg, Rectal, Q6H PRN, Tono Stout MD  Prior to Admission medications    Not on File     Additional information:       VITAL SIGNS   Vitals:    09/10/21 0630   BP:    Pulse: 77   Resp: 16   Temp:    SpO2: 94%       PHYSICAL:   General Appearance: sedated/intubated  Cardiovascular: normal rate, regular rhythm, normal S1 and S2, no murmurs, rubs, clicks, or gallops, distal pulses intact, no carotid bruits, no JVD  Pulmonary/Chest: Bilateral crackles  Abdomen: soft, non-tender, non-distended, normal bowel sounds, no masses   Extremities: no cyanosis, clubbing, 2+ edema bilateraly  Skin: warm and dry, no rash or erythema  Head: normocephalic and atraumatic  Eyes: pupils equal, round, and reactive to light  Neck: supple and non-tender without mass, no thyromegaly   Musculoskeletal: normal range of motion, no joint swelling, deformity or tenderness  Neurological: sedated/intubated        PLANNED PROCEDURE   []Cath  []PCI                []Pacemaker/AICD  []JASPREET             []Cardioversion []Peripheral angiography/PTA  [x]Other: ECMO Decannulation     SEDATION  Planned agent:[]Midazolam []Meperidine []Sublimaze []Morphine  []Diazepam  [x]Other:     ASA Classification:  []1 []2 []3 [x]4 []5  Class 1: A normal healthy patient  Class 2: Pt with mild to moderate systemic disease  Class 3: Severe systemic disease or disturbance  Class 4: Severe systemic disorders that are already life threatening. Class 5: Moribund pt with little chances of survival, for more than 24 hours. Mallampati I Airway Classification:   []1 []2 []3 []4 - intubated    [x]Pre-procedure diagnostic studies complete and results available. Comment:    [x]Previous sedation/anesthesia experiences assessed. Comment:    [x]The patient is an appropriate candidate to undergo the planned procedure sedation and anesthesia. (Refer to nursing sedation/analgesia documentation record)  [x]Formulation and discussion of sedation/procedure plan, risks, and expectations with patient and/or responsible adult completed. [x]Patient examined immediately prior to the procedure.  (Refer to nursing sedation/analgesia documentation record)    Lukas Serna MD MD   Electronically signed 9/10/2021 at 7:00 AM

## 2021-09-10 NOTE — ADT AUTH CERT
Utilization Reviews       Additional Clinical 9/6, 9/9 by Logan Kim       Review Status Review Entered   In Primary 9/10/2021 13:57      Criteria Review   Continued Stay Review Note     9/6/21     Patient Visit Status: inpt  Level of Care: covid icu     Last set of vitals: BP (!) 90/59   Pulse 76   Temp 96.6 °F (35.9 °C) (Core)   Resp 13   Ht 5' 10\" (1.778 m)   Wt (!) 306 lb 7 oz (139 kg)   SpO2 91%   BMI 43.97 kg/m²       General Appearance: intubated/sedated  Cardiovascular: bradycardic, regular rhythm, normal S1 and S2, no murmurs, rubs, clicks, or gallops, distal pulses intact, no carotid bruits, no JVD; lines intact, no bleeding from groins, ECMO cannulas intact  Pulmonary/Chest: Bilateral crackles     Current Treatments: ECMO; transfuse 1 unit prbc; remains on vent; iv albumin q6h, iv rocephin q12h, hhn mikey tid, iv nimbex gtt, iv fentanyl gtt, iv heparin gtt, iv levophed gtt, iv versed gtt, iv propofol gtt, iv kcl x3, continuous oximetry, telemetry, droplet + precautions, neurovascular checks q4h, daily weights, I&o q1h, cbc q6h     Labs:         Ref Range & Units 09/06/21 0445   Glucose 70 - 108 mg/dL 124High     CREATININE 0.4 - 1.2 mg/dL 0.5    BUN 7 - 22 mg/dL 20    Sodium 135 - 145 meq/L 140    Potassium reflex Magnesium 3.5 - 5.2 meq/L 4.2    Chloride 98 - 111 meq/L 106    CO2 23 - 33 meq/L 27    Calcium 8.5 - 10.5 mg/dL 8.3Low     AST 5 - 40 U/L 72High     Alkaline Phosphatase 38 - 126 U/L 24Low     Total Protein 6.1 - 8.0 g/dL 4.7Low     Albumin 3.5 - 5.1 g/dL 3.7    Total Bilirubin 0.3 - 1.2 mg/dL 1.0    ALT 11 - 66 U/L 83High          Ref Range & Units 09/06/21 0445   WBC 4.8 - 10.8 thou/mm3 13. 6High     RBC 4.70 - 6.10 mill/mm3 3.45Low     Hemoglobin 14.0 - 18.0 gm/dl 10. 0Low     Hematocrit 42.0 - 52.0 % 30.3Low     MCV 80.0 - 94.0 fL 87.8    MCH 26.0 - 33.0 pg 29.0    MCHC 32.2 - 35.5 gm/dl 33.0    RDW-CV 11.5 - 14.5 % 14. 6High     RDW-SD 35.0 - 45.0 fL 46.4High     Platelets 127 - 400 thou/mm3 113Low     MPV 9.4 - 12.4 fL 10.3         Ref Range & Units 09/06/21 0949   pH, Blood Gas 7.35 - 7.45 7.41    PCO2 35 - 45 mmhg 42    PO2 71 - 104 mmhg 55Low     HCO3 23 - 28 mmol/l 27    Base Excess (Calculated) -2.5 - 2.5 mmol/l 2.0    O2 Sat % 88    IFIO2   60    DEVICE   Adult Vent       CXR  Impression   1. Stable bilateral consolidations, consistent with pneumonia. 2. Recommend withdrawal of the endotracheal tube approximately 2 cm.      Review/Comments:  1. Acute hypoxemic respiratory failure: Secondary to COVID-19 with subsequent progression to ARDS.  Patient intubated 9/1/2021.    Patient undergoing lung rest strategy since ECMO has been administered. 2. COVID-19: Patient failed trial of remdesivir, tocilizumab, and Decadron.  Patient with progressive hypoxemia. 3. Pneumonia: Coinfection with Haemophilus influenza.  Patient on Rocephin.  Patient transition to higher dosing of Rocephin. Antibiotics initiated 8/30/2021.  4. Sepsis: Secondary to combination of Covid and Haemophilus influenza.  Present at presentation. 5. ARDS:  Patient intubated 9/1/2021.  ECMO initiated 9/1/2021.  Patient under lung rest strategy with pressure control 10, PEEP 12, FiO2 60%.  Rate 10.  Continue with lung rest strategy and ECMO. 6. Elevated liver enzymes: Monitor.  Bilirubin was increasing.  Haptoglobin pending.  Bilirubin has decreased with RPM reduction on pump. 7. Obesity: Aware. 8. Leukocytosis: Continue antibiotic therapy.    9. Normocytic anemia: Transfuse for hemoglobin to be 10 or better.   10. Mild neutropenia: resolved; Obtain immunoglobulin panel.  White blood cell count trending upward.     Subjective (Events in last 24 hours):   Sats continue to drop  Have tried to increase preload  24 L +  BP mildly depressed  Bronch done today with findings of PNA>pulmonary edema  Needing more vent support as ECMO flows are poor - concern for thrombosis/kinking/cannula interaction in IVC  PaO2 has dropped to < 60  Flows 1.5-2L/min with patient CO 6 Lpm  Right thigh is swelling     Plan:  · Neuro - intact, responsive but on high dose narcotics  · CV - ECMO is failing, flows falling, possible drainage cannula, ECMO conference between all members suggest that we should proceed with switch out to [address] at this time, as he is requiring more and more vent support to maintain oxygenation  · Florinda Rebollar has increased to 70% and PEEP up to 15, CXR worse, Bronch shows significant PNA, less pulmonary edema  · ID - Hflu PNA, on ABx, Decadron for COVID, all other COVID therapies have been ineffective thus far  · GI - hold TF, no bowel sounds, on Movantik with narcotics  · Heme - Requiring intermittent PRBCs  · Skin - not moving at this time, will watch for decubitus ulcers when feasible  · Renal - good UOP, no concerns for NIRAJ  · Endo - BS < 180, use Insulin as needed  · Dispo - ICU care, continue current care, weeks of support will likely be needed  · FULL CODE     Critical Care Notes  RE for ECMO initiation: ARDS  Do2: 278  Vo2: 85  Sweep Gas:  2/2  Pump Flow: 2.1  SvO2: 61  SaO2:  88  ACT Goal:  190-210  VJH:  400  Plt:  113  Hgb:  10  Lactic Acid:  1.3  Telemetry:  NSR  Paralytic:  No  Sedation Goals:  RASS -2  PC 10:  PEEP 12:  RATE 10:  FiO2:  40  Generated :     Procedure Performed:Protek DUO, Femoral ECMO Decannulation                 Sedation/Anesthesia: Versed/ Fentanyl and 2% xylocaine local anesthesia.                            Estimated Blood Loss: 100 cc     Specimens Removed: None                                           Disposition of Specimen: N/A                                         Complications: No Immediate Complications.                                        Post-procedure Diagnosis/Findings:                                   R IJ 31Fr ProteK Duo   Bifemoral venous decannulation with Woggle  4.5 Lpm of flow at 6500 RPM  Good position of distal cannula  JASPREET confirms tip of cannula is beyond PA  ACT goal ~200-250s once groins are stable     SUMMARY:  Successful 31-[place] ProtekDuo insertion via the right  internal jugular vein; successful decannulation of bifemoral venous  VV-ECMO; procedure completed under JASPREET and fluoroscopic guidance.     PLAN:  1.  Continue ECMO support. 2.  ICU protocol for VV-ECMO. 3.  Run ACTs 200 to 250 seconds after the groin hemostasis is  documented. 4.  Wrap the right lower extremity to help with venous return. 5.  SCDs. 6.  Repeat labs in 2 to 4 hours. 7.  Follow CO2 to adjust sweeps. 8.  Chest x-ray to be done once we shift to the ICU.     Anticipated Discharge Plan: tbd     Continued Stay Review Note     9/9/21     Patient Visit Status: inpt  Level of Care: covid icu     Last set of vitals: T: 98.4: P: 83 RR: 10 B/P: 96/45: FiO2: 30: O2 Sat:94: I/O: 6453/970 GCS: 8  Body mass index is 43.43 kg/m². Kirk Henriquez PC: 14/14: TV: 500: RRTotal: 10: Ti:1 sec:      Current Treatments: Weaning ECMO; transfuse 2 units prbc, platelets; remains on vent; iv diflucan qd, iv levaquin q24h, hhn mikey tid,  iv fentanyl gtt, iv levophed gtt, iv versed gtt, iv heparin gtt, continuous oximetry, telemetry, droplet + precautions, neurovascular checks q4h, daily weights, I&o q1h, cbc q6h     Labs:         Ref Range & Units 09/09/21 0430   Glucose 70 - 108 mg/dL 122High     CREATININE 0.4 - 1.2 mg/dL 0.7    BUN 7 - 22 mg/dL 17    Sodium 135 - 145 meq/L 139    Potassium reflex Magnesium 3.5 - 5.2 meq/L 4.0    Chloride 98 - 111 meq/L 105    CO2 23 - 33 meq/L 25    Calcium 8.5 - 10.5 mg/dL 8.0Low          Ref Range & Units 09/09/21 0400 09/09/21 0001   WBC 4.8 - 10.8 thou/mm3 16.7High   17. 7High     RBC 4.70 - 6.10 mill/mm3 3. 08Low   3.40Low     Hemoglobin 14.0 - 18.0 gm/dl 9.3Low   10.1Low     Hematocrit 42.0 - 52.0 % 27.4Low   30. 0Low     MCV 80.0 - 94.0 fL 89.0  88.2    MCH 26.0 - 33.0 pg 30.2  29.7    MCHC 32.2 - 35.5 gm/dl 33.9  33.7    RDW-CV 11.5 - 14.5 % 15. 8High   15.7High     RDW-SD 35.0 - 45.0 fL 49.5High   48. 7High     Platelets 791 - 313 thou/mm3 87Low   71Low          Ref Range & Units 09/09/21 0416 09/09/21 0027   pH, Blood Gas 7.35 - 7.45 7. 46High   7.42    PCO2 35 - 45 mmhg 36  37    PO2 71 - 104 mmhg 111High   113High     HCO3 23 - 28 mmol/l 26  24    Base Excess (Calculated) -2.5 - 2.5 mmol/l 1.9  -0.3    O2 Sat % 99  99    IFIO2   30  30    DEVICE   Adult Vent  Adult Vent       CXR  Impression   Impression:   Cardiomegaly present.       Moderate patchy opacities bilateral lungs, unchanged.      Review/Comments:  Pulmonology MD Notes  Acute hypoxemic respiratory failure: Secondary to COVID-19 with subsequent progression to ARDS.  Patient intubated 9/1/2021.   ECMO initiated 9/1/2021.  Patient required ECMO recannulation on 9/6/2021 secondary to kinked catheter with groin line.  Now with internal jugular Protek duo which was placed 9/6/2021.   S/P lung rest strategy.  Patient undergoing lung protection strategy targeting a plateau pressure of 30 or less.  Pressure control 14 PEEP 14 in order to recruit alveoli. COVID-19: Patient failed trial of remdesivir, tocilizumab, and Decadron.  Patient had progressive hypoxemia.  Can remove from isolation on 9/18/21 if he remains afebrile. Pneumonia: Coinfection with Haemophilus influenza.  Patient initially treated with Rocephin. Patient transition to higher dosing of Rocephin. Antibiotics initiated 8/30/2021.  Organism is beta-lactamase positive.  Rocephin ineffective.  Patient transition to [address] [place] [address] on 9/7/2021. Nebulized tobramycin was added on 9/5/2021.  Repeat bronchoscopy completed 9/8/2021 showed significant reduction in mucus production. Sepsis: Secondary to combination of Covid and Haemophilus influenza.  Present at presentation. Hypotension: Initially due to to heavy sedation and positive pressure ventilation.  Requiring low-dose pressors.  Subsequently worsened secondary to anemia of acute blood loss. Anemia secondary to acute blood loss:  This is primarily secondary to high ACT for clot prevention with low flow ECMO state.    Represents spontaneous bleeding.  Heparin discontinued 9/8/2021.  Patient requiring multiple transfusions.  No evidence of retroperitoneal hemorrhage on ultrasound.   Heparin resumed 9/8/2021 with stability of bleeding. ARDS:  Patient intubated 9/1/2021. Linda Del Rio initiated 9/1/2021.  Patient undergoing ECMO wean. Ángel Hinds is to wean ECMO first and then ventilator. Thrombocytopenia: Secondary to acute blood loss from hemorrhage.  Infusing platelets. Trending. Hepatostasis: Initially secondary to groin catheter placement.  Improving. Obesity: Aware. Mild neutropenia: IgG and IgM level are normal.  IgA level is slightly diminished.  Resolved. Hemoptysis: Secondary to spontaneous alveolar hemorrhage related to anticoagulation and high ACT requirements.  Heparin discontinued 9/8/2021.  Bronchoscopy completed 9/8/2021 showed old blood within the airway but no active bleeding.  Heparin held.  Resolved . .     Anticipated Discharge Plan: tbd       Viral Illness, Acute - Care Day 4 (9/2/2021) by Jose Zhang       Review Status Review Entered   Completed 9/10/2021 13:43      Criteria Review      Care Day: 4 Care Date: 9/2/2021 Level of Care: ICU    Guideline Day 3    Level Of Care    ( ) Floor to discharge    9/10/2021 1:43 PM EDT by Sesar Walters      icu    Clinical Status    (X) * Hemodynamic stability    (X) * Afebrile or temperature acceptable for next level of care    (X) * Tachypnea absent    9/10/2021 1:43 PM EDT by Sesar List      on vent    (X) * Hypoxemia absent    9/10/2021 1:43 PM EDT by Sesar List      on vent    ( ) * Mental status at baseline    9/10/2021 1:43 PM EDT by Sesar List      sedated    (X) * Renal function at baseline, or stable and acceptable for next level of care    ( ) * Discharge plans and education understood    Activity    ( ) * Ambulatory or acceptable for next level of care 7.2Low  CM    AST 5 - 40 U/L 44High      Alkaline Phosphatase 38 - 126 U/L 37Low      Total Protein 6.1 - 8.0 g/dL 5.1Low      Albumin 3.5 - 5.1 g/dL 3.0Low      Total Bilirubin 0.3 - 1.2 mg/dL 0.6    ALT 11 - 66 U/L 26       Ref Range & Units 09/02/21 0410   WBC 4.8 - 10.8 thou/mm3 5.2    RBC 4.70 - 6.10 mill/mm3 4. 33Low     Hemoglobin 14.0 - 18.0 gm/dl 12.3Low     Hematocrit 42.0 - 52.0 % 38.3Low     MCV 80.0 - 94.0 fL 88.5    MCH 26.0 - 33.0 pg 28.4    MCHC 32.2 - 35.5 gm/dl 32.1Low     RDW-CV 11.5 - 14.5 % 13.8    RDW-SD 35.0 - 45.0 fL 45. 1High     Platelets 572 - 970 thou/mm3 195    MPV 9.4 - 12.4 fL 9.7       Ref Range & Units 09/02/21 0512 09/02/21 0303 09/02/21 0206 09/02/21 0107 09/02/21 0011 09/01/21 2317   PH MIXED 7.31 - 7.41 7. 42High   7. 42High   7. 45High   7. 46High   7.32 7.31    PCO2, MIXED VENOUS 41 - 51 mmhg 29Low   31Low   30Low   30Low   44 49    PO2, Mixed 25 - 40 mmhg 59High   51High   45High   43High   50High   55High     HCO3, Mixed 23 - 28 mmol/l 19Low   20Low   20Low   21Low   23 25    Base Exc, Mixed -2.0 - 3.0 mmol/l -4.8Low   -4.2Low   -3. 0Low   -1.7 -3.1Low   -2.0    O2 Sat, Mixed % 91 87 84 82 82 85       CXR   Impression   1. Stable support devices. Cardiomegaly with diffuse bilateral airspace    opacities not significantly changed. Review/Comments:   Pulmonology MD Notes   Acute hypoxemic respiratory failure: Secondary to COVID-19 with subsequent progression to ARDS.  Patient intubated 9/1/2021.    Patient undergoing lung rest strategy since ECMO has been administered. COVID-19: Patient failed trial of remdesivir, tocilizumab, and Decadron.  Patient with progressive hypoxemia. Pneumonia: Coinfection with Haemophilus influenza.  Patient on Rocephin.  Patient transition to higher dosing of Rocephin. Antibiotics initiated 8/30/2021. Sepsis: Secondary to combination of Covid and Haemophilus influenza.  Present at presentation.    ARDS:  Patient intubated 9/1/2021. [facility] initiated 9/1/2021.  Patient under lung rest strategy with pressure control 10, PEEP 12, FiO2 40%.  Rate 10.  Continue with lung rest strategy and ECMO. Volume status: Patient has a right-sided ejection fraction of 6 L.  Therefore, need to keep CVP between 10 and 15 in order to adequately preload right ventricle for optimal left ventricle delivery.  Goal should be flow of 6.0 L in order to adequately prefill the right ventricle for left ventricular delivery.  Lower flows will result in the lower right sided cardiac output and decreased oxygen delivery to the distal tissues.  Patient on albumin 25 g IV every 6 hours for 8 doses to increase intravascular volume.  He is also on  cc an hour in order to increase intravascular volume. .   Obesity: Aware.    Mild neutropenia: Obtain immunoglobulin panel.  White blood cell count trending upward       Anticipated Discharge Plan: tbd

## 2021-09-10 NOTE — CARE COORDINATION
9/10/21, 2:41 PM EDT    DISCHARGE ON GOING EVALUATION    [facility] day: 11  Location: -02/002-A Reason for admit: Hypoxia [R09.02]  COVID-19 [U07.1]   Procedure:   8/30 CTA Chest: Neg for PE; Moderate groundglass infiltrates scattered throughout both lungs  9/1 Intubation  9/1 SGC left subclavian  9/1 CVC RIJ  9/1 Echo with EF 60%  9/1 JASPREET: EF 55-60%  9/1 Cardiac Cath: no sig CAD; PA pressure 40's; preserved CO/CI; Cannulated for ECMO  9/1 VV ECMO initiated  9/6 ECMO Cannulation sites changed  9/7 Bronch w/washings sent: Chronic airway inflammation with pitting airways disease and striation formation; Active pneumonia with bilateral lung mucus production; No active bleeding.  Hemoptysis secondary to suction trauma  9/7 US RUE: There is prominent edema in the subcutaneous fat at the right groin. No discrete hematoma is identified. 9/8 Bronch w/washings: Prominent pitting airways disease; Decreased mucus within the airway; Old clotted blood within the airway.  No active bleeding. Clots were removed with suction  9/8 US Abdomen: No definite retroperitoneal hematoma identified  9/10 Decannulated; ECMO stopped  9/10 CXR:   1. Right subclavian line with catheter tip in right atrium. An esophageal tube present with tip passing into the stomach. 2. Moderately severe infiltrates in both lung relatively diffusely. Left subclavian Hulen-Rayo catheter with tip in right lower lobe pulmonary artery     Barriers to Discharge: Decannulated today; ECMO stopped. Remains on vent w/ETT on PCV, peep 10, PC 14, FIO2 40%, sats 96%. Afebrile. NSR. Unable to follow commands; ROMERO x4 to painful stim. Respiratory culture +mold and candida albicans. Intensivist and Cardiology following. Dietitian consulted. Telemetry, SGC, maria esther, CVC, OG w/TF @ 20 ml/hr, nichols.  Fentanyl @ 175 mcg/hr, versed @ 10 mg/hr, levo @ 2 mcg/min, IV diuril, IV diflucan, SSI, lactobacillus, IV levaquin, movantik, IV protonix, tobramycin nebs, Electrolyte replacement protocols. Received 80 mg IV lasix x1 today. Alb 3, alt 146, ast 100, wbc 9, hgb 9.7, plt 62. PCP: Mitali Alvarez MD  Readmission Risk Score: 16%  Patient Goals/Plan/Treatment Preferences: From home w/wife. Plan pending clinical course. Will need PT/OT when appropriate.

## 2021-09-10 NOTE — PROGRESS NOTES
Patient:  Kyle Chugiak    Unit/Bed:4D-02/002-A  MRN: [id number]   PCP: Rufus Booth MD  Date of Admission: 8/30/2021    Assessment and Plan(All pulmonary edema, renal failure, PE, and respiratory failure diagnoses are acute in nature unless otherwise specified):          Acute hypoxemic respiratory failure: Secondary to COVID-19 with subsequent progression to ARDS.  Patient intubated 9/1/2021.   ECMO initiated 9/1/2021.  Patient required ECMO recannulation on 9/6/2021 secondary to kinked catheter with groin line.  Now with internal jugular Protek duo which was placed 9/6/2021.  ECMO completed with decannulation on 9/10/2021. S/P lung rest strategy.  Patient undergoing lung protection strategy targeting a plateau pressure of 30 or less.  Pressure control 14 PEEP 10. Continue with mechanical ventilator support. Should improve with diuresis. Blanchie Bevels COVID-19: Patient failed trial of remdesivir, tocilizumab, and Decadron. Patient had progressive hypoxemia. Can remove from isolation on 9/18/21 if he remains afebrile. Pneumonia: Coinfection with Haemophilus influenza.  Patient initially treated with Rocephin. Patient transition to higher dosing of Rocephin. Antibiotics initiated 8/30/2021.  Organism is beta-lactamase positive.  Rocephin ineffective.  Patient transition to [address] [place] [address] on 9/7/2021. Nebulized tobramycin was added on 9/5/2021.  Repeat bronchoscopy completed 9/8/2021 showed significant reduction in mucus production. Culture demonstrated airway colonization with Candida albicans. Patient did grow mold secondary to Aspergillus species with only light growth. I do not believe this represents an overwhelming infection. It may represent contamination. Repeat lavage 9/8/2021 is pending. Initial lavage was 9/6/2021. Awaiting culture to see if aspergillus species persists. Sepsis: Secondary to combination of Covid and Haemophilus influenza.  Present at presentation.   Hypotension: Initially due to to heavy sedation progressive oxygen supplementation.  He was treated with remdesivir Decadron and subsequently tocilizumab.  He continued to develop progressive dyspnea and had the syndrome of \"happy hypoxia\".  As patient continued to worsen, he elected to proceed with intubation and ECMO. Patient intubated 9/1/2021. Neo Beena cannulation 9/1/2021.  As the catheter started to warm, it became more compliant and developed kinking causing impaired outflow.  As the flow continued to deteriorate, it was clear patient would need replacement of bilateral groin catheters.  Patient underwent recannulation of the right internal jugular with Protek duo and removal of the bilateral groin lines.  Flow immediately returned without complications. Patient continued to improve and ECMO was terminated with decannulation on 9/10/2021. Patient did develop coinfection.  Patient had COVID-19 pneumonia worsened by beta lactamase positive Haemophilus influenza.  Rocephin was initiated 8/30/2021.  Patient was initially treated with Rocephin without improvement.  Patient was transitioned to [address] [place] [address] on 9/7/2021.  Nebulized tobramycin was added 9/5/2021. ROS: Sedated on mechanical ventilator. 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   FHX:   Allergies: NKDA  Medications:     sodium chloride      norepinephrine 2 mcg/min (09/10/21 0545)    [Held by provider] lactated ringers 100 mL/hr at 09/05/21 2211    midazolam 10 mg/hr (09/10/21 0123)    dextrose      fentaNYL (SUBLIMAZE) 1250 mcg in sodium chloride 0.9 % 250 mL 175 mcg/hr (09/10/21 0604)      fluconazole  400 mg IntraVENous Q24H    And    fluconazole  200 mg IntraVENous Q24H    levofloxacin  750 mg IntraVENous Q24H    chlorhexidine  15 mL Mouth/Throat BID    tobramycin (PF)  300 mg Nebulization BID    midazolam  2 mg IntraVENous Once    naloxegol  12.5 mg Oral QAM    insulin lispro  0-6 Units SubCUTAneous Q4H    pantoprazole  40 mg IntraVENous Daily    And    sodium chloride (PF)  10 mL IntraVENous Daily    calcium replacement protocol   Other RX Placeholder    lactobacillus  1 capsule Oral Daily with breakfast    sodium chloride flush  5-40 mL IntraVENous 2 times per day       Vital Signs:   T: 97.2: P: 70 RR: 13 B/P: 124/65: FiO2: 40: O2 Sat:95: I/O: 5451/1900 GCS: 7  Body mass index is 44.51 kg/m². Rod Gallegos PC: 14/10: TV: 500: RRTotal: 14: General:   Obese acutely ill-appearing white male. HEENT:  normocephalic and atraumatic.  No scleral icterus. PERR. Facial edema. Neck: supple.  No Thyromegaly.    Lungs: Crackles bilaterally.  No retractions  Cardiac: RRR.  No JVD. Abdomen: soft.  Nontender.  Nondistended.  Slight bruise to the right lower quadrant. Extremities:  No clubbing, cyanosis x 4.  Right lower extremity edema in the thigh and groin. There is scrotal edema with dependently draining hematoma. Bilateral upper extremity edema worse on the right compared with the left. Vasculature: capillary refill < 3 seconds. Palpable dorsalis pedis pulses. Skin:  warm and moist.  Right neck and shoulder swelling from catheter. Psych: Sedated on mechanical ventilator. Lymph:  No supraclavicular adenopathy. Neurologic:  No focal deficit. No seizures. Data: (All radiographs, tracings, PFTs, and imaging are personally viewed and interpreted unless otherwise noted). · COVID 8/30/21: +  · Pulmonary Lavage 9/1/21: PCUR positive for H. Influenza.  Beta- lactamase positive. · Pulmonary lavage 9/6/2021: Positive for Haemophilus influenza and Candida albicans. Culture showing light growth of Aspergillus species. Suspect this is contaminant. · Right lower extremity ultrasound report 9/7/2021: Subcutaneous edema without evidence of hematoma. · CXR shows increased infiltrates today. · Telemetry shows sinus rhythm. · Sodium 139, potassium 3.8, chloride 106, bicarb 25, BUN 13, creatinine 0.6, glucose 108. Lactic acid 0.8. Albumin 3. Alkaline phosphatase 39. . . White blood cell count 9.5, hemoglobin 10.3, platelets 75. CC time 35 minutes. Time was discontiguous and does not include procedures. Electronically signed by Giovani Christie M.D.

## 2021-09-10 NOTE — PROGRESS NOTES
Cardiology Progress Note      Patient:  Luis Armando Tracy  YOB: 1975  MRN: [id number]   Acct: [de-identified]  Admit Date:  8/30/2021  Primary Cardiologist: Wan Mendoza    Chief Complaint: ECMO    Subjective (Events in last 24 hours):   Passed ECMO weaning trial  Minimal vent settings  CXR worse for pulmonary edema  > 30 L +  No fevers  ECMO with no sweeps showing preserved ABG  Continues to bleed from access site minimally  Hgb relatively stable    Cr 0.6    Objective:   BP (!) 96/45   Pulse 77   Temp 97.5 °F (36.4 °C) (Bladder)   Resp 16   Ht 5' 10\" (1.778 m)   Wt (!) 310 lb 3 oz (140.7 kg)   SpO2 94%   BMI 44.51 kg/m²        TELEMETRY: SR    Physical Exam:  General Appearance: sedated/intubated  Cardiovascular: normal rate, regular rhythm, normal S1 and S2, no murmurs, rubs, clicks, or gallops, distal pulses intact, no carotid bruits, no JVD  Pulmonary/Chest: Bilateral crackles  Abdomen: soft, non-tender, non-distended, normal bowel sounds, no masses   Extremities: no cyanosis, clubbing, 2+ edema bilateraly  Skin: warm and dry, no rash or erythema  Head: normocephalic and atraumatic  Eyes: pupils equal, round, and reactive to light  Neck: supple and non-tender without mass, no thyromegaly   Musculoskeletal: normal range of motion, no joint swelling, deformity or tenderness  Neurological: sedated/intubated    Medications:    fluconazole  400 mg IntraVENous Q24H    And    fluconazole  200 mg IntraVENous Q24H    levofloxacin  750 mg IntraVENous Q24H    chlorhexidine  15 mL Mouth/Throat BID    tobramycin (PF)  300 mg Nebulization BID    midazolam  2 mg IntraVENous Once    naloxegol  12.5 mg Oral QAM    insulin lispro  0-6 Units SubCUTAneous Q4H    pantoprazole  40 mg IntraVENous Daily    And    sodium chloride (PF)  10 mL IntraVENous Daily    calcium replacement protocol   Other RX Placeholder    lactobacillus  1 capsule Oral Daily with breakfast    sodium chloride flush  5-40 mL IntraVENous 2 times per day      sodium chloride      norepinephrine 2 mcg/min (09/10/21 0545)    [Held by provider] lactated ringers 100 mL/hr at 09/05/21 2211    midazolam 10 mg/hr (09/10/21 0123)    dextrose      fentaNYL (SUBLIMAZE) 1250 mcg in sodium chloride 0.9 % 250 mL 175 mcg/hr (09/10/21 0604)     sodium chloride, , PRN  glucose, 15 g, PRN  dextrose, 12.5 g, PRN  glucagon (rDNA), 1 mg, PRN  dextrose, 100 mL/hr, PRN  morphine, 4 mg, Q2H PRN  LORazepam, 4 mg, Q4H PRN  docusate, 100 mg, BID PRN  potassium chloride, 20 mEq, PRN  magnesium sulfate, 2,000 mg, PRN  albuterol, 2.5 mg, Q4H PRN  heparin (porcine), 15,000 Units, PRN  artificial tears, , PRN  sodium chloride flush, 5-40 mL, PRN  ondansetron, 4 mg, Q8H PRN   Or  ondansetron, 4 mg, Q6H PRN  polyethylene glycol, 17 g, Daily PRN  acetaminophen, 650 mg, Q6H PRN   Or  acetaminophen, 650 mg, Q6H PRN    Lab Data:    Cardiac Enzymes:  No results for input(s): CKTOTAL, CKMB, CKMBINDEX, TROPONINI in the last 72 hours.     CBC:   Lab Results   Component Value Date    WBC 9.5 09/10/2021    RBC 3.40 09/10/2021    HGB 10.3 09/10/2021    HCT 30.7 09/10/2021    PLT 75 09/10/2021       CMP:    Lab Results   Component Value Date     09/10/2021    K 3.8 09/10/2021     09/10/2021    CO2 25 09/10/2021    BUN 13 09/10/2021    CREATININE 0.6 09/10/2021    LABGLOM >90 09/10/2021    GLUCOSE 108 09/10/2021    CALCIUM 8.1 09/10/2021       Hepatic Function Panel:    Lab Results   Component Value Date    ALKPHOS 39 09/10/2021     09/10/2021     09/10/2021    PROT 4.3 09/10/2021    BILITOT 1.0 09/10/2021    BILIDIR 0.3 09/10/2021    LABALBU 3.0 09/10/2021       Magnesium:    Lab Results   Component Value Date    MG 1.9 09/10/2021       PT/INR:    Lab Results   Component Value Date    INR 1.29 08/30/2021       HgBA1c:    Lab Results   Component Value Date    LABA1C 5.6 09/02/2021       FLP:    Lab Results   Component Value Date    TRIG 85 09/01/2021       TSH:  No results found for: TSH      Assessment:  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  Preserved EF  No sig CAD  Normal RV function    Plan:  · Neuro - high dose narcotics, will wean  · CV - passed ECMO weaning trial, minimal vent settings, CXR with pulmonary edema, will diurese aggressively  · Pulm - Minimal vent settings  · ID - Hflu PNA, on ABx, recovering from COVID, no fevers  · GI - restart TF  · Heme - Requiring intermittent PRBCs  · Skin - watch for decubitus ulcers when feasible  · Renal - good UOP, no concerns for NIRAJ  · Endo - BS < 180, use Insulin as needed  · Dispo - ICU care, continue current care, plan for decannulation  · FULL CODE    Greater than 120 minutes of time was spent managing critical issues, discussing with the family, and coordinating care.       Electronically signed by Neeraj Watson MD on 9/10/2021 at 6:53 AM

## 2021-09-10 NOTE — PROGRESS NOTES
2025 Marcela Robbins CNP and Dr. Armin Ash at bedside. Verbal orders to stop heparin gtt and increase ecmo flow to 4lpm at 0300.     2200 - peep decreased to 10 per Chino Chowdhury CNP    2315 - oxygenator 1 sweep gas turned off at this time per Chino Chowdhury CNP    0020 - oxygenator 2 sweep gas turned off at this time per Chino Chowdhury CNP.     0116 - Fio2 increased to 40% based off of ABG results. PH 7.39 PCO2 44 PO2 51 SaO2 85%. RR25    0120 - SpO2 99% RR 15    0300 - Ecmo flow increased to 4lpm 5700 rpm and heparin gtt stopped per order. 1176 - wife updated by phone. Will call her if plans to decannulate.     1953 - wife updated on plans for decannulation at 0700.     111 [facility] - report to Javid Bruce, [facility]0 [facility]

## 2021-09-11 ENCOUNTER — APPOINTMENT (OUTPATIENT)
Dept: GENERAL RADIOLOGY | Age: 46
DRG: 003 | End: 2021-09-11
Payer: COMMERCIAL

## 2021-09-11 LAB
ALBUMIN SERPL-MCNC: 3.2 G/DL (ref 3.5–5.1)
ALBUMIN SERPL-MCNC: 3.3 G/DL (ref 3.5–5.1)
ALLEN TEST: ABNORMAL
ALP BLD-CCNC: 43 U/L (ref 38–126)
ALP BLD-CCNC: 44 U/L (ref 38–126)
ALT SERPL-CCNC: 116 U/L (ref 11–66)
ALT SERPL-CCNC: 117 U/L (ref 11–66)
ANION GAP SERPL CALCULATED.3IONS-SCNC: 7 MEQ/L (ref 8–16)
ANISOCYTOSIS: PRESENT
AST SERPL-CCNC: 58 U/L (ref 5–40)
AST SERPL-CCNC: 59 U/L (ref 5–40)
ATYPICAL LYMPHOCYTES: ABNORMAL %
BASE EXCESS (CALCULATED): 4.5 MMOL/L (ref -2.5–2.5)
BASOPHILS # BLD: 0.6 %
BASOPHILS ABSOLUTE: 0 THOU/MM3 (ref 0–0.1)
BILIRUB SERPL-MCNC: 1.1 MG/DL (ref 0.3–1.2)
BILIRUB SERPL-MCNC: 1.2 MG/DL (ref 0.3–1.2)
BILIRUBIN DIRECT: 0.3 MG/DL (ref 0–0.3)
BUN BLDV-MCNC: 14 MG/DL (ref 7–22)
CALCIUM SERPL-MCNC: 8.4 MG/DL (ref 8.5–10.5)
CHLORIDE BLD-SCNC: 103 MEQ/L (ref 98–111)
CO2: 30 MEQ/L (ref 23–33)
COLLECTED BY:: ABNORMAL
CREAT SERPL-MCNC: 0.8 MG/DL (ref 0.4–1.2)
DEVICE: ABNORMAL
EOSINOPHIL # BLD: 0.8 %
EOSINOPHILS ABSOLUTE: 0.1 THOU/MM3 (ref 0–0.4)
ERYTHROCYTE [DISTWIDTH] IN BLOOD BY AUTOMATED COUNT: 18.1 % (ref 11.5–14.5)
ERYTHROCYTE [DISTWIDTH] IN BLOOD BY AUTOMATED COUNT: 18.6 % (ref 11.5–14.5)
ERYTHROCYTE [DISTWIDTH] IN BLOOD BY AUTOMATED COUNT: 53.4 FL (ref 35–45)
ERYTHROCYTE [DISTWIDTH] IN BLOOD BY AUTOMATED COUNT: 53.4 FL (ref 35–45)
GFR SERPL CREATININE-BSD FRML MDRD: > 90 ML/MIN/1.73M2
GLUCOSE BLD-MCNC: 109 MG/DL (ref 70–108)
GLUCOSE BLD-MCNC: 109 MG/DL (ref 70–108)
GLUCOSE BLD-MCNC: 112 MG/DL (ref 70–108)
GLUCOSE BLD-MCNC: 97 MG/DL (ref 70–108)
GLUCOSE BLD-MCNC: 97 MG/DL (ref 70–108)
HCO3: 30 MMOL/L (ref 23–28)
HCT VFR BLD CALC: 30.7 % (ref 42–52)
HCT VFR BLD CALC: 35 % (ref 42–52)
HEMOGLOBIN: 10 GM/DL (ref 14–18)
HEMOGLOBIN: 11.2 GM/DL (ref 14–18)
IMMATURE GRANS (ABS): 0.56 THOU/MM3 (ref 0–0.07)
IMMATURE GRANULOCYTES: 8.5 %
LACTIC ACID: 0.9 MMOL/L (ref 0.5–2)
LYMPHOCYTES # BLD: 14.7 %
LYMPHOCYTES ABSOLUTE: 1 THOU/MM3 (ref 1–4.8)
MAGNESIUM: 2 MG/DL (ref 1.6–2.4)
MCH RBC QN AUTO: 30.6 PG (ref 26–33)
MCH RBC QN AUTO: 30.8 PG (ref 26–33)
MCHC RBC AUTO-ENTMCNC: 32 GM/DL (ref 32.2–35.5)
MCHC RBC AUTO-ENTMCNC: 32.6 GM/DL (ref 32.2–35.5)
MCV RBC AUTO: 94.5 FL (ref 80–94)
MCV RBC AUTO: 95.6 FL (ref 80–94)
MODE: ABNORMAL
MONOCYTES # BLD: 11.5 %
MONOCYTES ABSOLUTE: 0.8 THOU/MM3 (ref 0.4–1.3)
NUCLEATED RED BLOOD CELLS: 1 /100 WBC
O2 SATURATION: 95 %
PATHOLOGIST REVIEW: ABNORMAL
PCO2: 46 MMHG (ref 35–45)
PH BLOOD GAS: 7.42 (ref 7.35–7.45)
PIP: 22 CMH2O
PLATELET # BLD: 75 THOU/MM3 (ref 130–400)
PLATELET # BLD: 79 THOU/MM3 (ref 130–400)
PLATELET ESTIMATE: ABNORMAL
PMV BLD AUTO: 10.4 FL (ref 9.4–12.4)
PMV BLD AUTO: 10.6 FL (ref 9.4–12.4)
PO2: 77 MMHG (ref 71–104)
POC ACTIVATED CLOTTING TIME KAOLIN: 136 SECONDS (ref 1–150)
POC ACTIVATED CLOTTING TIME KAOLIN: 175 SECONDS (ref 1–150)
POC ACTIVATED CLOTTING TIME KAOLIN: 180 SECONDS (ref 1–150)
POC ACTIVATED CLOTTING TIME KAOLIN: 180 SECONDS (ref 1–150)
POC ACTIVATED CLOTTING TIME KAOLIN: 186 SECONDS (ref 1–150)
POC ACTIVATED CLOTTING TIME KAOLIN: 186 SECONDS (ref 1–150)
POC ACTIVATED CLOTTING TIME KAOLIN: 191 SECONDS (ref 1–150)
POC ACTIVATED CLOTTING TIME KAOLIN: 197 SECONDS (ref 1–150)
POTASSIUM REFLEX MAGNESIUM: 4.4 MEQ/L (ref 3.5–5.2)
POTASSIUM SERPL-SCNC: 4.4 MEQ/L (ref 3.5–5.2)
RBC # BLD: 3.25 MILL/MM3 (ref 4.7–6.1)
RBC # BLD: 3.66 MILL/MM3 (ref 4.7–6.1)
SCAN OF BLOOD SMEAR: NORMAL
SEG NEUTROPHILS: 63.9 %
SEGMENTED NEUTROPHILS ABSOLUTE COUNT: 4.2 THOU/MM3 (ref 1.8–7.7)
SET PEEP: 10 MMHG
SET RESPIRATORY RATE: 16 BPM
SODIUM BLD-SCNC: 140 MEQ/L (ref 135–145)
SOURCE, BLOOD GAS: ABNORMAL
TOTAL PROTEIN: 5 G/DL (ref 6.1–8)
TOTAL PROTEIN: 5 G/DL (ref 6.1–8)
WBC # BLD: 6.6 THOU/MM3 (ref 4.8–10.8)
WBC # BLD: 7.9 THOU/MM3 (ref 4.8–10.8)

## 2021-09-11 PROCEDURE — C9113 INJ PANTOPRAZOLE SODIUM, VIA: HCPCS | Performed by: NURSE PRACTITIONER

## 2021-09-11 PROCEDURE — 99291 CRITICAL CARE FIRST HOUR: CPT | Performed by: INTERNAL MEDICINE

## 2021-09-11 PROCEDURE — 2580000003 HC RX 258: Performed by: INTERNAL MEDICINE

## 2021-09-11 PROCEDURE — 6360000002 HC RX W HCPCS: Performed by: INTERNAL MEDICINE

## 2021-09-11 PROCEDURE — 6370000000 HC RX 637 (ALT 250 FOR IP): Performed by: NURSE PRACTITIONER

## 2021-09-11 PROCEDURE — 6360000002 HC RX W HCPCS: Performed by: NURSE PRACTITIONER

## 2021-09-11 PROCEDURE — 94640 AIRWAY INHALATION TREATMENT: CPT

## 2021-09-11 PROCEDURE — 94003 VENT MGMT INPAT SUBQ DAY: CPT

## 2021-09-11 PROCEDURE — 85025 COMPLETE CBC W/AUTO DIFF WBC: CPT

## 2021-09-11 PROCEDURE — 85027 COMPLETE CBC AUTOMATED: CPT

## 2021-09-11 PROCEDURE — 37799 UNLISTED PX VASCULAR SURGERY: CPT

## 2021-09-11 PROCEDURE — 36415 COLL VENOUS BLD VENIPUNCTURE: CPT

## 2021-09-11 PROCEDURE — 99292 CRITICAL CARE ADDL 30 MIN: CPT | Performed by: INTERNAL MEDICINE

## 2021-09-11 PROCEDURE — 2580000003 HC RX 258: Performed by: NURSE PRACTITIONER

## 2021-09-11 PROCEDURE — APPSS180 APP SPLIT SHARED TIME > 60 MINUTES: Performed by: NURSE PRACTITIONER

## 2021-09-11 PROCEDURE — 94761 N-INVAS EAR/PLS OXIMETRY MLT: CPT

## 2021-09-11 PROCEDURE — 2580000003 HC RX 258

## 2021-09-11 PROCEDURE — 71045 X-RAY EXAM CHEST 1 VIEW: CPT

## 2021-09-11 PROCEDURE — 82948 REAGENT STRIP/BLOOD GLUCOSE: CPT

## 2021-09-11 PROCEDURE — 83605 ASSAY OF LACTIC ACID: CPT

## 2021-09-11 PROCEDURE — 83735 ASSAY OF MAGNESIUM: CPT

## 2021-09-11 PROCEDURE — P9041 ALBUMIN (HUMAN),5%, 50ML: HCPCS | Performed by: INTERNAL MEDICINE

## 2021-09-11 PROCEDURE — 80053 COMPREHEN METABOLIC PANEL: CPT

## 2021-09-11 PROCEDURE — 2000000000 HC ICU R&B

## 2021-09-11 PROCEDURE — 82803 BLOOD GASES ANY COMBINATION: CPT

## 2021-09-11 PROCEDURE — 84132 ASSAY OF SERUM POTASSIUM: CPT

## 2021-09-11 PROCEDURE — 6370000000 HC RX 637 (ALT 250 FOR IP): Performed by: INTERNAL MEDICINE

## 2021-09-11 RX ORDER — FUROSEMIDE 10 MG/ML
80 INJECTION INTRAMUSCULAR; INTRAVENOUS ONCE
Status: COMPLETED | OUTPATIENT
Start: 2021-09-11 | End: 2021-09-11

## 2021-09-11 RX ORDER — DEXMEDETOMIDINE HYDROCHLORIDE 4 UG/ML
.2-1.4 INJECTION, SOLUTION INTRAVENOUS CONTINUOUS
Status: DISCONTINUED | OUTPATIENT
Start: 2021-09-11 | End: 2021-09-13

## 2021-09-11 RX ORDER — DEXMEDETOMIDINE HYDROCHLORIDE 4 UG/ML
.2-1.4 INJECTION, SOLUTION INTRAVENOUS CONTINUOUS
Status: DISCONTINUED | OUTPATIENT
Start: 2021-09-11 | End: 2021-09-11 | Stop reason: SDUPTHER

## 2021-09-11 RX ORDER — ALBUMIN, HUMAN INJ 5% 5 %
25 SOLUTION INTRAVENOUS ONCE
Status: COMPLETED | OUTPATIENT
Start: 2021-09-11 | End: 2021-09-11

## 2021-09-11 RX ADMIN — ALBUMIN (HUMAN) 25 G: 12.5 INJECTION, SOLUTION INTRAVENOUS at 21:32

## 2021-09-11 RX ADMIN — Medication 15 ML: at 08:04

## 2021-09-11 RX ADMIN — MIDAZOLAM 8 MG/HR: 5 INJECTION, SOLUTION INTRAMUSCULAR; INTRAVENOUS at 11:27

## 2021-09-11 RX ADMIN — ENOXAPARIN SODIUM 40 MG: 40 INJECTION SUBCUTANEOUS at 12:00

## 2021-09-11 RX ADMIN — LEVOFLOXACIN 750 MG: 5 INJECTION, SOLUTION INTRAVENOUS at 10:55

## 2021-09-11 RX ADMIN — SODIUM CHLORIDE, PRESERVATIVE FREE 10 ML: 5 INJECTION INTRAVENOUS at 07:57

## 2021-09-11 RX ADMIN — SODIUM CHLORIDE, PRESERVATIVE FREE 10 ML: 5 INJECTION INTRAVENOUS at 07:51

## 2021-09-11 RX ADMIN — FUROSEMIDE 80 MG: 10 INJECTION, SOLUTION INTRAMUSCULAR; INTRAVENOUS at 22:35

## 2021-09-11 RX ADMIN — SODIUM CHLORIDE, PRESERVATIVE FREE 10 ML: 5 INJECTION INTRAVENOUS at 20:44

## 2021-09-11 RX ADMIN — ALBUTEROL SULFATE 2.5 MG: 2.5 SOLUTION RESPIRATORY (INHALATION) at 08:30

## 2021-09-11 RX ADMIN — FUROSEMIDE 80 MG: 40 INJECTION, SOLUTION INTRAMUSCULAR; INTRAVENOUS at 05:08

## 2021-09-11 RX ADMIN — Medication 1 CAPSULE: at 07:50

## 2021-09-11 RX ADMIN — Medication 15 ML: at 20:45

## 2021-09-11 RX ADMIN — FENTANYL CITRATE 125 MCG/HR: 50 INJECTION INTRAMUSCULAR; INTRAVENOUS at 11:27

## 2021-09-11 RX ADMIN — PANTOPRAZOLE SODIUM 40 MG: 40 INJECTION, POWDER, FOR SOLUTION INTRAVENOUS at 07:50

## 2021-09-11 RX ADMIN — NALOXEGOL OXALATE 12.5 MG: 12.5 TABLET, FILM COATED ORAL at 07:50

## 2021-09-11 RX ADMIN — Medication 300 MG: at 08:30

## 2021-09-11 RX ADMIN — CHLOROTHIAZIDE SODIUM 250 MG: 500 INJECTION, POWDER, LYOPHILIZED, FOR SOLUTION INTRAVENOUS at 05:57

## 2021-09-11 RX ADMIN — FLUCONAZOLE 400 MG: 2 INJECTION, SOLUTION INTRAVENOUS at 10:01

## 2021-09-11 RX ADMIN — ENOXAPARIN SODIUM 40 MG: 40 INJECTION SUBCUTANEOUS at 20:44

## 2021-09-11 RX ADMIN — FLUCONAZOLE IN SODIUM CHLORIDE 200 MG: 2 INJECTION, SOLUTION INTRAVENOUS at 13:49

## 2021-09-11 RX ADMIN — FENTANYL CITRATE 200 MCG/HR: 50 INJECTION INTRAMUSCULAR; INTRAVENOUS at 04:11

## 2021-09-11 ASSESSMENT — PULMONARY FUNCTION TESTS
PIF_VALUE: 24
PIF_VALUE: 24
PIF_VALUE: 19
PIF_VALUE: 24
PIF_VALUE: 22
PIF_VALUE: 19

## 2021-09-11 NOTE — FLOWSHEET NOTE
0800 Mr Kimmy Rodriguez rests in the bed, sedated with Fentanyl and Versed. He requires 2ug of Levophed to keep his BP above ordered parameters. He has a swan left SC site clear. He has a 3 lumen CVC right SC site clear. The ECMO site right IJ remains swollen but without bleeding. Both groins are bruised but soft. He had developed some bleeding at both sites during the night and gauze dressing was placed. His right leg appears  Much less swollen today. He has a temp sensing Rosales patent to gravity. He remains intubated with no plans for extubating today. The art line right radial site has been removed and a vasc band in its place. tube feeding Vital at 40 cc/hr which is goal. He has about 180 cc's residual with this. Plan to wean sedation and vent today as he tolerates. Continue to diurese.

## 2021-09-11 NOTE — PROGRESS NOTES
Cardiology Progress Note      Patient:  Margarita Bahena  YOB: 1975  MRN: [id number]   Acct: [de-identified]  Admit Date:  8/30/2021  Primary Cardiologist: Elaine Vincent    Chief Complaint: S/p ARDS VV-ECMO    Subjective (Events in last 24 hours):    No bleeding  Hgb stable  Neck site is stable with sutures  Femoral Figure 8 removed without bleeding  RRA has clotted off - thus was removed  PA pressures in the 30s, CVP 8  No further transfusions  BP stable  Cr 0.8        Objective:   /72   Pulse 90   Temp 98.6 °F (37 °C) (Bladder)   Resp 15   Ht 5' 10\" (1.778 m)   Wt (!) 310 lb 3 oz (140.7 kg)   SpO2 96%   BMI 44.51 kg/m²        TELEMETRY: SR    Physical Exam:  General Appearance: Stable, lines in place  Cardiovascular: normal rate, regular rhythm, normal S1 and S2, no murmurs, rubs, clicks, or gallops, distal pulses intact, no carotid bruits, no JVD  Pulmonary/Chest: Bilateral crackles  Abdomen: soft, non-tender, non-distended, normal bowel sounds, no masses Extremities: no cyanosis, clubbing, no edema, pulse   Skin: warm and dry, no rash or erythema  Head: normocephalic and atraumatic  Eyes: pupils equal, round, and reactive to light  Neck: supple and non-tender without mass, no thyromegaly   Musculoskeletal: no joint swelling, deformity or tenderness  Neurological: sedated/intubated    Medications:    chlorothiazide (DIURIL) IVPB  250 mg IntraVENous Once    fluconazole  400 mg IntraVENous Q24H    And    fluconazole  200 mg IntraVENous Q24H    levofloxacin  750 mg IntraVENous Q24H    chlorhexidine  15 mL Mouth/Throat BID    tobramycin (PF)  300 mg Nebulization BID    midazolam  2 mg IntraVENous Once    naloxegol  12.5 mg Oral QAM    insulin lispro  0-6 Units SubCUTAneous Q4H    pantoprazole  40 mg IntraVENous Daily    And    sodium chloride (PF)  10 mL IntraVENous Daily    calcium replacement protocol   Other RX Placeholder    lactobacillus  1 capsule Oral Daily with breakfast    sodium chloride flush  5-40 mL IntraVENous 2 times per day      norepinephrine 1 mcg/min (09/11/21 0433)    sodium chloride      [Held by provider] lactated ringers 100 mL/hr at 09/05/21 2211    midazolam 10 mg/hr (09/10/21 2350)    dextrose      fentaNYL (SUBLIMAZE) 1250 mcg in sodium chloride 0.9 % 250 mL 200 mcg/hr (09/10/21 2110)     sodium chloride, , PRN  glucose, 15 g, PRN  dextrose, 12.5 g, PRN  glucagon (rDNA), 1 mg, PRN  dextrose, 100 mL/hr, PRN  morphine, 4 mg, Q2H PRN  LORazepam, 4 mg, Q4H PRN  docusate, 100 mg, BID PRN  potassium chloride, 20 mEq, PRN  magnesium sulfate, 2,000 mg, PRN  albuterol, 2.5 mg, Q4H PRN  heparin (porcine), 15,000 Units, PRN  artificial tears, , PRN  sodium chloride flush, 5-40 mL, PRN  ondansetron, 4 mg, Q8H PRN   Or  ondansetron, 4 mg, Q6H PRN  polyethylene glycol, 17 g, Daily PRN  acetaminophen, 650 mg, Q6H PRN   Or  acetaminophen, 650 mg, Q6H PRN        Lab Data:    Cardiac Enzymes:  No results for input(s): CKTOTAL, CKMB, CKMBINDEX, TROPONINI in the last 72 hours.     CBC:   Lab Results   Component Value Date    WBC 6.6 09/11/2021    RBC 3.25 09/11/2021    HGB 10.0 09/11/2021    HCT 30.7 09/11/2021    PLT 75 09/11/2021       CMP:    Lab Results   Component Value Date     09/11/2021    K 4.4 09/11/2021     09/11/2021    CO2 30 09/11/2021    BUN 14 09/11/2021    CREATININE 0.8 09/11/2021    LABGLOM >90 09/11/2021    GLUCOSE 97 09/11/2021    CALCIUM 8.4 09/11/2021       Hepatic Function Panel:    Lab Results   Component Value Date    ALKPHOS 43 09/11/2021    ALKPHOS 44 09/11/2021     09/11/2021     09/11/2021    AST 59 09/11/2021    AST 58 09/11/2021    PROT 5.0 09/11/2021    PROT 5.0 09/11/2021    BILITOT 1.2 09/11/2021    BILITOT 1.1 09/11/2021    BILIDIR 0.3 09/11/2021    LABALBU 3.2 09/11/2021    LABALBU 3.3 09/11/2021       Magnesium:    Lab Results   Component Value Date    MG 2.0 09/11/2021       PT/INR:    Lab Results   Component Value Date INR 1.29 08/30/2021       HgBA1c:    Lab Results   Component Value Date    LABA1C 5.6 09/02/2021       FLP:    Lab Results   Component Value Date    TRIG 85 09/01/2021       TSH:  No results found for: TSH      Assessment:  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  Preserved EF  No sig CAD  Normal RV function    Plan:  · Neuro - high dose narcotics, will wean  · CV - ECMO weaned successfully, BP stable, d/c art line, repeat daily Lasix/diuril - put out 6L, requires only 1 dose per day and he is now 30 L+ (down from 37 L+), CVP 7, IV Albumin 25% q 6 hours; SGC in place  · Pulm - Minimal vent settings, continue to wean FiO2  · ID - Hflu PNA, on ABx, recovering from COVID, no fevers  · GI - TF at 40 - at goal, no BM yet  · Heme - Stable Hgb  · Skin - No decubitus ulcers  · Renal - good UOP, no concerns for NIRAJ  · Endo - BS < 180, use Insulin as needed  · Dispo - ICU care, continue current care  · FULL CODE    Greater than 120 minutes of time was spent managing critical issues, discussing with the family, and coordinating care.       Electronically signed by Perri Mendez MD on 9/11/2021 at 5:18 AM

## 2021-09-11 NOTE — PROGRESS NOTES
05:00- Patient's arterial line removed per verbal order from Dr. Alla Carrasco. Arterial line removed. Minimal bleeding from site. Vasc band applied, 10 mL air applied to band. Bruising noted at right radial site. 05:35- received verbal order from Dr. Alla Carrasco to begin lightening sedation.

## 2021-09-11 NOTE — PROGRESS NOTES
CRITICAL CARE PROGRESS NOTE      Patient:  Rex Kirby    Unit/Bed:4D-02/002-A  YOB: 1975  MRN: [id number]   PCP: Harrie Goodpasture, MD  Date of Admission: 8/30/2021  Chief Complaint:-COVID-19    Assessment and Plan:      Acute hypoxemic respiratory failure: Secondary to COVID-19 with subsequent progression to ARDS. Patient intubated 9/1/2021. ECMO initiated 9/1/2021. Patient required ECMO recannulation on 9/6/2021 secondary to kinked catheter with groin line. Now with internal jugular Protek duo which was placed 9/6/2021. ECMO completed with decannulation on 9/10/2021. S/P lung rest strategy. Patient undergoing lung protection strategy targeting a plateau pressure of 30 or less. Pressure control 14 PEEP 10. Continue with mechanical ventilator support. Should improve with diuresis. COVID-19: Patient failed trial of remdesivir, tocilizumab, and Decadron. Patient had progressive hypoxemia. Can remove from isolation on 9/18/21 if he remains afebrile. Pneumonia: Coinfection with Haemophilus influenza. Patient initially treated with Rocephin. Patient transition to higher dosing of Rocephin. Antibiotics initiated 8/30/2021. Organism is beta-lactamase positive. Rocephin ineffective. Patient transition to [address] [place] [address] on 9/7/2021. Nebulized tobramycin was added on 9/5/2021. Repeat bronchoscopy completed 9/8/2021 showed significant reduction in mucus production. Culture demonstrated airway colonization with Candida albicans. Patient did grow mold secondary to Aspergillus species with only light growth. I do not believe this represents an overwhelming infection. It may represent contamination. Repeat lavage 9/8/2021 negative. Respiratory culture from 9/8+ for Candida albicans light growth. Aspergillus was not identified on second lavage. Sepsis: resolved;  Secondary to combination of Covid and Haemophilus influenza. Present at presentation. Hypotension: resolved;  Initially due to to heavy sedation and positive pressure ventilation. Requiring low-dose pressors. Subsequently worsened secondary to anemia of acute blood loss. Weaning pressors. ARDS:  Patient intubated 9/1/2021. ECMO initiated 9/1/2021. ECMO completed 9/10/2021. Thrombocytopenia: Secondary to acute blood loss from hemorrhage. Infused platelets. Trending. Hepatostasis: Initially secondary to ECMO catheter placement. Continue to assess as catheter has been removed. Improving. Obesity: Aware. Mild neutropenia: IgG and IgM level are normal.  IgA level is slightly diminished. Resolved. Hemoptysis: Secondary to spontaneous alveolar hemorrhage related to anticoagulation and high ACT requirements. Heparin discontinued 9/8/2021. Bronchoscopy completed 9/8/2021 showed old blood within the airway but no active bleeding. Heparin held. Resolved . Anemia secondary to acute blood loss: This is primarily secondary to high ACT for clot prevention with low flow ECMO state. Represents spontaneous bleeding. Heparin discontinued 9/8/2021. Patient requiring multiple transfusions. No evidence of retroperitoneal hemorrhage on ultrasound. Heparin resumed 9/8/2021 with stability of bleeding. Resolved. INITIAL H AND P AND ICU COURSE:    Renetta Turcios  is a 12-year-old white male lifetime non-smoker. He is a farmer by occupation. He is obese but has no other medical diagnoses including coronary artery disease diabetes or sleep apnea. Patient presented to the emergency room and was admitted on 8/30/2021. He started to develop fever associated with chills cough shortness of breath nasal congestion loss of taste and loss of smell starting 8/28/2021. As he continued to progress his dyspnea continued to worsen. He developed malaise. He indicated that his wife was recently diagnosed with Covid. Patient underwent Covid screen and was positive. CT scan chest showed bilateral infiltrates.   He initially was started on low-flow oxygen but continued to require progressive oxygen supplementation. He was treated with remdesivir Decadron and subsequently tocilizumab. He continued to develop progressive dyspnea and had the syndrome of \"happy hypoxia\". As patient continued to worsen, he elected to proceed with intubation and ECMO. Patient intubated 9/1/2021. ECMO cannulation 9/1/2021. As the catheter started to warm, it became more compliant and developed kinking causing impaired outflow. As the flow continued to deteriorate, it was clear patient would need replacement of bilateral groin catheters. Patient underwent recannulation of the right internal jugular with Protek duo and removal of the bilateral groin lines. Flow immediately returned without complications. Patient continued to improve and ECMO was terminated with decannulation on 9/10/2021. Patient did develop coinfection. Patient had COVID-19 pneumonia worsened by beta lactamase positive Haemophilus influenza. Rocephin was initiated 8/30/2021. Patient was initially treated with Rocephin without improvement. Patient was transitioned to [address] [place] [address] on 9/7/2021. Nebulized tobramycin was added 9/5/2021. Past Medical History: per HPI  Family History: No known family history  Social History: Lifetime non-smoker, social alcohol use, denies drug use.     ROS   Intubated and sedated on mechanical ventilation    Scheduled Meds:   fluconazole  400 mg IntraVENous Q24H    And    fluconazole  200 mg IntraVENous Q24H    levofloxacin  750 mg IntraVENous Q24H    chlorhexidine  15 mL Mouth/Throat BID    tobramycin (PF)  300 mg Nebulization BID    midazolam  2 mg IntraVENous Once    naloxegol  12.5 mg Oral QAM    insulin lispro  0-6 Units SubCUTAneous Q4H    pantoprazole  40 mg IntraVENous Daily    And    sodium chloride (PF)  10 mL IntraVENous Daily    calcium replacement protocol   Other RX Placeholder    lactobacillus  1 capsule Oral Daily with breakfast    sodium chloride flush  5-40 mL IntraVENous 2 times per day     Continuous Infusions:   norepinephrine 1 mcg/min (09/11/21 0433)    sodium chloride      [Held by provider] lactated ringers 100 mL/hr at 09/05/21 2211    midazolam 9 mg/hr (09/11/21 0650)    dextrose      fentaNYL (SUBLIMAZE) 1250 mcg in sodium chloride 0.9 % 250 mL 150 mcg/hr (09/11/21 0615)       PHYSICAL EXAMINATION:  T:  97.3.  P:  99. RR:  20. B/P: 104/71. FiO2:  35. O2 Sat:  93.  I/O:  3563/29576  Body mass index is 44.51 kg/m². PC: 14/8: TV: 466: RRTotal: 18: Ti:1 sec  General:   Acute on chronically ill-appearing white male  HEENT:  normocephalic and atraumatic. No scleral icterus. PERR  Neck: supple. No Thyromegaly. Lungs: Diminished to auscultation. No retractions  Cardiac: RRR. No JVD. Abdomen: soft. Nontender. Extremities:  No clubbing, cyanosis. Lateral nonpitting lower extremity edema, right upper thigh edema  Vasculature: capillary refill < 3 seconds. Palpable dorsalis pedis pulses. Skin:  warm and dry. Psych: Sedated on mechanical ventilation  Lymph:  No supraclavicular adenopathy. Neurologic:  No focal deficit. No seizures. Data: (All radiographs, tracings, PFTs, and imaging are personally viewed and interpreted unless otherwise noted). Sodium 140, potassium 4.4, chloride 103, CO2 30, BUN 14, creatinine 0.8, anion gap 7.0, glucose 97  WBC 6.6, hemoglobin 10.0, hematocrit 30.7, platelet count 75  Telemetry shows normal sinus rhythm  Pneumonia panel 9/8/2021 negative  Respiratory culture 9/8/2021 reports light growth of Candida albicans  CTA 8/30/2021 reports no pulmonary embolism, moderate groundglass infiltrates  Chest x-ray 9/11/2021 reports similar bilateral airspace disease  Ultrasound abdomen pelvis 9/8/2021 reports no definite retroperitoneal hematoma  JASPREET 9/10/2021 reports ejection fraction 55-60, normal LV function.       Meets Continued ICU Level Care Criteria:    [x] Yes   [] No - Transfer Planned to listed location:  [] HOSPITALIST CONTACTED-      Case and plan discussed with Dr. Kory Mena. Patient seen with Dr. Kory Mena via telemedicine      Electronically signed by Radha Humphrey. KATHARINA Tinajero - CNP  CRITICAL CARE SPECIALIST  Patient seen by me. Case discussed with nurse practitioner. Patient status post ECMO decannulation on 09/10/2021. Continues to require mechanical ventilator support secondary to work of breathing and hypoxemia related to volume overload. Continue to diurese. Italicized font represents changes to the note made by me. CC time 35 minutes. Time was discontiguous. Time does not include procedures. Time does include my direct assessment of the patient and coordination of care.   Electronically signed by Zora Estrella MD on 9/13/2021 at 6:10 AM

## 2021-09-11 NOTE — PLAN OF CARE
Problem: MECHANICAL VENTILATION  Goal: Clear lung sounds  Description: Clear lung sounds  Outcome: Ongoing  Note: Patient on scheduled therapy with aerosolized antibiotic and bronchodilator given to prevent bronchospasm.

## 2021-09-12 ENCOUNTER — APPOINTMENT (OUTPATIENT)
Dept: GENERAL RADIOLOGY | Age: 46
DRG: 003 | End: 2021-09-12
Payer: COMMERCIAL

## 2021-09-12 LAB
ALBUMIN SERPL-MCNC: 4.1 G/DL (ref 3.5–5.1)
ALP BLD-CCNC: 49 U/L (ref 38–126)
ALT SERPL-CCNC: 93 U/L (ref 11–66)
ANION GAP SERPL CALCULATED.3IONS-SCNC: 10 MEQ/L (ref 8–16)
ANION GAP SERPL CALCULATED.3IONS-SCNC: 10 MEQ/L (ref 8–16)
AST SERPL-CCNC: 49 U/L (ref 5–40)
BASOPHILS # BLD: 0.5 %
BASOPHILS ABSOLUTE: 0 THOU/MM3 (ref 0–0.1)
BILIRUB SERPL-MCNC: 1.8 MG/DL (ref 0.3–1.2)
BILIRUBIN DIRECT: 0.5 MG/DL (ref 0–0.3)
BUN BLDV-MCNC: 18 MG/DL (ref 7–22)
BUN BLDV-MCNC: 19 MG/DL (ref 7–22)
CALCIUM SERPL-MCNC: 9.2 MG/DL (ref 8.5–10.5)
CALCIUM SERPL-MCNC: 9.3 MG/DL (ref 8.5–10.5)
CHLORIDE BLD-SCNC: 96 MEQ/L (ref 98–111)
CHLORIDE BLD-SCNC: 96 MEQ/L (ref 98–111)
CO2: 32 MEQ/L (ref 23–33)
CO2: 32 MEQ/L (ref 23–33)
CREAT SERPL-MCNC: 0.9 MG/DL (ref 0.4–1.2)
CREAT SERPL-MCNC: 0.9 MG/DL (ref 0.4–1.2)
EOSINOPHIL # BLD: 0.5 %
EOSINOPHILS ABSOLUTE: 0 THOU/MM3 (ref 0–0.4)
ERYTHROCYTE [DISTWIDTH] IN BLOOD BY AUTOMATED COUNT: 18.3 % (ref 11.5–14.5)
ERYTHROCYTE [DISTWIDTH] IN BLOOD BY AUTOMATED COUNT: 53.9 FL (ref 35–45)
GFR SERPL CREATININE-BSD FRML MDRD: > 90 ML/MIN/1.73M2
GFR SERPL CREATININE-BSD FRML MDRD: > 90 ML/MIN/1.73M2
GLUCOSE BLD-MCNC: 101 MG/DL (ref 70–108)
GLUCOSE BLD-MCNC: 101 MG/DL (ref 70–108)
GLUCOSE BLD-MCNC: 104 MG/DL (ref 70–108)
GLUCOSE BLD-MCNC: 104 MG/DL (ref 70–108)
GLUCOSE BLD-MCNC: 108 MG/DL (ref 70–108)
GLUCOSE BLD-MCNC: 109 MG/DL (ref 70–108)
GLUCOSE BLD-MCNC: 110 MG/DL (ref 70–108)
GLUCOSE BLD-MCNC: 79 MG/DL (ref 70–108)
HCT VFR BLD CALC: 31.9 % (ref 42–52)
HEMOGLOBIN: 10.2 GM/DL (ref 14–18)
IMMATURE GRANS (ABS): 0.28 THOU/MM3 (ref 0–0.07)
IMMATURE GRANULOCYTES: 4.4 %
LYMPHOCYTES # BLD: 12.2 %
LYMPHOCYTES ABSOLUTE: 0.8 THOU/MM3 (ref 1–4.8)
MAGNESIUM: 2 MG/DL (ref 1.6–2.4)
MCH RBC QN AUTO: 30.5 PG (ref 26–33)
MCHC RBC AUTO-ENTMCNC: 32 GM/DL (ref 32.2–35.5)
MCV RBC AUTO: 95.5 FL (ref 80–94)
MONOCYTES # BLD: 13.6 %
MONOCYTES ABSOLUTE: 0.9 THOU/MM3 (ref 0.4–1.3)
NUCLEATED RED BLOOD CELLS: 0 /100 WBC
PLATELET # BLD: 91 THOU/MM3 (ref 130–400)
PMV BLD AUTO: 10.4 FL (ref 9.4–12.4)
POTASSIUM SERPL-SCNC: 4.2 MEQ/L (ref 3.5–5.2)
POTASSIUM SERPL-SCNC: 4.5 MEQ/L (ref 3.5–5.2)
RBC # BLD: 3.34 MILL/MM3 (ref 4.7–6.1)
SEG NEUTROPHILS: 68.8 %
SEGMENTED NEUTROPHILS ABSOLUTE COUNT: 4.4 THOU/MM3 (ref 1.8–7.7)
SODIUM BLD-SCNC: 138 MEQ/L (ref 135–145)
SODIUM BLD-SCNC: 138 MEQ/L (ref 135–145)
TOTAL PROTEIN: 5.9 G/DL (ref 6.1–8)
WBC # BLD: 6.4 THOU/MM3 (ref 4.8–10.8)

## 2021-09-12 PROCEDURE — 2000000000 HC ICU R&B

## 2021-09-12 PROCEDURE — C9113 INJ PANTOPRAZOLE SODIUM, VIA: HCPCS | Performed by: NURSE PRACTITIONER

## 2021-09-12 PROCEDURE — 6360000002 HC RX W HCPCS: Performed by: NURSE PRACTITIONER

## 2021-09-12 PROCEDURE — APPSS180 APP SPLIT SHARED TIME > 60 MINUTES: Performed by: NURSE PRACTITIONER

## 2021-09-12 PROCEDURE — 99291 CRITICAL CARE FIRST HOUR: CPT | Performed by: INTERNAL MEDICINE

## 2021-09-12 PROCEDURE — 94003 VENT MGMT INPAT SUBQ DAY: CPT

## 2021-09-12 PROCEDURE — 6370000000 HC RX 637 (ALT 250 FOR IP): Performed by: NURSE PRACTITIONER

## 2021-09-12 PROCEDURE — 82248 BILIRUBIN DIRECT: CPT

## 2021-09-12 PROCEDURE — 80053 COMPREHEN METABOLIC PANEL: CPT

## 2021-09-12 PROCEDURE — 2580000003 HC RX 258: Performed by: INTERNAL MEDICINE

## 2021-09-12 PROCEDURE — 6360000002 HC RX W HCPCS: Performed by: INTERNAL MEDICINE

## 2021-09-12 PROCEDURE — 36415 COLL VENOUS BLD VENIPUNCTURE: CPT

## 2021-09-12 PROCEDURE — 85025 COMPLETE CBC W/AUTO DIFF WBC: CPT

## 2021-09-12 PROCEDURE — 74018 RADEX ABDOMEN 1 VIEW: CPT

## 2021-09-12 PROCEDURE — 94761 N-INVAS EAR/PLS OXIMETRY MLT: CPT

## 2021-09-12 PROCEDURE — 2580000003 HC RX 258: Performed by: NURSE PRACTITIONER

## 2021-09-12 PROCEDURE — 83735 ASSAY OF MAGNESIUM: CPT

## 2021-09-12 PROCEDURE — 6370000000 HC RX 637 (ALT 250 FOR IP): Performed by: INTERNAL MEDICINE

## 2021-09-12 PROCEDURE — 94640 AIRWAY INHALATION TREATMENT: CPT

## 2021-09-12 PROCEDURE — 2580000003 HC RX 258

## 2021-09-12 PROCEDURE — 6370000000 HC RX 637 (ALT 250 FOR IP)

## 2021-09-12 PROCEDURE — 82948 REAGENT STRIP/BLOOD GLUCOSE: CPT

## 2021-09-12 PROCEDURE — 71045 X-RAY EXAM CHEST 1 VIEW: CPT

## 2021-09-12 PROCEDURE — 99292 CRITICAL CARE ADDL 30 MIN: CPT | Performed by: INTERNAL MEDICINE

## 2021-09-12 PROCEDURE — 2500000003 HC RX 250 WO HCPCS: Performed by: INTERNAL MEDICINE

## 2021-09-12 RX ORDER — FUROSEMIDE 10 MG/ML
80 INJECTION INTRAMUSCULAR; INTRAVENOUS ONCE
Status: COMPLETED | OUTPATIENT
Start: 2021-09-12 | End: 2021-09-12

## 2021-09-12 RX ORDER — BISACODYL 10 MG
10 SUPPOSITORY, RECTAL RECTAL DAILY PRN
Status: DISCONTINUED | OUTPATIENT
Start: 2021-09-12 | End: 2021-09-20 | Stop reason: HOSPADM

## 2021-09-12 RX ADMIN — FENTANYL CITRATE 75 MCG/HR: 50 INJECTION INTRAMUSCULAR; INTRAVENOUS at 03:58

## 2021-09-12 RX ADMIN — BISACODYL 10 MG: 10 SUPPOSITORY RECTAL at 13:58

## 2021-09-12 RX ADMIN — Medication 300 MG: at 08:14

## 2021-09-12 RX ADMIN — PANTOPRAZOLE SODIUM 40 MG: 40 INJECTION, POWDER, FOR SOLUTION INTRAVENOUS at 09:09

## 2021-09-12 RX ADMIN — CHLOROTHIAZIDE SODIUM 250 MG: 500 INJECTION, POWDER, LYOPHILIZED, FOR SOLUTION INTRAVENOUS at 11:01

## 2021-09-12 RX ADMIN — Medication 15 ML: at 09:09

## 2021-09-12 RX ADMIN — FLUCONAZOLE IN SODIUM CHLORIDE 200 MG: 2 INJECTION, SOLUTION INTRAVENOUS at 13:51

## 2021-09-12 RX ADMIN — ENOXAPARIN SODIUM 40 MG: 40 INJECTION SUBCUTANEOUS at 09:09

## 2021-09-12 RX ADMIN — DOCUSATE SODIUM 100 MG: 50 LIQUID ORAL at 11:01

## 2021-09-12 RX ADMIN — Medication 1 CAPSULE: at 09:09

## 2021-09-12 RX ADMIN — FUROSEMIDE 80 MG: 10 INJECTION, SOLUTION INTRAMUSCULAR; INTRAVENOUS at 11:02

## 2021-09-12 RX ADMIN — ENOXAPARIN SODIUM 40 MG: 40 INJECTION SUBCUTANEOUS at 22:09

## 2021-09-12 RX ADMIN — Medication 300 MG: at 19:59

## 2021-09-12 RX ADMIN — SODIUM CHLORIDE 0.2 MCG/KG/HR: 9 INJECTION, SOLUTION INTRAVENOUS at 23:37

## 2021-09-12 RX ADMIN — SODIUM CHLORIDE, PRESERVATIVE FREE 10 ML: 5 INJECTION INTRAVENOUS at 09:11

## 2021-09-12 RX ADMIN — Medication 15 ML: at 22:10

## 2021-09-12 RX ADMIN — FLUCONAZOLE 400 MG: 2 INJECTION, SOLUTION INTRAVENOUS at 11:35

## 2021-09-12 RX ADMIN — SODIUM CHLORIDE, PRESERVATIVE FREE 10 ML: 5 INJECTION INTRAVENOUS at 22:10

## 2021-09-12 RX ADMIN — POLYETHYLENE GLYCOL 3350 17 G: 17 POWDER, FOR SOLUTION ORAL at 11:01

## 2021-09-12 RX ADMIN — NALOXEGOL OXALATE 12.5 MG: 12.5 TABLET, FILM COATED ORAL at 09:09

## 2021-09-12 RX ADMIN — LEVOFLOXACIN 750 MG: 5 INJECTION, SOLUTION INTRAVENOUS at 11:07

## 2021-09-12 ASSESSMENT — PULMONARY FUNCTION TESTS
PIF_VALUE: 19
PIF_VALUE: 20
PIF_VALUE: 19

## 2021-09-12 NOTE — FLOWSHEET NOTE
Gian Marroquin CNP updated on pt emesis again. Also informed tube feeding was never restarted this shift. Orders received.

## 2021-09-12 NOTE — PROGRESS NOTES
20:30- Patient had 320 residual tube feed upon first assessment, bowel sounds remain hypoactive. Patient passing gas. Patient began coughing and starting vomitting tube feed around ET tube and OG tube. Tube feed held at this time, will reassess and resume in 4 hours. Patient's mouth cleaned and suctioned, ETT suctioned. SPO2 remains greater than 90% with no increase in ventilator support.

## 2021-09-12 NOTE — FLOWSHEET NOTE
09/12/21 0940   Provider Notification   Reason for Communication Review case   Provider Name Dr Miguelina Walden   Provider Notification Physician   Method of Communication Face to face   Response See orders   Notification Time 0935   Dr Miguelina Walden updated on urine output since this am and response from albumin and lasix overnight. Orders received.

## 2021-09-12 NOTE — PROGRESS NOTES
00:00- attempting to complete patient's neuro assessment. Patient began coughing/gagging on tube then vomited around tube. Mouth and ETT suctioned. Lung sounds remain clear. Patient's tube feed held at this time, OG hooked to LIWS to attempt to prevent vomiting. Will resume tube feed at lower rate at 0400.     04:30- Patient bowel sounds remain hypoactive. No BM so far this shift. Patient had minimal output from OG tube when hooked to LIWS. Tube feed resumed at 20 mL/hr.

## 2021-09-12 NOTE — FLOWSHEET NOTE
09/12/21 1016   Provider Notification   Reason for Communication Review case   Provider Name Hilda Arriaga, [address]    Provider Notification Advance Practice Clinician (CNS/NP/CNM/CRNA/PA)   Method of Communication Face to face   Response Other (Comment)   Notification Time 21      Discussed KUB findings. Plan to restart feeding at 10 ml/hr. And give PRN meds for constipation.

## 2021-09-12 NOTE — PROGRESS NOTES
CRITICAL CARE PROGRESS NOTE      Patient:  Julia Montez    Unit/Bed:4D-02/002-A  YOB: 1975  MRN: [id number]   PCP: Lynnette Alfonso MD  Date of Admission: 8/30/2021  Chief Complaint:- COVID-19    Assessment and Plan:      Acute hypoxemic respiratory failure: Secondary to COVID-19 with subsequent progression to ARDS. Patient intubated 9/1/2021. ECMO initiated 9/1/2021. Patient required ECMO recannulation on 9/6/2021 secondary to kinked catheter with groin line. Now with internal jugular Protek duo which was placed 9/6/2021. ECMO completed with decannulation on 9/10/2021. S/P lung rest strategy. Patient undergoing lung protection strategy targeting a plateau pressure of 30 or less. Pressure control 10 PEEP 6. Continue with mechanical ventilator support. Weaning sedation for possible extubation tomorrow. COVID-19: Patient failed trial of remdesivir, tocilizumab, and Decadron. Patient had progressive hypoxemia. Can remove from isolation on 9/18/21 if he remains afebrile. Pneumonia: Coinfection with Haemophilus influenza. Patient initially treated with Rocephin. Patient transition to higher dosing of Rocephin. Antibiotics initiated 8/30/2021. Organism is beta-lactamase positive. Rocephin ineffective. Patient transition to [address] [place] [address] on 9/7/2021. Nebulized tobramycin was added on 9/5/2021. Repeat bronchoscopy completed 9/8/2021 showed significant reduction in mucus production. Culture demonstrated airway colonization with Candida albicans. Patient did grow mold secondary to Aspergillus species with only light growth. I do not believe this represents an overwhelming infection. It may represent contamination. Repeat lavage 9/8/2021 negative. Respiratory culture from 9/8+ for Candida albicans light growth. Aspergillus was not identified on second lavage. Sepsis: resolved;  Secondary to combination of Covid and Haemophilus influenza. Present at presentation.   Hypotension: resolved; Initially due to to heavy sedation and positive pressure ventilation. Requiring low-dose pressors. Subsequently worsened secondary to anemia of acute blood loss. Weaning pressors. ARDS:  Patient intubated 9/1/2021. ECMO initiated 9/1/2021. ECMO completed 9/10/2021. Pressure support reduced, PEEP reduced. Patient will likely extubate 9/13. Thrombocytopenia: Secondary to acute blood loss from hemorrhage. Infused platelets as needed. Trending. Platelets 91  Hepatostasis: Initially secondary to ECMO catheter placement. Continue to assess as catheter has been removed. Improving. Obesity: Aware. Mild neutropenia: IgG and IgM level are normal.  IgA level is slightly diminished. Resolved. Hemoptysis: Secondary to spontaneous alveolar hemorrhage related to anticoagulation and high ACT requirements. Heparin discontinued 9/8/2021. Bronchoscopy completed 9/8/2021 showed old blood within the airway but no active bleeding. Heparin held. Resolved . Anemia secondary to acute blood loss: This is primarily secondary to high ACT for clot prevention with low flow ECMO state. Represents spontaneous bleeding. Heparin discontinued 9/8/2021. Patient requiring multiple transfusions. No evidence of retroperitoneal hemorrhage on ultrasound. Heparin resumed 9/8/2021 with stability of bleeding. H/H 10.2/31.9, monitor      INITIAL H AND P AND ICU COURSE:     Chu Fernandez  is a 38-year-old white male lifetime non-smoker. He is a farmer by occupation. He is obese but has no other medical diagnoses including coronary artery disease diabetes or sleep apnea. Patient presented to the emergency room and was admitted on 8/30/2021. He started to develop fever associated with chills cough shortness of breath nasal congestion loss of taste and loss of smell starting 8/28/2021. As he continued to progress his dyspnea continued to worsen. He developed malaise. He indicated that his wife was recently diagnosed with Covid. Patient underwent Covid screen and was positive. CT scan chest showed bilateral infiltrates. He initially was started on low-flow oxygen but continued to require progressive oxygen supplementation. He was treated with remdesivir Decadron and subsequently tocilizumab. He continued to develop progressive dyspnea and had the syndrome of \"happy hypoxia\". As patient continued to worsen, he elected to proceed with intubation and ECMO. Patient intubated 9/1/2021. ECMO cannulation 9/1/2021. As the catheter started to warm, it became more compliant and developed kinking causing impaired outflow. As the flow continued to deteriorate, it was clear patient would need replacement of bilateral groin catheters. Patient underwent recannulation of the right internal jugular with Protek duo and removal of the bilateral groin lines. Flow immediately returned without complications. Patient continued to improve and ECMO was terminated with decannulation on 9/10/2021. Patient did develop coinfection. Patient had COVID-19 pneumonia worsened by beta lactamase positive Haemophilus influenza. Rocephin was initiated 8/30/2021. Patient was initially treated with Rocephin without improvement. Patient was transitioned to [address] [place] [address] on 9/7/2021. Nebulized tobramycin was added 9/5/2021. Past Medical History: per HPI  Family History: No known family history  Social History: Lifetime non-smoker, social alcohol use, denies drug use.      ROS   Intubated and sedated on mechanical ventilation    Scheduled Meds:   enoxaparin  40 mg SubCUTAneous BID    fluconazole  400 mg IntraVENous Q24H    And    fluconazole  200 mg IntraVENous Q24H    levofloxacin  750 mg IntraVENous Q24H    chlorhexidine  15 mL Mouth/Throat BID    tobramycin (PF)  300 mg Nebulization BID    midazolam  2 mg IntraVENous Once    naloxegol  12.5 mg Oral QAM    insulin lispro  0-6 Units SubCUTAneous Q4H    pantoprazole  40 mg IntraVENous Daily    And    sodium chloride (PF)  10 mL IntraVENous Daily    calcium replacement protocol   Other RX Placeholder    lactobacillus  1 capsule Oral Daily with breakfast    sodium chloride flush  5-40 mL IntraVENous 2 times per day     Continuous Infusions:   dexmedetomidine      norepinephrine 1 mcg/min (09/11/21 0433)    sodium chloride      midazolam 1 mg/hr (09/12/21 0828)    dextrose      fentaNYL (SUBLIMAZE) 1250 mcg in sodium chloride 0.9 % 250 mL 50 mcg/hr (09/12/21 0828)       PHYSICAL EXAMINATION:  T:  99.  P:  15. RR:  107. B/P:  95/72. FiO2:  30. O2 Sat:  90.  I/O:  1650/6850  Body mass index is 41.88 kg/m². PC: 10/6: TV: 511: RRTotal: 18: Ti:1 sec  General: Acute on chronically ill-appearing white male  HEENT:  normocephalic and atraumatic. No scleral icterus. PERR  Neck: supple. No Thyromegaly. Lungs: Diminished to auscultation. No retractions  Cardiac: RRR. No JVD. Abdomen: soft. Nontender. Extremities:  No clubbing, cyanosis. Bilateral lower extremity edema  Vasculature: capillary refill < 3 seconds. Palpable dorsalis pedis pulses. Skin:  warm and dry. Psych: Continues eye-opening, withdraws from pain. Follows no commands. Lymph:  No supraclavicular adenopathy. Neurologic:  No focal deficit. No seizures. Data: (All radiographs, tracings, PFTs, and imaging are personally viewed and interpreted unless otherwise noted). Sodium 138, potassium 4.2, chloride 96, CO2 32, BUN 19, creatinine 0.9, anion gap 10.0, glucose 104. WBC 6.4, hemoglobin 10.2, hematocrit 31.9, platelet count 91.   Telemetry shows normal sinus rhythm  Pneumonia panel 9/8/2021 negative  Respiratory culture 9/8/2021 reports light growth of Candida albicans  CTA 8/30/2021 reports no pulmonary embolism, moderate groundglass infiltrates  Chest x-ray 9/11/2021 reports similar bilateral airspace disease  Ultrasound abdomen pelvis 9/8/2021 reports no definite retroperitoneal hematoma  JASPREET 9/10/2021 reports ejection fraction 55-60, normal LV function. Chest x-ray 9/12/2021 reports slight improvement. No significant change. KUB 9/12/2021 reports nonobstructive gas pattern      Meets Continued ICU Level Care Criteria:    [x] Yes   [] No - Transfer Planned to listed location:  [] HOSPITALIST CONTACTED-      Case and plan discussed with Dr. Sandy Snyder. Patient was seen with Dr. Sandy Snyder in telemetry medicine. Electronically signed by Inga Leo. KATHARINA Boothe - [facility]  CRITICAL CARE SPECIALIST  Patient seen by me. Case discussed with practitioner. Patient undergoing diuresis secondary to extracellular volume overload from volume resuscitation required with ECMO. Radiograph continues to improve. Continue to wean as tolerated from mechanical ventilator. Italicized font represents changes to the note made by me. CC time 35 minutes. Time was discontiguous. Time does not include procedures. Time does include my direct assessment of the patient and coordination of care.   Electronically signed by Meagan Abdi MD on 9/13/2021 at 6:08 AM

## 2021-09-12 NOTE — PROGRESS NOTES
Cardiology Progress Note      Patient:  Renetta Turcios  YOB: 1975  MRN: [id number]   Acct: [de-identified]  Admit Date:  8/30/2021  Primary Cardiologist: Osvaldo Nicole    Chief Complaint: S/p ARDS VV -ECMO    Subjective (Events in last 24 hours):    No bleeding around sites  Right neck and RFA sites with expected ecchymoses  TF had high residuals likely related to narcotics  CVP 16  Net neg 5L over the last 24 hours  Still + 24 L (down 13L)      Objective:   BP 96/71   Pulse 96   Temp 99 °F (37.2 °C) (Bladder)   Resp 20   Ht 5' 10\" (1.778 m)   Wt 291 lb 14.2 oz (132.4 kg)   SpO2 92%   BMI 41.88 kg/m²        TELEMETRY: NSR    Physical Exam:  General Appearance: Stable, lines in place  Cardiovascular: normal rate, regular rhythm, normal S1 and S2, no murmurs, rubs, clicks, or gallops, distal pulses intact, no carotid bruits, no JVD  Pulmonary/Chest: Bilateral crackles  Abdomen: soft, non-tender, non-distended, normal bowel sounds, no masses Extremities: no cyanosis, clubbing, no edema, pulse   Skin: warm and dry, no rash or erythema, ecchymoses on the right groin, right neck, and scrotum  Head: normocephalic and atraumatic  Eyes: pupils equal, round, and reactive to light  Neck: supple and non-tender without mass, no thyromegaly   Musculoskeletal: no joint swelling, deformity or tenderness  Neurological: sedated/intubated    Medications:    enoxaparin  40 mg SubCUTAneous BID    fluconazole  400 mg IntraVENous Q24H    And    fluconazole  200 mg IntraVENous Q24H    levofloxacin  750 mg IntraVENous Q24H    chlorhexidine  15 mL Mouth/Throat BID    tobramycin (PF)  300 mg Nebulization BID    midazolam  2 mg IntraVENous Once    naloxegol  12.5 mg Oral QAM    insulin lispro  0-6 Units SubCUTAneous Q4H    pantoprazole  40 mg IntraVENous Daily    And    sodium chloride (PF)  10 mL IntraVENous Daily    calcium replacement protocol   Other RX Placeholder    lactobacillus  1 capsule Oral Daily with breakfast    sodium chloride flush  5-40 mL IntraVENous 2 times per day      dexmedetomidine      norepinephrine 1 mcg/min (09/11/21 0433)    sodium chloride      midazolam 3 mg/hr (09/12/21 0239)    dextrose      fentaNYL (SUBLIMAZE) 1250 mcg in sodium chloride 0.9 % 250 mL 75 mcg/hr (09/12/21 0358)     sodium chloride, , PRN  glucose, 15 g, PRN  dextrose, 12.5 g, PRN  glucagon (rDNA), 1 mg, PRN  dextrose, 100 mL/hr, PRN  docusate, 100 mg, BID PRN  potassium chloride, 20 mEq, PRN  magnesium sulfate, 2,000 mg, PRN  albuterol, 2.5 mg, Q4H PRN  artificial tears, , PRN  sodium chloride flush, 5-40 mL, PRN  ondansetron, 4 mg, Q8H PRN   Or  ondansetron, 4 mg, Q6H PRN  polyethylene glycol, 17 g, Daily PRN  acetaminophen, 650 mg, Q6H PRN   Or  acetaminophen, 650 mg, Q6H PRN    Lab Data:    Cardiac Enzymes:  No results for input(s): CKTOTAL, CKMB, CKMBINDEX, TROPONINI in the last 72 hours.     CBC:   Lab Results   Component Value Date    WBC 6.4 09/12/2021    RBC 3.34 09/12/2021    HGB 10.2 09/12/2021    HCT 31.9 09/12/2021    PLT 91 09/12/2021       CMP:    Lab Results   Component Value Date     09/12/2021    K 4.2 09/12/2021    K 4.4 09/11/2021    CL 96 09/12/2021    CO2 32 09/12/2021    BUN 19 09/12/2021    CREATININE 0.9 09/12/2021    LABGLOM >90 09/12/2021    GLUCOSE 104 09/12/2021    CALCIUM 9.2 09/12/2021       Hepatic Function Panel:    Lab Results   Component Value Date    ALKPHOS 49 09/12/2021    ALT 93 09/12/2021    AST 49 09/12/2021    PROT 5.9 09/12/2021    BILITOT 1.8 09/12/2021    BILIDIR 0.5 09/12/2021    LABALBU 4.1 09/12/2021       Magnesium:    Lab Results   Component Value Date    MG 2.0 09/12/2021       PT/INR:    Lab Results   Component Value Date    INR 1.29 08/30/2021       HgBA1c:    Lab Results   Component Value Date    LABA1C 5.6 09/02/2021       FLP:    Lab Results   Component Value Date    TRIG 85 09/01/2021       TSH:  No results found for: TSH      Assessment:  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 - 10 day ECMO run, decannulated 9/10/21  Preserved EF  No sig CAD  Normal RV function    Plan:  · Neuro - weaning narcotics, almost off, cut to Fentanyl 25 and Versed 1  · CV -[address] in place, CVP 16, keep dosing intermittent Lasix/Diuril   · Pulm - Minimal vent settings, continue to wean FiO2, CXR improving daily  · ID - Abx stable, no fevers  · GI - Likely related to narcotic bowel depression, TF held due to high residuals, non-obstructive bowel gas pattern, no concerns currently for SBO, LFTs improving likely related to congestion  · Heme - Stable Hgb  · Skin - No decubitus ulcers  · Renal - good UOP, no concerns for NIRAJ  · Endo - BS < 180, use Insulin as needed  · Dispo - ICU care, continue current care  · FULL CODE    Greater than 120 minutes of time was spent managing critical issues, discussing with the family, and coordinating care.       Electronically signed by Sandy Kiser MD on 9/12/2021 at 8:38 AM

## 2021-09-12 NOTE — PROGRESS NOTES
Assumed care of patient . Pt eyes open to stimuli . Moves both arms to painful stimuli . Follows no commands. Continues on vent . No distress . O/g placed to suction as ordered due to pt emesis.

## 2021-09-13 ENCOUNTER — APPOINTMENT (OUTPATIENT)
Dept: GENERAL RADIOLOGY | Age: 46
DRG: 003 | End: 2021-09-13
Payer: COMMERCIAL

## 2021-09-13 LAB
ALBUMIN SERPL-MCNC: 4.2 G/DL (ref 3.5–5.1)
ALP BLD-CCNC: 53 U/L (ref 38–126)
ALT SERPL-CCNC: 83 U/L (ref 11–66)
ANION GAP SERPL CALCULATED.3IONS-SCNC: 8 MEQ/L (ref 8–16)
AST SERPL-CCNC: 49 U/L (ref 5–40)
BASOPHILS # BLD: 0.3 %
BASOPHILS ABSOLUTE: 0 THOU/MM3 (ref 0–0.1)
BILIRUB SERPL-MCNC: 1.7 MG/DL (ref 0.3–1.2)
BILIRUBIN DIRECT: 0.4 MG/DL (ref 0–0.3)
BUN BLDV-MCNC: 20 MG/DL (ref 7–22)
CALCIUM SERPL-MCNC: 9.6 MG/DL (ref 8.5–10.5)
CHLORIDE BLD-SCNC: 98 MEQ/L (ref 98–111)
CO2: 33 MEQ/L (ref 23–33)
CREAT SERPL-MCNC: 0.9 MG/DL (ref 0.4–1.2)
EOSINOPHIL # BLD: 0.6 %
EOSINOPHILS ABSOLUTE: 0 THOU/MM3 (ref 0–0.4)
ERYTHROCYTE [DISTWIDTH] IN BLOOD BY AUTOMATED COUNT: 17.7 % (ref 11.5–14.5)
ERYTHROCYTE [DISTWIDTH] IN BLOOD BY AUTOMATED COUNT: 55.8 FL (ref 35–45)
GFR SERPL CREATININE-BSD FRML MDRD: > 90 ML/MIN/1.73M2
GLUCOSE BLD-MCNC: 103 MG/DL (ref 70–108)
GLUCOSE BLD-MCNC: 103 MG/DL (ref 70–108)
GLUCOSE BLD-MCNC: 112 MG/DL (ref 70–108)
GLUCOSE BLD-MCNC: 88 MG/DL (ref 70–108)
GLUCOSE BLD-MCNC: 92 MG/DL (ref 70–108)
GLUCOSE BLD-MCNC: 97 MG/DL (ref 70–108)
HCT VFR BLD CALC: 31.4 % (ref 42–52)
HEMOGLOBIN: 9.9 GM/DL (ref 14–18)
IMMATURE GRANS (ABS): 0.12 THOU/MM3 (ref 0–0.07)
IMMATURE GRANULOCYTES: 1.9 %
LYMPHOCYTES # BLD: 12.2 %
LYMPHOCYTES ABSOLUTE: 0.8 THOU/MM3 (ref 1–4.8)
MAGNESIUM: 2.2 MG/DL (ref 1.6–2.4)
MCH RBC QN AUTO: 30.5 PG (ref 26–33)
MCHC RBC AUTO-ENTMCNC: 31.5 GM/DL (ref 32.2–35.5)
MCV RBC AUTO: 96.6 FL (ref 80–94)
MONOCYTES # BLD: 15 %
MONOCYTES ABSOLUTE: 0.9 THOU/MM3 (ref 0.4–1.3)
NUCLEATED RED BLOOD CELLS: 0 /100 WBC
PLATELET # BLD: 107 THOU/MM3 (ref 130–400)
PMV BLD AUTO: 10 FL (ref 9.4–12.4)
POTASSIUM SERPL-SCNC: 4 MEQ/L (ref 3.5–5.2)
RBC # BLD: 3.25 MILL/MM3 (ref 4.7–6.1)
SEG NEUTROPHILS: 70 %
SEGMENTED NEUTROPHILS ABSOLUTE COUNT: 4.3 THOU/MM3 (ref 1.8–7.7)
SODIUM BLD-SCNC: 139 MEQ/L (ref 135–145)
TOTAL PROTEIN: 6.1 G/DL (ref 6.1–8)
WBC # BLD: 6.2 THOU/MM3 (ref 4.8–10.8)

## 2021-09-13 PROCEDURE — 83735 ASSAY OF MAGNESIUM: CPT

## 2021-09-13 PROCEDURE — 94640 AIRWAY INHALATION TREATMENT: CPT

## 2021-09-13 PROCEDURE — 2000000000 HC ICU R&B

## 2021-09-13 PROCEDURE — 6370000000 HC RX 637 (ALT 250 FOR IP): Performed by: NURSE PRACTITIONER

## 2021-09-13 PROCEDURE — 85025 COMPLETE CBC W/AUTO DIFF WBC: CPT

## 2021-09-13 PROCEDURE — 36415 COLL VENOUS BLD VENIPUNCTURE: CPT

## 2021-09-13 PROCEDURE — 82248 BILIRUBIN DIRECT: CPT

## 2021-09-13 PROCEDURE — 6360000002 HC RX W HCPCS: Performed by: INTERNAL MEDICINE

## 2021-09-13 PROCEDURE — 71045 X-RAY EXAM CHEST 1 VIEW: CPT

## 2021-09-13 PROCEDURE — 80053 COMPREHEN METABOLIC PANEL: CPT

## 2021-09-13 PROCEDURE — 2580000003 HC RX 258: Performed by: NURSE PRACTITIONER

## 2021-09-13 PROCEDURE — 99291 CRITICAL CARE FIRST HOUR: CPT | Performed by: INTERNAL MEDICINE

## 2021-09-13 PROCEDURE — 6360000002 HC RX W HCPCS: Performed by: NURSE PRACTITIONER

## 2021-09-13 PROCEDURE — C9113 INJ PANTOPRAZOLE SODIUM, VIA: HCPCS | Performed by: NURSE PRACTITIONER

## 2021-09-13 PROCEDURE — 6370000000 HC RX 637 (ALT 250 FOR IP): Performed by: INTERNAL MEDICINE

## 2021-09-13 PROCEDURE — 2580000003 HC RX 258: Performed by: INTERNAL MEDICINE

## 2021-09-13 PROCEDURE — 82948 REAGENT STRIP/BLOOD GLUCOSE: CPT

## 2021-09-13 PROCEDURE — 94003 VENT MGMT INPAT SUBQ DAY: CPT

## 2021-09-13 PROCEDURE — 2580000003 HC RX 258

## 2021-09-13 RX ORDER — FUROSEMIDE 10 MG/ML
80 INJECTION INTRAMUSCULAR; INTRAVENOUS ONCE
Status: COMPLETED | OUTPATIENT
Start: 2021-09-13 | End: 2021-09-13

## 2021-09-13 RX ADMIN — PANTOPRAZOLE SODIUM 40 MG: 40 INJECTION, POWDER, FOR SOLUTION INTRAVENOUS at 08:08

## 2021-09-13 RX ADMIN — Medication 15 ML: at 08:06

## 2021-09-13 RX ADMIN — FUROSEMIDE 80 MG: 40 INJECTION, SOLUTION INTRAMUSCULAR; INTRAVENOUS at 09:18

## 2021-09-13 RX ADMIN — LEVOFLOXACIN 750 MG: 5 INJECTION, SOLUTION INTRAVENOUS at 11:16

## 2021-09-13 RX ADMIN — SODIUM CHLORIDE, PRESERVATIVE FREE 10 ML: 5 INJECTION INTRAVENOUS at 08:08

## 2021-09-13 RX ADMIN — CHLOROTHIAZIDE SODIUM 250 MG: 500 INJECTION, POWDER, LYOPHILIZED, FOR SOLUTION INTRAVENOUS at 09:18

## 2021-09-13 RX ADMIN — FLUCONAZOLE 400 MG: 2 INJECTION, SOLUTION INTRAVENOUS at 11:16

## 2021-09-13 RX ADMIN — Medication 1 CAPSULE: at 08:01

## 2021-09-13 RX ADMIN — ENOXAPARIN SODIUM 40 MG: 40 INJECTION SUBCUTANEOUS at 08:07

## 2021-09-13 RX ADMIN — Medication 300 MG: at 08:45

## 2021-09-13 RX ADMIN — DOCUSATE SODIUM 100 MG: 50 LIQUID ORAL at 08:01

## 2021-09-13 RX ADMIN — SODIUM CHLORIDE, PRESERVATIVE FREE 10 ML: 5 INJECTION INTRAVENOUS at 08:09

## 2021-09-13 RX ADMIN — NALOXEGOL OXALATE 12.5 MG: 12.5 TABLET, FILM COATED ORAL at 08:01

## 2021-09-13 RX ADMIN — ENOXAPARIN SODIUM 40 MG: 40 INJECTION SUBCUTANEOUS at 20:08

## 2021-09-13 ASSESSMENT — PULMONARY FUNCTION TESTS
PIF_VALUE: 17
PIF_VALUE: 20

## 2021-09-13 ASSESSMENT — PAIN SCALES - GENERAL: PAINLEVEL_OUTOF10: 0

## 2021-09-13 NOTE — PROGRESS NOTES
Patient has been successfully weaned from Mechanical Ventilation. SpO2 of 98 on 30% FiO2. Patient extubated and placed on 6 liters/min via nasal cannula. Post extubation SpO2 is 95% with HR  100 bpm and RR 21 breaths/min. Patient had strong cough that was non-productive. Extubation Well tolerated by patient. Wilson Gallo

## 2021-09-13 NOTE — CARE COORDINATION
9/13/21, 2:54 PM EDT    DISCHARGE ON GOING EVALUATION    [facility] day: 14  Location: -02/002-A Reason for admit: Hypoxia [R09.02]  COVID-19 [U07.1]   COVID-19 [U07.1]   Procedure:   8/30 CTA Chest: Neg for PE; Moderate groundglass infiltrates scattered throughout both lungs  9/1 Intubation  9/1 SGC left subclavian  9/1 CVC RIJ  9/1 Echo with EF 60%  9/1 JASPREET: EF 55-60%  9/1 Cardiac Cath: no sig CAD; PA pressure 40's; preserved CO/CI; Cannulated for ECMO  9/1 VV ECMO initiated  9/6 ECMO Cannulation sites changed  9/7 Bronch w/washings sent: Chronic airway inflammation with pitting airways disease and striation formation; Active pneumonia with bilateral lung mucus production; No active bleeding.  Hemoptysis secondary to suction trauma  9/7 US RUE: There is prominent edema in the subcutaneous fat at the right groin. No discrete hematoma is identified. 9/8 Bronch w/washings: Prominent pitting airways disease; Decreased mucus within the airway; Old clotted blood within the airway.  No active bleeding. Clots were removed with suction  9/8 US Abdomen: No definite retroperitoneal hematoma identified  9/10 Decannulated; ECMO stopped  9/10 CXR:   1. Right subclavian line with catheter tip in right atrium. An esophageal tube present with tip passing into the stomach. 2. Moderately severe infiltrates in both lung relatively diffusely. Left subclavian [place]Rayo catheter with tip in right lower lobe pulmonary artery    9/13 Extubated    Barriers to Discharge: Intensivisit following. Lavage + candida and aspergillus. Nebs. IV diflucan. IV levaquin. IV protonix. PCP: Margaret Israel MD  Readmission Risk Score: 13%  Patient Goals/Plan/Treatment Preferences: Patient is from home with his wife, follow for needs. Will request PT/OT.

## 2021-09-13 NOTE — PROGRESS NOTES
Patient:  Abdirashid Yee    Unit/Bed:4D-02/002-A  MRN: [id number]   PCP: John Roman MD  Date of Admission: 8/30/2021    Assessment and Plan(All pulmonary edema, renal failure, PE, and respiratory failure diagnoses are acute in nature unless otherwise specified):          Acute hypoxemic respiratory failure: Secondary to COVID-19 with subsequent progression to ARDS.  Patient intubated 9/1/2021.   ECMO initiated 9/1/2021.  Patient required ECMO recannulation on 9/6/2021 secondary to kinked catheter with groin line.  Now with internal jugular Protek duo which was placed 9/6/2021.  ECMO completed with decannulation on 9/10/2021. Patient did well with spontaneous breathing trial and was extubated on 9/13/2021. COVID-19: Patient failed trial of remdesivir, tocilizumab, and Decadron. Patient had progressive hypoxemia.  Can remove from isolation on 9/18/21. Pneumonia: Coinfection with Haemophilus influenza.  Patient initially treated with Rocephin. Patient transition to higher dosing of Rocephin. Antibiotics initiated 8/30/2021.  Organism is beta-lactamase positive.  Rocephin ineffective.  Patient transition to [address] on 9/7/2021. Nebulized tobramycin was added on 9/5/2021.  Repeat bronchoscopy completed 9/8/2021 showed significant reduction in mucus production. Culture demonstrated airway colonization with Candida albicans. Patient did grow mold secondary to Aspergillus species with only light growth. I do not believe this represents an overwhelming infection. It may represent contamination. Repeat lavage 9/8/2021 shows Candida albicans but no Aspergillus. Initial lavage was 9/6/2021. Sepsis: Secondary to combination of Covid and Haemophilus influenza.  Present at presentation. On Levaquin. ARDS:  Patient intubated 9/1/2021.  ECMO initiated 9/1/2021. ECMO completed 9/10/2021. Extubated 9/13/2021. Thrombocytopenia: Secondary to acute blood loss from hemorrhage.  Infused platelets. Trending. Hepatostasis: Initially secondary to ECMO catheter placement. Continue to assess as catheter has been removed.  Improving. Obesity: Aware. Mild neutropenia: IgG and IgM level are normal.  IgA level is slightly diminished.  Resolved. Hemoptysis: Secondary to spontaneous alveolar hemorrhage related to anticoagulation and high ACT requirements.  Heparin discontinued 9/8/2021.  Bronchoscopy completed 9/8/2021 showed old blood within the airway but no active bleeding.  Heparin held.  Resolved . Anemia secondary to acute blood loss: This is primarily secondary to high ACT for clot prevention with low flow ECMO state.    Represents spontaneous bleeding.  Heparin discontinued 9/8/2021.  Patient requiring multiple transfusions.  No evidence of retroperitoneal hemorrhage on ultrasound.   Heparin resumed 9/8/2021 with stability of bleeding. Resolved. Hypotension: Initially due to to heavy sedation and positive pressure ventilation.  Requiring low-dose pressors.  Subsequently worsened secondary to anemia of acute blood loss. Resolved. CC: ARDS  HPI: Patient is a 49-year-old white male lifetime non-smoker. 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 He is obese but has no other medical diagnoses including coronary artery disease diabetes or sleep apnea.   Patient presented to the emergency room and was admitted on 8/30/2021. [facility] started to develop fever associated with chills cough shortness of breath nasal congestion loss of taste and loss of smell starting 8/28/2021.  As he continued to progress his dyspnea continued to worsen.  He developed malaise.  He indicated that his wife was recently diagnosed with Covid.  Patient underwent Covid screen and was positive.  CT scan chest showed bilateral infiltrates.  He initially was started on low-flow oxygen but continued to require progressive oxygen supplementation.  He was treated with remdesivir Decadron and subsequently tocilizumab.  He continued to develop progressive dyspnea and had the syndrome of \"happy hypoxia\".  As patient continued to worsen, he elected to proceed with intubation and ECMO. Patient intubated 9/1/2021. Asif Koch cannulation 9/1/2021.  As the catheter started to warm, it became more compliant and developed kinking causing impaired outflow.  As the flow continued to deteriorate, it was clear patient would need replacement of bilateral groin catheters.  Patient underwent recannulation of the right internal jugular with Protek duo and removal of the bilateral groin lines.  Flow immediately returned without complications. Patient continued to improve and ECMO was terminated with decannulation on 9/10/2021. Patient did develop coinfection.  Patient had COVID-19 pneumonia worsened by beta lactamase positive Haemophilus influenza.  Rocephin was initiated 8/30/2021.  Patient was initially treated with Rocephin without improvement.  Patient was transitioned to Levaquin on 9/7/2021.  Nebulized tobramycin was added 9/5/2021 for 10 doses. Patient extubated 9/13/2021. ROS: Lethargic but arousable. No chest pain. No fever. No nausea. 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   FHX:   Allergies: NKDA  Medications:     dexmedetomidine Stopped (09/13/21 0520)    norepinephrine Stopped (09/13/21 0348)    sodium chloride      midazolam Stopped (09/13/21 0012)    dextrose      fentaNYL (SUBLIMAZE) 1250 mcg in sodium chloride 0.9 % 250 mL Stopped (09/13/21 0159)      furosemide  80 mg IntraVENous Once    chlorothiazide (DIURIL) IVPB  250 mg IntraVENous Once    enoxaparin  40 mg SubCUTAneous BID    fluconazole  400 mg IntraVENous Q24H    And    fluconazole  200 mg IntraVENous Q24H    levofloxacin  750 mg IntraVENous Q24H    chlorhexidine  15 mL Mouth/Throat BID    tobramycin (PF)  300 mg Nebulization BID    midazolam  2 mg IntraVENous Once    naloxegol  12.5 mg Oral QAM    insulin lispro  0-6 Units SubCUTAneous Q4H    pantoprazole 40 mg IntraVENous Daily    And    sodium chloride (PF)  10 mL IntraVENous Daily    calcium replacement protocol   Other RX Placeholder    lactobacillus  1 capsule Oral Daily with breakfast    sodium chloride flush  5-40 mL IntraVENous 2 times per day       Vital Signs:   T: 99.1: P: 97 RR: 25 B/P: 110/71: FiO2: 30%: O2 Sat: 95%: I/O: 2052/5070 GCS: 10  Body mass index is 41.88 kg/m². [place]  General:   Overweight white male who is acutely ill-appearing. HEENT:  normocephalic and atraumatic. No scleral icterus. PERR  Neck: supple. No Thyromegaly. Lungs: clear to auscultation. No retractions  Cardiac: RRR. No JVD. Abdomen: soft. Nontender. Nondistended. Extremities:  No clubbing, cyanosis x 4. Upper and lower extremity edema bilaterally. Decreased scrotal edema. Vasculature: capillary refill < 3 seconds. Palpable dorsalis pedis pulses. Skin:  warm and dry. Psych: Sedated but arousable. Following simple requests. Affect withdrawn. Lymph:  No supraclavicular adenopathy. Neurologic:  No focal deficit. No seizures. Data: (All radiographs, tracings, PFTs, and imaging are personally viewed and interpreted unless otherwise noted). · COVID 8/30/21: +  · Pulmonary Lavage 9/1/21: PCUR positive for H. Influenza.  Beta- lactamase positive. · Pulmonary lavage 9/6/2021: Positive for Haemophilus influenza and Candida albicans. Culture showing light growth of Aspergillus species. Suspect this is contaminant. · Right lower extremity ultrasound report 9/7/2021: Subcutaneous edema without evidence of hematoma. · [place] lavage 9/8/2021: Many segmented neutrophils. No bacteria seen. Candida albicans light growth. Thus far no Aspergillus species. · Chest x-ray shows improvement in bilateral infiltrates. · White blood cell count 6.2, hemoglobin 9.9, platelets 957. Sodium 139, potassium 4, chloride 98, bicarb 33, BUN 20, creatinine 0.9, glucose 103.   Albumin 4.2, alkaline phosphatase 53, ALT 83, AST 49.  · Telemetry shows sinus rhythm. CC time 35 minutes. Time was discontiguous and does not include procedures. Electronically signed by Patricia Gutiérrez M.D.

## 2021-09-13 NOTE — PROGRESS NOTES
hold multiple times d/t procedures, elevated residuals, no bms( need for bm meds including Movantik), emesis  9/12. Doug Montejo RN confirms pt with pt with + bm yesterday ( 1 recorded). 9/12 KUB \" non obstructive gas pattern. \" Pt alert , but too drowsy for safe po yet, but plan po start as pt appropriate and able safe swallow . Per Doug Montejo RN all sedation is off & levo is off. meds include ATB, culturelle, movantik, Protonix, prn colace, prn, glycolax, prn dulcolax. ALT 83, Hgb 9.9. Wounds:  None (reported)       Current Nutrition Therapies:    ADULT DIET; Full Liquid    Anthropometric Measures:  · Height: 5' 10\" (177.8 cm)  · Current Body Weight: 291 lb 14.2 oz (132.4 kg) (9/12 +1+2 edema)   · Admission Body Weight: 263 lb 7.2 oz (119.5 kg) (8/31 no edema)    · Usual Body Weight:  (not available)     · Ideal Body Weight: 166 lbs;   · BMI: 41.9  · BMI Categories: Obese Class 3 (BMI 40.0 or greater)       Nutrition Diagnosis:   · Inadequate oral intake related to impaired respiratory function (pt also had been on ECMO this admit) as evidenced by  (just extubated.)      Nutrition Interventions:   Food and/or Nutrient Delivery:   (diet per Dr)  Nutrition Education/Counseling:  Education not appropriate   Coordination of Nutrition Care:  Continue to monitor while inpatient, Interdisciplinary Rounds    Goals:  Adequate oral intake with safe swallow during LOS       Nutrition Monitoring and Evaluation:   Behavioral-Environmental Outcomes:  None Identified   Food/Nutrient Intake Outcomes:  Diet Advancement/Tolerance, Food and Nutrient Intake  Physical Signs/Symptoms Outcomes:  Biochemical Data, Chewing or Swallowing, GI Status, Fluid Status or Edema, Hemodynamic Status, Nutrition Focused Physical Findings, Skin, Weight     Discharge Planning:     Too soon to determine     Electronically signed by Юлия Ford RD, LD on 9/13/21 at 11:24 AM EDT    Contact: [phone]

## 2021-09-13 NOTE — PLAN OF CARE
Problem: MECHANICAL VENTILATION  Goal: Will be able to breathe spontaneously, without ventilator support  Description: Will be able to breathe spontaneously, without ventilator support  9/13/2021 1451 by Suzi Jenkins RCP  Outcome: Completed  Note: Patient extubated     Problem: MECHANICAL VENTILATION  Goal: Clear lung sounds  Description: Clear lung sounds  Outcome: Completed  Note: Orders are for PRN nebulizer and tobramycin completed

## 2021-09-13 NOTE — PROGRESS NOTES
Patient flipped to spontaneous breathing trial at 0512. Precedex shut off per verbal bedside order from Hammad Hussein, [address] [facility] [place]. Patient's wife updated per her request.     07:25- Patient's wife updated that patient has been tolerating SBT. Updated that provider would like to extubate first thing this AM. Patient's wife stated she could be here between 8:15-8:30 and would like to be here when patient is extubated. Hammad Hussein, CNP updated, stated extubation around 8:30 would work. Patient continues to tolerate SBT.

## 2021-09-13 NOTE — PLAN OF CARE
Problem: MECHANICAL VENTILATION  Goal: Will be able to breathe spontaneously, without ventilator support  Description: Will be able to breathe spontaneously, without ventilator support  Outcome: Ongoing                                                  Patient Weaning Progress    The patient's vent settings was able to be weaned this shift. Ventilator settings that were weaned              [x] Mode   [] Pressure support weaned   [] Fio2 weaned   [] Peep weaned             Spontaneous weaning trial  was attempted. The patient was able to tolerate SBT. RSBI  was 51 with SpO2 of 93 on 30% FiO2. Spontanteous VT was 398 and RR 22 breaths/min. Patient placed on SBT at [phone]. Evac tube was  hooked up with continuous low suction(20-30mmHg)      Cuff was  deflated to determine cuff leak. A leak  was detected.  Cuff leak was 35%       *Specific details of weaning located in Ventilator documentation flowsheets*

## 2021-09-14 ENCOUNTER — APPOINTMENT (OUTPATIENT)
Dept: GENERAL RADIOLOGY | Age: 46
DRG: 003 | End: 2021-09-14
Payer: COMMERCIAL

## 2021-09-14 LAB
ALBUMIN SERPL-MCNC: 4.3 G/DL (ref 3.5–5.1)
ALP BLD-CCNC: 56 U/L (ref 38–126)
ALT SERPL-CCNC: 71 U/L (ref 11–66)
ANION GAP SERPL CALCULATED.3IONS-SCNC: 12 MEQ/L (ref 8–16)
AST SERPL-CCNC: 45 U/L (ref 5–40)
BASOPHILS # BLD: 0.7 %
BASOPHILS ABSOLUTE: 0 THOU/MM3 (ref 0–0.1)
BILIRUB SERPL-MCNC: 2 MG/DL (ref 0.3–1.2)
BILIRUBIN DIRECT: 0.5 MG/DL (ref 0–0.3)
BUN BLDV-MCNC: 22 MG/DL (ref 7–22)
CALCIUM SERPL-MCNC: 9.6 MG/DL (ref 8.5–10.5)
CHLORIDE BLD-SCNC: 100 MEQ/L (ref 98–111)
CO2: 30 MEQ/L (ref 23–33)
CREAT SERPL-MCNC: 0.8 MG/DL (ref 0.4–1.2)
EOSINOPHIL # BLD: 0.9 %
EOSINOPHILS ABSOLUTE: 0 THOU/MM3 (ref 0–0.4)
ERYTHROCYTE [DISTWIDTH] IN BLOOD BY AUTOMATED COUNT: 17.4 % (ref 11.5–14.5)
ERYTHROCYTE [DISTWIDTH] IN BLOOD BY AUTOMATED COUNT: 57.1 FL (ref 35–45)
GFR SERPL CREATININE-BSD FRML MDRD: > 90 ML/MIN/1.73M2
GLUCOSE BLD-MCNC: 108 MG/DL (ref 70–108)
GLUCOSE BLD-MCNC: 111 MG/DL (ref 70–108)
GLUCOSE BLD-MCNC: 82 MG/DL (ref 70–108)
GLUCOSE BLD-MCNC: 84 MG/DL (ref 70–108)
GLUCOSE BLD-MCNC: 84 MG/DL (ref 70–108)
GLUCOSE BLD-MCNC: 88 MG/DL (ref 70–108)
GLUCOSE BLD-MCNC: 88 MG/DL (ref 70–108)
HCT VFR BLD CALC: 32.4 % (ref 42–52)
HEMOGLOBIN: 10.3 GM/DL (ref 14–18)
IMMATURE GRANS (ABS): 0.06 THOU/MM3 (ref 0–0.07)
IMMATURE GRANULOCYTES: 1.3 %
LYMPHOCYTES # BLD: 16.6 %
LYMPHOCYTES ABSOLUTE: 0.8 THOU/MM3 (ref 1–4.8)
MAGNESIUM: 2.2 MG/DL (ref 1.6–2.4)
MCH RBC QN AUTO: 30.7 PG (ref 26–33)
MCHC RBC AUTO-ENTMCNC: 31.8 GM/DL (ref 32.2–35.5)
MCV RBC AUTO: 96.7 FL (ref 80–94)
MONOCYTES # BLD: 17.9 %
MONOCYTES ABSOLUTE: 0.8 THOU/MM3 (ref 0.4–1.3)
NUCLEATED RED BLOOD CELLS: 0 /100 WBC
PLATELET # BLD: 129 THOU/MM3 (ref 130–400)
PMV BLD AUTO: 9.8 FL (ref 9.4–12.4)
POTASSIUM SERPL-SCNC: 3.3 MEQ/L (ref 3.5–5.2)
RBC # BLD: 3.35 MILL/MM3 (ref 4.7–6.1)
SEG NEUTROPHILS: 62.6 %
SEGMENTED NEUTROPHILS ABSOLUTE COUNT: 2.9 THOU/MM3 (ref 1.8–7.7)
SODIUM BLD-SCNC: 142 MEQ/L (ref 135–145)
TOTAL PROTEIN: 6.4 G/DL (ref 6.1–8)
WBC # BLD: 4.6 THOU/MM3 (ref 4.8–10.8)

## 2021-09-14 PROCEDURE — 36415 COLL VENOUS BLD VENIPUNCTURE: CPT

## 2021-09-14 PROCEDURE — 82948 REAGENT STRIP/BLOOD GLUCOSE: CPT

## 2021-09-14 PROCEDURE — 6360000002 HC RX W HCPCS: Performed by: NURSE PRACTITIONER

## 2021-09-14 PROCEDURE — 83735 ASSAY OF MAGNESIUM: CPT

## 2021-09-14 PROCEDURE — 92610 EVALUATE SWALLOWING FUNCTION: CPT

## 2021-09-14 PROCEDURE — 97167 OT EVAL HIGH COMPLEX 60 MIN: CPT

## 2021-09-14 PROCEDURE — 97535 SELF CARE MNGMENT TRAINING: CPT

## 2021-09-14 PROCEDURE — 85025 COMPLETE CBC W/AUTO DIFF WBC: CPT

## 2021-09-14 PROCEDURE — 97530 THERAPEUTIC ACTIVITIES: CPT

## 2021-09-14 PROCEDURE — 2000000000 HC ICU R&B

## 2021-09-14 PROCEDURE — 2580000003 HC RX 258

## 2021-09-14 PROCEDURE — 6370000000 HC RX 637 (ALT 250 FOR IP): Performed by: INTERNAL MEDICINE

## 2021-09-14 PROCEDURE — 82248 BILIRUBIN DIRECT: CPT

## 2021-09-14 PROCEDURE — 71045 X-RAY EXAM CHEST 1 VIEW: CPT

## 2021-09-14 PROCEDURE — 99233 SBSQ HOSP IP/OBS HIGH 50: CPT | Performed by: INTERNAL MEDICINE

## 2021-09-14 PROCEDURE — 80053 COMPREHEN METABOLIC PANEL: CPT

## 2021-09-14 PROCEDURE — 99222 1ST HOSP IP/OBS MODERATE 55: CPT | Performed by: NURSE PRACTITIONER

## 2021-09-14 RX ORDER — FAMOTIDINE 20 MG/1
20 TABLET, FILM COATED ORAL 2 TIMES DAILY
Status: DISCONTINUED | OUTPATIENT
Start: 2021-09-14 | End: 2021-09-15

## 2021-09-14 RX ORDER — FLUCONAZOLE 200 MG/1
200 TABLET ORAL DAILY
Status: COMPLETED | OUTPATIENT
Start: 2021-09-14 | End: 2021-09-17

## 2021-09-14 RX ORDER — POTASSIUM CHLORIDE 20 MEQ/1
20 TABLET, EXTENDED RELEASE ORAL
Status: DISCONTINUED | OUTPATIENT
Start: 2021-09-14 | End: 2021-09-19

## 2021-09-14 RX ADMIN — FLUCONAZOLE 200 MG: 200 TABLET ORAL at 10:43

## 2021-09-14 RX ADMIN — ENOXAPARIN SODIUM 40 MG: 40 INJECTION SUBCUTANEOUS at 10:40

## 2021-09-14 RX ADMIN — POTASSIUM CHLORIDE 20 MEQ: 29.8 INJECTION, SOLUTION INTRAVENOUS at 05:47

## 2021-09-14 RX ADMIN — FAMOTIDINE 20 MG: 20 TABLET, FILM COATED ORAL at 20:16

## 2021-09-14 RX ADMIN — SODIUM CHLORIDE, PRESERVATIVE FREE 10 ML: 5 INJECTION INTRAVENOUS at 10:41

## 2021-09-14 RX ADMIN — ENOXAPARIN SODIUM 40 MG: 40 INJECTION SUBCUTANEOUS at 20:15

## 2021-09-14 RX ADMIN — POTASSIUM CHLORIDE 20 MEQ: 29.8 INJECTION, SOLUTION INTRAVENOUS at 07:41

## 2021-09-14 RX ADMIN — FAMOTIDINE 20 MG: 20 TABLET, FILM COATED ORAL at 10:43

## 2021-09-14 RX ADMIN — SODIUM CHLORIDE, PRESERVATIVE FREE 10 ML: 5 INJECTION INTRAVENOUS at 20:15

## 2021-09-14 RX ADMIN — POTASSIUM CHLORIDE 20 MEQ: 29.8 INJECTION, SOLUTION INTRAVENOUS at 06:59

## 2021-09-14 ASSESSMENT — PAIN SCALES - GENERAL
PAINLEVEL_OUTOF10: 0

## 2021-09-14 NOTE — CONSULTS
Physical Medicine & Rehabilitation   Consult Note      Admitting Physician: Eli Aguilar MD    Primary Care Provider: Gayathri Zheng MD     Reason for Consult:  COVID-19. Rehab needs    History of Present Illness:  Tre Lyons is a 48 y.o. male admitted to [address] [place] [address] on 8/30/2021. Patient with PMH of obesity. Patient presented to [facility] ER due to worsening SOB, cough, nasal congestion, loss of taste/smell and fatigue. Patient began experiencing symptoms about 4 days prior. Wife was +COVID. Patient was tested at urgent care and found to be +COVID and O2 sats 85%. Patient was brought to ER by family car from [facility]. CT Chest showed bilateral infiltrates. Patient continued to require more O2. He was treated with remdesivir, Decadron and subsequently tocilizumab. Patient required intubation with ventilation and ECMO on 9/1/21. Patient was found to have coinfection with Haemophilus influenza.  Patient initially treated with Rocephin. Patient transition to higher dosing of Rocephin. Antibiotics initiated 8/30/2021.  Organism is beta-lactamase positive.  Rocephin ineffective. Patient transition to [address] on 9/7/2021. Nebulized tobramycin was added on 9/5/2021.  Repeat bronchoscopy completed 9/8/2021 showed significant reduction in mucus production.  Culture demonstrated airway colonization with Candida albicans. Levaquin completed 9/14/2021. ECMO complete 9/10/21. Patient was extubated 9/13/21 and transitioned to low flow O2. Patient seen today, sitting up in chair. OT in to work with patient. Patient required low steady to get up to chair with staff. OT planning to work on eating and then transfer back to bed. Patient with complaints of general weakness, denies increased weakness in one side over the other. Patient with some delayed responses. Continues on O2. Discussed with patient about ongoing therapy, current isolation until 9/18/21, and insurance precert.  Patient understanding and motivated to continue to get better. Current Rehabilitation Assessments:  PT:  Await eval  OT:  Await eval  ST:  Await eval     Past Medical History:    History reviewed. No pertinent past medical history.     Past Surgical History:        Procedure Laterality Date    CHOLECYSTECTOMY         Allergies:    No Known Allergies     Current Medications:   Current Facility-Administered Medications: famotidine (PEPCID) tablet 20 mg, 20 mg, Oral, BID  fluconazole (DIFLUCAN) tablet 200 mg, 200 mg, Oral, Daily  potassium chloride (KLOR-CON M) extended release tablet 20 mEq, 20 mEq, Oral, Daily with breakfast  alteplase (CATHFLO) injection 1 mg, 1 mg, IntraCATHeter, Once  bisacodyl (DULCOLAX) suppository 10 mg, 10 mg, Rectal, Daily PRN  enoxaparin (LOVENOX) injection 40 mg, 40 mg, SubCUTAneous, BID  0.9 % sodium chloride infusion, , IntraVENous, PRN  insulin lispro (HUMALOG) injection vial 0-6 Units, 0-6 Units, SubCUTAneous, Q4H  dextrose 50 % IV solution, 12.5 g, IntraVENous, PRN  dextrose 5 % solution, 100 mL/hr, IntraVENous, PRN  docusate (COLACE) 50 MG/5ML liquid 100 mg, 100 mg, Per NG tube, BID PRN  magnesium sulfate 2000 mg in 50 mL IVPB premix, 2,000 mg, IntraVENous, PRN  calcium replacement protocol, , Other, RX Placeholder  albuterol (PROVENTIL) nebulizer solution 2.5 mg, 2.5 mg, Nebulization, Q4H PRN  lubrifresh P.M. (artificial tears) ophthalmic ointment, , Both Eyes, PRN  sodium chloride flush 0.9 % injection 5-40 mL, 5-40 mL, IntraVENous, 2 times per day  sodium chloride flush 0.9 % injection 5-40 mL, 5-40 mL, IntraVENous, PRN  [DISCONTINUED] ondansetron (ZOFRAN-ODT) disintegrating tablet 4 mg, 4 mg, Oral, Q8H PRN **OR** ondansetron (ZOFRAN) injection 4 mg, 4 mg, IntraVENous, Q6H PRN  polyethylene glycol (GLYCOLAX) packet 17 g, 17 g, Oral, Daily PRN  acetaminophen (TYLENOL) tablet 650 mg, 650 mg, Oral, Q6H PRN **OR** [DISCONTINUED] acetaminophen (TYLENOL) suppository 650 mg, 650 mg, Rectal, Q6H PRN    Social History:  Social History     Socioeconomic History    Marital status:      Spouse name: Not on file    Number of children: Not on file    Years of education: Not on file    Highest education level: Not on file   Occupational History    Not on file   Tobacco Use    Smoking status: Never Smoker    Smokeless tobacco: Never Used   Vaping Use    Vaping Use: Never used   Substance and Sexual Activity    Alcohol use: Yes     Comment: social    Drug use: Never    Sexual activity: Not on file   Other Topics Concern    Not on file   Social History Narrative    Not on file     Social Determinants of Health     Financial Resource Strain:     Difficulty of Paying Living Expenses:    Food Insecurity:     Worried About Running Out of Food in the Last Year:     [address] in the Last Year:    Transportation Needs:     Lack of Transportation (Medical):  Lack of Transportation (Non-Medical):    Physical Activity:     Days of Exercise per Week:     Minutes of Exercise per Session:    Stress:     Feeling of Stress :    Social Connections:     Frequency of Communication with Friends and Family:     Frequency of Social Gatherings with Friends and Family:     Attends Mu-ism Services:     Active Member of Clubs or Organizations:     Attends Club or Organization Meetings:     Marital Status:    Intimate Partner Violence:     Fear of Current or Ex-Partner:     Emotionally Abused:     Physically Abused:     Sexually Abused:      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 No pertinent family history.     Review of Systems:  CONSTITUTIONAL:  positive for  fatigue and malaise  EYES:  negative  HEENT:  negative  RESPIRATORY:  positive for  dyspnea and O2 requirements  CARDIOVASCULAR:  negative  GASTROINTESTINAL:  positive for dysphagia  GENITOURINARY:  Rosales in place  SKIN: negative  HEMATOLOGIC/LYMPHATIC:  negative  MUSCULOSKELETAL:  positive for  muscle weakness  NEUROLOGICAL:  positive for coordination problems, gait problems, tremor, dysphagia, weakness and cognitive concerns  BEHAVIOR/PSYCH:  positive for fatigue  System review otherwise negative    Physical Exam:  /79   Pulse 88   Temp 98.8 °F (37.1 °C) (Bladder)   Resp 9   Ht 5' 10\" (1.778 m)   Wt 291 lb 14.2 oz (132.4 kg)   SpO2 96%   BMI 41.88 kg/m²   awake  Orientation:   person, place  Mood: within normal limits  Affect: calm  General appearance: Patient is well nourished, well developed, well groomed and in no acute distress    Memory:   Normal with conversation  Attention/Concentration: normal  Language:  abnormal - delayed responses, clear speech. Some processing difficulty. Cranial Nerves:  cranial nerves appear intact  ROM:  abnormal - limited AROM due to weakness  Motor Exam:  Right deltoid 4- out of 5  Left deltoid 4- out of 5  Right biceps 4- out of 5  Left biceps 4- out of 5  Right triceps 3+ out of 5  Left triceps 3+ out of 5  Right finger flexion 3 out of 5  Left finger flexion 3 out of 5  Right hip flexion grade 3+ out of 5  Left hip flexion grade 3+ out of 5  Right knee extension 3 out of 5  Left knee extension 3 out of 5  Right knee flexion 3 out of 5  Left knee flexion 3 out of 5  Right ankle dorsi-flexion 3- out of 5  Left ankle dorsi-flexion 3- out of 5  Right ankle plantar-flexion 3 out of 5  Left ankle plantar-flexion 3 out of 5  Tone:  normal  Muscle bulk: decreased  Sensory:  Sensory intact  Coordination:   abnormal - tremor noted with finger to nose  Lungs: on O2 via NC. No respiratory distress or cough noted.  No audible wheezing  Abdomen: soft    Skin: warm and dry, no rash or erythema  Peripheral vascular: Pulses: Normal upper and lower extremity pulses; Edema: trace      Diagnostics:  Recent Results (from the past 24 hour(s))   POCT glucose    Collection Time: 09/13/21  4:48 PM Result Value Ref Range    POC Glucose 112 (H) 70 - 108 mg/dl   POCT glucose    Collection Time: 09/13/21  9:38 PM   Result Value Ref Range    POC Glucose 97 70 - 108 mg/dl   POCT glucose    Collection Time: 09/14/21 12:22 AM   Result Value Ref Range    POC Glucose 88 70 - 108 mg/dl   POCT glucose    Collection Time: 09/14/21  4:46 AM   Result Value Ref Range    POC Glucose 82 70 - 108 mg/dl   Magnesium    Collection Time: 09/14/21  4:50 AM   Result Value Ref Range    Magnesium 2.2 1.6 - 2.4 mg/dL   Basic Metabolic Panel    Collection Time: 09/14/21  4:50 AM   Result Value Ref Range    Sodium 142 135 - 145 meq/L    Potassium 3.3 (L) 3.5 - 5.2 meq/L    Chloride 100 98 - 111 meq/L    CO2 30 23 - 33 meq/L    Glucose 88 70 - 108 mg/dL    BUN 22 7 - 22 mg/dL    CREATININE 0.8 0.4 - 1.2 mg/dL    Calcium 9.6 8.5 - 10.5 mg/dL   CBC auto differential    Collection Time: 09/14/21  4:50 AM   Result Value Ref Range    WBC 4.6 (L) 4.8 - 10.8 thou/mm3    RBC 3.35 (L) 4.70 - 6.10 mill/mm3    Hemoglobin 10.3 (L) 14.0 - 18.0 gm/dl    Hematocrit 32.4 (L) 42.0 - 52.0 %    MCV 96.7 (H) 80.0 - 94.0 fL    MCH 30.7 26.0 - 33.0 pg    MCHC 31.8 (L) 32.2 - 35.5 gm/dl    RDW-CV 17.4 (H) 11.5 - 14.5 %    RDW-SD 57.1 (H) 35.0 - 45.0 fL    Platelets 711 (L) 128 - 400 thou/mm3    MPV 9.8 9.4 - 12.4 fL    Seg Neutrophils 62.6 %    Lymphocytes 16.6 %    Monocytes 17.9 %    Eosinophils 0.9 %    Basophils 0.7 %    Immature Granulocytes 1.3 %    Segs Absolute 2.9 1 - 7 thou/mm3    Lymphocytes Absolute 0.8 (L) 1.0 - 4.8 thou/mm3    Monocytes Absolute 0.8 0.4 - 1.3 thou/mm3    Eosinophils Absolute 0.0 0.0 - 0.4 thou/mm3    Basophils Absolute 0.0 0.0 - 0.1 thou/mm3    Immature Grans (Abs) 0.06 0.00 - 0.07 thou/mm3    nRBC 0 /100 wbc   Hepatic Function Panel    Collection Time: 09/14/21  4:50 AM   Result Value Ref Range    Albumin 4.3 3.5 - 5.1 g/dL    Total Bilirubin 2.0 (H) 0.3 - 1.2 mg/dL    Bilirubin, Direct 0.5 (H) 0.0 - 0.3 mg/dL    Alkaline Phosphatase 56 38 - 126 U/L    AST 45 (H) 5 - 40 U/L    ALT 71 (H) 11 - 66 U/L    Total Protein 6.4 6.1 - 8.0 g/dL   Anion Gap    Collection Time: 09/14/21  4:50 AM   Result Value Ref Range    Anion Gap 12.0 8.0 - 16.0 meq/L   Glomerular Filtration Rate, Estimated    Collection Time: 09/14/21  4:50 AM   Result Value Ref Range    Est, Glom Filt Rate >90 ml/min/1.73m2   POCT glucose    Collection Time: 09/14/21 10:48 AM   Result Value Ref Range    POC Glucose 84 70 - 108 mg/dl       Exam: 1V chest       Comparison: CR,SR - XR CHEST PORTABLE - 09/13/2021 02:23 AM EDT       Findings:   ET and NG tubes removed. Josiephine Butt catheter in place, tip over the main right pulmonary artery. Right subclavian line satisfactory. Mediastinum: No cardiac silhouette enlargement. Lungs: Mild improvement right and stable left infiltrates. Pleura: No pneumothorax or significant effusion. Bones: No acute pathology.           Impression   Impression:   Mild improvement right and stable left infiltrates. Support devices in place. Impression:  · Critical illness myopathy  · General muscle atrophy   · COVID 19 infection  · Acute hypoxemic respiratory failure  · ARDS  · Pneumonia: COVID and H. Influenza  · Sepsis  · Thrombocytopenia  · Anemia secondary to blood loss  · Obesity  Body mass index is 41.88 kg/m². Recommendations:  · Await therapy evals   · Patient presents with diffuse extremity weakness s/p critical care course. EMG unable to be completed. · Patient in isolation until 9/18/21, unable to admit to IPR until out of isolation  · Patient will require precert for possible IPR admission  · Patient would benefit from a stay on IPR for ST, OT, and PT in order to maximize functional gains. , Neuropsychology, Rehabilitation nursing, and Dietary services will also be integrated in order to assist with recovery. This program will consist of inpatient rehab services at least 3 hours per day 5 days per week. Anticipate patient has the physical capacity to participate in such a program, and will benefit without compromise of safety or medical condition. Due to the medical and rehabilitation complexity of this patient's case, they will benefit from close medical supervision by a rehabilitation specialist.  This involvement will provide access for adjustments in the medical treatment plan along with formulation of a complex rehabilitation plan of care. It was my pleasure to evaluate Deloris Amando today. Please call with questions.     Aldo Colmenares, KATHARINA - CNP

## 2021-09-14 NOTE — PROGRESS NOTES
Patient:  Elsa [place]    Unit/Bed:4D-02/002-A  MRN: [id number]   PCP: Tonny Tate MD  Date of Admission: 8/30/2021    Assessment and Plan(All pulmonary edema, renal failure, PE, and respiratory failure diagnoses are acute in nature unless otherwise specified):          Acute hypoxemic respiratory failure: Secondary to COVID-19 with subsequent progression to ARDS.  Patient intubated 9/1/2021.   ECMO initiated 9/1/2021.  Patient required ECMO recannulation on 9/6/2021 secondary to kinked catheter with groin line.  Now with internal jugular Protek duo which was placed 9/6/2021.  ECMO completed with decannulation on 9/10/2021. Patient did well with spontaneous breathing trial and was extubated on 9/13/2021. Now on low-flow oxygen. COVID-19: Patient failed trial of remdesivir, tocilizumab, and Decadron. Patient had progressive hypoxemia.  Can remove from isolation on 9/18/21. ARDS:  Patient intubated 9/1/2021. [address] initiated 9/1/2021.  ECMO completed 9/10/2021. Extubated 9/13/2021. Now on low-flow oxygen. Thrombocytopenia: Secondary to acute blood loss from hemorrhage.  Infused platelets. Improving. Hepatostasis: Initially secondary to ECMO catheter placement.  Continue to assess as catheter has been removed.  Improving. Obesity: Aware. Mild neutropenia: IgG and IgM level are normal.  IgA level is slightly diminished.  Resolved. Hemoptysis: Secondary to spontaneous alveolar hemorrhage related to anticoagulation and high ACT requirements.  Heparin discontinued 9/8/2021.  Bronchoscopy completed 9/8/2021 showed old blood within the airway but no active bleeding.  Heparin held.  Resolved . Anemia secondary to acute blood loss:  This is primarily secondary to high ACT for clot prevention with low flow ECMO state.    Represents spontaneous bleeding.  Heparin discontinued 9/8/2021.  Patient requiring multiple transfusions.  No evidence of retroperitoneal hemorrhage on ultrasound.   Heparin resumed 9/8/2021 with stability of bleeding.  Resolved. Hypotension: Initially due to to heavy sedation and positive pressure ventilation.  Requiring low-dose pressors.  Subsequently worsened secondary to anemia of acute blood loss.    Resolved. Sepsis: Secondary to combination of Covid and Haemophilus influenza.  Present at presentation. Treated with Levaquin. Resolved. Pneumonia: Coinfection with Haemophilus influenza.  Patient initially treated with Rocephin. Patient transition to higher dosing of Rocephin. Antibiotics initiated 8/30/2021.  Organism is beta-lactamase positive.  Rocephin ineffective.  Patient transition to [address] [place] [address] Rd on 9/7/2021. Nebulized tobramycin was added on 9/5/2021.  Repeat bronchoscopy completed 9/8/2021 showed significant reduction in mucus production.  Culture demonstrated airway colonization with Candida albicans.  Patient did grow mold secondary to Aspergillus species with only light growth.  I do not believe this represents an overwhelming infection.  It may represent contamination.  Repeat lavage 9/8/2021 shows Candida albicans but no Aspergillus.  Initial lavage was 9/6/2021. Levaquin completed 9/14/2021. Resolved. CC: [id number]. HPI: Patient is a 24-year-old white male lifetime non-smoker. 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 He is obese but has no other medical diagnoses including coronary artery disease diabetes or sleep apnea.   Patient presented to the emergency room and was admitted on 8/30/2021. Fadi Freeman started to develop fever associated with chills cough shortness of breath nasal congestion loss of taste and loss of smell starting 8/28/2021.  As he continued to progress his dyspnea continued to worsen.  He developed malaise.  He indicated that his wife was recently diagnosed with Covid.  Patient underwent Covid screen and was positive.  CT scan chest showed bilateral infiltrates.  He initially was started on low-flow oxygen but continued to require progressive oxygen supplementation.  He was treated with remdesivir Decadron and subsequently tocilizumab.  He continued to develop progressive dyspnea and had the syndrome of \"happy hypoxia\".  As patient continued to worsen, he elected to proceed with intubation and ECMO. Patient intubated 9/1/2021. Roylene Simper cannulation 9/1/2021.  As the catheter started to warm, it became more compliant and developed kinking causing impaired outflow.  As the flow continued to deteriorate, it was clear patient would need replacement of bilateral groin catheters.  Patient underwent recannulation of the right internal jugular with Protek duo and removal of the bilateral groin lines.  Flow immediately returned without complications.  Patient continued to improve and ECMO was terminated with decannulation on 9/10/2021. Patient did develop coinfection.  Patient had COVID-19 pneumonia worsened by beta lactamase positive Haemophilus influenza.  Rocephin was initiated 8/30/2021.  Patient was initially treated with Rocephin without improvement.  Patient was transitioned to Levaquin on 9/7/2021.  Nebulized tobramycin was added 9/5/2021 for 10 doses. Levaquin discontinued 9/14/2021. Patient extubated 9/13/2021. ROS: Patient states that he is feeling better. No chest pain. No nausea. No vomiting. No mucus production. No fever. 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   FHX:   Allergies: NKDA  Medications:     sodium chloride      dextrose        alteplase  1 mg IntraCATHeter Once    enoxaparin  40 mg SubCUTAneous BID    fluconazole  400 mg IntraVENous Q24H    levofloxacin  750 mg IntraVENous Q24H    naloxegol  12.5 mg Oral QAM    insulin lispro  0-6 Units SubCUTAneous Q4H    pantoprazole  40 mg IntraVENous Daily    And    sodium chloride (PF)  10 mL IntraVENous Daily    calcium replacement protocol   Other RX Placeholder    lactobacillus  1 capsule Oral Daily with breakfast    sodium chloride flush  5-40 mL IntraVENous 2 times per day Vital Signs:   T: 99.1: P: 90 RR: 31 B/P: 118/75: FiO2: 4L: O2 Sat:98: I/O: 777/4950 GCS: 15  Body mass index is 41.88 kg/m². David Bryant General:   Overweight white male who is ill-appearing. HEENT:  normocephalic and atraumatic. No scleral icterus. PERR  Neck: supple. No Thyromegaly. Lungs: clear to auscultation. No retractions  Cardiac: RRR. No JVD. Abdomen: soft. Nontender. Nondistended. Extremities:  No clubbing, cyanosis x 4. Upper and lower extremity edema bilaterally. Decreased scrotal edema. Vasculature: capillary refill < 3 seconds. Palpable dorsalis pedis pulses. Skin:  warm and dry. Psych:  Alert oriented x3. Affect appropriate. .  Lymph:  No supraclavicular adenopathy. Neurologic:  No focal deficit. No seizures. Data: (All radiographs, tracings, PFTs, and imaging are personally viewed and interpreted unless otherwise noted). · COVID 8/30/21: +  · Pulmonary Lavage 9/1/21: PCUR positive for H. Influenza.  Beta- lactamase positive. · Pulmonary lavage 9/6/2021: Positive for Haemophilus influenza and Candida albicans.  Culture showing light growth of Aspergillus species.  Suspect this is contaminant. · Right lower extremity ultrasound report 9/7/2021: Subcutaneous edema without evidence of hematoma. · Pulmonary lavage 9/8/2021: Many segmented neutrophils. No bacteria seen. Candida albicans light growth. Thus far no Aspergillus species. · Chest x-ray shows bilateral infiltrates with improvement. · Sodium 142, potassium 3.3, chloride 100, bicarb 30, BUN 22, creatinine 0.8, glucose 88. ALT 71. AST 45. Total bilirubin 2. White blood cell count 4.6, hemoglobin 10.3, platelets 109. · Telemetry shows sinus rhythm. Electronically signed by Melissa Arguello M.D.

## 2021-09-14 NOTE — ADT AUTH CERT
Continued Stay Review Note     9/9/21     Patient Visit Status: inpt  Level of Care: covid icu     Last set of vitals: T: 98.4: P: 83 RR: 10 B/P: 96/45: FiO2: 30: O2 Sat:94: I/O: 6453/970 GCS: 8  Body mass index is 43.43 kg/m². [place] [facility] PC: 14/14: TV: 500: RRTotal: 10: Ti:1 sec:      Current Treatments: Weaning ECMO; transfuse 2 units prbc, platelets; remains on vent; iv diflucan qd, iv levaquin q24h, hhn mikey tid,  iv fentanyl gtt, iv levophed gtt, iv versed gtt, iv heparin gtt, continuous oximetry, telemetry, droplet + precautions, neurovascular checks q4h, daily weights, I&o q1h, cbc q6h     Labs:         Ref Range & Units 09/09/21 0430   Glucose 70 - 108 mg/dL 122High     CREATININE 0.4 - 1.2 mg/dL 0.7    BUN 7 - 22 mg/dL 17    Sodium 135 - 145 meq/L 139    Potassium reflex Magnesium 3.5 - 5.2 meq/L 4.0    Chloride 98 - 111 meq/L 105    CO2 23 - 33 meq/L 25    Calcium 8.5 - 10.5 mg/dL 8.0Low          Ref Range & Units 09/09/21 0400 09/09/21 0001   WBC 4.8 - 10.8 thou/mm3 16.7High   17. 7High     RBC 4.70 - 6.10 mill/mm3 3. 08Low   3.40Low     Hemoglobin 14.0 - 18.0 gm/dl 9.3Low   10.1Low     Hematocrit 42.0 - 52.0 % 27.4Low   30. 0Low     MCV 80.0 - 94.0 fL 89.0  88.2    MCH 26.0 - 33.0 pg 30.2  29.7    MCHC 32.2 - 35.5 gm/dl 33.9  33.7    RDW-CV 11.5 - 14.5 % 15. 8High   15.7High     RDW-SD 35.0 - 45.0 fL 49.5High   48. 7High     Platelets 690 - 003 thou/mm3 87Low   71Low          Ref Range & Units 09/09/21 0416 09/09/21 0027   pH, Blood Gas 7.35 - 7.45 7. 46High   7.42    PCO2 35 - 45 mmhg 36  37    PO2 71 - 104 mmhg 111High   113High     HCO3 23 - 28 mmol/l 26  24    Base Excess (Calculated) -2.5 - 2.5 mmol/l 1.9  -0.3    O2 Sat % 99  99    IFIO2   30  30    DEVICE   Adult Vent  Adult Vent       CXR  Impression   Impression:   Cardiomegaly present.       Moderate patchy opacities bilateral lungs, unchanged.      Review/Comments:  Pulmonology MD Notes  Acute hypoxemic respiratory failure: Secondary to COVID-19 with subsequent progression to ARDS.  Patient intubated 9/1/2021.   ECMO initiated 9/1/2021.  Patient required ECMO recannulation on 9/6/2021 secondary to kinked catheter with groin line.  Now with internal jugular Protek duo which was placed 9/6/2021.   S/P lung rest strategy.  Patient undergoing lung protection strategy targeting a plateau pressure of 30 or less.  Pressure control 14 PEEP 14 in order to recruit alveoli. COVID-19: Patient failed trial of remdesivir, tocilizumab, and Decadron.  Patient had progressive hypoxemia.  Can remove from isolation on 9/18/21 if he remains afebrile. Pneumonia: Coinfection with Haemophilus influenza.  Patient initially treated with Rocephin. Patient transition to higher dosing of Rocephin. Antibiotics initiated 8/30/2021.  Organism is beta-lactamase positive.  Rocephin ineffective.  Patient transition to [address] on 9/7/2021. Nebulized tobramycin was added on 9/5/2021.  Repeat bronchoscopy completed 9/8/2021 showed significant reduction in mucus production. Sepsis: Secondary to combination of Covid and Haemophilus influenza.  Present at presentation. Hypotension: Initially due to to heavy sedation and positive pressure ventilation.  Requiring low-dose pressors.  Subsequently worsened secondary to anemia of acute blood loss. Anemia secondary to acute blood loss: This is primarily secondary to high ACT for clot prevention with low flow ECMO state.    Represents spontaneous bleeding.  Heparin discontinued 9/8/2021.  Patient requiring multiple transfusions.  No evidence of retroperitoneal hemorrhage on ultrasound.   Heparin resumed 9/8/2021 with stability of bleeding. ARDS:  Patient intubated 9/1/2021. Acey Gosselin initiated 9/1/2021.  Patient undergoing ECMO wean. Dillon Holbrook is to wean ECMO first and then ventilator. Thrombocytopenia: Secondary to acute blood loss from hemorrhage.  Infusing platelets. Trending. Hepatostasis: Initially secondary to groin catheter placement.  Improving. Obesity: Aware. Mild neutropenia: IgG and IgM level are normal.  IgA level is slightly diminished.  Resolved. Hemoptysis: Secondary to spontaneous alveolar hemorrhage related to anticoagulation and high ACT requirements.  Heparin discontinued 9/8/2021.  Bronchoscopy completed 9/8/2021 showed old blood within the airway but no active bleeding.  Heparin held.  Resolved . .     Anticipated Discharge Plan: tbd

## 2021-09-14 NOTE — PROGRESS NOTES
[address] [address] THERAPY  [facility] ICU 4D  EVALUATION    Time:   Time In: 1304  Time Out: 1354  Timed Code Treatment Minutes: 38 Minutes  Minutes: 50          Date: 2021  Patient Name: Arielle [place],   Gender: male      MRN: [id number]  : 1975  (48 y.o.)  Referring Practitioner: KATHARINA Kelly CNP  Diagnosis: hypoxia  Additional Pertinent Hx: Per H&P: Pt presents with SOB, productive cough, fever, chills, nasal congestion, loss of smell, loss of taste, and malaise starting 2021. Pt diagnosed with Acute Hypoxic Respiratory Failure 2/2 COVID-19 PNA with suspected superimposed CAP. Pt transferred to [address] [place] [address] on  (failed HFNC), intubated, and started on ECMO. Pt decannulated on 9/10/21 and extubated 21. Restrictions/Precautions:  Restrictions/Precautions: General Precautions, Fall Risk, Isolation  Position Activity Restriction  Other position/activity restrictions: droplet +    Subjective  Chart Reviewed: Yes, Orders, Progress Notes, History and Physical  Patient assessed for rehabilitation services?: Yes  Family / Caregiver Present: No    Subjective: Pt reclined in bedside chair upon arrival, agreeable to OT session. Pt has smiling/joking some throughout session. Pt demoes significant overall deconditioning/muscle wasting compared to before intubation/ECMO. Comments: New OT orders received this date, had been discharged from OT after ECMO initiated.     Pain:  Pain Assessment  Patient Currently in Pain: Denies    Vitals: Blood Pressure: 110/75  Oxygen: 94% on 2L O2 via NC  Heart Rate: 100; did increase to 131 at highest during transfer back to bed, although recovered within few seconds    Social/Functional History:  Lives With: Spouse (and 3 schoolage children)  Type of Home: House  Home Layout: Two level, Performs ADL's on one level, Able to Live on Main level with bedroom/bathroom  Home Equipment:  (none)   Bathroom Equipment:  (none)       ADL Assistance: Independent  Homemaking Assistance: Independent  Ambulation Assistance: Independent  Transfer Assistance: Independent    Active : Yes  Occupation: Full time employment  Type of occupation: [employer]; also [employer]       VISION:WFL    HEARING:  WFL    COGNITION: Slow Processing, Decreased Recall, Decreased Insight, Impaired Memory, Decreased Problem Solving and Impaired Attention    RANGE OF MOTION:  Bilateral Upper Extremity:  Impaired - significant deconditioning noted; limited active movement against gravity at B shoulders; distally is close to [facility] with gravity eliminated    STRENGTH:  Bilateral Upper Extremity:  Impaired - significant deconditioning; grossly 2-/5 at shoulders; 2+/5 to 3+/5 at elbow, wrist; 3+/5 at hands    ADL:   Feeding: Moderate Assistance-Maximum Assistance for support of RUE during task and bringing up to mouth; Pt was able to loosely  utensils (dropped on few occasions), although required total assist to place utensils in hand. Initially, task was graded and OTR placed food on utensil, although with adjustments in pillow placement to support UEs, pt was then able to progress to scooping food on own with OT providing support at forearm/shoulder. Increased time to complete feeding task, pt able to eat less than 50% but >25% of lunch. Rest breaks required throughout. BALANCE:  Sitting Balance:  Minimal assistance, X 2. sitting unsupported at EOB and in chair. Pt required max assist to bring trunk forward into unsupported sitting. Standing Balance: Moderate Assistance, X 2, with verbal cues . [facility]    BED MOBILITY:  Sit to Supine: Maximum Assistance, X 2      TRANSFERS:  Sit to Stand: Moderate Assistance X2-3, with increased time for completion, cues for hand placement. [facility] utilized  Stand to Sit: Moderate Assistance, X 2.  To EOB; minimal cues for slow descent onto EOB    FUNCTIONAL MOBILITY:  OTR to further assess as able/appropriate    Activity Tolerance: Patient tolerance of  treatment: fair. Assessment:  Assessment: Pt presents requiring significantly increased assistance for ADLs and transfers, unable to tolerate functional mobility at this time. Pt normally independent/active and working full time at baseline. Pt will continue to benefit from OT services to improve independence with these tasks, in addition to overall strength/endurance to facilitate return to PLOF. Performance deficits / Impairments: Decreased ADL status, Decreased functional mobility , Decreased endurance, Decreased high-level IADLs, Decreased ROM, Decreased strength, Decreased cognition, Decreased balance  Prognosis: Fair  REQUIRES OT FOLLOW UP: Yes  Decision Making: High Complexity    Treatment Initiated: Treatment and education initiated within context of evaluation. Evaluation time included review of current medical information, gathering information related to past medical, social and functional history, completion of standardized testing, formal and informal observation of tasks, assessment of data and development of plan of care and goals. Treatment time included skilled education and facilitation of tasks to increase safety and independence with ADL's for improved functional independence and quality of life. Discharge Recommendations:  IP Rehab, Continue to assess pending progress    Patient Education:  OT Education: OT Role, Plan of Care, Energy Conservation, ADL Adaptive Strategies, Transfer Training    Equipment Recommendations: Other: will continue to monitor pending progress    Plan:  Times per week: 6x  Current Treatment Recommendations: Strengthening, Functional Mobility Training, Endurance Training, Patient/Caregiver Education & Training, Equipment Evaluation, Education, & procurement, Self-Care / ADL, Home Management Training, Balance Training, ROM, Neuromuscular Re-education, Positioning. See long-term goal time frame for expected duration of plan of care. If no long-term goals established, a short length of stay is anticipated. Goals:     Short term goals  Time Frame for Short term goals: by discharge  Short term goal 1: Pt will complete feeding/grooming tasks using R dominant hand with minimal assistance and AE prn to increase independence with self care tasks. Short term goal 2: Pt will complete functional transfers with minimal assistance X2 using sera stedy in prep for [facility] transfers. Short term goal 3: Pt will tolerate dynamic standing X2 minutes with minimal assistance X2 in prep for toileting tasks. Short term goal 4: Pt will complete BUE A/AROM exercises X5-10 reps to increase overall strength/endurance needed for UB dressing. Following session, patient left in safe position with all fall risk precautions in place.

## 2021-09-14 NOTE — FLOWSHEET NOTE
09/14/21 0950   Encounter Summary   Services provided to: Patient   Referral/Consult From: Rounding   Continue Visiting Yes  (9/14 COVID-19)   Complexity of Encounter Low   Length of Encounter 15 minutes   Routine   Type Follow up   Assessment Approachable   Intervention Nurtured hope   Outcome Expressed gratitude   Assessment: In my encounter with the 48 yr old patient, while rounding  the unit 4D, (the pt was sitting in the chair)  I provided spiritual care to patient through conversation, I also came to assess the patients spiritual needs present. The pt was admitted due to COVID-19. The pt was tired and was feeling worn out. Interventions:  I provided prayer, emotional support and words of comfort.  provided a listening presence and encouraged pt to share their beliefs and how they support him during their hospitalization. Outcomes: The patient was encouraged and didnt share any further spiritual needs at this time. The pt remains optimistic and hopeful. The pt shared that they were appreciative for the support. Plan:  Chaplains will follow-up at a later time for assessment of any spiritual care needs present.

## 2021-09-14 NOTE — PROGRESS NOTES
Consult received. Chart reviewed. Will initiated precert for inpatient rehab once there are updated therapy notes.

## 2021-09-14 NOTE — PROGRESS NOTES
[facility] ICU 4D  Clinical Swallow Evaluation      SLP Individual Minutes  Time In: 5680  Time Out: 8741  Minutes: 20  Timed Code Treatment Minutes: 0 Minutes       Date: 2021  Patient Name: Julia Montez      CSN: [id number]   : 1975  (48 y.o.)  Gender: male   Referring Physician:  Balwinder Banuelos MD  Diagnosis: Hypoxia   Secondary Diagnosis: Dysphagia, COVID     History of Present Illness/Injury: Per chart review; Barbera Roach" Leatha Goodpasture is a 49 y/o male who was admitted to [facility]  on 2021. Patient with PMH of obesity. Patient presented to [facility] ER due to worsening SOB, cough, nasal congestion, loss of taste/smell and fatigue. Patient began experiencing symptoms about 4 days prior. Wife was +COVID. Patient was tested at urgent care and found to be +COVID and O2 sats 85%. Patient was brought to ER by family car from [address] [place] [address]. CT Chest showed bilateral infiltrates. Patient continued to require more O2. He was treated with remdesivir, Decadron and subsequently tocilizumab. Patient required intubation with ventilation and ECMO on 21. Patient was found to have coinfection with Haemophilus influenza.  Patient initially treated with Rocephin. Patient transition to higher dosing of Rocephin. Antibiotics initiated 2021.  Organism is beta-lactamase positive.  Rocephin ineffective. Patient transition to [address] [place] [address] on 2021. Nebulized tobramycin was added on 2021.  Repeat bronchoscopy completed 2021 showed significant reduction in mucus production.  Culture demonstrated airway colonization with Candida albicans.  Levaquin completed 2021. ECMO complete 9/10/21. Patient was extubated 21 and transitioned to low flow O2. History reviewed. No pertinent past medical history. SUBJECTIVE:  Patient alert, oriented, upright in bed, and pleasant for CSE. No family present. OBJECTIVE:    Pain:  No pain reported.     Current Diet: Easy to chew    Respiratory Status: Pt verbalized understanding and agreement of recommendations. *Recommended remain on easy to chew, thin liquids, meds whole in puree, and direct 1:1 physical assistance with meals and monitor for fatigue and endurance to meet nutrition/hydrational needs via PO.   *post evaluation, patient without respiratory distress upon leaving room; RN [id number] Ashwin Martin notified re: clinical findings and recommendations from the assessment; verbal receptiveness noted       RECOMMENDATIONS/ASSESSMENT:  Instrumental Evaluation: Instrumental evaluation not indicated at this time. Diet Recommendations:  Easy to chew, thin liquids, meds whole in puree   Strategies:  Full Upright Position, Small Bite/Sip, Pulmonary Monitoring, Medications Whole with Puree, Direct 1:1 Supervision, Alternate Solids and Liquids and Monitor for Fatigue   Rehabilitation Potential: good    EDUCATION:  Learner: Patient  Education:  Reviewed results and recommendations of this evaluation, Reviewed diet and strategies, Reviewed signs, symptoms and risks of aspiration, Reviewed ST goals and Plan of Care and Reviewed recommendations for follow-up  Evaluation of Education: Verbalizes understanding    PLAN:  Speech Therapy evaluation to assess speech, language, cognition and/or voice and Skilled SLP intervention on acute care 3-5 x per week or until goals met and/or pt plateaus in function. Specific interventions for next session may include: monitor vocal quality for possible FEES vs ENT consult. PATIENT GOAL:    Return to least restrictive diet. SHORT TERM GOALS:  Short-term Goals  Timeframe for Short-term Goals: 2 weeks  Goal 1: Patient will safely consume soft & bite size solids with thin liquids with no overt s/s of aspiration/penetration to demonstrate ability to meet nutrition/hydrational needs.   Goal 2: Patient will complete advanced dietary analysis with ST only and no overt s/s of aspiration/penetration to demosntrate improved endurance as meals progress to demonstrate readiness for a diet upgrade. Goal 3: Patient will complete cognitive evaluation to further assess cognitive linguistic status    LONG TERM GOALS:  No LTM Goals recommended, due to anticipated short ELOS. Page Chahal MA., CCC-SLP

## 2021-09-14 NOTE — CARE COORDINATION
9/14/21, 2:28 PM EDT    DISCHARGE ON GOING EVALUATION    [facility] day: 15  Location: -02/002-A Reason for admit: Hypoxia [R09.02]  COVID-19 [U07.1]   Procedure:   8/30 CTA Chest: Neg for PE; Moderate groundglass infiltrates scattered throughout both lungs  9/1 Intubation  9/1 SGC left subclavian  9/1 CVC RIJ  9/1 Echo with EF 60%  9/1 JASPREET: EF 55-60%  9/1 Cardiac Cath: no sig CAD; PA pressure 40's; preserved CO/CI; Cannulated for ECMO  9/1 VV ECMO initiated  9/6 ECMO Cannulation sites changed  9/7 Bronch w/washings sent: Chronic airway inflammation with pitting airways disease and striation formation; Active pneumonia with bilateral lung mucus production; No active bleeding.  Hemoptysis secondary to suction trauma  9/7 US RUE: There is prominent edema in the subcutaneous fat at the right groin. No discrete hematoma is identified. 9/8 Bronch w/washings: Prominent pitting airways disease; Decreased mucus within the airway; Old clotted blood within the airway.  No active bleeding. Clots were removed with suction  9/8 US Abdomen: No definite retroperitoneal hematoma identified  9/10 Decannulated; ECMO stopped  9/10 CXR:   1. Right subclavian line with catheter tip in right atrium. An esophageal tube present with tip passing into the stomach. 2. Moderately severe infiltrates in both lung relatively diffusely. Left subclavian [place]-Rayo catheter with tip in right lower lobe pulmonary artery    9/13 Extubated       Barriers to Discharge: Nebs. Diflucan. Levaquin completed. 94% on 2L NC. Orders for PT/OT/SLP. PCP: Rojelio Minor MD  Readmission Risk Score: 12%  Patient Goals/Plan/Treatment Preferences: Discussed in ecmo rounds that it is preferred patient go to [facility] once out of isolation on 9/18. PMR/Rehab coordinator consulted. Will require precert.

## 2021-09-15 ENCOUNTER — APPOINTMENT (OUTPATIENT)
Dept: GENERAL RADIOLOGY | Age: 46
DRG: 003 | End: 2021-09-15
Payer: COMMERCIAL

## 2021-09-15 LAB
ANION GAP SERPL CALCULATED.3IONS-SCNC: 10 MEQ/L (ref 8–16)
BASOPHILS # BLD: 0.8 %
BASOPHILS ABSOLUTE: 0 THOU/MM3 (ref 0–0.1)
BUN BLDV-MCNC: 19 MG/DL (ref 7–22)
CALCIUM SERPL-MCNC: 9.7 MG/DL (ref 8.5–10.5)
CHLORIDE BLD-SCNC: 104 MEQ/L (ref 98–111)
CO2: 30 MEQ/L (ref 23–33)
CREAT SERPL-MCNC: 0.8 MG/DL (ref 0.4–1.2)
EOSINOPHIL # BLD: 1.5 %
EOSINOPHILS ABSOLUTE: 0.1 THOU/MM3 (ref 0–0.4)
ERYTHROCYTE [DISTWIDTH] IN BLOOD BY AUTOMATED COUNT: 17.3 % (ref 11.5–14.5)
ERYTHROCYTE [DISTWIDTH] IN BLOOD BY AUTOMATED COUNT: 58.8 FL (ref 35–45)
GFR SERPL CREATININE-BSD FRML MDRD: > 90 ML/MIN/1.73M2
GLUCOSE BLD-MCNC: 105 MG/DL (ref 70–108)
GLUCOSE BLD-MCNC: 82 MG/DL (ref 70–108)
GLUCOSE BLD-MCNC: 89 MG/DL (ref 70–108)
GLUCOSE BLD-MCNC: 91 MG/DL (ref 70–108)
HCT VFR BLD CALC: 33.9 % (ref 42–52)
HEMOGLOBIN: 10.6 GM/DL (ref 14–18)
IMMATURE GRANS (ABS): 0.05 THOU/MM3 (ref 0–0.07)
IMMATURE GRANULOCYTES: 1.3 %
LYMPHOCYTES # BLD: 19.8 %
LYMPHOCYTES ABSOLUTE: 0.8 THOU/MM3 (ref 1–4.8)
MCH RBC QN AUTO: 30.5 PG (ref 26–33)
MCHC RBC AUTO-ENTMCNC: 31.3 GM/DL (ref 32.2–35.5)
MCV RBC AUTO: 97.4 FL (ref 80–94)
MONOCYTES # BLD: 17.5 %
MONOCYTES ABSOLUTE: 0.7 THOU/MM3 (ref 0.4–1.3)
NUCLEATED RED BLOOD CELLS: 0 /100 WBC
PLATELET # BLD: 163 THOU/MM3 (ref 130–400)
PMV BLD AUTO: 9.5 FL (ref 9.4–12.4)
POTASSIUM SERPL-SCNC: 3.4 MEQ/L (ref 3.5–5.2)
RBC # BLD: 3.48 MILL/MM3 (ref 4.7–6.1)
SEG NEUTROPHILS: 59.1 %
SEGMENTED NEUTROPHILS ABSOLUTE COUNT: 2.4 THOU/MM3 (ref 1.8–7.7)
SODIUM BLD-SCNC: 144 MEQ/L (ref 135–145)
WBC # BLD: 4 THOU/MM3 (ref 4.8–10.8)

## 2021-09-15 PROCEDURE — 6360000002 HC RX W HCPCS: Performed by: NURSE PRACTITIONER

## 2021-09-15 PROCEDURE — 99232 SBSQ HOSP IP/OBS MODERATE 35: CPT | Performed by: NURSE PRACTITIONER

## 2021-09-15 PROCEDURE — 6360000002 HC RX W HCPCS: Performed by: INTERNAL MEDICINE

## 2021-09-15 PROCEDURE — 6370000000 HC RX 637 (ALT 250 FOR IP): Performed by: INTERNAL MEDICINE

## 2021-09-15 PROCEDURE — 97530 THERAPEUTIC ACTIVITIES: CPT

## 2021-09-15 PROCEDURE — 85025 COMPLETE CBC W/AUTO DIFF WBC: CPT

## 2021-09-15 PROCEDURE — 36415 COLL VENOUS BLD VENIPUNCTURE: CPT

## 2021-09-15 PROCEDURE — 89220 SPUTUM SPECIMEN COLLECTION: CPT

## 2021-09-15 PROCEDURE — 97163 PT EVAL HIGH COMPLEX 45 MIN: CPT

## 2021-09-15 PROCEDURE — 80048 BASIC METABOLIC PNL TOTAL CA: CPT

## 2021-09-15 PROCEDURE — 97110 THERAPEUTIC EXERCISES: CPT

## 2021-09-15 PROCEDURE — 71045 X-RAY EXAM CHEST 1 VIEW: CPT

## 2021-09-15 PROCEDURE — 97535 SELF CARE MNGMENT TRAINING: CPT

## 2021-09-15 PROCEDURE — 82948 REAGENT STRIP/BLOOD GLUCOSE: CPT

## 2021-09-15 PROCEDURE — 2580000003 HC RX 258: Performed by: INTERNAL MEDICINE

## 2021-09-15 PROCEDURE — 92523 SPEECH SOUND LANG COMPREHEN: CPT

## 2021-09-15 PROCEDURE — 2140000000 HC CCU INTERMEDIATE R&B

## 2021-09-15 PROCEDURE — 99233 SBSQ HOSP IP/OBS HIGH 50: CPT | Performed by: INTERNAL MEDICINE

## 2021-09-15 RX ORDER — FUROSEMIDE 10 MG/ML
80 INJECTION INTRAMUSCULAR; INTRAVENOUS DAILY
Status: DISCONTINUED | OUTPATIENT
Start: 2021-09-15 | End: 2021-09-16

## 2021-09-15 RX ADMIN — FAMOTIDINE 20 MG: 20 TABLET, FILM COATED ORAL at 09:28

## 2021-09-15 RX ADMIN — FLUCONAZOLE 200 MG: 200 TABLET ORAL at 09:57

## 2021-09-15 RX ADMIN — ENOXAPARIN SODIUM 40 MG: 40 INJECTION SUBCUTANEOUS at 09:28

## 2021-09-15 RX ADMIN — POTASSIUM CHLORIDE 20 MEQ: 1500 TABLET, EXTENDED RELEASE ORAL at 09:28

## 2021-09-15 RX ADMIN — ENOXAPARIN SODIUM 40 MG: 40 INJECTION SUBCUTANEOUS at 22:04

## 2021-09-15 RX ADMIN — CHLOROTHIAZIDE SODIUM 250 MG: 500 INJECTION, POWDER, LYOPHILIZED, FOR SOLUTION INTRAVENOUS at 09:26

## 2021-09-15 RX ADMIN — FUROSEMIDE 80 MG: 40 INJECTION, SOLUTION INTRAMUSCULAR; INTRAVENOUS at 09:28

## 2021-09-15 ASSESSMENT — PAIN SCALES - GENERAL
PAINLEVEL_OUTOF10: 0
PAINLEVEL_OUTOF10: 0
PAINLEVEL_OUTOF10: 10
PAINLEVEL_OUTOF10: 0
PAINLEVEL_OUTOF10: 0

## 2021-09-15 NOTE — PROGRESS NOTES
0730 report and bedside rounds completed. Pt in chair. On nasal cannula. Alert oriented to place. Does have some confusion on  Time and events of hospital stay. 0800 wife notified of plan for transfer, updated. 0820 report to 3A RN Brenda. 0900 speech therapy in  0910 Assisted with eating breakfast. Pt swallowed meds  without difficultly. 1000 returned to bed. 1013 removed cvc on rt subclavian pressure held x7 minutes,no bleeding at site. [place] Founds area around site excoriated with skin tears from previous dressings. sutures remain in place in RIJ at site of vv ecmo cannulation. 1100 pt transferred per bed to 3B22. Wife notifed of room number.

## 2021-09-15 NOTE — PROGRESS NOTES
Comprehensive Nutrition Assessment    Type and Reason for Visit:  Reassess    Nutrition Recommendations/Plan:   Diet per SLP. Trial Ensure Compact TID. Recommend MVI. Encouraged po, ONS at best efforts. Nutrition Assessment:    Pt improving from a nutritional standpoint AEB tolerating po diet however intake 25-50, 50-75% of meals. At risk for further nutrition compromise r/t Dx + COVID 8/30, intubated 9/1 & extubated 9/13, VV ECMO started 9/1 & stopped 9/10 , paralyzed this admit, + Hflu, ARDS and underlying medical condition (hx of GB OR, obesity ).  Nutrition recommendations/interventions as per above. Malnutrition Assessment:  Malnutrition Status: At risk for malnutrition (Comment)    Context:  Acute Illness     Findings of the 6 clinical characteristics of malnutrition:  Energy Intake:  7 - 50% or less of estimated energy requirements for 5 or more days  Weight Loss:  Unable to assess (UBW not available)     Body Fat Loss:  No significant body fat loss     Muscle Mass Loss:  No significant muscle mass loss    Fluid Accumulation:  No significant fluid accumulation     Strength:  Not Performed    Estimated Daily Nutrient Needs:  Energy (kcal):  ~2264 kcals ( 30) with late phase + COVID. Note kcal goal while pt was on ECMO was 1425 kcals ( 19 kcals/kg IBW). ; Weight Used for Energy Requirements:  Ideal     Protein (g):  ~151 grams (2); Weight Used for Protein Requirements:  Ideal            Nutrition Related Findings:    Pt. intubated 9/1 & just extubated 9/13 , VV ECMO started 9/1 & stopped 9/10, COVID diagnosed 8/30; SLP following; po 25-75%; 1 BM noted past 48 hours; Rx includes Dulcolax, Insulin, Colace, Glycolax, Zofran; 9/15: Glucose 91, BUN 19, Cr 0.8, Sodium 144; pt. Reports feeling better; ?  Rehab, remove from isolation 9/18    Wounds:  None (reported)       Current Nutrition Therapies:    ADULT DIET; Easy to Chew; 5 carb choices (75 gm/meal)  Adult Oral Nutrition Supplement; Standard 4 oz Oral Supplement    Anthropometric Measures:  · Height: 5' 10\" (177.8 cm)  · Current Body Weight: 291 lb 14.2 oz (132.4 kg) (9/12 +1+2 edema)   · Admission Body Weight: 263 lb 7.2 oz (119.5 kg) (8/31 no edema)    · Usual Body Weight:  (not available)     · Ideal Body Weight: 166 lbs;      · BMI: 41.9  · BMI Categories: Obese Class 3 (BMI 40.0 or greater)       Nutrition Diagnosis:   · Inadequate oral intake related to impaired respiratory function as evidenced by intake 26-50%, intake 51-75%      Nutrition Interventions:   Food and/or Nutrient Delivery:  Continue Current Diet, Start Oral Nutrition Supplement  Nutrition Education/Counseling:  Education initiated (9/15 Encouraged po, good nutrition at best efforts for recovery. Pt. voiced understanding.)   Coordination of Nutrition Care:  Continue to monitor while inpatient    Goals:  Pt. will tolerate and consume 75% or more of meals during LOS. Nutrition Monitoring and Evaluation:   Behavioral-Environmental Outcomes:  None Identified   Food/Nutrient Intake Outcomes:  Diet Advancement/Tolerance, Food and Nutrient Intake, Supplement Intake  Physical Signs/Symptoms Outcomes:  Biochemical Data, Chewing or Swallowing, GI Status, Fluid Status or Edema, Hemodynamic Status, Nutrition Focused Physical Findings, Skin, Weight     Discharge Planning:     Too soon to determine     Electronically signed by Reena Boyd RD, LD on 9/15/21 at 10:46 AM EDT    Contact: [phone]

## 2021-09-15 NOTE — PROGRESS NOTES
[address] [place] [address]  Occupational Therapy  Daily Note  Time:   Time In: 1400  Time Out: 5040  Timed Code Treatment Minutes: 48 Minutes  Minutes: 48          Date: 9/15/2021  Patient Name: Deloris Goel,   Gender: male      Room: -22/022-A  MRN: [id number]  : 1975  (48 y.o.)  Referring Practitioner: KATHARINA Mckenna CNP  Diagnosis: hypoxia  Additional Pertinent Hx: Per H&P: Pt presents with SOB, productive cough, fever, chills, nasal congestion, loss of smell, loss of taste, and malaise starting 2021. Pt diagnosed with Acute Hypoxic Respiratory Failure 2/ COVID-19 PNA with suspected superimposed CAP. Pt transferred to [address] [place] [address] on  (failed HFNC), intubated, and started on ECMO. Pt decannulated on 9/10/21 and extubated 21. Restrictions/Precautions:  Restrictions/Precautions: General Precautions, Fall Risk, Isolation  Position Activity Restriction  Other position/activity restrictions: droplet +     SUBJECTIVE: RN approved session, stating she is okay with whatever therapy does as long as his HR stays below 140. Pt was lying in bed upon arrival and agreed to participate in OT session. Pt requesting to stand. PAIN: Pt did not c/o any pain or exhibit any pain behaviors throughout session. Vitals: Oxygen: Stayed above 92% throughout session on 1.5L O2 NC  Heart Rate: Consistantly around 110-115. When we stood x3, pts HR raised to 140 but went down after sitting. COGNITION: Slow Processing, Decreased Recall, Decreased Insight, Decreased Problem Solving and Decreased Safety Awareness    ADL:   Grooming: Moderate Assistance. Washing face and combing hair. Pt required mod A for washing face with washcloth for completeness and max A for combing hair for completeness. Pt able to wipe mouth area and reach up with LUE to comb front of hair. [facility] hospital BALANCE:  Sitting Balance:  Stand By Assistance. Sitting EOB ~20 minutes   Standing Balance: Minimal Assistance.  With BUE support on walker. Pt was shaky in lower body d/t weakness and fatigue     BED MOBILITY:  Supine to Sit: Minimal Assistance To bring trunk in upward position. Pt able to bring legs off bed. Sit to Supine: Minimal Assistance Only for adjustment d/t pt being crooked in bed after lying himself back  Scooting: Stand By Assistance Pt scooting himself to EOB by pushing using BUE on bed. TRANSFERS:  Sit to Stand:  Maximum Assistance. With cues for hand placement to push from EOB and not walker. Pt completed sit<>stand t/f x3 at EOB. Each time, pt did get better with the motion of standing up and sitting down, still requiring max A for the t/fs. Stand to Sit: Moderate Assistance. Cues given to lower slowly and not plop. FUNCTIONAL MOBILITY:  Not attempted this date d/t pt's HR reaching 140 in standing and nurse requesting it not to go higher. Pt completed PT session this morning and ambulated to the chair, rising HR above 140. ADDITIONAL ACTIVITIES:  Pt completed AAROM and PROM ex in BUE to increase strength and ROM in UE for ADL tasks. Pt complete x5 reps of A/AROM shoulder flexion to a range he tolerated with both hands together in front. Pt also completed PROM of shoulder flexion x3 on each side to ~165 to a point where he felt a stretch but it was not painful. ASSESSMENT:     Activity Tolerance:  Patient tolerance of  treatment: fair. Pt improved a lot from previous session on yesterday. Discharge Recommendations: IP Rehab, Continue to assess pending progress   Equipment Recommendations:  Other: will continue to monitor pending progress  Plan: Times per week: 6x  Current Treatment Recommendations: Strengthening, Functional Mobility Training, Endurance Training, Patient/Caregiver Education & Training, Equipment Evaluation, Education, & procurement, Self-Care / ADL, Home Management Training, Balance Training, ROM, Neuromuscular Re-education, Positioning    Patient Education  Patient Education: Role of OT, Plan of Care, Home Exercise Program and Importance of Increasing Activity    Goals  Short term goals  Time Frame for Short term goals: by discharge  Short term goal 1: Pt will complete feeding/grooming tasks using R dominant hand with minimal assistance and AE prn to increase independence with self care tasks. Short term goal 2: Pt will complete functional transfers with minimal assistance X2 using sera stedy in prep for [facility] transfers. Short term goal 3: Pt will tolerate dynamic standing X2 minutes with minimal assistance X2 in prep for toileting tasks. Short term goal 4: Pt will complete BUE A/AROM exercises X5-10 reps to increase overall strength/endurance needed for UB dressing. Following session, patient left in safe position with all fall risk precautions in place.

## 2021-09-15 NOTE — PROGRESS NOTES
[address] [id number]  INPATIENT PHYSICAL THERAPY  EVALUATION  [facility] CCU-STEPDOWN 3B - 3B-22/022-A    Time In: 1100  Time Out: 4018  Timed Code Treatment Minutes: 24 Minutes  Minutes: 35          Date: 9/15/2021  Patient Name: Lashawn Olivares,  Gender:  male        MRN: [id number]  : 1975  (48 y.o.)      Referring Practitioner: KATHARINA Quiñonez CNP  Diagnosis: Hypoxia  Additional Pertinent Hx: Per H&P: Pt presents with SOB, productive cough, fever, chills, nasal congestion, loss of smell, loss of taste, and malaise starting 2021. Pt diagnosed with Acute Hypoxic Respiratory Failure 2/2 COVID-19 PNA with suspected superimposed CAP. Pt transferred to [address] [place] [address] on  (failed HFNC), intubated, and started on ECMO. Pt decannulated on 9/10/21 and extubated 21     Restrictions/Precautions:  Restrictions/Precautions: General Precautions, Fall Risk, Isolation  Position Activity Restriction  Other position/activity restrictions: droplet +    Subjective:  Chart Reviewed: Yes  Patient assessed for rehabilitation services?: Yes  Subjective: Pt resting in bed and agees to therapy. Pt was just moved to new room out of ICU. Pt reports still being rather weak especially in his arms.     General:  Overall Orientation Status: Within Functional Limits    Vision: Within Functional Limits    Hearing: Within functional limits         Pain: denies      Vitals: Blood Pressure: 130/45  Oxygen: 97% on 3 L/min  Heart Rate: generally low 100's, jumped to 143 during standing    Social/Functional History:    Lives With: Spouse (and 3 schoolage children)  Type of Home: House  Home Layout: Two level, Performs ADL's on one level, Able to Live on Main level with bedroom/bathroom  Home Access: Stairs to enter with rails  Entrance Stairs - Number of Steps: 4 steps with 2 rails that are too wide to use at same time  Home Equipment:  (none)     Bathroom Equipment:  (none)       ADL Assistance: Independent  Homemaking Assistance: Independent  Ambulation Assistance: Independent  Transfer Assistance: Independent    Active : Yes  Occupation: Full time employment  Type of occupation: [employer]; also farms       OBJECTIVE:  Range of Motion:  Bilateral Lower Extremity: WFL    Strength:  Bilateral Lower Extremity: grossly 4-/5    Balance:  Static Sitting Balance:  Stand By Assistance  Dynamic Sitting Balance: Stand By Assistance, Contact Guard Assistance  Static Standing Balance: Contact Guard Assistance, Minimal Assistance, with walker  Dynamic Standing Balance: [address], Minimal Assistance, with walker    Bed Mobility:  Supine to Sit: Moderate Assistance  Sit to Supine: Minimal Assistance     Transfers:  Sit to Stand: Moderate Assistance, X 1, cues for hand placement  Stand to Sit:Minimal Assistance, X 1, cues for hand placement     Stood x 2 trials with each about 20-45 sec length with walker and HR jumping to 135 on 1st trial and 143 on 2nd. Ambulation:  Not tested  Pt with some mild dizziness while standing and HR became tachycardic to 140's so returned to sitting and then supine    Exercise:  Patient was guided in 1 set(s) 5 reps of exercise to both lower extremities. Ankle pumps, Glut sets, Quad sets and Long arc quads. Exercises were completed for increased independence with functional mobility. Functional Outcome Measures: Completed  AM-PAC Inpatient Mobility Raw Score : 10  -PAC Inpatient T-Scale Score : 32.29    ASSESSMENT:  Activity Tolerance:  Patient tolerance of  treatment: fair. Had HR become tachycardic limiting mobility      Treatment Initiated: Treatment and education initiated within context of evaluation. Evaluation time included review of current medical information, gathering information related to past medical, social and functional history, completion of standardized testing, formal and informal observation of tasks, assessment of data and development of plan of care and goals.   Treatment time included skilled education and facilitation of tasks to increase safety and independence with functional mobility for improved independence and quality of life. Assessment: Body structures, Functions, Activity limitations: Decreased functional mobility , Decreased balance, Decreased strength, Decreased endurance, Decreased coordination  Assessment: Pt is a 47 yo male that is quite deconditioned due to having been bedbound, intubated, and on ECMO for nearly 2 weeks. Pt was Independent and working in [employer] and is currently requiring Mod to Min A for bed mobility and transfers. Pt fatigues quickly and had tachycardia with standing activity today. Pt would benefit from continued skilled PT for strengthening, endurance building, and functional mobility training before being safe to return home. Based on how pt had done with OT yesterday and progress with decreased assist for mobility today, pt appears to be a good candidate for Inpatient Rehab with schedule allowing rest breaks to allow tolerance of 3 hrs of therapy per day. Prognosis: Good    REQUIRES PT FOLLOW UP: Yes    Discharge Recommendations:  Discharge Recommendations: Continue to assess pending progress, IP Rehab, Patient would benefit from continued therapy after discharge    Patient Education:  PT Education: Goals, PT Role, Plan of Care, Home Exercise Program    Equipment Recommendations:   Other: monitor for needs    Plan:  Times per week: 5x GM  Current Treatment Recommendations: Strengthening, Home Exercise Program, Safety Education & Training, Balance Training, Endurance Training, Functional Mobility Training, Patient/Caregiver Education & Training, Equipment Evaluation, Education, & procurement, Transfer Training, Gait Training    Goals:  Patient goals : get stronger and move around again  Short term goals  Time Frame for Short term goals: at discharge  Short term goal 1: Pt to be SBA for supine <> sit to get in/out of bed  Short term goal 2: Pt to be SBA for sit <> stand to get up to ambulate  Short term goal 3: Pt to stand > 2 min with walker with CGA for pre-gait training  Short term goal 4: Ambulation to be assessed by LPT  Long term goals  Time Frame for Long term goals : not set due to short ELOS    Following session, patient left in safe position with all fall risk precautions in place. Ita العلي.  Clementina Gonzalez, [place] [address]

## 2021-09-15 NOTE — PROGRESS NOTES
1030- the pt. Has arrived to the room. He has been oriented to the room, call light , and bed controls. Telemetry has been applied. PT. Denies pain . INT in LFA.

## 2021-09-15 NOTE — PROGRESS NOTES
Pharmacist - Provider Communication    The following stress ulcer prophylaxis agent has been selected for discontinuation based on not meeting the criteria listed below: famotidine PO    Stress Ulcer Prophylaxis Criteria  Any one of the following independent risk factors:  1) Coagulopathy (INR >1.5 [not on anticoagulation], platelets <08,100, RIQV>9R normal)  2) Mechanical ventilation >48 hours    Any two or more of the followin) Occult or overt bleeding for >6 days  2) ICU admission >1 week  3) Glucocorticoid therapy (>250 mg/day hydrocortisone or equivalent)  4) History of ulcers/bleeding within 1 year  5) Syed covering >35% BSA  6) Head/spinal trauma  7) Organ transplantation   8) Sepsis/septic shock    If the prescriber doesn't feel this discontinuation is appropriate, please re-order the medication and place \"SUP appropriate per prescriber\" in comments of the order. If you would like further assistance or have questions, please contact pharmacy.     Josias Ramos, PharmD, BCPS, BCCCP  9/15/2021 10:20 AM

## 2021-09-15 NOTE — PROGRESS NOTES
Physical Medicine & Rehabilitation   Progress Note    Chief Complaint:  CIM. COVID 19. Rehab needs    Subjective:  Patient seen today, resting in bed. Patient with noted improvement with therapy today, improved strength noted. Patient with ongoing tremors with MMT. Discussed plan for precert with insurance. If medically stable and approved, plan admission 9/19 to [facility]. Patient understanding with plan. Denies any questions or concerns at this time. Rehabilitation:  PT:   Range of Motion:  Bilateral Lower Extremity: WFL  Strength:  Bilateral Lower Extremity: grossly 4-/5  Balance:  Static Sitting Balance:  Stand By Assistance  Dynamic Sitting Balance: Stand By Assistance, Contact Guard Assistance  Static Standing Balance: Contact Guard Assistance, Minimal Assistance, with walker  Dynamic Standing Balance: Contact Guard Assistance, Minimal Assistance, with walker  Bed Mobility:  Supine to Sit: Moderate Assistance  Sit to Supine: Minimal Assistance   Transfers:  Sit to Stand: Moderate Assistance, X 1, cues for hand placement  Stand to Sit:Minimal Assistance, X 1, cues for hand placement   Stood x 2 trials with each about 20-45 sec length with walker and HR jumping to 135 on 1st trial and 143 on 2nd. Ambulation:  Not tested  Pt with some mild dizziness while standing and HR became tachycardic to 140's so returned to sitting and then supine  Exercise:  Patient was guided in 1 set(s) 5 reps of exercise to both lower extremities. Ankle pumps, Glut sets, Quad sets and Long arc quads. Exercises were completed for increased independence with functional mobility. OT:   COGNITION: Slow Processing, Decreased Recall, Decreased Insight, Decreased Problem Solving and Decreased Safety Awareness  ADL:   Grooming: Moderate Assistance. Washing face and combing hair. Pt required mod A for washing face with washcloth for completeness and max A for combing hair for completeness.  Pt able to wipe mouth area and reach up with LUE Math Computation: 1/2  Executive Functionin/5     SWALLOWING:  Current Diet: Easy to chew diet and thin liquids   Impaired - CSE completed on  with concerns for swallow decompensation s/t fatigue     RECOMMENDATIONS/ASSESSMENT:  DIAGNOSTIC IMPRESSIONS:  Patient presents with moderate cognitive deficits evidenced by derived [address] score of 16/30 along with additional skilled level observations. Deficits within the areas of functional and higher level carryover (I.e. reference to calendar for orientation, poor recall of number sequences, decreased immediate/delayed recall), impaired mental flexibility, verbal reasoning, thought organization, basic attention, mathematical computation as well as visuospatial reasoning/executive functioning. Mild-moderate receptive language deficits evidenced by difficulty with following complex commands and answering complex yes/no questions. Expressive language skills intact for basic information. Difficulty with higher level domains s/t above cognitive influences (I.e. sentence repetition, organization of complex thoughts). Patient with improving vocal function. Slight soft vocal quality. No overt dysarthria. Consuming an easy to chew diet and thin liquids without overt difficulty. Would highly benefit from ongoing cognitive rehabilitation in order to enhance skills to a supervision/Mod I level for safe, functional return to home setting upon discharge.      Review of Systems:  CONSTITUTIONAL:  positive for  fatigue and malaise  EYES:  negative  HEENT:  negative  RESPIRATORY:  positive for  dyspnea and O2 requirements  CARDIOVASCULAR:  negative  GASTROINTESTINAL:  positive for dysphagia  GENITOURINARY:  Ros[place] in place  SKIN:  negative  HEMATOLOGIC/LYMPHATIC:  negative  MUSCULOSKELETAL:  positive for  muscle weakness  NEUROLOGICAL:  positive for coordination problems, gait problems, tremor, dysphagia, weakness and cognitive concerns  BEHAVIOR/PSYCH:  positive for fatigue  System review otherwise negative      Objective:  /89   Pulse 103   Temp 99.5 °F (37.5 °C) (Bladder)   Resp 19   Ht 5' 10\" (1.778 m)   Wt 291 lb 14.2 oz (132.4 kg)   SpO2 96%   BMI 41.88 kg/m²   awake  Orientation:   person, place  Mood: within normal limits  Affect: calm  General appearance: Patient is well nourished, well developed, well groomed and in no acute distress     Memory:   Normal with conversation  Attention/Concentration: normal  Language:  abnormal - delayed responses, clear speech. Some processing difficulty.      Cranial Nerves:  cranial nerves appear intact  ROM:  abnormal - limited AROM due to weakness  Motor Exam:  Right deltoid 4- out of 5  Left deltoid 4- out of 5  Right biceps 4- out of 5  Left biceps 4- out of 5  Right triceps 4- out of 5  Left triceps 4- out of 5  Right finger flexion 4- out of 5  Left finger flexion 4- out of 5  Right ankle dorsi-flexion 3+ out of 5  Left ankle dorsi-flexion 3+ out of 5  Right ankle plantar-flexion 3+ out of 5  Left ankle plantar-flexion 3+ out of 5  Tone:  normal  Muscle bulk: decreased  Sensory:  Sensory intact  Coordination:   abnormal - tremor noted with finger to nose  Lungs: on O2 via NC. No respiratory distress or cough noted.  No audible wheezing  Abdomen: soft     Skin: warm and dry, no rash or erythema  Peripheral vascular: Pulses: Normal upper and lower extremity pulses; Edema: trace      Diagnostics:   Recent Results (from the past 24 hour(s))   POCT glucose    Collection Time: 09/14/21  5:15 PM   Result Value Ref Range    POC Glucose 108 70 - 108 mg/dl   POCT glucose    Collection Time: 09/14/21  8:15 PM   Result Value Ref Range    POC Glucose 111 (H) 70 - 108 mg/dl   POCT glucose    Collection Time: 09/14/21 11:12 PM   Result Value Ref Range    POC Glucose 84 70 - 108 mg/dl   POCT glucose    Collection Time: 09/15/21  4:28 AM   Result Value Ref Range    POC Glucose 89 70 - 108 mg/dl   CBC auto differential    Collection Time: 09/15/21 4:30 AM   Result Value Ref Range    WBC 4.0 (L) 4.8 - 10.8 thou/mm3    RBC 3.48 (L) 4.70 - 6.10 mill/mm3    Hemoglobin 10.6 (L) 14.0 - 18.0 gm/dl    Hematocrit 33.9 (L) 42.0 - 52.0 %    MCV 97.4 (H) 80.0 - 94.0 fL    MCH 30.5 26.0 - 33.0 pg    MCHC 31.3 (L) 32.2 - 35.5 gm/dl    RDW-CV 17.3 (H) 11.5 - 14.5 %    RDW-SD 58.8 (H) 35.0 - 45.0 fL    Platelets 676 561 - 689 thou/mm3    MPV 9.5 9.4 - 12.4 fL    Seg Neutrophils 59.1 %    Lymphocytes 19.8 %    Monocytes 17.5 %    Eosinophils 1.5 %    Basophils 0.8 %    Immature Granulocytes 1.3 %    Segs Absolute 2.4 1 - 7 thou/mm3    Lymphocytes Absolute 0.8 (L) 1.0 - 4.8 thou/mm3    Monocytes Absolute 0.7 0.4 - 1.3 thou/mm3    Eosinophils Absolute 0.1 0.0 - 0.4 thou/mm3    Basophils Absolute 0.0 0.0 - 0.1 thou/mm3    Immature Grans (Abs) 0.05 0.00 - 0.07 thou/mm3    nRBC 0 /100 wbc   Basic Metabolic Panel    Collection Time: 09/15/21  4:30 AM   Result Value Ref Range    Sodium 144 135 - 145 meq/L    Potassium 3.4 (L) 3.5 - 5.2 meq/L    Chloride 104 98 - 111 meq/L    CO2 30 23 - 33 meq/L    Glucose 91 70 - 108 mg/dL    BUN 19 7 - 22 mg/dL    CREATININE 0.8 0.4 - 1.2 mg/dL    Calcium 9.7 8.5 - 10.5 mg/dL   Anion Gap    Collection Time: 09/15/21  4:30 AM   Result Value Ref Range    Anion Gap 10.0 8.0 - 16.0 meq/L   Glomerular Filtration Rate, Estimated    Collection Time: 09/15/21  4:30 AM   Result Value Ref Range    Est, Glom Filt Rate >90 ml/min/1.73m2   POCT glucose    Collection Time: 09/15/21  9:24 AM   Result Value Ref Range    POC Glucose 82 70 - 108 mg/dl       Impression:  · Critical illness myopathy  · General muscle atrophy   · COVID 19 infection  · Acute hypoxemic respiratory failure  · ARDS  · Pneumonia: COVID and H. Influenza  · Sepsis  · Thrombocytopenia  · Anemia secondary to blood loss  · Obesity  Body mass index is 41.88 kg/m².     Plan:  · Continue current therapies  · Patient in isolation until 9/18/21, unable to admit to IPR until out of isolation  · IV medications will not be able to be administered on IPR  · Precert started for IPR admission once medically stable and out of droplet isolation.    · Will continue to follow      Missed Therapy Time:  · None    Velia Bazzi, KATHARINA - CNP

## 2021-09-15 NOTE — PROGRESS NOTES
Patient:  Ruby Verdugo    Unit/Bed:4D-02/002-A  MRN: [id number]   PCP: Holly Mccabe MD  Date of Admission: 8/30/2021    Assessment and Plan(All pulmonary edema, renal failure, PE, and respiratory failure diagnoses are acute in nature unless otherwise specified):          Acute hypoxemic respiratory failure: Secondary to COVID-19 with subsequent progression to ARDS.  Patient intubated 9/1/2021.   ECMO initiated 9/1/2021.  Patient required ECMO recannulation on 9/6/2021 secondary to kinked catheter with groin line.  Now with internal jugular Protek duo which was placed 9/6/2021.  ECMO completed with decannulation on 9/10/2021. Patient did well with spontaneous breathing trial and was extubated on 9/13/2021. Now on low flow O2.  COVID-19: Patient failed trial of remdesivir, tocilizumab, and Decadron. Patient had progressive hypoxemia.  Can remove from isolation on 9/18/21. Pneumonia: Coinfection with Haemophilus influenza.  Patient initially treated with Rocephin. Patient transition to higher dosing of Rocephin. Antibiotics initiated 8/30/2021.  Organism is beta-lactamase positive.  Rocephin ineffective.  Patient transition to [address] on 9/7/2021. Nebulized tobramycin was added on 9/5/2021 for 10 doses.  Levaquin completed 9/14/2021. Repeat bronchoscopy completed 9/8/2021 showed significant reduction in mucus production.  Culture demonstrated airway colonization with Candida albicans.  Patient did grow mold secondary to Aspergillus species with only light growth on specimen collected 9/6/2021. I do not believe this represented an infection.  Repeat lavage 9/8/2021 shows Candida albicans but no Aspergillus. Trending chest x-ray. Monitoring mucus production. Obesity: Aware. Mild neutropenia: IgG and IgM level are normal.  IgA level is slightly diminished.  Resolved. Hemoptysis: Secondary to spontaneous alveolar hemorrhage related to anticoagulation and high ACT requirements.  Heparin discontinued 9/8/2021.  Bronchoscopy completed 9/8/2021 showed old blood within the airway but no active bleeding.  Heparin held.  Resolved . Anemia secondary to acute blood loss: This is primarily secondary to high ACT for clot prevention with low flow ECMO state.    Represents spontaneous bleeding.  Heparin discontinued 9/8/2021.  Patient requiring multiple transfusions.  No evidence of retroperitoneal hemorrhage on ultrasound.   Heparin resumed 9/8/2021 with stability of bleeding.  Resolved. Hypotension: Initially due to to heavy sedation and positive pressure ventilation.  Requiring low-dose pressors.  Subsequently worsened secondary to anemia of acute blood loss.    Resolved. Thrombocytopenia: Secondary to acute blood loss from hemorrhage.  Resolved. Hepatostasis: Initially secondary to ECMO catheter placement.  Continue to assess as catheter has been removed. Resolved. Sepsis: Secondary to combination of Covid and Haemophilus influenza.  Present at presentation. Status post Levaquin. Resolved. ARDS:  Patient intubated 9/1/2021. [address] initiated 9/1/2021.  ECMO completed 9/10/2021. Extubated 9/13/2021. Resolved. CC: COVID-19  HPI: Patient is a 57-year-old white male lifetime non-smoker. 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 He is obese but has no other medical diagnoses including coronary artery disease diabetes or sleep apnea.   Patient presented to the emergency room and was admitted on 8/30/2021. Myles Ruffin started to develop fever associated with chills cough shortness of breath nasal congestion loss of taste and loss of smell starting 8/28/2021.  As he continued to progress his dyspnea continued to worsen.  He developed malaise.  He indicated that his wife was recently diagnosed with Covid.  Patient underwent Covid screen and was positive.  CT scan chest showed bilateral infiltrates.  He initially was started on low-flow oxygen but continued to require progressive oxygen supplementation.  He was treated with remdesivir Decadron and subsequently tocilizumab. Amaury Ram continued to develop progressive dyspnea and had the syndrome of \"happy hypoxia\".  As patient continued to worsen, he elected to proceed with intubation and ECMO. Patient intubated 9/1/2021. Pablo Gentileams cannulation 9/1/2021.  As the catheter started to warm, it became more compliant and developed kinking causing impaired outflow.  As the flow continued to deteriorate, it was clear patient would need replacement of bilateral groin catheters.  Patient underwent recannulation of the right internal jugular with Protek duo and removal of the bilateral groin lines.  Flow immediately returned without complications.  Patient continued to improve and ECMO was terminated with decannulation on 9/10/2021. Patient did develop coinfection.  Patient had COVID-19 pneumonia worsened by beta lactamase positive Haemophilus influenza.  Rocephin was initiated 8/30/2021.  Patient was initially treated with Rocephin without improvement.  Patient was transitioned to Levaquin on 9/7/2021.  Nebulized tobramycin was added 9/5/2021 for 10 doses. Levaquin completed 9/14/2021. Patient extubated 9/13/2021. ROS: Patient has a cough with milky white mucus production. Thickness has improved. Mucus is starting to thin. No fever. Poor appetite. No shortness of breath. 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 FHX:   Allergies: NKDA  Medications:     sodium chloride      dextrose        famotidine  20 mg Oral BID    fluconazole  200 mg Oral Daily    potassium chloride  20 mEq Oral Daily with breakfast    alteplase  1 mg IntraCATHeter Once    enoxaparin  40 mg SubCUTAneous BID    insulin lispro  0-6 Units SubCUTAneous Q4H    calcium replacement protocol   Other RX Placeholder    sodium chloride flush  5-40 mL IntraVENous 2 times per day       Vital Signs:   T: 99.5: P: 98 RR: 17 B/P: 124/85: FiO2: 3L: O2 Sat:91: I/O: 767/1500 GCS: 15  Body mass index is 41.88 kg/m². [facility]   General: Heavyset white male who looks stronger today. HEENT:  normocephalic and atraumatic. No scleral icterus. PERR  Neck: supple. No Thyromegaly. Lungs: clear to auscultation. No retractions  Cardiac: RRR. No JVD. Abdomen: soft. Nontender. Extremities:  No clubbing, cyanosis, or edema x 4. Vasculature: capillary refill < 3 seconds. Palpable dorsalis pedis pulses. Skin:  warm and dry. Psych:  Alert and oriented x3. Affect appropriate  Lymph:  No supraclavicular adenopathy. Neurologic:  No focal deficit. No seizures. Data: (All radiographs, tracings, PFTs, and imaging are personally viewed and interpreted unless otherwise noted). · COVID 8/30/21: +  · Pulmonary Lavage 9/1/21: PCUR positive for H. Influenza.  Beta- lactamase positive. · Pulmonary lavage 9/6/2021: Positive for Haemophilus influenza and Candida albicans.  Culture showing light growth of Aspergillus species.  Suspect this is contaminant. · Right lower extremity ultrasound report 9/7/2021: Subcutaneous edema without evidence of hematoma. · Pulmonary lavage 9/8/2021: Many segmented neutrophils.  No bacteria seen.  Candida albicans light growth.  Thus far no Aspergillus species. · Telemetry shows sinus rhythm. · Sodium 144, potassium 3.4, chloride 104, bicarb 30, BUN 19, creatinine 0.8, glucose 91. White blood cell count 4, hemoglobin 10.6, platelets 016. · Chest x-ray shows bilateral patchy infiltrates. Case discussed with Dr. Portia Chopra. Electronically signed by Giovani Christie M.D.

## 2021-09-15 NOTE — CARE COORDINATION
9/15/21, 11:53 AM EDT    DISCHARGE ON GOING EVALUATION    [facility] day: 16  Location: [facility]22/022-A Reason for admit: Hypoxia [R09.02]  COVID-19 [U07.1]   Procedure:   8/30 CTA Chest: Neg for PE; Moderate groundglass infiltrates scattered throughout both lungs  9/1 Intubation  9/1 SGC left subclavian  9/1 CVC RIJ  9/1 Echo with EF 60%  9/1 JASPREET: EF 55-60%  9/1 Cardiac Cath: no sig CAD; PA pressure 40's; preserved CO/CI; Cannulated for ECMO  9/1 VV ECMO initiated  9/6 ECMO Cannulation sites changed  9/7 Bronch w/washings sent: Chronic airway inflammation with pitting airways disease and striation formation; Active pneumonia with bilateral lung mucus production; No active bleeding.  Hemoptysis secondary to suction trauma  9/7 US RUE: There is prominent edema in the subcutaneous fat at the right groin. No discrete hematoma is identified. 9/8 Bronch w/washings: Prominent pitting airways disease; Decreased mucus within the airway; Old clotted blood within the airway.  No active bleeding. Clots were removed with suction  9/8 US Abdomen: No definite retroperitoneal hematoma identified  9/10 Decannulated; ECMO stopped  9/10 CXR:   1. Right subclavian line with catheter tip in right atrium. An esophageal tube present with tip passing into the stomach. 2. Moderately severe infiltrates in both lung relatively diffusely. Left subclavian Galena-Rayo catheter with tip in right lower lobe pulmonary artery   9/13 extubated    Barriers to Discharge: Currently on 3L NC. IV levaquin completed. IV diruril. IV lasix. Diflucan. Nebs. PT/OT/SLP. PMR following. PCP: Bee Tavares MD  Readmission Risk Score: 12%  Patient Goals/Plan/Treatment Preferences: Plan TCU once out of isolation on 9/21. They can accept on either 9/19 (Sunday) vs 9/20 (Monday) per attending preference. Did speak with Dr. Maricarmen Jaramillo this morning, he is ok with a Sunday discharge to [facility]. Message left for [employer] as patient will require precert.  Transferred to 3B today, will report off to Corinna BANKS.

## 2021-09-15 NOTE — PROGRESS NOTES
[facility] THERAPY  [facility] ICU 4D  Speech - Language - Cognitive Evaluation    SLP Individual Minutes  Time In: 3307  Time Out: 1317  Minutes: 15  Timed Code Treatment Minutes: 0 Minutes       Date: 9/15/2021  Patient Name: Harry Mcrae      CSN: [id number]   : 1975  (48 y.o.)  Gender: male   Referring Physician: Bertha Mejia MD  Diagnosis: Hypoxia   Secondary Diagnosis: Dysphagia; cognitive-linguistic deficits   Precautions: Fall risk, aspiration precautions, Droplet plus isolation   History of Present Illness/Injury: Patient admit to [facility] with above medical dx. Per chart review; Ashley Martinez is a 47 y/o male who was admitted to [address] [place] [address] 2021.  Patient with PMH of obesity. Patient presented to [facility] ER due to worsening SOB, cough, nasal congestion, loss of taste/smell and fatigue. Patient began experiencing symptoms about 4 days prior. Wife was +COVID. Patient was tested at urgent care and found to be +COVID and O2 sats 85%.  Patient was brought to ER by family car from [facility]. CT Chest showed bilateral infiltrates. Patient continued to require more O2. He was treated with remdesivir, Decadron and subsequently tocilizumab. Patient required intubation with ventilation and ECMO on 21. Patient was found to have coinfection with Haemophilus influenza.  Patient initially treated with Rocephin. Patient transition to higher dosing of Rocephin. Antibiotics initiated 2021.  Organism is beta-lactamase positive.  Rocephin ineffective. Patient transition to [address] [place] [address] on 2021. Nebulized tobramycin was added on 2021.  Repeat bronchoscopy completed 2021 showed significant reduction in mucus production.  Culture demonstrated airway colonization with Candida albicans.  Levaquin completed 2021. ECMO complete 9/10/21.  Patient was extubated 21 and transitioned to low flow O2.   ST consult for cognitive assessment to r/o deficits and establish Goals/POC as clinically indicated. History reviewed. No pertinent past medical history. Pain: No pain reported. Subjective:  Patient seen upright in recliner. Pleasant and cooperative. No family present. 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 Hobbies: watching kids play sports, working on things with his son   Vision Status: Corrected   Hearing: WFL   Type of Home: House  Home Layout: Two level, Performs ADL's on one level, Able to Live on Main level with bedroom/bathroom  Home Equipment:  (none)    SPEECH / VOICE:  Speech and Voice appear to be grossly intact for basic and complex daily communication    LANGUAGE:  Receptive:  2 Step Commands: 1/3- difficulty with high level   Complex Yes/No Questions: 2/3    Expressive:  Confrontational Naming: 3/3  Responsive Naming: 3/3  Divergent Naming (abstract): impaired-- see below  Sentence Formulation: WFL  Conversational Speech: WFL with basic level, errors with complex (extra time to gather thoughts)   Paraphasias: none   Repetition: sentence level 1/2    COGNITION:  Pioneer Cognitive Assessment (MOCA) version 7.1 completed. Pt scored 16/30. Normal is greater than or equal to 26/30.   Inclusion of +1 point given highest level of education achieved less than/equal to 12th grade or GED with limited-0 post-secondary schooling     Orientation:  (indep reference to calendar)   Immediate Recall: 4/5  Short-Term Recall: 2/5 indep, 1/5 min cues, 2/5 FO2  Divergent Naming: x8 wpm (N=11)  Reasonin/2  Thought Organization: moderate impairment  Attention: impaired   Math Computation: 1/2  Executive Functionin/5    SWALLOWING:  Current Diet: Easy to chew diet and thin liquids   Impaired - CSE completed on 9/14 with concerns for swallow decompensation s/t fatigue         RECOMMENDATIONS/ASSESSMENT:  DIAGNOSTIC IMPRESSIONS:  Patient presents with moderate cognitive deficits evidenced by derived [address] score of 16/30 along with additional skilled level observations. Deficits within the areas of functional and higher level carryover (I.e. reference to calendar for orientation, poor recall of number sequences, decreased immediate/delayed recall), impaired mental flexibility, verbal reasoning, thought organization, basic attention, mathematical computation as well as visuospatial reasoning/executive functioning. Mild-moderate receptive language deficits evidenced by difficulty with following complex commands and answering complex yes/no questions. Expressive language skills intact for basic information. Difficulty with higher level domains s/t above cognitive influences (I.e. sentence repetition, organization of complex thoughts). Patient with improving vocal function. Slight soft vocal quality. No overt dysarthria. Consuming an easy to chew diet and thin liquids without overt difficulty. Would highly benefit from ongoing cognitive rehabilitation in order to enhance skills to a supervision/Mod I level for safe, functional return to home setting upon discharge. **EXCELLENT candidate for IPR. Rehabilitation Potential: excellent    EDUCATION:  Learner: Patient  Education:  Reviewed results and recommendations of this evaluation, Reviewed ST goals and Plan of Care and Reviewed recommendations for follow-up  Evaluation of Education: [employer] understanding, Needs further instruction and Family not present    PLAN:  Skilled SLP intervention on acute care 3-5 x per week or until goals met and/or pt plateaus in function. Specific interventions for next session may include: dysphagia intervention/management, cognitive rehabilitation . PATIENT GOAL:    Did not state. Will further assess during treatment.     SHORT TERM GOALS:  Short-term Goals  Timeframe for Short-term Goals: 2 weeks  Goal 1: Patient will safely consume an easy to chew diet with thin liquids (1:1 assistance) without overt s/s of airway invasion to meet nutritional/hydration measures safely  Goal 2: Patient will complete advanced dietary analysis with ST only and no overt s/s of aspiration/penetration to demosntrate improved endurance as meals progress to demonstrate readiness for a diet upgrade. Goal 3: Patient will complete functional and higher level carryover/working memory tasks (i.e. orientation, recall of phone numbers/messages/directions given, 5 units) with focus on compensatory memory strategies and 70% accuracy, mod cues for improved carryover of newly learned information. Goal 4: Patient will complete higher level visuospatial reasoning/executive functioning tasks (i.e. medications, finances, visuospatial copying, etc) with 70% accuracy, mod cues for succesful return to IADL and work responsibilities post discharge. Goal 5: Patient will complete mental flexibility/thought organization tasks (i.e. divergent naming, scheduling, time management, organization of multiple variables, flowers comprehension program) with 70% accuracy, mod cues for improved organization of complex material.  Goal 6: Patient will complete complex attention tasks (i.e. selective, alternating, divided) with no more than x3-4 errors/redirections within a 2 minute task for successful return to driving post discharge.     LONG TERM GOALS:  No established LTG's given short AMALIA Barrios 9/15/2021

## 2021-09-16 LAB
GLUCOSE BLD-MCNC: 125 MG/DL (ref 70–108)
GLUCOSE BLD-MCNC: 87 MG/DL (ref 70–108)
GLUCOSE BLD-MCNC: 89 MG/DL (ref 70–108)
GLUCOSE BLD-MCNC: 89 MG/DL (ref 70–108)

## 2021-09-16 PROCEDURE — 2140000000 HC CCU INTERMEDIATE R&B

## 2021-09-16 PROCEDURE — 97116 GAIT TRAINING THERAPY: CPT

## 2021-09-16 PROCEDURE — 2580000003 HC RX 258: Performed by: INTERNAL MEDICINE

## 2021-09-16 PROCEDURE — 97110 THERAPEUTIC EXERCISES: CPT

## 2021-09-16 PROCEDURE — 6360000002 HC RX W HCPCS: Performed by: NURSE PRACTITIONER

## 2021-09-16 PROCEDURE — 97530 THERAPEUTIC ACTIVITIES: CPT

## 2021-09-16 PROCEDURE — 99233 SBSQ HOSP IP/OBS HIGH 50: CPT | Performed by: INTERNAL MEDICINE

## 2021-09-16 PROCEDURE — 97535 SELF CARE MNGMENT TRAINING: CPT

## 2021-09-16 PROCEDURE — 82948 REAGENT STRIP/BLOOD GLUCOSE: CPT

## 2021-09-16 PROCEDURE — 6370000000 HC RX 637 (ALT 250 FOR IP): Performed by: INTERNAL MEDICINE

## 2021-09-16 PROCEDURE — 6360000002 HC RX W HCPCS: Performed by: INTERNAL MEDICINE

## 2021-09-16 RX ADMIN — POTASSIUM CHLORIDE 20 MEQ: 1500 TABLET, EXTENDED RELEASE ORAL at 08:49

## 2021-09-16 RX ADMIN — FLUCONAZOLE 200 MG: 200 TABLET ORAL at 08:49

## 2021-09-16 RX ADMIN — CHLOROTHIAZIDE SODIUM 250 MG: 500 INJECTION, POWDER, LYOPHILIZED, FOR SOLUTION INTRAVENOUS at 09:17

## 2021-09-16 RX ADMIN — ENOXAPARIN SODIUM 40 MG: 40 INJECTION SUBCUTANEOUS at 08:48

## 2021-09-16 RX ADMIN — ENOXAPARIN SODIUM 40 MG: 40 INJECTION SUBCUTANEOUS at 20:30

## 2021-09-16 RX ADMIN — FUROSEMIDE 80 MG: 40 INJECTION, SOLUTION INTRAMUSCULAR; INTRAVENOUS at 08:49

## 2021-09-16 ASSESSMENT — PAIN SCALES - GENERAL
PAINLEVEL_OUTOF10: 0

## 2021-09-16 NOTE — PROGRESS NOTES
Pt removed from [address] per [facility] guidelines as approved by [employer]

## 2021-09-16 NOTE — PROGRESS NOTES
[address] [place] [address]  Occupational Therapy  Daily Note  Time:   Time In: 0465  Time Out: 1100  Timed Code Treatment Minutes: 40 Minutes  Minutes: 40          Date: 2021  Patient Name: Rosa Maria Alexis,   Gender: male      Room: -22/022-A  MRN: [id number]  : 1975  (48 y.o.)  Referring Practitioner: KATHARINA Garza CNP  Diagnosis: hypoxia  Additional Pertinent Hx: Per H&P: Pt presents with SOB, productive cough, fever, chills, nasal congestion, loss of smell, loss of taste, and malaise starting 2021. Pt diagnosed with Acute Hypoxic Respiratory Failure 2/ COVID-19 PNA with suspected superimposed CAP. Pt transferred to [address] [place] [address] on  (failed HFNC), intubated, and started on ECMO. Pt decannulated on 9/10/21 and extubated 21. Restrictions/Precautions:  Restrictions/Precautions: General Precautions, Fall Risk, Isolation  Position Activity Restriction  Other position/activity restrictions: droplet +    - Removed from droplet + precautions      SUBJECTIVE: RN approved session, requesting HR to be monitored and stay below 140bpm. Pt was lying in bed upon arrival and agreed to participate in OT session. PAIN: Pt did not c/o any pain or exhibit any pain behaviors. Vitals: Oxygen: 93-95% on 1LO2 NC   Heart Rate: Stayed below 140 throughout session with constant monitoring on portable HR. Pt's HR would increase to 140 with activity. Rest breaks were given when it reached 140. COGNITION: Slow Processing, Decreased Recall, Decreased Insight, Impaired Memory and Decreased Problem Solving    ADL:   Grooming: Stand By Assistance. for washing face with washcloth sitting EOB. Pt compensated by bringing head down towards hands. Pt also completed combing hair with mod A. Pt was able to comb L and R front of hair with compensation. Mod A was given to complete rest of head. [employer] BALANCE:  Sitting Balance:  Stand By Assistance.  Sitting EOB ~25 minutes with no UE support at anatoly.     BED MOBILITY:  Supine to Sit: Moderate Assistance To bring trunk in upright position  Sit to Supine: Minimal Assistance For adjustments after lying in bed. Scooting: Stand By Assistance BUE pushing from bed to lift bottom up and move. TRANSFERS:  Not attempted this date d/t fatigue. FUNCTIONAL MOBILITY:  Not attempted this date d/t fatigue. ADDITIONAL ACTIVITIES:  BUE exercising and stretching completed to increase range and strength in UE for ADL participation and performance. Pt completed x5 reps of AAROM of shoulder flexion with hands together. Pt also completed x3 reps of PROM in shoulder flexion and shoulder abduction, bilaterally. Pt also completed reaching tasks of crossing midline to grab glasses case bilaterally. Pt opened glasses case, pulled out glasses, put them on with compensation of head, closed case, opened case back up and took glasses off without assistance given. AROM BUE   Shoulder flexion ~90 degrees bilaterally   Shoulder Abduction ~90 degrees bilaterally    ASSESSMENT:     Activity Tolerance:  Patient tolerance of  treatment: fair. Pt improved since last session. Discharge Recommendations: IP Rehab, Continue to assess pending progress   Equipment Recommendations:  Other: will continue to monitor pending progress  Plan: Times per week: 6x  Current Treatment Recommendations: Strengthening, Functional Mobility Training, Endurance Training, Patient/Caregiver Education & Training, Equipment Evaluation, Education, & procurement, Self-Care / ADL, Home Management Training, Balance Training, ROM, Neuromuscular Re-education, Positioning    Patient Education  Patient Education: Role of OT, Plan of Care and Importance of Increasing Activity    Goals  Short term goals  Time Frame for Short term goals: by discharge  Short term goal 1: Pt will complete feeding/grooming tasks using R dominant hand with minimal assistance and AE prn to increase independence with self care tasks.  Short term goal 2: Pt will complete functional transfers with minimal assistance X2 using sera stedy in prep for [facility] transfers. Short term goal 3: Pt will tolerate dynamic standing X2 minutes with minimal assistance X2 in prep for toileting tasks. Short term goal 4: Pt will complete BUE A/AROM exercises X5-10 reps to increase overall strength/endurance needed for UB dressing. Following session, patient left in safe position with all fall risk precautions in place.

## 2021-09-16 NOTE — PROGRESS NOTES
Patient:  Amalia John    Unit/Bed:3B-22/022-A  MRN: [id number]   PCP: Tarah Mueller MD  Date of Admission: 8/30/2021    Assessment and Plan(All pulmonary edema, renal failure, PE, and respiratory failure diagnoses are acute in nature unless otherwise specified):          Acute hypoxemic respiratory failure: Secondary to COVID-19 with subsequent progression to ARDS.  Patient intubated 9/1/2021.   ECMO initiated 9/1/2021.  Patient required ECMO recannulation on 9/6/2021 secondary to kinked catheter with groin line.  Now with internal jugular Protek duo which was placed 9/6/2021.  ECMO completed with decannulation on 9/10/2021. Patient did well with spontaneous breathing trial and was extubated on 9/13/2021. Now on low flow O2.  COVID-19: Patient failed trial of remdesivir, tocilizumab, and Decadron. Patient had progressive hypoxemia.  Can remove from isolation on 9/18/21. Pneumonia: Coinfection with Haemophilus influenza.  Patient initially treated with Rocephin. Patient transition to higher dosing of Rocephin. Antibiotics initiated 8/30/2021.  Organism is beta-lactamase positive.  Rocephin ineffective.  Patient transition to [facility] on 9/7/2021. Nebulized tobramycin was added on 9/5/2021 for 10 doses.  Levaquin completed 9/14/2021. Repeat bronchoscopy completed 9/8/2021 showed significant reduction in mucus production.  Culture demonstrated airway colonization with Candida albicans.  Patient did grow mold secondary to Aspergillus species with only light growth on specimen collected 9/6/2021. I do not believe this represented an infection.  Repeat lavage 9/8/2021 shows Candida albicans but no Aspergillus. Trending chest x-ray. Monitoring mucus production. Obesity: Aware. Mild neutropenia: IgG and IgM level are normal.  IgA level is slightly diminished.  Resolved. Hemoptysis: Secondary to spontaneous alveolar hemorrhage related to anticoagulation and high ACT requirements.  Heparin discontinued 9/8/2021.  Bronchoscopy completed 9/8/2021 showed old blood within the airway but no active bleeding.  Heparin held.  Resolved . Anemia secondary to acute blood loss: This is primarily secondary to high ACT for clot prevention with low flow ECMO state.    Represents spontaneous bleeding.  Heparin discontinued 9/8/2021.  Patient requiring multiple transfusions.  No evidence of retroperitoneal hemorrhage on ultrasound.   Heparin resumed 9/8/2021 with stability of bleeding.  Resolved. Hypotension: Initially due to to heavy sedation and positive pressure ventilation.  Requiring low-dose pressors.  Subsequently worsened secondary to anemia of acute blood loss.    Resolved. Thrombocytopenia: Secondary to acute blood loss from hemorrhage.  Resolved. Hepatostasis: Initially secondary to ECMO catheter placement.  Continue to assess as catheter has been removed. Resolved. Sepsis: Secondary to combination of Covid and Haemophilus influenza.  Present at presentation.    Status post Levaquin. Resolved. ARDS:  Patient intubated 9/1/2021. [address] initiated 9/1/2021.  ECMO completed 9/10/2021.  Extubated 9/13/2021. Resolved. CC: Post Covid  HPI: Patient is a 25-year-old white male lifetime non-smoker. 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 He is obese but has no other medical diagnoses including coronary artery disease diabetes or sleep apnea.   Patient presented to the emergency room and was admitted on 8/30/2021. Jonna Zambrano started to develop fever associated with chills cough shortness of breath nasal congestion loss of taste and loss of smell starting 8/28/2021.  As he continued to progress his dyspnea continued to worsen.  He developed malaise.  He indicated that his wife was recently diagnosed with Covid.  Patient underwent Covid screen and was positive.  CT scan chest showed bilateral infiltrates.  He initially was started on low-flow oxygen but continued to require progressive oxygen supplementation.  He was treated with remdesivir Decadron and subsequently tocilizumab.  He continued to develop progressive dyspnea and had the syndrome of \"happy hypoxia\".  As patient continued to worsen, he elected to proceed with intubation and ECMO. Patient intubated 9/1/2021. Deward Grills cannulation 9/1/2021.  As the catheter started to warm, it became more compliant and developed kinking causing impaired outflow.  As the flow continued to deteriorate, it was clear patient would need replacement of bilateral groin catheters.  Patient underwent recannulation of the right internal jugular with Protek duo and removal of the bilateral groin lines.  Flow immediately returned without complications.  Patient continued to improve and ECMO was terminated with decannulation on 9/10/2021. Patient did develop coinfection.  Patient had COVID-19 pneumonia worsened by beta lactamase positive Haemophilus influenza.  Rocephin was initiated 8/30/2021.  Patient was initially treated with Rocephin without improvement.  Patient was transitioned to Levaquin on 9/7/2021.  Nebulized tobramycin was added 9/5/2021 for 10 doses. Levaquin completed 9/14/2021. Patient extubated 9/13/2021. ROS: Cough is improved. Mucus is starting to become clear. No shortness of breath. Positive difficulty standing from fatigue and weakness. 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 FHX:   Allergies: NKDA  Medications:     dextrose        furosemide  80 mg IntraVENous Daily    chlorothiazide (DIURIL) IVPB  250 mg IntraVENous Q24H    fluconazole  200 mg Oral Daily    potassium chloride  20 mEq Oral Daily with breakfast    enoxaparin  40 mg SubCUTAneous BID    insulin lispro  0-6 Units SubCUTAneous Q4H       Vital Signs:   T: 98.3: P: 107 RR: 18 B/P: 115/89: FiO2: 2L: O2 Sat:90: I/O: 100/3800 GCS: 15  Body mass index is 41.88 kg/m². Kirk Valenzuelago General:   Heavyset white male who looks stronger again today. HEENT:  normocephalic and atraumatic. No scleral icterus.  PERR  Neck: supple. No Thyromegaly. Lungs: clear to auscultation. No retractions  Cardiac: RRR. No JVD. Abdomen: soft. Nontender. Extremities:  No clubbing, cyanosis, or edema x 4. Vasculature: capillary refill < 3 seconds. Palpable dorsalis pedis pulses. Skin:  warm and dry. Psych:  Alert and oriented x3. Affect appropriate  Lymph:  No supraclavicular adenopathy. Neurologic:  No focal deficit. No seizures    Data: (All radiographs, tracings, PFTs, and imaging are personally viewed and interpreted unless otherwise noted). · COVID 8/30/21: +  · Pulmonary Lavage 9/1/21: PCUR positive for H. Influenza.  Beta- lactamase positive. · Pulmonary lavage 9/6/2021: Positive for Haemophilus influenza and Candida albicans.  Culture showing light growth of Aspergillus species.  Suspect this is contaminant. · Right lower extremity ultrasound report 9/7/2021: Subcutaneous edema without evidence of hematoma. · Pulmonary lavage 9/8/2021: Many segmented neutrophils.  No bacteria seen.  Candida albicans light growth.  Thus far no Aspergillus species. · Telemetry shows sinus rhythm. · Chest x-ray requested for tomorrow. · Glucose 87. Case reviewed with Dr. Wan Mendoza. Electronically signed by Chery Dsouza M.D.

## 2021-09-16 NOTE — PROGRESS NOTES
[facility]  INPATIENT PHYSICAL THERAPY  DAILY NOTE  STRZ CCU-STEPDOWN 3B - 3B-22/022-A     Time In: 9478  Time Out: 1630  Timed Code Treatment Minutes: 42 Minutes  Minutes: 42          Date: 2021  Patient Name: Deloris Goel,  Gender:  male        MRN: [id number]  : 1975  (48 y.o.)     Referring Practitioner: KATHARINA Mckenna CNP  Diagnosis: Hypoxia  Additional Pertinent Hx: Per H&P: Pt presents with SOB, productive cough, fever, chills, nasal congestion, loss of smell, loss of taste, and malaise starting 2021. Pt diagnosed with Acute Hypoxic Respiratory Failure 2/2 COVID-19 PNA with suspected superimposed CAP. Pt transferred to [address] [place] [address] on  (failed HFNC), intubated, and started on ECMO. Pt decannulated on 9/10/21 and extubated 21     Prior Level of Function:  Lives With: Spouse (and 3 schoolage children)  Type of Home: House  Home Layout: Two level, Performs ADL's on one level, Able to Live on Main level with bedroom/bathroom  Home Access: Stairs to enter with rails  Entrance Stairs - Number of Steps: 4 steps with 2 rails that are too wide to use at same time  Home Equipment:  (none)   Bathroom Equipment:  (none)    ADL Assistance: Independent  Homemaking Assistance: Independent  Ambulation Assistance: Independent  Transfer Assistance: Independent  Active : Yes    Restrictions/Precautions:  Restrictions/Precautions: General Precautions, Fall Risk  Position Activity Restriction  Other position/activity restrictions: Monitor O2 sats      SUBJECTIVE: Pt resting in bed and is eager to try walking into the aguilar.     PAIN: denies      Vitals: Oxygen: Room air varied 89 to 95% for saturations    OBJECTIVE:  Bed Mobility:  Supine to Sit: Contact Guard Assistance, with head of bed raised, with rail, with increased time for completion    Transfers:  Sit to Stand: Minimal Assistance, with increased time for completion, cues for hand placement  Stand to Asha 68, cues for hand placement    Ambulation:  Contact Guard Assistance  Distance: 30 ft  Surface: Level Tile  Device:Rolling Walker  Gait Deviations:  Slow Evelia, Decreased Step Length Bilaterally, Decreased Gait Speed, Decreased Heel Strike Bilaterally, Wide Base of Support, Unsteady Gait and required a couple standing rest breaks. LEs would have mild tremor/wobble a couple times. Balance:  Static Sitting Balance:  Stand By Assistance  Dynamic Sitting Balance: Stand By Assistance  Static Standing Balance: Contact Guard Assistance  Dynamic Standing Balance: Contact Guard Assistance, with walker    Exercise:  Patient was guided in 1 set(s) 10 reps of exercise to both lower extremities. Glut sets, Quad sets, Hip abduction/adduction, Seated marches, Seated heel/toe raises and Long arc quads. Exercises were completed for increased independence with functional mobility. Functional Outcome Measures: Completed  AM-PAC Inpatient Mobility Raw Score : 17  AM-PAC Inpatient T-Scale Score : 42.13    ASSESSMENT:  Assessment: Patient progressing toward established goals. and tolerated ambulation without tachycardia. Pt is motivated to get moving again. Pt is an excellent candidate for Inpt Rehab to progress mobility with strengthening, balance and enduranc building, and functional mobility training. Activity Tolerance:  Patient tolerance of  treatment: good. Equipment Recommendations: Other: monitor for needs  Discharge Recommendations:   Continue to assess pending progress, IP Rehab, Patient would benefit from continued therapy after discharge    Plan: Times per week: 5x GM  Current Treatment Recommendations: Strengthening, Home Exercise Program, Safety Education & Training, Balance Training, Endurance Training, Functional Mobility Training, Patient/Caregiver Education & Training, Equipment Evaluation, Education, & procurement, Transfer Training, Gait Training    Patient Education  Patient Education: Plan of Care, Home Exercise Program, Gait    Goals:  Patient goals : get stronger and move around again  Short term goals  Time Frame for Short term goals: at discharge  Short term goal 1: Pt to be SBA for supine <> sit to get in/out of bed  Short term goal 2: Pt to be SBA for sit <> stand to get up to ambulate  Short term goal 3: Pt to stand > 2 min with walker with CGA for pre-gait training - MET, see revisions  Short term goal 4: Ambulation to be assessed by LPT - MET, see revisions  Revised Short-Term Goals:    Short term goals  Time Frame for Short term goals: at discharge  Short term goal 1: Pt to be SBA for supine <> sit to get in/out of bed  Short term goal 2: Pt to be SBA for sit <> stand to get up to ambulate  Short term goal 3: Pt to ambulate > 60 ft with RW with SBA for household distances  Short term goal 4: Pt to negotiate 4 steps with 2 rails with CGA for preparation to access home      Long term goals  Time Frame for Long term goals : not set due to short ELOS    Following session, patient left in safe position with all fall risk precautions in place. Christy Yung.  Andrey Dumont, [place] [facility]

## 2021-09-16 NOTE — CARE COORDINATION
9/16/21, 10:35 AM EDT    DISCHARGE ON GOING EVALUATION    [facility] day: 17  Location: [facility]22/022-A Reason for admit: Hypoxia [R09.02]  COVID-19 [U07.1]   Procedure:   9/1 Intubation  9/1 SGC left subclavian  9/1 CVC RIJ  9/1 Echo with EF 60%  9/1 JASPREET: EF 55-60%  9/1 Cardiac Cath: no sig CAD; PA pressure 40's; preserved CO/CI; Cannulated for ECMO  9/1 VV ECMO initiated  9/6 ECMO Cannulation sites changed  9/7 Bronch w/washings sent: Chronic airway inflammation with pitting airways disease and striation formation; Active pneumonia with bilateral lung mucus production; No active bleeding.  Hemoptysis secondary to suction trauma  9/8 Bronch w/washings: Prominent pitting airways disease; Decreased mucus within the airway; Old clotted blood within the airway.  No active bleeding. Clots were removed with suction  9/10 Decannulated; ECMO stopped. 9/13 Extubated. Barriers to Discharge: Transferred from ICU to  yesterday. Removed from [facility] isolation today per Infection Control per CDC guidelines. PT/OT. O2 down to 2L/nc. Sats 94-96%. Diuril iv daily. Colace, Lovenox, Diflucan po daily, Lasix iv daily, Humalog, Klor-Con. Physiatry following. PCP: Trey Xiong MD  Readmission Risk Score: 12%  Patient Goals/Plan/Treatment Preferences: Planning IP Rehab at discharge. Precert started per IsaiPhilinda on 9/15/21.

## 2021-09-17 ENCOUNTER — APPOINTMENT (OUTPATIENT)
Dept: GENERAL RADIOLOGY | Age: 46
DRG: 003 | End: 2021-09-17
Payer: COMMERCIAL

## 2021-09-17 LAB
ANION GAP SERPL CALCULATED.3IONS-SCNC: 18 MEQ/L (ref 8–16)
BUN BLDV-MCNC: 29 MG/DL (ref 7–22)
CALCIUM SERPL-MCNC: 10.2 MG/DL (ref 8.5–10.5)
CHLORIDE BLD-SCNC: 98 MEQ/L (ref 98–111)
CO2: 25 MEQ/L (ref 23–33)
CREAT SERPL-MCNC: 0.9 MG/DL (ref 0.4–1.2)
GFR SERPL CREATININE-BSD FRML MDRD: > 90 ML/MIN/1.73M2
GLUCOSE BLD-MCNC: 108 MG/DL (ref 70–108)
GLUCOSE BLD-MCNC: 118 MG/DL (ref 70–108)
MAGNESIUM: 2.2 MG/DL (ref 1.6–2.4)
POTASSIUM REFLEX MAGNESIUM: 3.3 MEQ/L (ref 3.5–5.2)
SODIUM BLD-SCNC: 141 MEQ/L (ref 135–145)

## 2021-09-17 PROCEDURE — 97116 GAIT TRAINING THERAPY: CPT

## 2021-09-17 PROCEDURE — 36415 COLL VENOUS BLD VENIPUNCTURE: CPT

## 2021-09-17 PROCEDURE — 97110 THERAPEUTIC EXERCISES: CPT

## 2021-09-17 PROCEDURE — 2140000000 HC CCU INTERMEDIATE R&B

## 2021-09-17 PROCEDURE — 97535 SELF CARE MNGMENT TRAINING: CPT

## 2021-09-17 PROCEDURE — 6360000002 HC RX W HCPCS: Performed by: NURSE PRACTITIONER

## 2021-09-17 PROCEDURE — 2580000003 HC RX 258: Performed by: INTERNAL MEDICINE

## 2021-09-17 PROCEDURE — 97530 THERAPEUTIC ACTIVITIES: CPT

## 2021-09-17 PROCEDURE — 99232 SBSQ HOSP IP/OBS MODERATE 35: CPT | Performed by: INTERNAL MEDICINE

## 2021-09-17 PROCEDURE — 83735 ASSAY OF MAGNESIUM: CPT

## 2021-09-17 PROCEDURE — 80048 BASIC METABOLIC PNL TOTAL CA: CPT

## 2021-09-17 PROCEDURE — 6370000000 HC RX 637 (ALT 250 FOR IP): Performed by: INTERNAL MEDICINE

## 2021-09-17 PROCEDURE — 82948 REAGENT STRIP/BLOOD GLUCOSE: CPT

## 2021-09-17 PROCEDURE — 71045 X-RAY EXAM CHEST 1 VIEW: CPT

## 2021-09-17 PROCEDURE — 2580000003 HC RX 258

## 2021-09-17 RX ORDER — SODIUM CHLORIDE, SODIUM LACTATE, POTASSIUM CHLORIDE, CALCIUM CHLORIDE 600; 310; 30; 20 MG/100ML; MG/100ML; MG/100ML; MG/100ML
INJECTION, SOLUTION INTRAVENOUS CONTINUOUS
Status: ACTIVE | OUTPATIENT
Start: 2021-09-17 | End: 2021-09-17

## 2021-09-17 RX ADMIN — SODIUM CHLORIDE, POTASSIUM CHLORIDE, SODIUM LACTATE AND CALCIUM CHLORIDE: 600; 310; 30; 20 INJECTION, SOLUTION INTRAVENOUS at 16:35

## 2021-09-17 RX ADMIN — ENOXAPARIN SODIUM 40 MG: 40 INJECTION SUBCUTANEOUS at 09:44

## 2021-09-17 RX ADMIN — SODIUM CHLORIDE, PRESERVATIVE FREE 10 ML: 5 INJECTION INTRAVENOUS at 20:25

## 2021-09-17 RX ADMIN — ENOXAPARIN SODIUM 40 MG: 40 INJECTION SUBCUTANEOUS at 20:25

## 2021-09-17 RX ADMIN — POTASSIUM CHLORIDE 20 MEQ: 1500 TABLET, EXTENDED RELEASE ORAL at 11:58

## 2021-09-17 RX ADMIN — FLUCONAZOLE 200 MG: 200 TABLET ORAL at 09:45

## 2021-09-17 ASSESSMENT — PAIN SCALES - GENERAL: PAINLEVEL_OUTOF10: 0

## 2021-09-17 NOTE — CARE COORDINATION
9/17/21, 7:34 AM EDT    DISCHARGE ON GOING EVALUATION    [facility] day: 18  Location: [facility]22/022-A Reason for admit: Hypoxia [R09.02]  COVID-19 [U07.1]   Procedure:   9/1 Intubation  9/1 SGC left subclavian  9/1 CVC RIJ  9/1 Echo with EF 60%  9/1 JASPREET: EF 55-60%  9/1 Cardiac Cath: no sig CAD; PA pressure 40's; preserved CO/CI; Cannulated for ECMO  9/1 VV ECMO initiated  9/6 ECMO Cannulation sites changed  9/7 Bronch w/washings sent: Chronic airway inflammation with pitting airways disease and striation formation; Active pneumonia with bilateral lung mucus production; No active bleeding.  Hemoptysis secondary to suction trauma  9/8 Bronch w/washings: Prominent pitting airways disease; Decreased mucus within the airway; Old clotted blood within the airway.  No active bleeding. Clots were removed with suction  9/10 Decannulated; ECMO stopped. 9/13 Extubated. Barriers to Discharge: Removed from [facility] isolation yesterday per Infection Control per CDC guidelines. PT/OT. O2 is now off. Sats 90-91%. Lovenox, Diflucan po daily, Humalog, Klor-Con. Physiatry following. PCP: Frank Mercado MD  Readmission Risk Score: 12%  Patient Goals/Plan/Treatment Preferences: Anticipate IP Rehab once precert obtained.

## 2021-09-17 NOTE — PROGRESS NOTES
Patient:  Renetta Turcios                   Unit/Bed:3B-22/022-A  MRN: [id number]            PCP: Vitaliy Gann MD  Date of Admission: 8/30/2021     Assessment and Plan(All pulmonary edema, renal failure, PE, and respiratory failure diagnoses are acute in nature unless otherwise specified):           Volume contraction: Administer lactated Ringer's. Reassess heart rate as he ambulates after rehydration efforts completed. Obesity: Aware. Mild neutropenia: IgG and IgM level are normal.  IgA level is slightly diminished.  Resolved. Hemoptysis: Secondary to spontaneous alveolar hemorrhage related to anticoagulation and high ACT requirements.  Heparin discontinued 9/8/2021.  Bronchoscopy completed 9/8/2021 showed old blood within the airway but no active bleeding.  Heparin held.  Resolved . Anemia secondary to acute blood loss: This is primarily secondary to high ACT for clot prevention with low flow ECMO state.    Represents spontaneous bleeding.  Heparin discontinued 9/8/2021.  Patient requiring multiple transfusions.  No evidence of retroperitoneal hemorrhage on ultrasound.   Heparin resumed 9/8/2021 with stability of bleeding.  Resolved. Hypotension: Initially due to to heavy sedation and positive pressure ventilation.  Requiring low-dose pressors.  Subsequently worsened secondary to anemia of acute blood loss.    Resolved. Thrombocytopenia: Secondary to acute blood loss from hemorrhage.  Resolved. Hepatostasis: Initially secondary to ECMO catheter placement.  Continue to assess as catheter has been removed.  Resolved. Sepsis: Secondary to combination of Covid and Haemophilus influenza.  Present at presentation.    Status post Levaquin.  Resolved. ARDS:  Patient intubated 9/1/2021. [address] initiated 9/1/2021.  ECMO completed 9/10/2021.  Extubated 9/13/2021.  Resolved. Pneumonia: Coinfection with Haemophilus influenza.  Patient initially treated with Rocephin. Patient transition to higher dosing of Rocephin. Antibiotics initiated 8/30/2021.  Organism is beta-lactamase positive.  Rocephin ineffective.  Patient transition to [address] [place] [address] on 9/7/2021. Nebulized tobramycin was added on 9/5/2021 for 10 doses.  Levaquin completed 9/14/2021. Repeat bronchoscopy completed 9/8/2021 showed significant reduction in mucus production.  Culture demonstrated airway colonization with Candida albicans.  Patient did grow mold secondary to Aspergillus species with only light growth on specimen collected 9/6/2021. I do not believe this represented an infection.  Repeat lavage 9/8/2021 shows Candida albicans but no Aspergillus.    Trending chest x-ray.  Monitoring mucus production. Resolved. COVID-19: Patient failed trial of remdesivir, tocilizumab, and Decadron. Resolved. Acute hypoxemic respiratory failure: Secondary to COVID-19 with subsequent progression to ARDS.  Patient intubated 9/1/2021.   ECMO initiated 9/1/2021.  Patient required ECMO recannulation on 9/6/2021 secondary to kinked catheter with groin line.  Now with internal jugular Protek duo which was placed 9/6/2021.  ECMO completed with decannulation on 9/10/2021. Patient did well with spontaneous breathing trial and was extubated on 9/13/2021. Resolved. .     CC: Post Covid  HPI: Patient is a 53-year-old white male lifetime non-smoker. 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 He is obese but has no other medical diagnoses including coronary artery disease diabetes or sleep apnea.   Patient presented to the emergency room and was admitted on 8/30/2021. Charles Seymour started to develop fever associated with chills cough shortness of breath nasal congestion loss of taste and loss of smell starting 8/28/2021.  As he continued to progress his dyspnea continued to worsen.  He developed malaise.  He indicated that his wife was recently diagnosed with Covid.  Patient underwent Covid screen and was positive.  CT scan chest showed bilateral infiltrates.  He initially was started on low-flow oxygen but continued to require progressive oxygen supplementation.  He was treated with remdesivir Decadron and subsequently tocilizumab.  He continued to develop progressive dyspnea and had the syndrome of \"happy hypoxia\".  As patient continued to worsen, he elected to proceed with intubation and ECMO. Patient intubated 9/1/2021. Acey Gosselin cannulation 9/1/2021.  As the catheter started to warm, it became more compliant and developed kinking causing impaired outflow.  As the flow continued to deteriorate, it was clear patient would need replacement of bilateral groin catheters.  Patient underwent recannulation of the right internal jugular with Protek duo and removal of the bilateral groin lines.  Flow immediately returned without complications.  Patient continued to improve and ECMO was terminated with decannulation on 9/10/2021. Patient did develop coinfection.  Patient had COVID-19 pneumonia worsened by beta lactamase positive Haemophilus influenza.  Rocephin was initiated 8/30/2021.  Patient was initially treated with Rocephin without improvement.  Patient was transitioned to Levaquin on 9/7/2021.  Nebulized tobramycin was added 9/5/2021 for 10 doses.  Levaquin completed 9/14/2021. Patient extubated 9/13/2021. ROS:  Patient and nurses relate that he gets tachycardic when he ambulates. He has to ambulate to the restroom with assistance. He does have dizziness upon standing. No further cough. .  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 FHX:   Allergies: NKDA  Medications:     lactated ringers      dextrose        potassium chloride  20 mEq Oral Daily with breakfast    enoxaparin  40 mg SubCUTAneous BID    insulin lispro  0-6 Units SubCUTAneous Q4H        Vital Signs:   T: 97.9: P: 98: RR: 18: B/P: 120/84: FiO2: RA: O2 Sat: 92:   GCS:  15: Body mass index is 36.01 kg/m². Angela Mendez General:   Heavyset white male who looks stronger again today.   HEENT:  normocephalic and atraumatic.  No scleral icterus. PERR  Neck: supple.  No Thyromegaly. Lungs: clear to auscultation.  No retractions  Cardiac: RRR.  No JVD. Abdomen: soft.  Nontender. Extremities:  No clubbing, cyanosis, or edema x 4.    Vasculature: capillary refill < 3 seconds. Palpable dorsalis pedis pulses. Skin:  warm and dry. Psych:  Alert and oriented x3.  Affect appropriate  Lymph:  No supraclavicular adenopathy. Neurologic:  No focal deficit. No seizures     Data: (All radiographs, tracings, PFTs, and imaging are personally viewed and interpreted unless otherwise noted). · COVID 8/30/21: +  · Pulmonary Lavage 9/1/21: PCUR positive for H. Influenza.  Beta- lactamase positive. · Pulmonary lavage 9/6/2021: Positive for Haemophilus influenza and Candida albicans.  Culture showing light growth of Aspergillus species.  Suspect this is contaminant. · Right lower extremity ultrasound report 9/7/2021: Subcutaneous edema without evidence of hematoma. · Pulmonary lavage 9/8/2021: Many segmented neutrophils.  No bacteria seen.  Candida albicans light growth.  Thus far no Aspergillus species. · Telemetry shows sinus rhythm.     Electronically signed by Rafita Solomon.  Chipper Lesches M.D.

## 2021-09-17 NOTE — PROGRESS NOTES
Clinicals sent to [facility] review nurse at [facility]

## 2021-09-17 NOTE — PROGRESS NOTES
[facility]  INPATIENT PHYSICAL THERAPY  DAILY NOTE  STRZ CCU-STEPDOWN 3B - 3B-22/022-A     Time In: 6986  Time Out: 1013  Timed Code Treatment Minutes: 27 Minutes  Minutes: 27          Date: 2021  Patient Name: Tre Lyons,  Gender:  male        MRN: [id number]  : 1975  (48 y.o.)     Referring Practitioner: KATHARINA Martinez CNP  Diagnosis: Hypoxia  Additional Pertinent Hx: Per H&P: Pt presents with SOB, productive cough, fever, chills, nasal congestion, loss of smell, loss of taste, and malaise starting 2021. Pt diagnosed with Acute Hypoxic Respiratory Failure 2/2 COVID-19 PNA with suspected superimposed CAP. Pt transferred to [address] [place] [address] on  (failed HFNC), intubated, and started on ECMO. Pt decannulated on 9/10/21 and extubated 21     Prior Level of Function:  Lives With: Spouse (and 3 schoolage children)  Type of Home: House  Home Layout: Two level, Performs ADL's on one level, Able to Live on Main level with bedroom/bathroom  Home Access: Stairs to enter with rails  Entrance Stairs - Number of Steps: 4 steps with 2 rails that are too wide to use at same time  Home Equipment:  (none)   Bathroom Equipment:  (none)    ADL Assistance: Independent  Homemaking Assistance: Independent  Ambulation Assistance: Independent  Transfer Assistance: Independent  Active : Yes    Restrictions/Precautions:  Restrictions/Precautions: General Precautions, Fall Risk  Position Activity Restriction  Other position/activity restrictions: Monitor O2 sats      SUBJECTIVE: Pt ready to get up with nursing and agrees to ambulate with PT. Pt is eager to progress. Spouse present to see him ambulate as well.     PAIN: denies     Vitals: Oxygen: 93% on room air  Heart Rate: ranged 113 to 146 throughout     OBJECTIVE:  Bed Mobility:  Sit to Supine: Stand By Assistance     Transfers:  Sit to Stand: Contact Guard Assistance  Stand to [facility] Assistance    Ambulation:  Contact Guard Assistance  Distance: 80 ft  Surface: Level Tile  Device:Rolling Walker  Gait Deviations:  Slow Evelia, Decreased Step Length Bilaterally, Decreased Gait Speed and improved steadiness noted today, and 2 standing rest breaks needed. Balance:  Static Sitting Balance:  Supervision  Dynamic Sitting Balance: Supervision  Static Standing Balance: Contact Guard Assistance  Dynamic Standing Balance: Contact Guard Assistance    Exercise:  Patient was guided in 1 set(s) 10 reps of exercise to both lower extremities. Ankle pumps, Glut sets, Quad sets, Heelslides, Hip abduction/adduction, Seated marches and Long arc quads. Exercises were completed for increased independence with functional mobility. Functional Outcome Measures: Completed  AM-PAC Inpatient Mobility Raw Score : 17  AM-PAC Inpatient T-Scale Score : 42.13    ASSESSMENT:  Assessment: Patient progressing toward established goals. and continues to be motivated and eager to get to Rehab for more activity  Activity Tolerance:  Patient tolerance of  treatment: good. Equipment Recommendations: Other: monitor for needs  Discharge Recommendations:   Continue to assess pending progress, IP Rehab, Patient would benefit from continued therapy after discharge    Plan: Times per week: 5x GM  Current Treatment Recommendations: Strengthening, Home Exercise Program, Safety Education & Training, Balance Training, Endurance Training, Functional Mobility Training, Patient/Caregiver Education & Training, Equipment Evaluation, Education, & procurement, Transfer Training, Gait Training    Patient Education  Patient Education: Plan of Care, Home Exercise Program    Goals:  Patient goals : get stronger and move around again  Short term goals  Time Frame for Short term goals: at discharge  Short term goal 1: Pt to be SBA for supine <> sit to get in/out of bed  Short term goal 2: Pt to be SBA for sit <> stand to get up to ambulate  Short term goal 3: Pt to ambulate > 60 ft with RW with SBA for household distances  Short term goal 4: Pt to negotiate 4 steps with 2 rails with CGA for preparation to access home  Long term goals  Time Frame for Long term goals : not set due to short ELOS    Following session, patient left in safe position with all fall risk precautions in place. Jeremiah Hewitt.  Reny [place], [place] [address]

## 2021-09-17 NOTE — PROGRESS NOTES
Comprehensive Nutrition Assessment    Type and Reason for Visit:  Reassess    Nutrition Recommendations/Plan:   Continue current diet, ONS TID. Recommend MVI. Encouraged po, good nutrition at best efforts for healing. Nutrition Assessment:    Pt improving from a nutritional standpoint AEB tolerating po diet, ONS. Remains at risk for further nutritional compromise r/t COVID+ (diagnosed 8/30), extubated 9/13, ECMO stopped 9/10, Hflu, ARDs and underlying medical condition (hx GB OR, obesity). Nutrition recommendations/interventions as per above. Malnutrition Assessment:  Malnutrition Status: At risk for malnutrition (Comment)    Context:  Acute Illness     Findings of the 6 clinical characteristics of malnutrition:  Energy Intake:  7 - 50% or less of estimated energy requirements for 5 or more days  Weight Loss:  Unable to assess (UBW not available)     Body Fat Loss:  No significant body fat loss     Muscle Mass Loss:  No significant muscle mass loss    Fluid Accumulation:  No significant fluid accumulation     Strength:  Not Performed    Estimated Daily Nutrient Needs:  Energy (kcal):  ~2264 kcals ( 30) with late phase + COVID. Note kcal goal while pt was on ECMO was 1425 kcals ( 19 kcals/kg IBW). ; Weight Used for Energy Requirements:  Ideal     Protein (g):  ~151 grams (2); Weight Used for Protein Requirements:  Ideal          Nutrition Related Findings:     Pt. Seen with wife; reports fair appetite; tolerating diet; states acceptance of ONS; consuming 25-50%, 50-75% of meals; Glucose 108;  Rx includes Colace, Glycolax, Zofran; 1 BM past 24 hours; states had BM this am; plan IP rehab    Wounds:  None (reported)       Current Nutrition Therapies:    Adult Oral Nutrition Supplement; Standard 4 oz Oral Supplement  ADULT DIET; Easy to Chew; 5 carb choices (75 gm/meal)    Anthropometric Measures:  · Height: 5' 10\" (177.8 cm)  · Current Body Weight: 251 lb (113.9 kg) (9/17 standing scale, trace, +1 edema (? accuracy))   · Admission Body Weight: 263 lb 7.2 oz (119.5 kg) (8/31 no edema)    · Usual Body Weight:  (not available)     · Ideal Body Weight: 166 lbs;      · BMI: 36  · BMI Categories: Obese Class 2 (BMI 35.0 -39.9)       Nutrition Diagnosis:   · Inadequate oral intake related to impaired respiratory function as evidenced by intake 26-50%, intake 51-75%      Nutrition Interventions:   Food and/or Nutrient Delivery:  Continue Current Diet, Continue Oral Nutrition Supplement  Nutrition Education/Counseling:  Education initiated (9/15 Encouraged po, good nutrition at best efforts for recovery. Pt. voiced understanding.)   Coordination of Nutrition Care:  Continue to monitor while inpatient    Goals:  Pt. will tolerate and consume 75% or more of meals during LOS. Nutrition Monitoring and Evaluation:   Behavioral-Environmental Outcomes:  None Identified   Food/Nutrient Intake Outcomes:  Diet Advancement/Tolerance, Food and Nutrient Intake, Supplement Intake  Physical Signs/Symptoms Outcomes:  Biochemical Data, Chewing or Swallowing, GI Status, Fluid Status or Edema, Hemodynamic Status, Nutrition Focused Physical Findings, Skin, Weight     Discharge Planning:     Too soon to determine     Electronically signed by Laila Doyle RD, LD on 9/17/21 at 9:35 AM EDT    Contact: [phone]

## 2021-09-17 NOTE — PROGRESS NOTES
[address] [place] [address]  Occupational Therapy  Daily Note  Time:   Time In: 4033  Time Out: 1424  Timed Code Treatment Minutes: 43 Minutes  Minutes: 43          Date: 2021  Patient Name: Rosa Maria Alexis,   Gender: male      Room: [facility]22/022-A  MRN: [id number]  : 1975  (48 y.o.)  Referring Practitioner: KATHARINA Garza CNP  Diagnosis: hypoxia  Additional Pertinent Hx: Per H&P: Pt presents with SOB, productive cough, fever, chills, nasal congestion, loss of smell, loss of taste, and malaise starting 2021. Pt diagnosed with Acute Hypoxic Respiratory Failure 2/ COVID-19 PNA with suspected superimposed CAP. Pt transferred to [address] [place] [address] on  (failed HFNC), intubated, and started on ECMO. Pt decannulated on 9/10/21 and extubated 21. Restrictions/Precautions:  Restrictions/Precautions: General Precautions, Fall Risk  Position Activity Restriction  Other position/activity restrictions: Monitor O2 sats     SUBJECTIVE: RN approved session. Pt was sitting in bedside chair upon arrival and agreed to participate in OT session. OT came by earlier in the day and pt requesting to shave so RN gave okay to shave d/t pt on blood thinners. PAIN: Pt did not c/o any pain or exhibit any pain behaviors throughout session. Vitals: Oxygen: spO2 95-97% on room air after navigating to bathroom. COGNITION: Slow Processing and Decreased Problem Solving    ADL:   Grooming: Minimal Assistance. Pt completing shaving with clippers and safety razor, combing hair, and brushing teeth. Pt required min A for completeness with using clippers. Pt compensated throughout grooming tasks by resting elbows on sink. Pt completed shaving with safety razor (prep with shaving cream included), combing hair, and brushing teeth SBA. Pt was educated on a compensatory strategy of placing shaving cream on back of fingers to get neck lathered d/t having difficulty rotating hands.  Pt was receptive of this compensatory strategy. Pt also using a tubigrip to build up toothbrush for teethbrushing. Upper Extremity Dressing: Minimal Assistance. [place] and donning hospital gown. Pt required orientation and set up but was able to thread arms through gown. Brian Cazares BALANCE:  Standing Balance: Contact Guard Assistance. BUE support on RW    BED MOBILITY:  Not Tested    TRANSFERS:  Sit to Stand:  Contact Guard Assistance. From bedside chair d/t pushing up from arm rest. Min A for sit to stand t/f out of no-arm chair d/t not having anything to push up from. Stand to Sit: Contact Guard Assistance. Into no-arm chair and bedside chair     FUNCTIONAL MOBILITY:  Assistive Device: Rolling Walker  Assist Level:  Contact Guard Assistance. Distance: To and from bathroom  Pt demo safe and appropriate ambulation to bathroom. Pt relying heavily on walker with UE shaking at times. LE steady and not shaking. Pt reporting after that, his LE are weaker than UE and he was compensating a lot when ambulating with the walker. ADDITIONAL ACTIVITIES:  PROM stretches in shoulder flexion and shoulder abduction x3 were completed to increase range in UE in prep for more increased participation in ADLs. ASSESSMENT:     Activity Tolerance:  Patient tolerance of  treatment: fair. Pt improved since last session. Pt completed more complex coordination activities compared to what was accomplished in last session. Discharge Recommendations: IP Rehab, Continue to assess pending progress   Equipment Recommendations:  Other: will continue to monitor pending progress  Plan: Times per week: 6x  Current Treatment Recommendations: Strengthening, Functional Mobility Training, Endurance Training, Patient/Caregiver Education & Training, Equipment Evaluation, Education, & procurement, Self-Care / ADL, Home Management Training, Balance Training, ROM, Neuromuscular Re-education, Positioning    Patient Education  Patient Education: Role of OT, Plan of Care, Precautions and Importance of Increasing Activity    Goals  Short term goals  Time Frame for Short term goals: by discharge  Short term goal 1: Pt will complete feeding/grooming tasks using R dominant hand with minimal assistance and AE prn to increase independence with self care tasks. - REVISED  Short term goal 2: Pt will complete functional transfers with minimal assistance X2 using sera stedy in prep for [facility] transfers. -MET  Short term goal 3: Pt will tolerate dynamic standing X2 minutes with minimal assistance X2 in prep for toileting tasks. - REVISED  Short term goal 4: Pt will complete BUE A/AROM exercises X5-10 reps to increase overall strength/endurance needed for UB dressing. -MET    Revised Short-Term Goals  Short term goals  Time Frame for Short term goals: by discharge  Short term goal 1: Pt will complete feeding/grooming tasks using R dominant hand SBA and use of adaptive strategies/devices prn to increase independence with self care tasks. Short term goal 2: Pt will navigate to/from BR CGA to complete tolieting routine for increased indep with ADL routine  Short term goal 3: Pt will tolerate dynamic standing >5 minutes CGA at sink side for grooming tasks for increase endurance for ADL participation. Kieshadell Luz term goal 4: Pt will complete BUE AROM HEP with min cues for technique to increase strength in UE for ADL tasks. Following session, patient left in safe position with all fall risk precautions in place.

## 2021-09-18 PROCEDURE — 2140000000 HC CCU INTERMEDIATE R&B

## 2021-09-18 PROCEDURE — 97110 THERAPEUTIC EXERCISES: CPT

## 2021-09-18 PROCEDURE — 99232 SBSQ HOSP IP/OBS MODERATE 35: CPT | Performed by: NURSE PRACTITIONER

## 2021-09-18 PROCEDURE — 6370000000 HC RX 637 (ALT 250 FOR IP): Performed by: INTERNAL MEDICINE

## 2021-09-18 PROCEDURE — 97530 THERAPEUTIC ACTIVITIES: CPT

## 2021-09-18 PROCEDURE — 6360000002 HC RX W HCPCS: Performed by: NURSE PRACTITIONER

## 2021-09-18 RX ORDER — POTASSIUM CHLORIDE 7.45 MG/ML
10 INJECTION INTRAVENOUS PRN
Status: DISCONTINUED | OUTPATIENT
Start: 2021-09-18 | End: 2021-09-20 | Stop reason: ALTCHOICE

## 2021-09-18 RX ORDER — FUROSEMIDE 10 MG/ML
80 INJECTION INTRAMUSCULAR; INTRAVENOUS ONCE
Status: COMPLETED | OUTPATIENT
Start: 2021-09-18 | End: 2021-09-18

## 2021-09-18 RX ADMIN — POTASSIUM CHLORIDE 20 MEQ: 1500 TABLET, EXTENDED RELEASE ORAL at 11:21

## 2021-09-18 RX ADMIN — FUROSEMIDE 80 MG: 40 INJECTION, SOLUTION INTRAMUSCULAR; INTRAVENOUS at 11:20

## 2021-09-18 RX ADMIN — ENOXAPARIN SODIUM 40 MG: 40 INJECTION SUBCUTANEOUS at 20:51

## 2021-09-18 RX ADMIN — ENOXAPARIN SODIUM 40 MG: 40 INJECTION SUBCUTANEOUS at 11:20

## 2021-09-18 ASSESSMENT — PAIN SCALES - GENERAL
PAINLEVEL_OUTOF10: 0

## 2021-09-18 ASSESSMENT — ENCOUNTER SYMPTOMS
COLOR CHANGE: 0
VOMITING: 0
ABDOMINAL PAIN: 0
SORE THROAT: 0
WHEEZING: 0
COUGH: 0
PHOTOPHOBIA: 0
BACK PAIN: 0
TROUBLE SWALLOWING: 0
NAUSEA: 0
CHEST TIGHTNESS: 0

## 2021-09-18 NOTE — PROGRESS NOTES
[address] [place] [address]  Occupational Therapy  Daily Note  Time:   Time In: 1488  Time Out: 4413  Timed Code Treatment Minutes: 25 Minutes  Minutes: 25          Date: 2021  Patient Name: Renetta Turcios,   Gender: male      Room: [facility]22/022-A  MRN: [id number]  : 1975  (48 y.o.)  Referring Practitioner: KATHARINA Latham CNP  Diagnosis: hypoxia  Additional Pertinent Hx: Per H&P: Pt presents with SOB, productive cough, fever, chills, nasal congestion, loss of smell, loss of taste, and malaise starting 2021. Pt diagnosed with Acute Hypoxic Respiratory Failure 2/ COVID-19 PNA with suspected superimposed CAP. Pt transferred to [address] [place] [address] on  (failed HFNC), intubated, and started on ECMO. Pt decannulated on 9/10/21 and extubated 21. Restrictions/Precautions:  Restrictions/Precautions: General Precautions, Fall Risk  Position Activity Restriction  Other position/activity restrictions: Monitor O2 sats     SUBJECTIVE: Pt seated in bedside chair upon arrival, agreeable to OT session. PAIN: Denies. Vitals: Oxygen: Patient on O2 via Room Air  upon arrival to room. Patient O2 sats at >90%. Upon activity patient dropping O2 at 88-89%. Pt requiring min rest break(s) to recover. Heart Rate: 110-120's    COGNITION: Slow Processing, Decreased Insight, Decreased Problem Solving and Decreased Safety Awareness    ADL:   No ADL's completed this session. Pt declined. BALANCE:  Sitting Balance:  Supervision. Bedside Chair. Standing Balance: Stand By Assistance, [employer]. x1 minute in prep for mobility, x2 episodes for standing rest break    BED MOBILITY:  Not Tested    TRANSFERS:  Sit to Stand:  Contact Guard Assistance. Stand to Sit: Contact Guard Assistance. Comment: To/from bedside chair. FUNCTIONAL MOBILITY:  Assistive Device: Rolling Walker  Assist Level:  Contact Guard Assistance.    Distance:   Completed functional mobility within unit hallway at slow pace, no LOB noted. Pt requires x2 standing rest breaks and lengthy seated rest break after trial of mobility, mod fatigue noted- RN monitored HR on tele monitor throughout. ADDITIONAL ACTIVITIES:  Patient identified a personal goal to increase UB strength and improve overall endurance so they can complete their toilet & shower transfers; skilled edu on UE strengthening. Completed BUE AROM exercises x10 reps x1 set in all joints/planes to increase strength and endurance required for ADLs. Pt required min rest break between each exercise and min v/c for proper technique. Pt identified personal goal of increasing B hand functional use for specified meaningful activities such as opening pop bottles/jars for cooking and self managing buttons. Pt engaged in green theraball exercises x10 reps x1 set  to increase intrinsic/extrinsic hand musculature. ASSESSMENT:     Activity Tolerance:  Patient tolerance of  treatment: fair. Discharge Recommendations: IP Rehab, Continue to assess pending progress   Equipment Recommendations: Other: will continue to monitor pending progress  Plan: Times per week: 6x  Current Treatment Recommendations: Strengthening, Functional Mobility Training, Endurance Training, Patient/Caregiver Education & Training, Equipment Evaluation, Education, & procurement, Self-Care / ADL, Home Management Training, Balance Training, ROM, Neuromuscular Re-education, Positioning    Patient Education  Patient Education: Energy Conservation, Home Exercise Program, Importance of Increasing Activity and Safety with transfers and mobility. Goals  Short term goals  Time Frame for Short term goals: by discharge  Short term goal 1: Pt will complete feeding/grooming tasks using R dominant hand SBA and use of adaptive strategies/devices prn to increase independence with self care tasks.   Short term goal 2: Pt will navigate to/from BR CGA to complete tolieting routine for increased indep with ADL routine  Short term goal 3: Pt will tolerate dynamic standing >5 minutes CGA at sink side for grooming tasks for increase endurance for ADL participation. Edmundo Her term goal 4: Pt will complete BUE AROM HEP with min cues for technique to increase strength in UE for ADL tasks. Following session, patient left in safe position with all fall risk precautions in place.

## 2021-09-18 NOTE — PROGRESS NOTES
CRITICAL CARE PROGRESS NOTE      Patient:  Renetta Turcios    Unit/Bed:3B-22/022-A  YOB: 1975  MRN: [id number]   PCP: Vitaliy Gann MD  Date of Admission: 8/30/2021  Chief Complaint:- COVID 19    Assessment and Plan:      1. Physical deconditioning: Status post ECMO and COVID-19. Patient continues with PT OT. Plans for inpatient rehab.  2. COVID-19 pneumonia: Status post ECMO. Patient was decannulated 9/10/2021. Now on room air  3. Bacterial pneumonia: H influenza. Status post Levaquin 9/14/2021.  4. Hypokalemia: Placement protocol  5. Leukopenia: White count 4.0. Monitor. IgG and IgM normal, IgA was slightly reduced. 6. Macrocytic anemia: H/H 10.6/33.9, monitor, no active signs of bleeding. 7. Obesity: known       INITIAL H AND P AND ICU COURSE:  Renetta Turcios is a 38-year-old white male who presented to [facility] on 8/30/2021 with complaints of shortness of breath. He has a past medical history of lifetime non-smoker, obesity, but has no other medical history. He is a farmer by occupation. Per report he had presented to the emergency department after developing fever with cough chills and shortness of breath. He also states that he lost his taste and smell on 8/28/2021. He continued to progress with shortness of breath which worsened. He developed malaise. He did state that he and his wife had recently been diagnosed with Covid. He did undergo Covid testing which was positive. CT scan chest showed bilateral infiltrates. He initially was placed on low-flow oxygen but continued to progress to high flow. He was given remdesivir and Decadron. And subsequently he did receive tocilizumab. He continued to develop dyspnea and \"happy hypoxemia\" as he continued to worsen he did elect to be placed on the ventilator and the initiation of ECMO. On 9/1/2021 patient was cannulated and intubated. He initially underwent bilateral groin catheterization.   He was later recannulated in the neck. He was decannulated on 9/10/2021. He did develop bacterial pneumonia with H influenza. He was started on Rocephin. He was transferred to [address] [place] [address] on 9/7/2021. Nebulized tobramycin was added on 9/5/2021 for 10 doses. Levaquin was completed on 9/14/2021. Patient was extubated on 9/13/2021. Past Medical History: Per HPI  Family History: No known family history  Social History: Lifetime non-smoker, social alcohol use, denies drug use. Review of Systems   Constitutional: Negative for fatigue and fever. HENT: Negative for sore throat and trouble swallowing. Eyes: Negative for photophobia and visual disturbance. Respiratory: Negative for cough, chest tightness and wheezing. Cardiovascular: Negative for chest pain and leg swelling. Gastrointestinal: Negative for abdominal pain, nausea and vomiting. Endocrine: Negative for polydipsia and polyphagia. Genitourinary: Negative for decreased urine volume and flank pain. Musculoskeletal: Negative for back pain and neck pain. Skin: Negative for color change, pallor and rash. Allergic/Immunologic: Negative for food allergies. Neurological: Negative for dizziness, weakness and numbness. Hematological: Negative for adenopathy. Psychiatric/Behavioral: Positive for confusion (Intermittent confusion). Negative for agitation. The patient is not nervous/anxious. Scheduled Meds:   potassium chloride  20 mEq Oral Daily with breakfast    enoxaparin  40 mg SubCUTAneous BID     Continuous Infusions:   dextrose         PHYSICAL EXAMINATION:  T:  98.3.  P:  106. RR:  18. B/P:  120/82. FiO2:  0. O2 Sat:  95.  I/O:  670/600  Body mass index is 36.29 kg/m². GCS: 15  General: Acute on chronically ill-appearing white male  HEENT:  normocephalic and atraumatic. No scleral icterus. PERR  Neck: supple. No Thyromegaly. Lungs: clear to auscultation. No retractions  Cardiac: RRR. No JVD. Abdomen: soft. Nontender.   Extremities:  No clubbing, cyanosis,. Trace lower extremity edema bilaterally  Vasculature: capillary refill < 3 seconds. Palpable dorsalis pedis pulses. Skin:  warm and dry. Psych:  Alert and oriented x3. Affect appropriate  Lymph:  No supraclavicular adenopathy. Neurologic:  No focal deficit. No seizures. Data: (All radiographs, tracings, PFTs, and imaging are personally viewed and interpreted unless otherwise noted).  Sodium 141, potassium 3.3, chloride 98, CO2 25, BUN 29, creatinine 0.9, anion gap 18.0, glucose 118.  WBC 4.0, hemoglobin 10.6, hematocrit 33.9, platelet count 328.  Telemetry shows normal sinus rhythm   Chest x-ray 9/17/2021 reports similar appearance of the lungs. Stable nodule. Meets Continued ICU Level Care Criteria:    [x] Yes   [] No - Transfer Planned to listed location:  [] HOSPITALIST CONTACTED-      Case and plan discussed with Dr. Mustapha Morales. Electronically signed by Dinesh Mondragon.  KATHARINA Martel - CNP  CRITICAL CARE SPECIALIST

## 2021-09-19 LAB
ANION GAP SERPL CALCULATED.3IONS-SCNC: 13 MEQ/L (ref 8–16)
BASOPHILS # BLD: 1.5 %
BASOPHILS ABSOLUTE: 0.1 THOU/MM3 (ref 0–0.1)
BUN BLDV-MCNC: 27 MG/DL (ref 7–22)
CALCIUM SERPL-MCNC: 9.5 MG/DL (ref 8.5–10.5)
CHLORIDE BLD-SCNC: 100 MEQ/L (ref 98–111)
CO2: 28 MEQ/L (ref 23–33)
CREAT SERPL-MCNC: 0.9 MG/DL (ref 0.4–1.2)
EOSINOPHIL # BLD: 3.5 %
EOSINOPHILS ABSOLUTE: 0.2 THOU/MM3 (ref 0–0.4)
ERYTHROCYTE [DISTWIDTH] IN BLOOD BY AUTOMATED COUNT: 17 % (ref 11.5–14.5)
ERYTHROCYTE [DISTWIDTH] IN BLOOD BY AUTOMATED COUNT: 58.7 FL (ref 35–45)
GFR SERPL CREATININE-BSD FRML MDRD: > 90 ML/MIN/1.73M2
GLUCOSE BLD-MCNC: 74 MG/DL (ref 70–108)
HCT VFR BLD CALC: 38.8 % (ref 42–52)
HEMOGLOBIN: 12.4 GM/DL (ref 14–18)
IMMATURE GRANS (ABS): 0.06 THOU/MM3 (ref 0–0.07)
IMMATURE GRANULOCYTES: 1.3 %
LYMPHOCYTES # BLD: 30.3 %
LYMPHOCYTES ABSOLUTE: 1.4 THOU/MM3 (ref 1–4.8)
MCH RBC QN AUTO: 31 PG (ref 26–33)
MCHC RBC AUTO-ENTMCNC: 32 GM/DL (ref 32.2–35.5)
MCV RBC AUTO: 97 FL (ref 80–94)
MONOCYTES # BLD: 13.5 %
MONOCYTES ABSOLUTE: 0.6 THOU/MM3 (ref 0.4–1.3)
NUCLEATED RED BLOOD CELLS: 0 /100 WBC
PLATELET # BLD: 193 THOU/MM3 (ref 130–400)
PMV BLD AUTO: 9.4 FL (ref 9.4–12.4)
POTASSIUM SERPL-SCNC: 3.3 MEQ/L (ref 3.5–5.2)
RBC # BLD: 4 MILL/MM3 (ref 4.7–6.1)
SEG NEUTROPHILS: 49.9 %
SEGMENTED NEUTROPHILS ABSOLUTE COUNT: 2.3 THOU/MM3 (ref 1.8–7.7)
SODIUM BLD-SCNC: 141 MEQ/L (ref 135–145)
WBC # BLD: 4.6 THOU/MM3 (ref 4.8–10.8)

## 2021-09-19 PROCEDURE — 85025 COMPLETE CBC W/AUTO DIFF WBC: CPT

## 2021-09-19 PROCEDURE — 6370000000 HC RX 637 (ALT 250 FOR IP): Performed by: NURSE PRACTITIONER

## 2021-09-19 PROCEDURE — 99232 SBSQ HOSP IP/OBS MODERATE 35: CPT | Performed by: NURSE PRACTITIONER

## 2021-09-19 PROCEDURE — 6360000002 HC RX W HCPCS: Performed by: NURSE PRACTITIONER

## 2021-09-19 PROCEDURE — 6370000000 HC RX 637 (ALT 250 FOR IP): Performed by: INTERNAL MEDICINE

## 2021-09-19 PROCEDURE — 2140000000 HC CCU INTERMEDIATE R&B

## 2021-09-19 PROCEDURE — 36415 COLL VENOUS BLD VENIPUNCTURE: CPT

## 2021-09-19 PROCEDURE — 80048 BASIC METABOLIC PNL TOTAL CA: CPT

## 2021-09-19 RX ORDER — POTASSIUM CHLORIDE 20 MEQ/1
40 TABLET, EXTENDED RELEASE ORAL
Status: DISCONTINUED | OUTPATIENT
Start: 2021-09-20 | End: 2021-09-20 | Stop reason: HOSPADM

## 2021-09-19 RX ADMIN — ENOXAPARIN SODIUM 40 MG: 40 INJECTION SUBCUTANEOUS at 09:37

## 2021-09-19 RX ADMIN — METOPROLOL TARTRATE 25 MG: 25 TABLET, FILM COATED ORAL at 11:03

## 2021-09-19 RX ADMIN — METOPROLOL TARTRATE 25 MG: 25 TABLET, FILM COATED ORAL at 20:15

## 2021-09-19 RX ADMIN — POTASSIUM CHLORIDE 20 MEQ: 1500 TABLET, EXTENDED RELEASE ORAL at 09:37

## 2021-09-19 RX ADMIN — ENOXAPARIN SODIUM 40 MG: 40 INJECTION SUBCUTANEOUS at 20:15

## 2021-09-19 ASSESSMENT — ENCOUNTER SYMPTOMS
WHEEZING: 0
VOMITING: 0
COLOR CHANGE: 0
SORE THROAT: 0
TROUBLE SWALLOWING: 0
CHEST TIGHTNESS: 0
ABDOMINAL PAIN: 0
NAUSEA: 0
BACK PAIN: 0
PHOTOPHOBIA: 0
SHORTNESS OF BREATH: 0

## 2021-09-19 ASSESSMENT — PAIN SCALES - GENERAL: PAINLEVEL_OUTOF10: 0

## 2021-09-19 NOTE — PROGRESS NOTES
Pt right previous central line insertion site and right IJ site cleansed with surgical soap - foam patch w/ tegaderm applied to central line site - guaze and tegaderm applied to right IJ site.

## 2021-09-19 NOTE — PROGRESS NOTES
CRITICAL CARE PROGRESS NOTE      Patient:  Arielle Houston    Unit/Bed:3B-22/022-A  YOB: 1975  MRN: [id number]   PCP: Paul Benjamin MD  Date of Admission: 8/30/2021  Chief Complaint:- COVID 19    Assessment and Plan:      1. Physical deconditioning: Status post ECMO and COVID-19. Patient continues with PT OT. Plans for inpatient rehab.  2. COVID-19 pneumonia: Status post ECMO. Patient was decannulated 9/10/2021. Now on room air  3. Hypertension: Patient continues to have mild hypertension and tachycardia. Initiate Lopressor 25 mg twice daily 9/19.  4. Anxiety: Patient states that he has had mild anxiety. I did speak with wife privately. At this time not initiate intervention. I did update wife that if she felt patient low-dose medication could prescribe. Monitor. 5. Bacterial pneumonia: H influenza. Status post Levaquin 9/14/2021. 6. Hypokalemia: Placement protocol  7. Leukopenia: White count 4.6. Monitor. IgG and IgM normal, IgA was slightly reduced. 8. Macrocytic anemia: H/H 12.4/38.8, monitor, no active signs of bleeding. 9. Obesity: known       INITIAL H AND P AND ICU COURSE:  Arielle Houston is a 49-year-old white male who presented to [facility] on 8/30/2021 with complaints of shortness of breath. He has a past medical history of lifetime non-smoker, obesity, but has no other medical history. He is a farmer by occupation. Per report he had presented to the emergency department after developing fever with cough chills and shortness of breath. He also states that he lost his taste and smell on 8/28/2021. He continued to progress with shortness of breath which worsened. He developed malaise. He did state that he and his wife had recently been diagnosed with Covid. He did undergo Covid testing which was positive. CT scan chest showed bilateral infiltrates. He initially was placed on low-flow oxygen but continued to progress to high flow.   He was given remdesivir and Decadron. And subsequently he did receive tocilizumab. He continued to develop dyspnea and \"happy hypoxemia\" as he continued to worsen he did elect to be placed on the ventilator and the initiation of ECMO. On 9/1/2021 patient was cannulated and intubated. He initially underwent bilateral groin catheterization. He was later recannulated in the neck. He was decannulated on 9/10/2021. He did develop bacterial pneumonia with H influenza. He was started on Rocephin. He was transferred to [address] [place] [address] on 9/7/2021. Nebulized tobramycin was added on 9/5/2021 for 10 doses. Levaquin was completed on 9/14/2021. Patient was extubated on 9/13/2021.     Past Medical History: Per HPI  Family History: No known family history  Social History: Lifetime non-smoker, social alcohol use, denies drug use. Review of Systems   Constitutional: Negative for fever and unexpected weight change. HENT: Negative for sore throat and trouble swallowing. Eyes: Negative for photophobia and visual disturbance. Respiratory: Negative for chest tightness, shortness of breath and wheezing. Cardiovascular: Negative for chest pain and leg swelling. Gastrointestinal: Negative for abdominal pain, nausea and vomiting. Endocrine: Negative for polydipsia and polyphagia. Genitourinary: Negative for decreased urine volume and flank pain. Musculoskeletal: Negative for back pain and neck pain. Skin: Negative for color change, pallor and rash. Allergic/Immunologic: Negative for food allergies. Neurological: Negative for dizziness, weakness and numbness. Hematological: Negative for adenopathy. Psychiatric/Behavioral: Negative for agitation and confusion. The patient is nervous/anxious (mild).         Scheduled Meds:   metoprolol tartrate  25 mg Oral BID    [START ON 9/20/2021] potassium chloride  40 mEq Oral Daily with breakfast    enoxaparin  40 mg SubCUTAneous BID     Continuous Infusions:   dextrose         PHYSICAL EXAMINATION:  T:  98.3.  P:  101. RR: 18 B/P:  131/82. FiO2:  0. O2 Sat:  94.  I/O:  275/1125  Body mass index is 36 kg/m². GCS:  15  General: Acute on chronically ill-appearing white male  HEENT:  normocephalic and atraumatic. No scleral icterus. PERR  Neck: supple. No Thyromegaly. Lungs: clear to auscultation. No retractions  Cardiac: RRR. No JVD. Abdomen: soft. Nontender. Extremities:  No clubbing, cyanosis, or edema x 4. Vasculature: capillary refill < 3 seconds. Palpable dorsalis pedis pulses. Skin:  warm and dry. Psych:  Alert and oriented x3. Affect appropriate  Lymph:  No supraclavicular adenopathy. Neurologic:  No focal deficit. No seizures. Data: (All radiographs, tracings, PFTs, and imaging are personally viewed and interpreted unless otherwise noted).  Sodium 141, potassium 3.3, chloride 100, CO2 28, BUN 27, creatinine 0.9, anion gap 13.0, glucose 74   WBC 4.6, hemoglobin 12.4, hematocrit 38.2, platelet count 1 9   Telemetry shows NSR    Chest x-ray 9/17/2021 reports similar appearance of the lungs. Stable nodule. Meets Continued ICU Level Care Criteria:    [x] Yes   [] No - Transfer Planned to listed location:  [] HOSPITALIST CONTACTED-      Case and plan discussed with Dr. Mary Kay Godfrey. Electronically signed by Laura Colorado.  KATHARINA Hawk - CNP  CRITICAL CARE SPECIALIST

## 2021-09-20 ENCOUNTER — HOSPITAL ENCOUNTER (INPATIENT)
Age: 46
LOS: 4 days | Discharge: HOME | DRG: 093 | End: 2021-09-24
Attending: PHYSICAL MEDICINE & REHABILITATION | Admitting: PHYSICAL MEDICINE & REHABILITATION
Payer: COMMERCIAL

## 2021-09-20 VITALS
HEART RATE: 102 BPM | DIASTOLIC BLOOD PRESSURE: 81 MMHG | TEMPERATURE: 98.2 F | BODY MASS INDEX: 35.92 KG/M2 | OXYGEN SATURATION: 94 % | WEIGHT: 250.9 LBS | HEIGHT: 70 IN | SYSTOLIC BLOOD PRESSURE: 120 MMHG | RESPIRATION RATE: 17 BRPM

## 2021-09-20 DIAGNOSIS — G72.81 CRITICAL ILLNESS MYOPATHY: ICD-10-CM

## 2021-09-20 DIAGNOSIS — E87.6 HYPOKALEMIA: ICD-10-CM

## 2021-09-20 DIAGNOSIS — U07.1 COVID-19: Primary | ICD-10-CM

## 2021-09-20 LAB
ANION GAP SERPL CALCULATED.3IONS-SCNC: 11 MEQ/L (ref 8–16)
BASOPHILS # BLD: 1.7 %
BASOPHILS ABSOLUTE: 0.1 THOU/MM3 (ref 0–0.1)
BUN BLDV-MCNC: 23 MG/DL (ref 7–22)
CALCIUM SERPL-MCNC: 9.1 MG/DL (ref 8.5–10.5)
CHLORIDE BLD-SCNC: 101 MEQ/L (ref 98–111)
CO2: 27 MEQ/L (ref 23–33)
CREAT SERPL-MCNC: 0.8 MG/DL (ref 0.4–1.2)
EOSINOPHIL # BLD: 4.8 %
EOSINOPHILS ABSOLUTE: 0.2 THOU/MM3 (ref 0–0.4)
ERYTHROCYTE [DISTWIDTH] IN BLOOD BY AUTOMATED COUNT: 16.9 % (ref 11.5–14.5)
ERYTHROCYTE [DISTWIDTH] IN BLOOD BY AUTOMATED COUNT: 60.6 FL (ref 35–45)
GFR SERPL CREATININE-BSD FRML MDRD: > 90 ML/MIN/1.73M2
GLUCOSE BLD-MCNC: 101 MG/DL (ref 70–108)
HCT VFR BLD CALC: 38.3 % (ref 42–52)
HEMOGLOBIN: 11.8 GM/DL (ref 14–18)
IMMATURE GRANS (ABS): 0.04 THOU/MM3 (ref 0–0.07)
IMMATURE GRANULOCYTES: 1 %
LYMPHOCYTES # BLD: 31.3 %
LYMPHOCYTES ABSOLUTE: 1.3 THOU/MM3 (ref 1–4.8)
MCH RBC QN AUTO: 30.5 PG (ref 26–33)
MCHC RBC AUTO-ENTMCNC: 30.8 GM/DL (ref 32.2–35.5)
MCV RBC AUTO: 99 FL (ref 80–94)
MONOCYTES # BLD: 13.8 %
MONOCYTES ABSOLUTE: 0.6 THOU/MM3 (ref 0.4–1.3)
NUCLEATED RED BLOOD CELLS: 0 /100 WBC
PLATELET # BLD: 199 THOU/MM3 (ref 130–400)
PMV BLD AUTO: 9.6 FL (ref 9.4–12.4)
POTASSIUM SERPL-SCNC: 3.4 MEQ/L (ref 3.5–5.2)
RBC # BLD: 3.87 MILL/MM3 (ref 4.7–6.1)
SEG NEUTROPHILS: 47.4 %
SEGMENTED NEUTROPHILS ABSOLUTE COUNT: 2 THOU/MM3 (ref 1.8–7.7)
SODIUM BLD-SCNC: 139 MEQ/L (ref 135–145)
WBC # BLD: 4.2 THOU/MM3 (ref 4.8–10.8)

## 2021-09-20 PROCEDURE — 97530 THERAPEUTIC ACTIVITIES: CPT

## 2021-09-20 PROCEDURE — 97110 THERAPEUTIC EXERCISES: CPT

## 2021-09-20 PROCEDURE — 85025 COMPLETE CBC W/AUTO DIFF WBC: CPT

## 2021-09-20 PROCEDURE — 6360000002 HC RX W HCPCS: Performed by: NURSE PRACTITIONER

## 2021-09-20 PROCEDURE — 6370000000 HC RX 637 (ALT 250 FOR IP): Performed by: NURSE PRACTITIONER

## 2021-09-20 PROCEDURE — 97129 THER IVNTJ 1ST 15 MIN: CPT

## 2021-09-20 PROCEDURE — 36415 COLL VENOUS BLD VENIPUNCTURE: CPT

## 2021-09-20 PROCEDURE — 99231 SBSQ HOSP IP/OBS SF/LOW 25: CPT | Performed by: NURSE PRACTITIONER

## 2021-09-20 PROCEDURE — 99232 SBSQ HOSP IP/OBS MODERATE 35: CPT | Performed by: INTERNAL MEDICINE

## 2021-09-20 PROCEDURE — 92526 ORAL FUNCTION THERAPY: CPT

## 2021-09-20 PROCEDURE — 6370000000 HC RX 637 (ALT 250 FOR IP): Performed by: INTERNAL MEDICINE

## 2021-09-20 PROCEDURE — APPSS180 APP SPLIT SHARED TIME > 60 MINUTES: Performed by: NURSE PRACTITIONER

## 2021-09-20 PROCEDURE — 1180000000 HC REHAB R&B

## 2021-09-20 PROCEDURE — 97116 GAIT TRAINING THERAPY: CPT

## 2021-09-20 PROCEDURE — 97535 SELF CARE MNGMENT TRAINING: CPT

## 2021-09-20 PROCEDURE — 80048 BASIC METABOLIC PNL TOTAL CA: CPT

## 2021-09-20 RX ORDER — POLYETHYLENE GLYCOL 3350 17 G/17G
17 POWDER, FOR SOLUTION ORAL DAILY PRN
Status: CANCELLED | OUTPATIENT
Start: 2021-09-20

## 2021-09-20 RX ORDER — POTASSIUM CHLORIDE 7.45 MG/ML
10 INJECTION INTRAVENOUS PRN
Status: DISCONTINUED | OUTPATIENT
Start: 2021-09-20 | End: 2021-09-24 | Stop reason: HOSPADM

## 2021-09-20 RX ORDER — SODIUM CHLORIDE 0.9 % (FLUSH) 0.9 %
5-40 SYRINGE (ML) INJECTION PRN
Status: DISCONTINUED | OUTPATIENT
Start: 2021-09-20 | End: 2021-09-24 | Stop reason: HOSPADM

## 2021-09-20 RX ORDER — ALBUTEROL SULFATE 2.5 MG/3ML
2.5 SOLUTION RESPIRATORY (INHALATION) EVERY 4 HOURS PRN
Status: DISCONTINUED | OUTPATIENT
Start: 2021-09-20 | End: 2021-09-24 | Stop reason: HOSPADM

## 2021-09-20 RX ORDER — ONDANSETRON 4 MG/1
4 TABLET, ORALLY DISINTEGRATING ORAL EVERY 8 HOURS PRN
Status: DISCONTINUED | OUTPATIENT
Start: 2021-09-20 | End: 2021-09-24 | Stop reason: HOSPADM

## 2021-09-20 RX ORDER — ONDANSETRON 4 MG/1
4 TABLET, ORALLY DISINTEGRATING ORAL EVERY 8 HOURS PRN
Status: CANCELLED | OUTPATIENT
Start: 2021-09-20

## 2021-09-20 RX ORDER — MINERAL OIL AND WHITE PETROLATUM 150; 830 MG/G; MG/G
OINTMENT OPHTHALMIC PRN
Status: DISCONTINUED | OUTPATIENT
Start: 2021-09-20 | End: 2021-09-24 | Stop reason: HOSPADM

## 2021-09-20 RX ORDER — BISACODYL 10 MG
10 SUPPOSITORY, RECTAL RECTAL DAILY PRN
Status: DISCONTINUED | OUTPATIENT
Start: 2021-09-20 | End: 2021-09-24 | Stop reason: HOSPADM

## 2021-09-20 RX ORDER — BISACODYL 10 MG
10 SUPPOSITORY, RECTAL RECTAL DAILY PRN
Status: CANCELLED | OUTPATIENT
Start: 2021-09-20

## 2021-09-20 RX ORDER — POTASSIUM CHLORIDE 20 MEQ/1
40 TABLET, EXTENDED RELEASE ORAL PRN
Status: DISCONTINUED | OUTPATIENT
Start: 2021-09-20 | End: 2021-09-20 | Stop reason: HOSPADM

## 2021-09-20 RX ORDER — POTASSIUM CHLORIDE 20 MEQ/1
40 TABLET, EXTENDED RELEASE ORAL PRN
Status: CANCELLED | OUTPATIENT
Start: 2021-09-20

## 2021-09-20 RX ORDER — DEXTROSE MONOHYDRATE 50 MG/ML
100 INJECTION, SOLUTION INTRAVENOUS PRN
Status: DISCONTINUED | OUTPATIENT
Start: 2021-09-20 | End: 2021-09-24 | Stop reason: HOSPADM

## 2021-09-20 RX ORDER — BISACODYL 10 MG
10 SUPPOSITORY, RECTAL RECTAL DAILY PRN
Qty: 1 SUPPOSITORY | Refills: 0 | Status: ON HOLD
Start: 2021-09-20 | End: 2021-09-23 | Stop reason: HOSPADM

## 2021-09-20 RX ORDER — DEXTROSE MONOHYDRATE 50 MG/ML
100 INJECTION, SOLUTION INTRAVENOUS PRN
Status: CANCELLED | OUTPATIENT
Start: 2021-09-20

## 2021-09-20 RX ORDER — MINERAL OIL AND WHITE PETROLATUM 150; 830 MG/G; MG/G
OINTMENT OPHTHALMIC PRN
Qty: 1 EACH | Refills: 0 | Status: ON HOLD
Start: 2021-09-20 | End: 2021-09-24 | Stop reason: HOSPADM

## 2021-09-20 RX ORDER — MAGNESIUM SULFATE IN WATER 40 MG/ML
2000 INJECTION, SOLUTION INTRAVENOUS PRN
Status: DISCONTINUED | OUTPATIENT
Start: 2021-09-20 | End: 2021-09-24 | Stop reason: HOSPADM

## 2021-09-20 RX ORDER — POTASSIUM CHLORIDE 20 MEQ/1
40 TABLET, EXTENDED RELEASE ORAL
Qty: 60 TABLET | Refills: 3 | Status: ON HOLD | OUTPATIENT
Start: 2021-09-21 | End: 2021-09-24 | Stop reason: SDUPTHER

## 2021-09-20 RX ORDER — POLYETHYLENE GLYCOL 3350 17 G/17G
17 POWDER, FOR SOLUTION ORAL DAILY PRN
Qty: 527 G | Refills: 1 | Status: ON HOLD | OUTPATIENT
Start: 2021-09-20 | End: 2021-09-24 | Stop reason: HOSPADM

## 2021-09-20 RX ORDER — MINERAL OIL AND WHITE PETROLATUM 150; 830 MG/G; MG/G
OINTMENT OPHTHALMIC PRN
Status: CANCELLED | OUTPATIENT
Start: 2021-09-20

## 2021-09-20 RX ORDER — ACETAMINOPHEN 325 MG/1
650 TABLET ORAL EVERY 4 HOURS PRN
Status: CANCELLED | OUTPATIENT
Start: 2021-09-20

## 2021-09-20 RX ORDER — MAGNESIUM SULFATE IN WATER 40 MG/ML
2000 INJECTION, SOLUTION INTRAVENOUS PRN
Status: CANCELLED | OUTPATIENT
Start: 2021-09-20

## 2021-09-20 RX ORDER — DEXTROSE MONOHYDRATE 25 G/50ML
12.5 INJECTION, SOLUTION INTRAVENOUS PRN
Status: CANCELLED | OUTPATIENT
Start: 2021-09-20

## 2021-09-20 RX ORDER — ACETAMINOPHEN 325 MG/1
650 TABLET ORAL EVERY 4 HOURS PRN
Status: DISCONTINUED | OUTPATIENT
Start: 2021-09-20 | End: 2021-09-24 | Stop reason: HOSPADM

## 2021-09-20 RX ORDER — POTASSIUM CHLORIDE 7.45 MG/ML
10 INJECTION INTRAVENOUS PRN
Status: CANCELLED | OUTPATIENT
Start: 2021-09-20

## 2021-09-20 RX ORDER — ALBUTEROL SULFATE 2.5 MG/3ML
2.5 SOLUTION RESPIRATORY (INHALATION) EVERY 4 HOURS PRN
Status: CANCELLED | OUTPATIENT
Start: 2021-09-20

## 2021-09-20 RX ORDER — POTASSIUM CHLORIDE 20 MEQ/1
40 TABLET, EXTENDED RELEASE ORAL
Status: CANCELLED | OUTPATIENT
Start: 2021-09-21

## 2021-09-20 RX ORDER — POTASSIUM CHLORIDE 20 MEQ/1
40 TABLET, EXTENDED RELEASE ORAL
Status: DISCONTINUED | OUTPATIENT
Start: 2021-09-21 | End: 2021-09-24 | Stop reason: HOSPADM

## 2021-09-20 RX ORDER — POLYETHYLENE GLYCOL 3350 17 G/17G
17 POWDER, FOR SOLUTION ORAL DAILY PRN
Status: DISCONTINUED | OUTPATIENT
Start: 2021-09-20 | End: 2021-09-24 | Stop reason: HOSPADM

## 2021-09-20 RX ORDER — DEXTROSE MONOHYDRATE 25 G/50ML
12.5 INJECTION, SOLUTION INTRAVENOUS PRN
Status: DISCONTINUED | OUTPATIENT
Start: 2021-09-20 | End: 2021-09-24 | Stop reason: HOSPADM

## 2021-09-20 RX ORDER — POTASSIUM CHLORIDE 7.45 MG/ML
10 INJECTION INTRAVENOUS PRN
Status: DISCONTINUED | OUTPATIENT
Start: 2021-09-20 | End: 2021-09-20 | Stop reason: HOSPADM

## 2021-09-20 RX ORDER — SODIUM CHLORIDE 0.9 % (FLUSH) 0.9 %
5-40 SYRINGE (ML) INJECTION PRN
Status: CANCELLED | OUTPATIENT
Start: 2021-09-20

## 2021-09-20 RX ORDER — POTASSIUM CHLORIDE 20 MEQ/1
40 TABLET, EXTENDED RELEASE ORAL PRN
Status: DISCONTINUED | OUTPATIENT
Start: 2021-09-20 | End: 2021-09-24 | Stop reason: HOSPADM

## 2021-09-20 RX ADMIN — METOPROLOL TARTRATE 25 MG: 25 TABLET, FILM COATED ORAL at 08:15

## 2021-09-20 RX ADMIN — POTASSIUM CHLORIDE 40 MEQ: 1500 TABLET, EXTENDED RELEASE ORAL at 08:15

## 2021-09-20 RX ADMIN — POTASSIUM CHLORIDE 40 MEQ: 1500 TABLET, EXTENDED RELEASE ORAL at 08:16

## 2021-09-20 RX ADMIN — ENOXAPARIN SODIUM 40 MG: 40 INJECTION SUBCUTANEOUS at 08:15

## 2021-09-20 RX ADMIN — METOPROLOL TARTRATE 25 MG: 25 TABLET, FILM COATED ORAL at 21:02

## 2021-09-20 RX ADMIN — ENOXAPARIN SODIUM 40 MG: 40 INJECTION SUBCUTANEOUS at 21:02

## 2021-09-20 ASSESSMENT — ENCOUNTER SYMPTOMS
CHEST TIGHTNESS: 0
SHORTNESS OF BREATH: 0
NAUSEA: 0
PHOTOPHOBIA: 0
COLOR CHANGE: 0
WHEEZING: 0
TROUBLE SWALLOWING: 0
SORE THROAT: 0
VOMITING: 0
BACK PAIN: 0
ABDOMINAL PAIN: 0

## 2021-09-20 ASSESSMENT — PAIN SCALES - GENERAL: PAINLEVEL_OUTOF10: 0

## 2021-09-20 NOTE — PROGRESS NOTES
[facility]  INPATIENT SPEECH THERAPY  STRZ CCU-STEPDOWN 3B  DAILY NOTE    TIME   SLP Individual Minutes  Time In: 5966  Time Out: 7752  Minutes: 26  Timed Code Treatment Minutes: 15 Minutes       Date: 2021  Patient Name: Renetta Turcios      CSN: [id number]   : 1975  (48 y.o.)  Gender: male   Referring Physician: Boston Bailey MD  Diagnosis: Hypoxia   Secondary Diagnosis: Dysphagia; cognitive-linguistic deficits   Precautions: Fall risk, aspiration precautions, Droplet plus isolation   Current Diet: Easy to chew diet + thin liquids  Swallowing Strategies: Sit up, small bites, eat slowly  Date of Last MBS/FEES: Not Applicable    Pain:  No pain reported. Subjective:  Pt was sitting upright in recliner chair upon ST arrival. Pt was alert, pleasant, and cooperative this date. No family was present during this tx session. Short-Term Goals:  SHORT TERM GOAL #1:  Goal 1: Patient will safely consume an easy to chew diet with thin liquids (1:1 assistance) without overt s/s of airway invasion to meet nutritional/hydration measures safely GOAL MET-  No new goal (see goal #2)    SHORT TERM GOAL #2:  Goal 2: Patient will complete advanced dietary analysis with ST only and no overt s/s of aspiration/penetration to demosntrate improved endurance as meals progress to demonstrate readiness for a diet upgrade. GOAL MET 21 - No new goal   INTERVENTIONS: Skilled dietary analysis with advanced PO trials was performed to further assess swallowing function to determine eligibility to upgrade Pt's diet level as well as determine necessity for continuation of skilled dysphagia treatment. Advanced PO trials included: Thin liquid via cup and straw (x10), hard solids (bites of cookie w/ walnuts x/5)    Pt demonstrated successful labial seal, as well as adequate bolus formation/control and A-P movement.  Pt also demonstrated timely swallow across liquid and solid PO trials, effective/timely mastication, and consistent clearance of the oral cavity. Pharyngeal phase of swallow appears WFL, however impairments cannot be ruled out without instrumentation. Recommended diet upgraded to regular diet with thin liquids with no additional skilled ST for dysphagia. Pt d/c as of 9/20/21. Please re-consult if swallow function and/or medical status declines. *Pt in agreement with d/c from dysphagia tx. SHORT TERM GOAL #3:  Goal 3: Patient will complete functional and higher level carryover/working memory tasks (i.e. orientation, recall of phone numbers/messages/directions given, 5 units) with focus on compensatory memory strategies and 70% accuracy, mod cues for improved carryover of newly learned information. INTERVENTIONS:  STM:   Given 4 facts about ST to recall.   -pt immediate requested to write information down without cues/prompts. (immediate recall): 4/4 indep     (delayed recall): 15 minutes: 3/4 indep, 1/4 mod I additional time  *good success  *recommended more complex working memory and delayed recall    Robert Koo 1277 #4:  Goal 4: Patient will complete higher level visuospatial reasoning/executive functioning tasks (i.e. medications, finances, visuospatial copying, etc) with 70% accuracy, mod cues for succesful return to IADL and work responsibilities post discharge. INTERVENTIONS: Goal not addressed d/t focus on other goals this date    SHORT TERM GOAL #5:  Goal 5: Patient will complete mental flexibility/thought organization tasks (i.e. divergent naming, scheduling, time management, organization of multiple variables, flowers comprehension program) with 70% accuracy, mod cues for improved organization of complex material.  INTERVENTIONS:   Time management:  Pt required a complete moderately complex word problems with pictures. -2/6 MOD I via additional time, 2/6 MIN A via verbal cue, 2/6 MOD A via visual, verbal, and repetition cues.   *Poor selective, sustainted, and alternating attention   *Improved success with verbal cues    SHORT TERM GOAL #6:  Goal 6: Patient will complete complex attention tasks (i.e. selective, alternating, divided) with no more than x3-4 errors/redirections within a 2 minute task for successful return to driving post discharge. INTERVENTIONS: Completed education referring to driving safety. Given increased difficulties with attention, generalized weakness, and suspected poor response times, Pt highly encourageed no driving until MD clearance and/or OT assessment of driving. Continue with Pt demonstrating poor retention regarding ST recommendations, stating \"I wouldn't drive to [place] or anything. I would just be driving to my parents house locally\". *reinforcement provided regarding safety and reaction times  *continued education, highly encouraged d/t poor comprehension and concern for decreased carry over of recommendations. Long-Term Goals:    No LT goals recommended d/t anticipated short ELOS. EDUCATION:  Learner: Patient  Education:  Reviewed diet and strategies, Reviewed ST goals and Plan of Care, Reviewed recommendations for follow-up and Education Related to [employer] and Wellness  Evaluation of Education: [employer] understanding    ASSESSMENT/PLAN:  Activity Tolerance:  Patient tolerance of  treatment: good. Pt making good progress towards goals. Assessment/Plan: Patient progressing toward established goals. Continues to require skilled care of licensed speech pathologist to progress toward achievement of established goals and plan of care. .     Plan for Next Session: Cognitive evaluation upon arrival to [facility]. Oris Baumgarten, B.S., Speech Therapy Student  Lauren Gonzalez.  Юлия Aguilera MA., CCC-SLP

## 2021-09-20 NOTE — PROGRESS NOTES
Spo2 maintained with 92-95% entire session   Heart Rate: 112 bpm baseline, 116 bpm following ambulation, 106 bpm at conclusion     OBJECTIVE:  Bed Mobility:  Not Tested    Transfers:  Sit to Stand: Stand By Assistance, Contact Guard Assistance  Stand to Sit:Stand By Assistance, [facility] assistance provided with transfers     Ambulation:  Contact Guard Assistance  Distance: 60' x2  Surface: Level Tile  Device:Rolling Walker  Gait Deviations:  Slow Grady, Decreased Step Length Bilaterally, Decreased Gait Speed and Mild Path Deviations    *Observed SOB with increased challenge and slowed grady with distance completion during ambulation. Education provided on monitoring tolerance to activity and taking rest breaks as needed. One prolonged standing rest break required to complete distance to regain SOB with verbal cues for deep breathing, and to recover B LE fatigue. Prolonged seated rest break following distance required to regain SOB, and LE fatigue. O2 decreased to 92% following ambulation, quickly improved to 95% with seated rest break. Balance:  Static Standing Balance: Stand By Assistance, Contact Guard Assistance  Dynamic Standing Balance: Stand By Assistance, Contact Guard Assistance     Exercise:  Patient was guided in 1 set(s) 10 reps of exercise to both lower extremities. Long arc quads, Standing heel/toe raises, Standing marches, Standing hip abduction, Standing hip extension, Standing hamstring curls and Standing hip flexion. Exercises were completed for increased independence with functional mobility. *Observed increased SOB and shakiness with exercise completion. x2 standing rest breaks and x1 seated rest break to recover SOB and LE fatigue. Increased difficulty with marching, HS curls, and 3 way hip requiring increased time to complete, and pt describing LE feeling heavy d/t weakness. Improved shakiness following seated rest break.    *Spo2 92% following exercise completion, quickly improved to 95% with rest break and cues for deep breathing   Exercises completed with SBA to CGA, with CGA to steady shakiness and unsteadiness with LE fatigue, and to prompt a seated rest break. Functional Outcome Measures: Completed  AM-PAC Inpatient Mobility Raw Score : 17  AM-PAC Inpatient T-Scale Score : 42.13    ASSESSMENT:  Assessment: Patient progressing toward established goals. Activity Tolerance:  Patient tolerance of  treatment: good. Equipment Recommendations: Other: monitor for needs  Discharge Recommendations:  Continue to assess pending progress, IP Rehab, Patient would benefit from continued therapy after discharge    Plan: Times per week: 5x GM  Current Treatment Recommendations: Strengthening, Home Exercise Program, Safety Education & Training, Balance Training, Endurance Training, Functional Mobility Training, Patient/Caregiver Education & Training, Equipment Evaluation, Education, & procurement, Transfer Training, Gait Training    Patient Education  Patient Education: Plan of Care, Transfers, Gait, Verbal Exercise Instruction   *Energy conservation, utilizing rest breaks, deep breathing,     Goals:  Patient goals : get stronger and move around again  Short term goals  Time Frame for Short term goals: at discharge  Short term goal 1: Pt to be SBA for supine <> sit to get in/out of bed  Short term goal 2: Pt to be SBA for sit <> stand to get up to ambulate  Short term goal 3: Pt to ambulate > 60 ft with RW with SBA for household distances  Short term goal 4: Pt to negotiate 4 steps with 2 rails with CGA for preparation to access home  Long term goals  Time Frame for Long term goals : not set due to short ELOS    Following session, patient left in safe position with all fall risk precautions in place.

## 2021-09-20 NOTE — PROGRESS NOTES
1640: Report called to [place] RN on 7E [phone]: patient sent with all belongings and chart to  [id number] in stable condition via wheelchair. Patients family with patient upon transfer.

## 2021-09-20 NOTE — PROGRESS NOTES
Called Jame Zhao review nurse at [facility] to inquire about precert status. Left message requesting she call me back with update.

## 2021-09-20 NOTE — PROGRESS NOTES
[address] [place] [address]  Occupational Therapy  Daily Note  Time:   Time In: 6641  Time Out: 6216  Timed Code Treatment Minutes: 41 Minutes  Minutes: 41          Date: 2021  Patient Name: Kyle Singleton,   Gender: male      Room: -22/022-A  MRN: [id number]  : 1975  (48 y.o.)  Referring Practitioner: KATHARINA Salmno CNP  Diagnosis: hypoxia  Additional Pertinent Hx: Per H&P: Pt presents with SOB, productive cough, fever, chills, nasal congestion, loss of smell, loss of taste, and malaise starting 2021. Pt diagnosed with Acute Hypoxic Respiratory Failure 2/ COVID-19 PNA with suspected superimposed CAP. Pt transferred to [address] [place] [address] on  (failed HFNC), intubated, and started on ECMO. Pt decannulated on 9/10/21 and extubated 21. Restrictions/Precautions:  Restrictions/Precautions: General Precautions, Fall Risk  Position Activity Restriction  Other position/activity restrictions: Monitor O2 sats     SUBJECTIVE: Pt seated in bedside chair upon arrival, agreeable to OT session. PAIN: Denies    Vitals: Oxygen: Patient on O2 via Room Air  upon arrival to room. Patient O2 sats at 94%. Upon activity patient maintaining O2 at >90%. Pt requiring min rest break(s) to recover. Heart Rate: 110-120's    COGNITION: Slow processing and decreased problem solving during ADL routine. ADL:   Grooming: Stand By Assistance. Standing sink side to apply deodorant and washing face. Pt reports he was able to use nail clippers this am for nail care of B hands. Bathing: Stand By Assistance. SBA for balance standing to wash cherie area/bottom with 2 UE release. S for UB care and washing BLE above/below knees while seated- pt self iniated EC strategy to complete task while seated. Upper Extremity Dressing: Supervision. Bishop/donning shirt overhead while seated. Lower Extremity Dressing: Stand By Assistance. SBA to manage clothing over hips in standing.  Supervision threading BLE into undergarment/pants while seated. BALANCE:  Sitting Balance:  Supervision. Standard chair  Standing Balance: Stand By Assistance. x2-3 minutes within ADLs    BED MOBILITY:  Not Tested    TRANSFERS:  Sit to Stand:  Stand By Assistance. Stand to Sit: Stand By Assistance. Comment: To/from various surfaces. FUNCTIONAL MOBILITY:  Assistive Device: Rolling Walker   Assist Level:  Stand By Assistance. Distance:   Completed functional mobility to/from BR and household dstance within unit hallway at slow pace, no LOB noted. Pt requires no cues for walker safety- lenghty seated rest break after trial of mobility, mod fatigue noted. ADDITIONAL ACTIVITIES:  Patient identified a personal goal to increase UB strength and improve overall endurance so they can complete their toilet & shower transfers; skilled edu on UE strengthening. Completed BUE exercises x10 reps x1 set using mod resistance band in all joints/planes to increase strength and endurance required for ADLs. Pt required mod rest break between each exercise and min v/c for proper technique. ASSESSMENT:     Activity Tolerance:  Patient tolerance of  treatment: good. Discharge Recommendations: IP Rehab, Continue to assess pending progress   Equipment Recommendations: Other: will continue to monitor pending progress  Plan: Times per week: 6x  Current Treatment Recommendations: Strengthening, Functional Mobility Training, Endurance Training, Patient/Caregiver Education & Training, Equipment Evaluation, Education, & procurement, Self-Care / ADL, Home Management Training, Balance Training, ROM, Neuromuscular Re-education, Positioning    Patient Education  Patient Education: ADL's, Energy Conservation, Home Exercise Program, Assistive Device Safety and Safety with transfers and mobility.     Goals  Short term goals  Time Frame for Short term goals: by discharge  Short term goal 1: Pt will complete feeding/grooming tasks using R dominant hand SBA and use of adaptive strategies/devices prn to increase independence with self care tasks. Short term goal 2: Pt will navigate to/from BR CGA to complete tolieting routine for increased indep with ADL routine  Short term goal 3: Pt will tolerate dynamic standing >5 minutes CGA at sink side for grooming tasks for increase endurance for ADL participation. Prieto Lagunass term goal 4: Pt will complete BUE AROM HEP with min cues for technique to increase strength in UE for ADL tasks. Following session, patient left in safe position with all fall risk precautions in place.

## 2021-09-20 NOTE — PROGRESS NOTES
Admitted to the Inpatient Rehabilitation Unit via wheelchair. Patient was then oriented to room and unit. Education provided on the rehabilitation routine: three hours of therapy five days per week and dining room for lunch. Explained patients right to have family, representative or physician notified of their admission. Patient has Declined for physician to be notified. Patient has Declined for family/representative to be notified. Admitting medication orders compared with acute stay medications; home medication list reviewed with patient/family. Medication issues identified: No  Medication issue: None  If yes, physician notified Dr Santhosh Vences. Bladder and Bowel Function Assessment:  1. Prior history of bladder problems: no problems with bladder  2. Number of pads used per day: 1  3. Frequency of night time voiding: once  4. Fluid intake volume and pattern: adequate  5. Last BM: 9/19/21  6. Bowel problems (prior or current) No      Incontinence      Frequent diarrhea      No BM in 3 days this stay or history of constipation      Hemorrhoids      Diverticulitis      Bowel Surgery     Two nurse skin assessment performed by Manuel Arreaga RN  And Bryan Mina RN. Weight: 255 lbs      Care plan was created with patient's input and goals were agreed upon. Admission folder provided with education regarding patients diagnoses, fall prevention, skin care, and M in the box. \"Data Collection Information Summary for Patients in Inpatient Rehabilitation Facilities\" and \"Privacy Act Statement - Health Care Records\" provided. Please refer to the admission navigator for further information.

## 2021-09-20 NOTE — PROGRESS NOTES
Physical Medicine & Rehabilitation   Progress Note    Chief Complaint:  AILIN. COVID 19. Rehab needs    Subjective:  Patient seen up in chair. Patient continues to improve with therapies. Patient continues to have goal of IPR in order to return to his life quicker and more independent. Discussed awaiting precert. Denies any questions or concerns at this time. + BM 9/19/21    Rehabilitation:  PT:   Bed Mobility:  Sit to Supine: Stand By Assistance   Transfers:  Sit to Stand: Contact Guard Assistance  Stand to [employer] Assistance  Ambulation:  Contact Guard Assistance  Distance: 80 ft  Surface: Level Tile  Device:Rolling Walker  Gait Deviations:  Slow Evelia, Decreased Step Length Bilaterally, Decreased Gait Speed and improved steadiness noted today, and 2 standing rest breaks needed. Balance:  Static Sitting Balance:  Supervision  Dynamic Sitting Balance: Supervision  Static Standing Balance: Contact Guard Assistance  Dynamic Standing Balance: Contact Guard Assistance  Exercise:  Patient was guided in 1 set(s) 10 reps of exercise to both lower extremities. Ankle pumps, Glut sets, Quad sets, Heelslides, Hip abduction/adduction, Seated marches and Long arc quads. Exercises were completed for increased independence with functional mobility. OT:   COGNITION: Slow Processing, Decreased Insight, Decreased Problem Solving and Decreased Safety Awareness  ADL:   No ADL's completed this session. Pt declined. BALANCE:  Sitting Balance:  Supervision. Bedside Chair. Standing Balance: Stand By Assistance, [employer]. x1 minute in prep for mobility, x2 episodes for standing rest break  TRANSFERS:  Sit to Stand:  Contact Guard Assistance. Stand to Sit: Contact Guard Assistance. Comment: To/from bedside chair. FUNCTIONAL MOBILITY:  Assistive Device: Rolling Walker  Assist Level:  Contact Guard Assistance.    Distance:   Completed functional mobility within unit hallway at slow pace, no LOB noted. Pt requires x2 standing rest breaks and lengthy seated rest break after trial of mobility, mod fatigue noted- RN monitored HR on tele monitor throughout. ADDITIONAL ACTIVITIES:  Patient identified a personal goal to increase UB strength and improve overall endurance so they can complete their toilet & shower transfers; skilled edu on UE strengthening. Completed BUE AROM exercises x10 reps x1 set in all joints/planes to increase strength and endurance required for ADLs. Pt required min rest break between each exercise and min v/c for proper technique. Pt identified personal goal of increasing B hand functional use for specified meaningful activities such as opening pop bottles/jars for cooking and self managing buttons. Pt engaged in green theraball exercises x10 reps x1 set  to increase intrinsic/extrinsic hand musculature. ST: await updated session      Review of Systems:  CONSTITUTIONAL:  positive for decreased endurance  EYES:  negative  HEENT:  negative  RESPIRATORY:  positive for cough  CARDIOVASCULAR:  negative  GASTROINTESTINAL:  positive for dysphagia  GENITOURINARY:  Rosales in place  SKIN:  negative  HEMATOLOGIC/LYMPHATIC:  negative  MUSCULOSKELETAL:  positive for  muscle weakness  NEUROLOGICAL:  positive for coordination problems, gait problems, tremor, dysphagia, weakness and cognitive concerns  BEHAVIOR/PSYCH:  positive for fatigue  System review otherwise negative      Objective:  /69   Pulse 110   Temp 97.8 °F (36.6 °C) (Oral)   Resp 17   Ht 5' 10\" (1.778 m)   Wt 250 lb 14.4 oz (113.8 kg)   SpO2 94%   BMI 36.00 kg/m²   awake  Orientation:   person, place  Mood: within normal limits  Affect: calm  General appearance: Patient is well nourished, well developed, well groomed and in no acute distress     Memory:   Normal with conversation  Attention/Concentration: normal  Language:  abnormal - delayed responses, clear speech.  Some processing difficulty.      Cranial Nerves: Absolute 0.2 0.0 - 0.4 thou/mm3    Basophils Absolute 0.1 0.0 - 0.1 thou/mm3    Immature Grans (Abs) 0.04 0.00 - 0.07 thou/mm3    nRBC 0 /100 wbc   Anion Gap    Collection Time: 09/20/21  3:53 AM   Result Value Ref Range    Anion Gap 11.0 8.0 - 16.0 meq/L   Glomerular Filtration Rate, Estimated    Collection Time: 09/20/21  3:53 AM   Result Value Ref Range    Est, Glom Filt Rate >90 ml/min/1.73m2       Impression:  · Critical illness myopathy  · General muscle atrophy   · COVID 19 infection  · Acute hypoxemic respiratory failure  · ARDS  · Pneumonia: COVID and H. Influenza  · Sepsis  · Thrombocytopenia  · Anemia secondary to blood loss  · Obesity  Body mass index is 41.88 kg/m².     Plan:  · Continue current therapies  · Out of droplet isolation  · Await precert for IPR  · Will continue to follow      Missed Therapy Time:  · None    Velia Bazzi, APRN - CNP

## 2021-09-20 NOTE — PROGRESS NOTES
Precert approved for inpatient rehab. Patient will go to . [facility] Watts and Surya Garcia RN made aware.

## 2021-09-20 NOTE — PROGRESS NOTES
[facility]  Acute Inpatient Rehab Preadmission Assessment    Patient Name: Rosa Maria Alexis        MRN: [id number]    : 1975  (48 y.o.)  Gender: male     Admitted from:[address]  Initial Assessment    Date of admission to the hospital: 2021  3:55 PM  Date patient eligible for admission:2021    Primary Diagnosis: Critical Illness Myopathy    Did patient have surgery? yes - 21 VV ECMO, Cath, SGC    [id number] DUO, Femoral ECMO Decannulation     9-10-21 ECMO Decannulation     Physicians: Srinivas Siegel MD, Dr. Stephen Lara for clinical complications/co-morbidities: History reviewed. No pertinent past medical history. Financial Information  Primary insurance: Commercial: Company: ., Contact Information: . Secondary Insurance:      Has the patient had two or more falls in the past year or any fall with injury in the past year? no    Did the patient have major surgery during the 100 days prior to admission?   yes    Precautions:   falls, infections and skin  Restrictions/Precautions: General Precautions, Fall Risk  Other position/activity restrictions: Monitor O2 sats    Isolation Precautions: None       Physiatrist: Dr. Britni Patel    Patients Occupation: Employed full time  Reviewed Lab and Diagnostic reports from Current Admission: Yes    Patients Prior Functional  Level: Prior Function  ADL Assistance: Independent  Homemaking Assistance: Independent  Ambulation Assistance: Independent  Transfer Assistance: Independent    Current functional status for upper extremity ADLs: contact guard assistance    Current functional status for lower extremity ADLs: contact guard assistance    Current functional status for bed, chair, wheelchair transfers: contact guard assistance    Current functional status for toilet transfers: contact guard assistance    Current functional status for locomotion: Contact Guard Assistance  Distance: 60' x2  Surface: Level Tile  Device:Rolling Walker  Gait Deviations:  Slow Evelia, Decreased Step Length Bilaterally, Decreased Gait Speed and Mild Path Deviations       Current functional status for bladder management: Modified independence    Current functional status for bowel management:Modified independence    Current functional status for comprehension: Modified independence    Current functional status for expression: Modified independence    Current functional status for social interaction: Complete independence    Current functional status for problem solving: Complete independence    Current functional status for memory: Complete independence    Expected level of Improvement in Self-Care:  Complete independence    Expected level of Improvement in Sphincter Control:  Complete independence    Expected level of Improvement in Transfers: Complete independence    Expected level of Improvement in Locomotion:  Complete independence    Expected level of Improvement in Communication and Social Cognition: Complete independence    Expected length of time to achieve that level of improvement: 2 weeks    Current rehab issues: ADL dysfunction,bladder management,bowel management,carry over of therapy techniques, discharge planning, disease and co-morbidity management, gait/mobility dysfunction, medication management, nutrition and hydration management,Ongoing assessment of safety, Pain management, Patient and family education, Prevention of secondary complications, Skin Integrity    Required therapy: Physical Therapy, Occupational Therapy and Speech Therapy 3 hours per day, 5-6 days per week. Recreational Therapy 1 hour per week.     Expected Discharge Destination: Home    Expected Post Discharge Treatments: Out Patient    Other information relevant to the care needs:   Lives With: Spouse (and 3 schoolage children)  Type of Home: House  Home Layout: Two level, Performs ADL's on one level, Able to Live on Main level with bedroom/bathroom  Home Access: Stairs to enter with rails  Entrance Stairs - Number of Steps: 4 steps with 2 rails that are too wide to use at same time  Bathroom Equipment:  (none)  Home Equipment:  (none)  ADL Assistance: Independent  Homemaking Assistance: Independent  Ambulation Assistance: Independent  Transfer Assistance: Independent  Active : Yes  Occupation: Full time employment  Type of occupation: [employer]; also farms    Acute Inpatient Rehabilitation Disclosure Statement provided to patient. Patient verbalized understanding. I have reviewed and concur with the findings and results of the pre-admission screening assessment completed by the Inpatient Rehabilitation Admissions Coordinator.     Gavino Solomon MD

## 2021-09-20 NOTE — PROGRESS NOTES
CRITICAL CARE PROGRESS NOTE      Patient:  Isiah De Souza    Unit/Bed:3B-22/022-A  YOB: 1975  MRN: [id number]   PCP: Shawna Barlow MD  Date of Admission: 8/30/2021  Chief Complaint:- COVID 19    Assessment and Plan:      Physical deconditioning: Status post ECMO and COVID-19. Patient continues with PT OT. Plans for inpatient rehab. COVID-19 pneumonia: Status post ECMO. Patient was decannulated 9/10/2021. Now on room air  Hypertension: Patient continues to have mild hypertension and tachycardia. Initiate Lopressor 25 mg twice daily 9/19. Anxiety: Patient states that he has had mild anxiety. I did speak with wife privately. At this time not initiate intervention. I did update wife that if she felt patient low-dose medication could prescribe. Monitor. Bacterial pneumonia: H influenza. Status post Levaquin 9/14/2021. Hypokalemia: Placement protocol  Leukopenia: White count 4.2. Monitor. IgG and IgM normal, IgA was slightly reduced. Macrocytic anemia: H/H 11.8/38.3, monitor, no active signs of bleeding. Obesity: known         INITIAL H AND P AND ICU COURSE:  Isiah De Souza is a 38-year-old white male who presented to [facility] on 8/30/2021 with complaints of shortness of breath. He has a past medical history of lifetime non-smoker, obesity, but has no other medical history. He is a farmer by occupation. Per report he had presented to the emergency department after developing fever with cough chills and shortness of breath. He also states that he lost his taste and smell on 8/28/2021. He continued to progress with shortness of breath which worsened. He developed malaise. He did state that he and his wife had recently been diagnosed with Covid. He did undergo Covid testing which was positive. CT scan chest showed bilateral infiltrates. He initially was placed on low-flow oxygen but continued to progress to high flow. He was given remdesivir and Decadron.   And subsequently he did receive tocilizumab. He continued to develop dyspnea and \"happy hypoxemia\" as he continued to worsen he did elect to be placed on the ventilator and the initiation of ECMO. On 9/1/2021 patient was cannulated and intubated. He initially underwent bilateral groin catheterization. He was later recannulated in the neck. He was decannulated on 9/10/2021. He did develop bacterial pneumonia with H influenza. He was started on Rocephin. He was transferred to [address] [place] [address] on 9/7/2021. Nebulized tobramycin was added on 9/5/2021 for 10 doses. Levaquin was completed on 9/14/2021. Patient was extubated on 9/13/2021. Past Medical History: Per HPI  Family History: No known family history  Social History: Lifetime non-smoker, social alcohol use, denies drug use. Review of Systems   Constitutional: Negative for fatigue and fever. HENT: Negative for sore throat and trouble swallowing. Eyes: Negative for photophobia and visual disturbance. Respiratory: Negative for chest tightness, shortness of breath and wheezing. Cardiovascular: Negative for chest pain and leg swelling. Gastrointestinal: Negative for abdominal pain, nausea and vomiting. Endocrine: Negative for polydipsia and polyphagia. Genitourinary: Negative for decreased urine volume and flank pain. Musculoskeletal: Negative for back pain and neck pain. Skin: Negative for color change, pallor and rash. Allergic/Immunologic: Negative for food allergies. Neurological: Negative for dizziness, seizures and weakness. Hematological: Negative for adenopathy. Psychiatric/Behavioral: Negative for agitation and confusion. The patient is nervous/anxious. Scheduled Meds:   metoprolol tartrate  25 mg Oral BID    potassium chloride  40 mEq Oral Daily with breakfast    enoxaparin  40 mg SubCUTAneous BID     Continuous Infusions:   dextrose         PHYSICAL EXAMINATION:  T:  97.8.  P:  110. RR:  17. B/P:  112/69.   FiO2:  0. O2 Sat:  94.  I/O:  1420/0  Body mass index is 36 kg/m². GCS: 15  General: Chronically ill-appearing white male  HEENT:  normocephalic and atraumatic. No scleral icterus. PERR  Neck: supple. No Thyromegaly. Lungs: clear to auscultation. No retractions  Cardiac: RRR. No JVD. Abdomen: soft. Nontender. Extremities:  No clubbing, cyanosis, or edema x 4. Vasculature: capillary refill < 3 seconds. Palpable dorsalis pedis pulses. Skin:  warm and dry. Psych:  Alert and oriented x3. Affect appropriate  Lymph:  No supraclavicular adenopathy. Neurologic:  No focal deficit. No seizures. Data: (All radiographs, tracings, PFTs, and imaging are personally viewed and interpreted unless otherwise noted). Sodium 139, potassium 3.4, chloride 101, CO2 27, BUN 23, creatinine 0.8, anion gap 11.0, glucose 101  WBC 4.2, hemoglobin 11.8, hematocrit 38.3, platelet count 571  Telemetry shows sinus tachycardia  Chest x-ray 9/17/2021 reports similar appearance of the lungs. Stable nodule. Case and plan discussed with Dr. Mustapha Morales. Electronically signed by Dinesh Mondragon. KATHARINA Martel - CNP  CRITICAL CARE SPECIALIST  Patient seen by me. Case discussed with nurse practitioner. Awaiting rehab. .  Italicized font represents changes to the note made by me.     Electronically signed by Nawaf Pearce MD on 9/20/2021 at 11:08 AM

## 2021-09-21 LAB
ALBUMIN SERPL-MCNC: 4.6 G/DL (ref 3.5–5.1)
ALP BLD-CCNC: 60 U/L (ref 38–126)
ALT SERPL-CCNC: 59 U/L (ref 11–66)
ANION GAP SERPL CALCULATED.3IONS-SCNC: 15 MEQ/L (ref 8–16)
AST SERPL-CCNC: 45 U/L (ref 5–40)
BASOPHILS # BLD: 1.3 %
BASOPHILS ABSOLUTE: 0 THOU/MM3 (ref 0–0.1)
BILIRUB SERPL-MCNC: 2.2 MG/DL (ref 0.3–1.2)
BILIRUBIN DIRECT: 0.4 MG/DL (ref 0–0.3)
BUN BLDV-MCNC: 17 MG/DL (ref 7–22)
CALCIUM SERPL-MCNC: 9.7 MG/DL (ref 8.5–10.5)
CHLORIDE BLD-SCNC: 105 MEQ/L (ref 98–111)
CO2: 23 MEQ/L (ref 23–33)
CREAT SERPL-MCNC: 0.8 MG/DL (ref 0.4–1.2)
EOSINOPHIL # BLD: 6 %
EOSINOPHILS ABSOLUTE: 0.2 THOU/MM3 (ref 0–0.4)
ERYTHROCYTE [DISTWIDTH] IN BLOOD BY AUTOMATED COUNT: 16.8 % (ref 11.5–14.5)
ERYTHROCYTE [DISTWIDTH] IN BLOOD BY AUTOMATED COUNT: 59 FL (ref 35–45)
GFR SERPL CREATININE-BSD FRML MDRD: > 90 ML/MIN/1.73M2
GLUCOSE BLD-MCNC: 93 MG/DL (ref 70–108)
HCT VFR BLD CALC: 40.4 % (ref 42–52)
HEMOGLOBIN: 12.6 GM/DL (ref 14–18)
IMMATURE GRANS (ABS): 0.04 THOU/MM3 (ref 0–0.07)
IMMATURE GRANULOCYTES: 1 %
LYMPHOCYTES # BLD: 24.9 %
LYMPHOCYTES ABSOLUTE: 0.9 THOU/MM3 (ref 1–4.8)
MCH RBC QN AUTO: 30.3 PG (ref 26–33)
MCHC RBC AUTO-ENTMCNC: 31.2 GM/DL (ref 32.2–35.5)
MCV RBC AUTO: 97.1 FL (ref 80–94)
MONOCYTES # BLD: 13.1 %
MONOCYTES ABSOLUTE: 0.5 THOU/MM3 (ref 0.4–1.3)
NUCLEATED RED BLOOD CELLS: 0 /100 WBC
PLATELET # BLD: 196 THOU/MM3 (ref 130–400)
PMV BLD AUTO: 9.7 FL (ref 9.4–12.4)
POTASSIUM SERPL-SCNC: 4.3 MEQ/L (ref 3.5–5.2)
PREALBUMIN: 32.7 MG/DL (ref 20–40)
RBC # BLD: 4.16 MILL/MM3 (ref 4.7–6.1)
SEG NEUTROPHILS: 53.7 %
SEGMENTED NEUTROPHILS ABSOLUTE COUNT: 2 THOU/MM3 (ref 1.8–7.7)
SODIUM BLD-SCNC: 143 MEQ/L (ref 135–145)
TOTAL PROTEIN: 7.1 G/DL (ref 6.1–8)
WBC # BLD: 3.8 THOU/MM3 (ref 4.8–10.8)

## 2021-09-21 PROCEDURE — 97166 OT EVAL MOD COMPLEX 45 MIN: CPT

## 2021-09-21 PROCEDURE — 6370000000 HC RX 637 (ALT 250 FOR IP): Performed by: NURSE PRACTITIONER

## 2021-09-21 PROCEDURE — 97530 THERAPEUTIC ACTIVITIES: CPT

## 2021-09-21 PROCEDURE — 6370000000 HC RX 637 (ALT 250 FOR IP): Performed by: PHYSICAL MEDICINE & REHABILITATION

## 2021-09-21 PROCEDURE — 80053 COMPREHEN METABOLIC PANEL: CPT

## 2021-09-21 PROCEDURE — 6360000002 HC RX W HCPCS: Performed by: NURSE PRACTITIONER

## 2021-09-21 PROCEDURE — 82248 BILIRUBIN DIRECT: CPT

## 2021-09-21 PROCEDURE — 84134 ASSAY OF PREALBUMIN: CPT

## 2021-09-21 PROCEDURE — 97116 GAIT TRAINING THERAPY: CPT

## 2021-09-21 PROCEDURE — 97110 THERAPEUTIC EXERCISES: CPT

## 2021-09-21 PROCEDURE — 36415 COLL VENOUS BLD VENIPUNCTURE: CPT

## 2021-09-21 PROCEDURE — 85025 COMPLETE CBC W/AUTO DIFF WBC: CPT

## 2021-09-21 PROCEDURE — 99223 1ST HOSP IP/OBS HIGH 75: CPT | Performed by: PHYSICAL MEDICINE & REHABILITATION

## 2021-09-21 PROCEDURE — 97130 THER IVNTJ EA ADDL 15 MIN: CPT

## 2021-09-21 PROCEDURE — 1180000000 HC REHAB R&B

## 2021-09-21 PROCEDURE — 97535 SELF CARE MNGMENT TRAINING: CPT

## 2021-09-21 PROCEDURE — 97162 PT EVAL MOD COMPLEX 30 MIN: CPT

## 2021-09-21 PROCEDURE — 92523 SPEECH SOUND LANG COMPREHEN: CPT

## 2021-09-21 PROCEDURE — 97129 THER IVNTJ 1ST 15 MIN: CPT

## 2021-09-21 RX ORDER — DOCUSATE SODIUM 100 MG/1
100 CAPSULE, LIQUID FILLED ORAL 2 TIMES DAILY
Status: DISCONTINUED | OUTPATIENT
Start: 2021-09-21 | End: 2021-09-21

## 2021-09-21 RX ORDER — DOCUSATE SODIUM 100 MG/1
100 CAPSULE, LIQUID FILLED ORAL 2 TIMES DAILY PRN
Status: DISCONTINUED | OUTPATIENT
Start: 2021-09-21 | End: 2021-09-23

## 2021-09-21 RX ORDER — AMMONIUM LACTATE 12 G/100G
LOTION TOPICAL 2 TIMES DAILY PRN
Status: DISCONTINUED | OUTPATIENT
Start: 2021-09-21 | End: 2021-09-24 | Stop reason: HOSPADM

## 2021-09-21 RX ADMIN — POTASSIUM CHLORIDE 40 MEQ: 1500 TABLET, EXTENDED RELEASE ORAL at 09:52

## 2021-09-21 RX ADMIN — ENOXAPARIN SODIUM 40 MG: 40 INJECTION SUBCUTANEOUS at 20:20

## 2021-09-21 RX ADMIN — METOPROLOL TARTRATE 25 MG: 25 TABLET, FILM COATED ORAL at 09:52

## 2021-09-21 RX ADMIN — Medication: at 22:04

## 2021-09-21 RX ADMIN — METOPROLOL TARTRATE 25 MG: 25 TABLET, FILM COATED ORAL at 20:19

## 2021-09-21 RX ADMIN — ENOXAPARIN SODIUM 40 MG: 40 INJECTION SUBCUTANEOUS at 09:52

## 2021-09-21 ASSESSMENT — ENCOUNTER SYMPTOMS
VOMITING: 0
BLOOD IN STOOL: 0
BACK PAIN: 0
TROUBLE SWALLOWING: 0
CHEST TIGHTNESS: 0
COLOR CHANGE: 0
PHOTOPHOBIA: 0
NAUSEA: 0
SORE THROAT: 0
SHORTNESS OF BREATH: 0

## 2021-09-21 ASSESSMENT — PAIN SCALES - GENERAL: PAINLEVEL_OUTOF10: 0

## 2021-09-21 ASSESSMENT — 9 HOLE PEG TEST
TESTTIME_SECONDS: 31.4
TEST_RESULT: FUNCTIONAL
TESTTIME_SECONDS: 47.3
TEST_RESULT: FUNCTIONAL

## 2021-09-21 NOTE — PROGRESS NOTES
[address] PHYSICAL THERAPY  DAILY NOTE  [facility]  Time In: 1200  Time Out: 1230  Timed Code Treatment Minutes: 30 Minutes  Minutes: 30          Date: 2021  Patient Name: Kyle Singleton,  Gender:  male        MRN: [id number]  : 1975  (48 y.o.)  Referral Date : 21  Referring Practitioner: KATHARINA Hays CNP  Diagnosis: critical illness myopathy  Additional Pertinent Hx: Kyle Singleton is a 49-year-old white male who presented to [facility] on 2021 with complaints of shortness of breath. He has a past medical history of lifetime non-smoker, obesity, but has no other medical history. He is a farmer by occupation. Per report he had presented to the emergency department after developing fever with cough chills and shortness of breath. He also states that he lost his taste and smell on 2021. He continued to progress with shortness of breath which worsened. He developed malaise. CT scan chest showed bilateral infiltrates. He initially was placed on low-flow oxygen but continued to progress to high flow. He was given remdesivir and Decadron. And subsequently he did receive tocilizumab. He continued to develop dyspnea and \"happy hypoxemia\" as he continued to worsen he did elect to be placed on the ventilator and the initiation of ECMO. On 2021 patient was cannulated and intubated. He initially underwent bilateral groin catheterization. He was later recannulated in the neck. He was decannulated on 9/10/2021. He did develop bacterial pneumonia with H influenza. He was started on Rocephin. He was transferred to [address] [place] [address] on 2021. Nebulized tobramycin was added on 2021 for 10 doses. Levaquin was completed on 2021. Patient was extubated on 2021. Pt admitted to  Rehab .      Prior Level of Function:  Lives With: Spouse (3 children aged 15, 15, and 13 y)  Type of Home: House  Home Layout: Two level, Performs ADL's on one level, Able to Live on Main level with bedroom/bathroom  Entrance Stairs - Number of Steps: 4 steps with 2 rails that are too wide to use at same time   Bathroom Shower/Tub: Tub/Shower unit, Curtain  Bathroom Toilet: Handicap height    ADL Assistance: Independent  Homemaking Assistance: Independent  Ambulation Assistance: Independent  Transfer Assistance: Independent  Active : Yes  Additional Comments: I at PLOF no AD    Restrictions/Precautions:  Restrictions/Precautions: General Precautions, Fall Risk  Position Activity Restriction  Other position/activity restrictions: Monitor O2 sats     SUBJECTIVE: Pt agreeable to tx session. Pleasant and motivated throughout session.      PAIN: none stated     Vitals: Oxygen: 88<--->96% on RA, 93% for majority of session  Heart Rate: in 110s to low 120s for majority of session, did increase up to 140bpm during ambulation---had pt rest at this time and did recover to 110s    OBJECTIVE:  Bed Mobility:  Not Tested    Transfers:  Sit to Stand: Stand By Assistance  Stand to Sit:Stand By Assistance   5X Sit-to-Stand Test: 19 seconds              Age Bracket         Time (sec)               61-75 yo          11.4               66-76 yo          12.6               80-79 yo          14.8       Fall Risk:   Geriatrics    Need for further assessment of fall risk greater or equal to 12 sec   Recurrent fall and frailty > 15 sec    Vestibular Disorders   Fall risk > 15 sec   Parkinson's Disease   Fall risk > 16 sec     Ambulation:  Stand By Assistance  Distance: 200' x 2, various smaller distances -- several pauses to increase O2 saturation and decrease HR   Surface: Level Tile  Device:No Device  Gait Deviations:  Slow Evelia and Decreased Gait Speed  *education on pacing with HR and target HRs    Balance:  Static Sitting Balance:  Independent  Dynamic Sitting Balance: Independent  Static Standing Balance: Supervision  Dynamic Standing Balance: Stand By Assistance   *lateral walking over six 6\" hurdles each direction x 2 sets in parallel bars with SBA--decreased foot clearance on a few reps due to increased speed   *forward ambulation through wooden ladder--good clearance--did require HHA    Exercise:  Patient was guided in 1 set(s) 10 reps of exercise to both lower extremities. Standing heel/toe raises, Standing marches, Standing hip abduction/adduction, Standing hip flexion and Mini squats. Exercises were completed for increased independence with functional mobility. Functional Outcome Measures: Not completed    ASSESSMENT:  Assessment: Patient progressing toward established goals. Activity Tolerance:  Patient tolerance of  treatment: fair. Several rest breaks due to decreased endurance. Equipment Recommendations:Equipment Needed: Yes  Other: pulse oximeter  Discharge Recommendations:  Continue to assess pending progress    Plan: Times per week: 5x/wk 90min, 1x/wk 30min  Times per day: Twice a day  Plan weeks: 2  Current Treatment Recommendations: Strengthening, Transfer Training, Endurance Training, Neuromuscular Re-education, Patient/Caregiver Education & Training, Balance Training, Gait Training, Home Exercise Program, Functional Mobility Training, Stair training, Safety Education & Training    Patient Education  Patient Education: Transfers, Reviewed Prior Education, Gait, Verbal Exercise Instruction, appropriate HR range,  - Patient Verbalized Understanding, - Patient Requires Continued Education    Goals:  Patient goals : go home  Short term goals  Time Frame for Short term goals: 1 week  Short term goal 1: Patient will complete sit < > stand and stand pivot transfer with modified independence to transfer safely. Short term goal 2: Patient will ascend/descend 4 steps with 1 hand rail and SBA to progress towards independent home entry.   Short term goal 3: Patient will ambulate 76' with SBA and maintain O2 >/=92% to progress towards safe home navigation. Short term goal 4: Patient will demonstrate 5 second single leg stance bilaterally to improve balance and reduce fall risk. Long term goals  Time Frame for Long term goals : 2 weeks  Long term goal 1: Patient will ascend/descend 4 steps with 1 hand rail with modified independence. Long term goal 2: Patient will ambulate 80' with no AD and modified independence and maintain O2 >/=92% to navigate home safely. Long term goal 3: Patient will improve MARTINEZ to greater than or equal to 51/56 to reduce his risk for falls. Long term goal 4: Patient will complete car transfer with modified independence to transfer in/out of vehicle safely for transfer to home and appointments. Following session, patient left in safe position with all fall risk precautions in place.     Radha Shepherd, PT, DPT

## 2021-09-21 NOTE — PROGRESS NOTES
[facility]  INPATIENT PHYSICAL THERAPY  EVALUATION  [facility]    Time In: 9731  Time Out: 7734  Timed Code Treatment Minutes: 45 Minutes  Minutes: 64          Date: 2021  Patient Name: Maryam Cody,  Gender:  male        MRN: [id number]  : 1975  (48 y.o.)  Referral Date : 21   Referring Practitioner: KATHARINA Castañeda CNP  Diagnosis: critical illness myopathy  Additional Pertinent Hx: Maryam Cody is a 57-year-old white male who presented to [facility] on 2021 with complaints of shortness of breath. He has a past medical history of lifetime non-smoker, obesity, but has no other medical history. He is a farmer by occupation. Per report he had presented to the emergency department after developing fever with cough chills and shortness of breath. He also states that he lost his taste and smell on 2021. He continued to progress with shortness of breath which worsened. He developed malaise. CT scan chest showed bilateral infiltrates. He initially was placed on low-flow oxygen but continued to progress to high flow. He was given remdesivir and Decadron. And subsequently he did receive tocilizumab. He continued to develop dyspnea and \"happy hypoxemia\" as he continued to worsen he did elect to be placed on the ventilator and the initiation of ECMO. On 2021 patient was cannulated and intubated. He initially underwent bilateral groin catheterization. He was later recannulated in the neck. He was decannulated on 9/10/2021. He did develop bacterial pneumonia with H influenza. He was started on Rocephin. He was transferred to [address] [place] [address] on 2021. Nebulized tobramycin was added on 2021 for 10 doses. Levaquin was completed on 2021. Patient was extubated on 2021. Pt admitted to  Rehab .      Restrictions/Precautions:  Restrictions/Precautions: General Precautions, Fall Risk  Position Activity Restriction  Other position/activity restrictions: Monitor O2 sats    Subjective:  Chart Reviewed: Yes  Patient assessed for rehabilitation services?: Yes  Family / Caregiver Present: No  Subjective: Patient in room in chair, agreeable to PT. Pt's spouse present initially, however left at start of session. General:  Overall Orientation Status: Within Normal Limits    Vision: Impaired  Vision Exceptions: Wears glasses for reading    Hearing: Within functional limits         Pain: denies    Vitals: Vitals not assessed per clinical judgement, see nursing flowsheet    Social/Functional History:    Lives With: Spouse (3 children 11-16 age range)  Type of Home: House  Home Layout: Two level, Performs ADL's on one level, Able to Live on Main level with bedroom/bathroom  Entrance Stairs - Number of Steps: 4 steps with 2 rails that are too wide to use at same time             ADL Assistance: Independent  Homemaking Assistance: Independent  Ambulation Assistance: Independent  Transfer Assistance: Independent    Active : Yes  Occupation: Full time employment  Type of occupation: [employer]; also [employer]  Additional Comments: I at [employer] no AD     Vitals:  O2: 95% at rest on room air, 88-92% with activity  Heart Rate: 103-108 at rest, 111-133 with activity  Education on importance of rest to allow heart and lungs to recover.   Recommended pulse ox for home    OBJECTIVE:  Range of Motion:  Right Lower Extremity: WFL  Left Lower Extremity: WFL    Strength:  Right Lower Extremity: Impaired - hip flexion 4-/5, knee 4/5  Left Lower Extremity: Impaired - hip flexion 4-/5, knee 4/5    Balance:  Static Sitting Balance:  Independent  Static Standing Balance: Stand By Assistance  Dynamic Standing Balance: Stand By Assistance, Contact Guard Assistance   *Patient able to  object from ground from standing with CGA    Bed Mobility:  Rolling to Left: Independent   Rolling to Right: Independent   Supine to Sit: Modified Independent  Sit to Supine: Modified Independent     Transfers:  Sit to Stand: Stand By Assistance  Stand to Sit:Stand By Assistance  Stand Pivot:Stand By Assistance  Car:Contact Guard Assistance    Ambulation:  [address], progressing to SBA intermittently  Distance: 8', 50', 15', 20'  Surface: Level Tile  Device:No Device  Gait Deviations:  Slow Evelia, Decreased Step Length Bilaterally, Decreased Arm Swing, Decreased Gait Speed and Mild Path Deviations    Contact Guard Assistance  Distance: 10'  Surface: Ramp  Device:No Device  Gait Deviations:  Slow Evelia, Decreased Step Length Bilaterally, Decreased Arm Swing, Decreased Gait Speed and Decreased Heel Strike Bilaterally    Stairs:  Contact Guard Assistance  Number of Steps: 1x2  Height: 6\" step with No Device     Stairs:  Contact Guard Assistance  Number of Steps: 4  Height: 6\" step with One Handrail    Exercise:  Patient was guided in 1 set(s) 10 reps of exercise to both lower extremities. Standing: march, hip flexion, heel raise, hip extension, mini squats. Exercises were completed for increased independence with functional mobility. Functional Outcome Measures: Completed  Martinez Balance Score: 41/56  MARTINEZ BALANCE TEST SCORING   Score of < 45 indicates a greater risk of falling  41-56= low fall risk  21-40= medium fall risk (recommendation of walking with assist at all times)  0-20= high fall risk      ASSESSMENT:  Activity Tolerance:  Patient tolerance of  treatment: good. Patient very motivated, requiring cues to take rest breaks. Frequent rest breaks to recover. Pt presents with shortness of breath with O2 ranging 88-95% on room air and heart rate 103-133. Verbal cues for pursed lip breathing and slow breaths      Treatment Initiated: Treatment and education initiated within context of evaluation.   Evaluation time included review of current medical information, gathering information related to past medical, social and functional history, completion of standardized testing, formal and informal observation of tasks, assessment of data and development of plan of care and goals. Treatment time included skilled education and facilitation of tasks to increase safety and independence with functional mobility for improved independence and quality of life. Therapeutic exercise completed to improve patient's lower extremity strength to reduce difficulty with functional mobility and increase activity tolerance. Therapeutic activity completed to improve patient's ability to complete functional mobility. Gait training completed to improve patient's ability to navigate his living environment. Assessment: Body structures, Functions, Activity limitations: Decreased functional mobility , Decreased endurance, Decreased strength, Decreased balance  Assessment: Patient is a 48year old male who presents with overall deconditioning s/p 3 weeks hospitilization including intubation and ECMO. Patient was independent at [facility], working full time and at this time requiring SBA/CGA to complete functional mobility, with distance limited to 48' due to shortness of breath. Patient's MARTINEZ balance score of 41/56 indicates low fall risk. Pt requires close monitoring of O2 and heart rate with activity with cues to pursed lip breathing. Patient would benefit from skilled PT services to improve his ability to complete functional mobility, reduce his risk for falls and allow patient to return to OF.   Prognosis: Good       Discharge Recommendations:  Discharge Recommendations: Continue to assess pending progress    Patient Education:  PT Education: Goals, PT Role, Plan of Care, Functional Mobility Training    Equipment Recommendations:  Equipment Needed: pulse oximeter    Plan:  Times per week: 5x/wk 90min, 1x/wk 30min  Times per day: Twice a day  Plan weeks: 2  Current Treatment Recommendations: Strengthening, Transfer Training, Endurance Training, Neuromuscular Re-education, Patient/Caregiver Education & Training, Balance Training, Gait Training, Home Exercise Program, Functional Mobility Training, Stair training, Safety Education & Training    Goals:  Patient goals : go home  Short term goals  Time Frame for Short term goals: 1 week  Short term goal 1: Patient will complete sit < > stand and stand pivot transfer with modified independence to transfer safely. Short term goal 2: Patient will ascend/descend 4 steps with 1 hand rail and SBA to progress towards independent home entry. Short term goal 3: Patient will ambulate 76' with SBA and maintain O2 >/=92% to progress towards safe home navigation. Short term goal 4: Patient will demonstrate 5 second single leg stance bilaterally to improve balance and reduce fall risk. Long term goals  Time Frame for Long term goals : 2 weeks  Long term goal 1: Patient will ascend/descend 4 steps with 1 hand rail with modified independence. Long term goal 2: Patient will ambulate 80' with no AD and modified independence and maintain O2 >/=92% to navigate home safely. Long term goal 3: Patient will improve MARTINEZ to greater than or equal to 51/56 to reduce his risk for falls. Long term goal 4: Patient will complete car transfer with modified independence to transfer in/out of vehicle safely for transfer to home and appointments. Following session, patient left in safe position with all fall risk precautions in place.

## 2021-09-21 NOTE — PLAN OF CARE
Problem: Falls - Risk of:  Goal: Will remain free from falls  Description: Will remain free from falls  Outcome: Ongoing  Pt will remain free of falls. Pt is standby assist. Very steady with transfers. Problem: Infection - Surgical Site:  Goal: Will show no infection signs and symptoms  Description: Will show no infection signs and symptoms  Outcome: Ongoing  Surgical site will be assessed every shift for warmth to site, redness, pain or drainage. No ss of infection present.

## 2021-09-21 NOTE — DISCHARGE SUMMARY
CRITICAL CARE DISCHARGE SUMMARY      Patient:  Amalia John    Unit/Bed:3B-22/022-A  YOB: 1975  MRN: [id number]   PCP: Tarah Mueller MD  Date of Admission: 8/30/2021    Assessment and Plan:  . Physical deconditioning:   COVID-19 pneumonia:   Hypertension:   Anxiety:   Bacterial pneumonia:   Hypokalemia:   Leukopenia:   Macrocytic anemia:   Obesity:         INITIAL H AND P AND ICU COURSE:  Amalia John is a 53-year-old white male who presented to [facility] on 8/30/2021 with complaints of shortness of breath. He has a past medical history of lifetime non-smoker, obesity, but has no other medical history. He is a farmer by occupation. Per report he had presented to the emergency department after developing fever with cough chills and shortness of breath. He also states that he lost his taste and smell on 8/28/2021. He continued to progress with shortness of breath which worsened. He developed malaise. He did state that he and his wife had recently been diagnosed with Covid. He did undergo Covid testing which was positive. CT scan chest showed bilateral infiltrates. He initially was placed on low-flow oxygen but continued to progress to high flow. He was given remdesivir and Decadron. And subsequently he did receive tocilizumab. He continued to develop dyspnea and \"happy hypoxemia\" as he continued to worsen he did elect to be placed on the ventilator and the initiation of ECMO. On 9/1/2021 patient was cannulated and intubated. He initially underwent bilateral groin catheterization. He was later recannulated in the neck. He was decannulated on 9/10/2021. He did develop bacterial pneumonia with H influenza. He was started on Rocephin. He was transferred to [address] [place] [address] on 9/7/2021. Nebulized tobramycin was added on 9/5/2021 for 10 doses. Levaquin was completed on 9/14/2021. Patient was extubated on 9/13/2021.      Past Medical History: Per HPI  Family History: No known family history  Social History: Lifetime non-smoker, social alcohol use, denies drug use. Review of Systems   Constitutional: Negative for fatigue and fever. HENT: Negative for sore throat and trouble swallowing. Eyes: Negative for photophobia and visual disturbance. Respiratory: Negative for chest tightness and shortness of breath. Cardiovascular: Negative for chest pain and leg swelling. Gastrointestinal: Negative for blood in stool, nausea and vomiting. Endocrine: Negative for polyphagia and polyuria. Genitourinary: Negative for decreased urine volume and flank pain. Musculoskeletal: Negative for back pain and neck pain. Skin: Negative for color change, pallor and rash. Allergic/Immunologic: Negative for immunocompromised state. Neurological: Positive for weakness. Negative for headaches. Hematological: Negative for adenopathy. Psychiatric/Behavioral: Negative for agitation and confusion. The patient is not nervous/anxious.            Medication List        START taking these medications      bisacodyl 10 MG suppository  Commonly known as: DULCOLAX  Place 1 suppository rectally daily as needed for Constipation     docusate 50 MG/5ML liquid  Commonly known as: COLACE  10 mLs by Per NG tube route 2 times daily as needed (BM)     enoxaparin 40 MG/0.4ML injection  Commonly known as: LOVENOX  Inject 0.4 mLs into the skin 2 times daily     lubrifresh P.M. ophthalmic ointment  Place into both eyes as needed (dry eyes)     metoprolol tartrate 25 MG tablet  Commonly known as: LOPRESSOR  Take 1 tablet by mouth 2 times daily     polyethylene glycol 17 g packet  Commonly known as: GLYCOLAX  Take 17 g by mouth daily as needed for Constipation     potassium chloride 20 MEQ extended release tablet  Commonly known as: KLOR-CON M  Take 2 tablets by mouth daily (with breakfast)               Where to Get Your Medications        You can get these medications from any pharmacy    Bring a paper prescription for each of these medications  docusate 50 MG/5ML liquid  metoprolol tartrate 25 MG tablet  polyethylene glycol 17 g packet  potassium chloride 20 MEQ extended release tablet       Information about where to get these medications is not yet available    Ask your nurse or doctor about these medications  bisacodyl 10 MG suppository  enoxaparin 40 MG/0.4ML injection  lubrifresh P.M. ophthalmic ointment           PHYSICAL EXAMINATION:  T:  98.2.  P:  102. RR:  17. B/P:  120/81. FiO2:  0. O2 Sat:  94.  I/O   Body mass index is 36 kg/m². GCS:  15  General: Acute on chornically ill appearing white male   HEENT:  normocephalic and atraumatic. No scleral icterus. PERR  Neck: supple. No Thyromegaly. Lungs: clear to auscultation. No retractions  Cardiac: RRR. No JVD. Abdomen: soft. Nontender. Extremities:  No clubbing, cyanosis, or edema x 4. Vasculature: capillary refill < 3 seconds. Palpable dorsalis pedis pulses. Skin:  warm and dry. Psych:  Alert and oriented x3. Affect appropriate  Lymph:  No supraclavicular adenopathy. Neurologic:  No focal deficit. No seizures. Data: (All radiographs, tracings, PFTs, and imaging are personally viewed and interpreted unless otherwise noted). Sodium 43, potassium 4.3, chloride 105, CO2 23, BUN 17, creatinine 0.8, anion gap 15.0  WBC 3.8, hemoglobin 12.6, hematocrit 40.4, platelet count 498  Telemetry shows NSR   Chest x-ray 9/17/2021 reports similar appearance of the lungs. Stable nodule. Patient discharged in stable and improved condition. Patient discharged to inpatient rehab. Case and plan discussed with Dr. Phi Gallegos    Discharge time 32 minutes        Electronically signed by Flory Chavez. KATHARINA Mcmillan - CNP  CRITICAL CARE SPECIALIST  Patient seen by me. Case discussed with NP. Patient discharged in stable and improved condition. Italicized font represents changes to the note made by me.     Electronically signed by Adrián Rm MD on 9/21/2021 at 12:50 PM

## 2021-09-21 NOTE — PROGRESS NOTES
[address] [place] [address]  EVALUATION    Time:    Time In: 1030  Time Out: 1130  Timed Code Treatment Minutes: 40 Minutes  Minutes: 60       Date: 2021  Patient Name: Aleah Balderas,   Gender: male      MRN: [id number]  : 1975  (48 y.o.)  Referring Practitioner: KATHARINA Gabriel CNP (Ordering)  Dr. Anu Xiong (Attending)  Diagnosis: Critical Illness Myopathy  Additional Pertinent Hx: Aleah Balderas  is a 48 y.o. male admitted to the inpatient rehabilitation unit on  2021; I have seen and evaluated the patient today, 21. He was originally admitted to [address] [place] [address] on 2021. Patient with PMH of obesity. Patient presented to [facility] ER due to worsening SOB, cough, nasal congestion, loss of taste/smell and fatigue. Patient began experiencing symptoms about 4 days prior. Wife was +COVID. Patient was tested at urgent care and found to be +COVID and O2 sats 85%. Patient was brought to ER by family car from Urgent Care. CT Chest showed bilateral infiltrates. Patient continued to require more O2. He was treated with remdesivir, Decadron and subsequently tocilizumab. Patient required intubation with ventilation and ECMO on 21. Patient was found to have coinfection with Haemophilus influenza. Patient initially treated with Rocephin. Patient transitioned to higher dosing of Rocephin. Antibiotics initiated 2021. Organism is beta-lactamase positive. Rocephin ineffective. Nebulized tobramycin was added on 2021 and patient was transitioned to [address] [place] [address] Rd on 2021. Repeat bronchoscopy completed 2021 showed significant reduction in mucus production. Culture demonstrated airway colonization with Candida albicans. ECMO complete 9/10/21 and patient was extubated 21 and transitioned to low flow O2. Levaquin completed 2021. To IPR on .     Restrictions/Precautions:  Restrictions/Precautions: General Precautions, Fall Risk  Position Activity Restriction  Other position/activity restrictions: Monitor O2 sats    Subjective  Chart Reviewed: Yes, Orders, Progress Notes, History and Physical, Previous Admission  Patient assessed for rehabilitation services?: Yes    Subjective: Pt pleasant and cooperative, motivated and excited to take a shower. \"This is my first shower since August!  \"     Pain:  Pain Assessment  Patient Currently in Pain: Denies    Vitals: 94% on  bpm at rest.       95% on  bpm during standing activity     Social/Functional History:  Lives With: Spouse (3 children aged 15, 15, and 13 y)  Type of Home: House  Home Layout: Two level, Performs ADL's on one level, Able to Live on Main level with bedroom/bathroom  Entrance Stairs - Number of Steps: 4 steps with 2 rails that are too wide to use at same time   Bathroom Shower/Tub: Tub/Shower unit, Curtain  Bathroom Toilet: Handicap height       ADL Assistance: Independent  Homemaking Assistance: Independent  Ambulation Assistance: Independent  Transfer Assistance: Independent    Active : Yes  Mode of Transportation: Car, Truck  Occupation: Full time employment  Type of occupation: [employer]; also farms  Additional Comments: I at [employer] no AD    VISION:WFL    HEARING:  WFL    COGNITION: WFL for ADL routine. Pt demonstrating good insight into not immediately returning to [place] upon discharging, electing to wait until late fall to resume. RANGE OF MOTION:  Bilateral Upper Extremity:  WNL    STRENGTH:  Bilateral Upper Extremity:  Impaired - 4-/5  Plan to do dynamometer this PM     SENSATION:   WFL    ADL:   EATING:Independent. [facility] CARE Score: 6. ORAL HYGIENE:Supervision or touching assistance. SBA. CARE Score: 4. TOILETING HYGIENE:Supervision or touching assistance. SBA. CARE Score: 4. SHOWERING/BATHING:Supervision or touching assistance. SBA, Tolerated standing 50% of shower,  sat to wash LB. CARE Score: 4. UPPER BODY DRESSING: .  SBA. .     LOWER BODY DRESSING:Supervision or touching assistance. SBA, doffed while standing. [facility] CARE Score: 4. FOOTWEAR:Partial/moderate assistance  Able to stand to doff socks with SBA. A to don socks due to wet feet. Pt unable to don slip on work boots, but donned crocs with SBA. [facility] CARE Score: 3.     TOILET TRANSFER: Supervision or touching assistance. SBA on/off standard toilet seat without use of grab bars. CARE Score: 4. BALANCE:  Sitting Balance:  Modified Independent. Standing Balance: Stand By Assistance. BED MOBILITY:  Not Tested    TRANSFERS:  Sit to Stand:  Stand By Assistance. Stand to Sit: Stand By Assistance. FUNCTIONAL MOBILITY:  Assistive Device: None  Assist Level:  Stand By Assistance. Distance: To and from bathroom and To and from shower room     Activity Tolerance:  Patient tolerance of  treatment: good. Assessment:  Assessment: Pt presents to occupational therapy following COVID 19 infection with patient complaints of  strength, activity tolerance, shortness of breath and executive functioning impacting patient's ability to complete self care at prior level of functioning. Pt currently able to complete ADL routine with SBA with exception of needing mod A for LB dressing and extra time required due to slow pace due to lower activity tolerance. Pt used adaptations such as a shower chair to complete his ADL routine. Due to patient's performance deficits, patient would greatly benefit from continued occupational therapy for ADL remediation, endurance building, strengthening and energy conservation to return to prior level of functioning and safe return to home environment.      Performance deficits / Impairments: Decreased ADL status, Decreased functional mobility , Decreased endurance, Decreased high-level IADLs, Decreased ROM, Decreased strength, Decreased cognition, Decreased balance  Prognosis: Fair  Safety Devices in place: Yes  Type of devices: All fall risk precautions in place, Call light within reach    Treatment Initiated: Treatment and education initiated within context of evaluation. Evaluation time included review of current medical information, gathering information related to past medical, social and functional history, completion of standardized testing, formal and informal observation of tasks, assessment of data and development of plan of care and goals. Treatment time included skilled education and facilitation of tasks to increase safety and independence with ADL's for improved functional independence and quality of life. Discharge Recommendations:  Home with assist PRN, Outpatient OT    Patient Education:  OT Education: OT Role, Plan of Care, Energy Conservation, ADL Adaptive Strategies, Transfer Training, Home Exercise Program, Equipment    Equipment Recommendations: Other: Monitor need for a shower chair for ECT. Plan:  Times per week: 5x/wk for 90 min and 1x/wk for 30 min  Current Treatment Recommendations: Strengthening, Endurance Training, Patient/Caregiver Education & Training, Equipment Evaluation, Education, & procurement, Self-Care / ADL, Home Management Training, Balance Training, ROM. See long-term goal time frame for expected duration of plan of care. If no long-term goals established, a short length of stay is anticipated. Goals:  Patient goals : To return home independently, decrease burden of care on my family  Short term goals  Time Frame for Short term goals: 1 week  Short term goal 1: Pt will complete LB dressing tasks with set up only to improve indep with donning his work boots. Short term goal 2: Pt will tolerate dynamic standing >10 minutes with mod I to improve endurance needed for return to work. Short term goal 3: Pt will complete medium work IADL tasks with modified indendence with 0 vcs needed for thought organization or planning for eventual return to work.   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 Short term goal 5: Pt will demonstate good divided attention with 0 vcs needed during tasks for eventual return to driving. Long term goals  Time Frame for Long term goals : 1 week  Long term goal 1: Pt will complete BADL routine with modified independence and good use of ECT strategies to regain indep with self care at home. Long term goal 2: Pt will demonstate 2 ECT strategies to conserve his energy when returning to work. Following session, patient left in safe position with all fall risk precautions in place.

## 2021-09-21 NOTE — PROGRESS NOTES
[address] [address]  Recreational Therapy  Inpatient Rehabilitation Evaluation        Time Spent with Patient: 25 minutes    Date:  9/21/2021       Patient Name: Elsa Melo      MRN: [id number]       YOB: 1975 (48 y.o.)       Gender: male          RESTRICTIONS/PRECAUTIONS:  Restrictions/Precautions: General Precautions, Fall Risk  Vision: Impaired  Hearing: Within functional limits    PAIN: 0    SUBJECTIVE:  Pt lives with his wife Catia Mcclain, daughters Jaclyn Garber and Brian Rachel and son Dio Limb:  Glasses to read     HEARING: Within Normal Limit    LEISURE INTERESTS:   Works at [employer] and has for 25 yrs-he helps farm, builds things in his shop, cuts wood to heat the house, dine out with wife, read my bible, watch some tv if it's a good program and attends Taoism-he has 2 dogs he enjoys -very pleasant and social-no pain at this time- very involved in his kids school activities     [facility]:    Decreased endurance           Patient Education  New Education Provided: Importance of Leisure, RT Plan of Care    Plan:  Continue to follow patient through this admission  See patient individually    Electronically signed by: Lucy Friedman, CTRS  Date: 9/21/2021

## 2021-09-21 NOTE — PROGRESS NOTES
colonization with Candida albicans.  ECMO complete 9/10/21 and patient was extubated 21 and transitioned to low flow O2.  Levaquin completed 2021. \" ST consulted to assess cognitive functioning in order to determine appropriate goals/POC during IPR stay. Pain: No pain reported. Subjective:  Patient seated upright in recliner for duration of session. Alert and pleasantly engaged. Appeared motivated to participate. 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  Education Level: HS degree   Driving Status: Active   Finance Management: Independent  Medication Management: Independent  ADL's: Independent   Hobbies: watching kids play sports, working on things with his son   Vision Status: wears corrective lenses  Hearing: WFL     Type of Home: House  Home Layout: Two level, Performs ADL's on one level, Able to Live on Main level with bedroom/bathroom  Entrance Stairs - Number of Steps: 4 steps with 2 rails that are too wide to use at same time    SPEECH / VOICE:  Speech and Voice appear to be grossly intact for basic and complex daily communication    LANGUAGE:  Receptive:  Receptive language skills appear to be grossly intact for basic and complex daily communication. Expressive:  Expressive language skills appear to be grossly intact for basic and complex daily communication. COGNITION:  Finley Cognitive Assessment (MOCA) version 7.2 completed. Pt scored 24/30. Normal is greater than or equal to 26/30. Inclusion of +1 point given highest level of education achieved less than/equal to 12th grade or GED with limited-0 post-secondary schooling.   Orientation: 6/6  Immediate Recall: 4/5  Short-Term Recall: 5/5  Divergent Namin wpm (target=11wpm)  Problem Solving: Higher level impairment  Reasoning: Higher level impairment  Insight: WFL  Attention: Sustained attention WFL; Divided/Selective attention - Impaired  Math Computation: 0/3  Executive Functionin/5    SWALLOWING:  Current Diet: Regular, thin liquids  WFL     Comprehension: 5 - Patient understands basic needs (hungry/hot/pain)  Expression: 5 - Expresses basic ideas/needs only (hungry/hot/pain)  Social Interaction: 5 - Patient is appropriate with supervision/cues  Problem Solvin - Patient solves simple/routine tasks 75-90%+   Memory: 5 - Patient requires prompting with stress/unfamiliar situations    RECOMMENDATIONS/ASSESSMENT:  DIAGNOSTIC IMPRESSIONS:  Patient presents with a mild cognitive impairment characterized by score of 24/30 on the [facility]ILITATION with deficits revealed in complex attention, verbal/visual thought organization, processing speed, and recall of 6+ units. Patient with excellent insight into deficits, reporting cognition is not yet at baseline level. Strengths include social interaction, orientation, and expressive/receptive language which appear grossly intact at this time. Concerns for successful return to demanding occupational duties, driving, and managing multiple schedules (appointments and extracurricular's for children). No barriers to success foreseen; excellent familial support and patient motivation observed. Skilled ST services are highly recommended in order to improve skills required for IADL/ADL achievement, specifically previous occupational duties and caring for his children. Would likely require ongoing skilled ST services via OP setting at discharge; also question needs for completion of reintegration program prior to making full return to full time work. Rehabilitation Potential: excellent.      EDUCATION:  Learner: Patient  Education:  Reviewed results and recommendations of this evaluation, Reviewed ST goals and Plan of Care and Reviewed recommendations for follow-up  Evaluation of Education: Rip Forth understanding and Family not present    PLAN:  Skilled SLP intervention on IP Rehab or TCU 90 minutes per day ,BID as tolerated, 5 days per week. Specific interventions for next session may include: complex recall, high level executive functioning, complex attention. PATIENT GOAL:    \"be normal again\"    SHORT TERM GOALS:  Short-term Goals  Timeframe for Short-term Goals: 1 week  Goal 1: Patient will complete higher level (6+ units) immediate/delayed recall and working memory tasks with 70% accuracy given min cues to improve retention of new and functional information. INTERVENTIONS: Delayed 10 min recall of 5-word list within evaluation - 5/5 indep    Goal 2: Patient will complete complex attention tasks with no more than 7 errors across a given activity in order to permit potential return to driving, occupational duties, and other multi-tasking responsibilities. INTERVENTIONS: Complex attention task initiated via use of newspaper article; patient instructed to identify one target throughout article while comprehending newly learned information well enough to provide detailed summary to follow. Time to complete - 4 minutes  Errors - 15 (identified 5 of 20 targets)  Summary - fair, lacking detail  *reduced divided and selective attention  *educated patient on rationale behind task related to daily executive functioning activities     Goal 3: Patient will complete higher level executive functioning tasks (finances, med management, occupational duties) with 80% accuracy given min cues to improve skills required for IADL completion. Goal 4: Patient will complete high level thought organizational tasks (divergent naming, organization of multiple variables, scheduling) with 90% accuracy given min cues to improve skills required for IADL completion. LONG TERM GOALS:  Long-term Goals  Timeframe for Long-term Goals: 2 weeks  Goal 1: Patient will improve cognitive functioning to a level of Mod I in order to permit potential return to PLOF in home setting.       Solo Nice M.S. [address] [facility] [address]

## 2021-09-21 NOTE — PROGRESS NOTES
[address] [place] [address]  [facility]  Occupational Therapy  Daily Note  Time:    Time In: 1430  Time Out: 1500  Timed Code Treatment Minutes: 30 Minutes  Minutes: 30        Date: 2021  Patient Name: Mini Granda,   Gender: male      Room: [facility]/068-A  MRN: [id number]  : 1975  (48 y.o.)  Referring Practitioner: Leopold Blamer, APRN - CNP (Ordering)  Dr. Genesis Ramírez (Attending)  Diagnosis: Critical Illness Myopathy  Additional Pertinent Hx: Mini Granda  is a 48 y.o. male admitted to the inpatient rehabilitation unit on  2021; I have seen and evaluated the patient today, 21. He was originally admitted to [address] [place] [address] on 2021. Patient with PMH of obesity. Patient presented to [facility] ER due to worsening SOB, cough, nasal congestion, loss of taste/smell and fatigue. Patient began experiencing symptoms about 4 days prior. Wife was +COVID. Patient was tested at urgent care and found to be +COVID and O2 sats 85%. Patient was brought to ER by family car from Urgent Care. CT Chest showed bilateral infiltrates. Patient continued to require more O2. He was treated with remdesivir, Decadron and subsequently tocilizumab. Patient required intubation with ventilation and ECMO on 21. Patient was found to have coinfection with Haemophilus influenza. Patient initially treated with Rocephin. Patient transitioned to higher dosing of Rocephin. Antibiotics initiated 2021. Organism is beta-lactamase positive. Rocephin ineffective. Nebulized tobramycin was added on 2021 and patient was transitioned to [address] on 2021. Repeat bronchoscopy completed 2021 showed significant reduction in mucus production. Culture demonstrated airway colonization with Candida albicans. ECMO complete 9/10/21 and patient was extubated 21 and transitioned to low flow O2. Levaquin completed 2021.  To IPR on .    Restrictions/Precautions:  Restrictions/Precautions: General Precautions, Fall Risk  Position Activity Restriction  Other position/activity restrictions: Monitor O2 sats      SUBJECTIVE: Pt seated in recliner, agreeable to OT. PAIN: Denies /10:      Vitals: Oxygen: 95% on RA   Heart Rate: 118 bpm     COGNITION: WFL for UE exs     BALANCE:  Sitting Balance:  Supervision. seated in recliner chair      HAND ASSESSMENT  Hand Dominance: Right  Left Hand Strength -  (lbs)  Handle Setting 2: 62, 62, 61  Right Hand Strength -  (lbs)  Handle Setting 2: 61, 65, 64  Fine Motor Skills  Left 9-Hole Peg Test: Functional  Left 9 Hole Peg Test Time (secs): 47.3 (intenion tremors,  pt reports baseline)  Right 9-Hole Peg Test: Functional  Right 9 Hole Peg Test Time (secs): 31.4       ADDITIONAL ACTIVITIES:  Patient completed BUE strengthening exercises with skilled education on HEP: completed x10 reps x1 set with a medium resistance band in all joints and all planes in order to improve UE strength and activity tolerance required for BADL routine and toilet / shower transfers. Patient tolerated well, requiring short rest breaks. Patient also required 1:1 cues for technique. Pt given written HEP to complete this PM. Pt appreciative. ASSESSMENT:  Assessment: Pt presents to occupational therapy following COVID 19 infection with patient complaints of  strength, activity tolerance, shortness of breath and executive functioning impacting patient's ability to complete self care at prior level of functioning. Pt currently able to complete ADL routine with SBA with exception of needing mod A for LB dressing and extra time required due to slow pace due to lower activity tolerance. Pt used adaptations such as a shower chair to complete his ADL routine.  Due to patient's performance deficits, patient would greatly benefit from continued occupational therapy for ADL remediation, endurance building, strengthening and energy conservation to return to prior level of functioning and safe return to home environment. Activity Tolerance:  Patient tolerance of  treatment: good. Discharge Recommendations: Home with assist PRN, Outpatient OT    Equipment Recommendations: Other: Monitor need for a shower chair for ECT. Plan: Times per week: 5x/wk for 90 min and 1x/wk for 30 min  Current Treatment Recommendations: Strengthening, Endurance Training, Patient/Caregiver Education & Training, Equipment Evaluation, Education, & procurement, Self-Care / ADL, Home Management Training, Balance Training, ROM    Patient Education  Patient Education: Home Exercise Program     Goals  Short term goals  Time Frame for Short term goals: 1 week  Short term goal 1: Pt will complete LB dressing tasks with set up only to improve indep with donning his work boots. Short term goal 2: Pt will tolerate dynamic standing >10 minutes with mod I to improve endurance needed for return to work. Short term goal 3: Pt will complete medium work IADL tasks with modified indendence with 0 vcs needed for thought organization or planning for eventual return to work. 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 Short term goal 5: Pt will demonstate good divided attention with 0 vcs needed during tasks for eventual return to driving. Long term goals  Time Frame for Long term goals : 1 week  Long term goal 1: Pt will complete BADL routine with modified independence and good use of ECT strategies to regain indep with self care at home. Long term goal 2: Pt will demonstate 2 ECT strategies to conserve his energy when returning to work. Following session, patient left in safe position with all fall risk precautions in place.

## 2021-09-21 NOTE — PLAN OF CARE
Problem: DISCHARGE BARRIERS  Goal: Patient's continuum of care needs are met  Note:   [address] [address]  Physical Medicine Case Management Assessment    [x] Inpatient Rehabilitation Unit  [] Transitional Care Unit    Patient Name: Deloris Goel        MRN: [id number]    : 1975  (48 y.o.)  Gender: male     Date of Admission: 2021      Family/Social/Home Environment: Prior to hospitalization, patient was active and independent with ADL's and personal care. Patient lives with spouse, João Grubbs, and three children (Sally-15, Farzaneh-14, and Fredy-12). 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 Patient's spouse, João Grubbs, also works full time (nurse at [address] [place] [address]). Patient was not using any medical equipment for ambulation. Patient was able to drive, manage finances, manage medication (was not previously taking any prescribed medications), complete errands, laundry, meal preparation, and housekeeping. Patient's spouse, João Grubbs, is also able to complete household responsibilities and will be patient's primary support at discharge. Patient's goal is to return home and to work as soon as possible.      Social/Functional History  Lives With: Spouse (3 children aged 15, 15, and 13 y)  Type of Home: House  Home Layout: Two level, Performs ADL's on one level, Able to Live on Main level with bedroom/bathroom  Entrance Stairs - Number of Steps: 4 steps with 2 rails that are too wide to use at same time  Bathroom Shower/Tub: Tub/Shower unit, Curtain  Bathroom Toilet: Handicap height  ADL Assistance: Independent  Homemaking Assistance: Independent  Ambulation Assistance: Independent  Transfer Assistance: Independent  Active : Yes  Mode of Transportation: Car, Truck  Occupation: Full time employment  Type of occupation: [employer]; [employer]  Additional Comments: I at [email or web address] no AD    Contact/Guardian Information: Sylvie oGnzalez [place], [phone]. Community Resources Utilized: No community resources utilized prior to admission. Sexuality/Intimacy: No issues or concerns identified at time of SW assessment. Complementary Health Approaches: Patient with no current interest in complementary health approaches at time of SW assessment. Anticipated Needs/Discharge Plans: Anticipate patient may benefit from continued therapy upon discharge, but will continue to assess during length of stay. SW met with patient to introduce self and explain role, complete SW assessment, and initiate discharge planning. Prior to hospitalization, patient was active and independent with ADL's and personal care. Patient lives with spouse, Juan Carlos Jackson, and three children (Sally-15, Farzaneh-14, and Fredy-12). 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 Patient's spouse, Juan Carlos Jackson, also works full time (nurse at [address] [place] [address]). Patient was not using any medical equipment for ambulation. Patient was able to drive, manage finances, manage medication (was not previously taking any prescribed medications), complete errands, laundry, meal preparation, and housekeeping. Patient's spouse, Juan Carlos Jackson, is also able to complete household responsibilities and will be patient's primary support at discharge. Patient's goal is to return home and to work as soon as possible. Anticipate patient may benefit from continued therapy upon discharge, but will continue to assess during length of stay. SW reviewed with patient team conference on Thursday, 09/23/2021, to further discuss progress and discharge recommendations. SW to follow and maintain involvement in discharge planning.             Discharge Planning  Living Arrangements: Spouse/Significant Other, Children  Support Systems: Spouse/Significant Other, Children, Family Members  Potential Assistance Needed: N/A  Potential Assistance Purchasing Medications: No  Meds-to-Beds: Does the patient want to have any new prescriptions delivered to bedside prior to discharge?: No (Patient prefers to use [address] on [facility] for discharge medications.)  Type of Home Care Services: None  Patient expects to be discharged to[de-identified] House  Expected Discharge Date:  (Undetermined)  Follow Up Appointment: Best Day/Time : Thursday AM    Electronically signed by LIZ Haque on 9/21/2021 at 11:53 AM

## 2021-09-21 NOTE — PLAN OF CARE
Individualized Plan of 1632 [facility] Inpatient Rehabilitation Unit    Rehabilitation physician: Dr. Raven Bradley Date: 9/20/2021     Rehabilitation Diagnosis: Critical illness myopathy [G72.81]      Rehabilitation impairments: self care, mobility, motor dysfunction, bowel/bladder management, pain management, safety and cognitive function    Factors facilitating achievement of predicted outcomes: Family support, Motivated, Cooperative, Pleasant and Good insight into deficits  Barriers to the achievement of predicted outcomes: Decreased endurance, Upper extremity weakness and Lower extremity weakness    Patient Goals: Improve independence with mobility, Improvement of mobility at a wheelchair level, Increase overall strength and endurance, Increase balance, Increase endurance, Increase independence with activities of daily living, Improve cognition, Increase self-awareness, Increase safety awareness, Increase community integration, Increase socialization, Functional communication with caregivers, Integrate appropriate pain management plan, Assure adequate nutritional option for discharge, Continence of bowel and bladder and Provide appropriate patient and family education      NURSING:  Nursing goals for [facility] while on the rehabilitation unit will include:  Continence of bowel and bladder, Adequate number of bowel movements, Urinate with no urinary retention >300ml in bladder, Complete bladder protocol with nichols removal, Maintain O2 SATs at an acceptable level during stay, Effective pain management while on the rehabilitation unit, Establish adequate pain control plan for discharge, Absence of skin breakdown while on the rehabilitation unit, Improved skin integrity via assessments including wound measurements, Avoidance of any hospital acquired infections, No signs/symptoms of infection at the wound site, Freedom from injury during hospitalization and Complete education with patient/family with understanding demonstrated regarding disease process and resultant impairment     In order to achieve these goals, nursing interventions may include bowel/bladder training, education for medical assistive devices, medication education, O2 saturation management, energy conservation, stress management techniques, fall prevention, alarms protocol, seating and positioning, skin/wound care, pressure relief instruction, dressing changes, infection protection, DVT prophylaxis, assistance with safe transfers , and/or assistance with bathroom activities and hygiene. PHYSICAL THERAPY:  Goals:        Short term goals  Time Frame for Short term goals: 1 week  Short term goal 1: Patient will complete sit < > stand and stand pivot transfer with modified independence to transfer safely. Short term goal 2: Patient will ascend/descend 4 steps with 1 hand rail and SBA to progress towards independent home entry. Short term goal 3: Patient will ambulate 76' with SBA and maintain O2 >/=92% to progress towards safe home navigation. Short term goal 4: Patient will demonstrate 5 second single leg stance bilaterally to improve balance and reduce fall risk. Long term goals  Time Frame for Long term goals : 2 weeks  Long term goal 1: Patient will ascend/descend 4 steps with 1 hand rail with modified independence. Long term goal 2: Patient will ambulate 80' with no AD and modified independence and maintain O2 >/=92% to navigate home safely. Long term goal 3: Patient will improve MARTINEZ to greater than or equal to 51/56 to reduce his risk for falls. Long term goal 4: Patient will complete car transfer with modified independence to transfer in/out of vehicle safely for transfer to home and appointments.     Plan of Care: Patient to be seen by physical therapy services 90 minutes per day Monday through Friday and 30 minutes on Saturday or Sunday    Anticipated interventions may include therapeutic exercises, gait training, neuromuscular re-ed, transfer training, community reintegration, bed mobility, w/c mobility and training. OCCUPATIONAL THERAPY:  Goals:                Patient goals : To return home independently, decrease burden of care on my family  Short term goals  Time Frame for Short term goals: 1 week  Short term goal 1: Pt will complete LB dressing tasks with set up only to improve indep with donning his work boots. Short term goal 2: Pt will tolerate dynamic standing >10 minutes with mod I to improve endurance needed for return to work. Short term goal 3: Pt will complete medium work IADL tasks with modified indendence with 0 vcs needed for thought organization or planning for eventual return to work. 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 Short term goal 5: Pt will demonstate good divided attention with 0 vcs needed during tasks for eventual return to driving. Long term goals  Time Frame for Long term goals : 1 week  Long term goal 1: Pt will complete BADL routine with modified independence and good use of ECT strategies to regain indep with self care at home. Long term goal 2: Pt will demonstate 2 ECT strategies to conserve his energy when returning to work. Plan of Care: Patient to be seen by occupational therapy services 90 minutes per day Monday through Friday and 30 minutes on Saturday or Sunday    Anticipated interventions may include ADL and IADL retraining, strengthening, safety education and training, patient/caregiver education and training, equipment evaluation/ training/procurement, neuromuscular reeducation, wheelchair mobility training.       SPEECH THERAPY:    PATIENT GOAL:    \"be normal again\"     SHORT TERM GOALS:  Short-term Goals  Timeframe for Short-term Goals: 1 week  Goal 1: Patient will complete higher level (6+ units) immediate/delayed recall and working memory tasks with 70% accuracy given min cues to improve retention of new and functional information. INTERVENTIONS: Delayed 10 min recall of 5-word list within evaluation - 5/5 indep     Goal 2: Patient will complete complex attention tasks with no more than 7 errors across a given activity in order to permit potential return to driving, occupational duties, and other multi-tasking responsibilities. INTERVENTIONS: Complex attention task initiated via use of newspaper article; patient instructed to identify one target throughout article while comprehending newly learned information well enough to provide detailed summary to follow. Time to complete - 4 minutes  Errors - 15 (identified 5 of 20 targets)  Summary - fair, lacking detail  *reduced divided and selective attention  *educated patient on rationale behind task related to daily executive functioning activities      Goal 3: Patient will complete higher level executive functioning tasks (finances, med management, occupational duties) with 80% accuracy given min cues to improve skills required for IADL completion.     Goal 4: Patient will complete high level thought organizational tasks (divergent naming, organization of multiple variables, scheduling) with 90% accuracy given min cues to improve skills required for IADL completion.        LONG TERM GOALS:  Long-term Goals  Timeframe for Long-term Goals: 2 weeks  Goal 1: Patient will improve cognitive functioning to a level of Mod I in order to permit potential return to PLOF in home setting.       Plan of Care: Pt to be seen by speech therapy services 30-60 minutes per day Monday through Friday . Anticipated interventions may include speech/language/communication therapy, cognitive training, group therapy, education, and/or dysphagia therapy based on the above goals.      CASE MANAGEMENT:  Goals:   Assist patient/family with discharge planning, patient/family counseling,  and coordination with insurance during the inpatient rehabilitation stay. Other members of the multidisciplinary rehabilitation team that will be involved in the patient's plan of care include recreational therapy, dietary, respiratory therapy, and neuropsychology. Medical issues being managed closely and that require 24 hour availability of a physician:  Swallowing precautions, Bowel/Bladder function, Weight bearing precautions, Wound care, Pain management, Infection protection, DVT prophylaxis, Fall precautions, Fluid/Electrolyte balance, Nutritional status, Respiratory needs and Anemia                                           Physician anticipated functional outcomes: Improved independence with functional measures   Estimated length of stay for this admission 5-7 days  Medical Prognosis: Good  Anticipated disposition: Home. The potential to achieve the above medical and rehabilitative goals is excellent. This plan of care has been developed with the assistance and input of the multidisciplinary rehabilitation team.  The plan was reviewed with the patient. The patient has had the opportunity to provide input to the therapy team.    I have reviewed this Individualized Plan of Care and agree with its contents. Above documentation has been expanded, modified, adjusted to reflect the findings of my evaluations and goals for the patient. Physician:   Aixa Pena M.D.

## 2021-09-21 NOTE — H&P
Physical Medicine & Rehabilitation   History and Physical    Chief Complaint and Reason for Rehabilitation Admission:   COVID-19. Rehab needs    History of Present Illness:  Ruby Verdugo  is a 48 y.o. male admitted to the inpatient rehabilitation unit on  9/20/2021; I have seen and evaluated the patient today, 9/21/21. He was originally admitted to [facility] on 8/30/2021. Patient with PMH of obesity. Patient presented to [facility] ER due to worsening SOB, cough, nasal congestion, loss of taste/smell and fatigue. Patient began experiencing symptoms about 4 days prior. Wife was +COVID. Patient was tested at urgent care and found to be +COVID and O2 sats 85%. Patient was brought to ER by family car from Urgent Care. CT Chest showed bilateral infiltrates. Patient continued to require more O2. He was treated with remdesivir, Decadron and subsequently tocilizumab. Patient required intubation with ventilation and ECMO on 9/1/21. Patient was found to have coinfection with Haemophilus influenza.  Patient initially treated with Rocephin. Patient transitioned to higher dosing of Rocephin. Antibiotics initiated 8/30/2021.  Organism is beta-lactamase positive.  Rocephin ineffective. Nebulized tobramycin was added on 9/5/2021 and patient was transitioned to [address] on 9/7/2021.  Repeat bronchoscopy completed 9/8/2021 showed significant reduction in mucus production.  Culture demonstrated airway colonization with Candida albicans. ECMO complete 9/10/21 and patient was extubated 9/13/21 and transitioned to low flow O2. Levaquin completed 9/14/2021.       Patient seen today, sitting up in chair. OT in to help patient get a shower. Patient with complaints of general weakness, denies increased weakness in one side over the other. He has been sleeping  \"OK\" and his bowels have been moving. He denied any pain.     Current Rehabilitation Assessments:  PT:      OBJECTIVE:  Range of Motion:  Right Lower Extremity: WFL  Left Lower Extremity: [facility]     Strength:  Right Lower Extremity: Impaired - hip flexion 4-/5, knee 4/5  Left Lower Extremity: Impaired - hip flexion 4-/5, knee 4/5     Balance:  Static Sitting Balance:  Independent  Static Standing Balance: Stand By Assistance  Dynamic Standing Balance: Stand By Assistance, Contact Guard Assistance   *Patient able to  object from ground from standing with CGA     Bed Mobility:  Rolling to Left: Independent   Rolling to Right: Independent   Supine to Sit: Modified Independent  Sit to Supine: Modified Independent      Transfers:  Sit to Stand: Stand By Assistance  Stand to Sit:Stand By Assistance  Stand Pivot:Stand By Assistance  Car:Contact Guard Assistance     Ambulation:  Contact Guard Assistance, progressing to SBA intermittently  Distance: 8', 50', 15', 20'  Surface: Level Tile  Device:No Device  Gait Deviations:  Slow Evelia, Decreased Step Length Bilaterally, Decreased Arm Swing, Decreased Gait Speed and Mild Path Deviations     Contact Guard Assistance  Distance: 10'  Surface: Ramp  Device:No Device  Gait Deviations:  Slow Evelia, Decreased Step Length Bilaterally, Decreased Arm Swing, Decreased Gait Speed and Decreased Heel Strike Bilaterally     Stairs:  Contact Guard Assistance  Number of Steps: 1x2  Height: 6\" step with No Device      Stairs:  Contact Guard Assistance  Number of Steps: 4  Height: 6\" step with One Handrail     Exercise:  Patient was guided in 1 set(s) 10 reps of exercise to both lower extremities. Standing: march, hip flexion, heel raise, hip extension, mini squats.   Exercises were completed for increased independence with functional mobility.     Functional Outcome Measures: Completed  Martinez Balance Score: 41/56  MARTINEZ BALANCE TEST SCORING   Score of < 45 indicates a greater risk of falling  41-56= low fall risk  21-40= medium fall risk (recommendation of walking with assist at all times)  0-20= high fall risk        ASSESSMENT:  Activity Tolerance:  Patient tolerance of  treatment: good. Patient very motivated, requiring cues to take rest breaks. Frequent rest breaks to recover. Pt presents with shortness of breath with O2 ranging 88-95% on room air and heart rate 103-133. Verbal cues for pursed lip breathing and slow breaths         OT:      COGNITION: Slow processing and decreased problem solving during ADL routine.     ADL:   Grooming: Stand By Assistance. Standing sink side to apply deodorant and washing face. Pt reports he was able to use nail clippers this am for nail care of B hands. Bathing: Stand By Assistance. SBA for balance standing to wash cherie area/bottom with 2 UE release. S for UB care and washing BLE above/below knees while seated- pt self iniated EC strategy to complete task while seated. Upper Extremity Dressing: Supervision. [place]/donning shirt overhead while seated. Lower Extremity Dressing: Stand By Assistance. SBA to manage clothing over hips in standing. Supervision threading BLE into undergarment/pants while seated.     BALANCE:  Sitting Balance:  Supervision. Standard chair  Standing Balance: Stand By Assistance. x2-3 minutes within ADLs     BED MOBILITY:  Not Tested     TRANSFERS:  Sit to Stand:  Stand By Assistance. Stand to Sit: Stand By Assistance. Comment: To/from various surfaces.     FUNCTIONAL MOBILITY:  Assistive Device: Rolling Walker   Assist Level:  Stand By Assistance. Distance:   Completed functional mobility to/from BR and household dstance within unit hallway at slow pace, no LOB noted. Pt requires no cues for walker safety- lenghty seated rest break after trial of mobility, mod fatigue noted.      ADDITIONAL ACTIVITIES:  Patient identified a personal goal to increase UB strength and improve overall endurance so they can complete their toilet & shower transfers; skilled edu on UE strengthening.  Completed BUE exercises x10 reps x1 set using mod resistance band in all joints/planes to skilled dysphagia treatment.       Advanced PO trials included: Thin liquid via cup and straw (x10), hard solids (bites of cookie w/ walnuts x/5)     Pt demonstrated successful labial seal, as well as adequate bolus formation/control and A-P movement. Pt also demonstrated timely swallow across liquid and solid PO trials, effective/timely mastication, and consistent clearance of the oral cavity. Pharyngeal phase of swallow appears WFL, however impairments cannot be ruled out without instrumentation. Recommended diet upgraded to regular diet with thin liquids with no additional skilled ST for dysphagia. Pt d/c as of 9/20/21. Please re-consult if swallow function and/or medical status declines. *Pt in agreement with d/c from dysphagia tx.      SHORT TERM GOAL #3:  Goal 3: Patient will complete functional and higher level carryover/working memory tasks (i.e. orientation, recall of phone numbers/messages/directions given, 5 units) with focus on compensatory memory strategies and 70% accuracy, mod cues for improved carryover of newly learned information. INTERVENTIONS:  STM:   Given 4 facts about ST to recall.   -pt immediate requested to write information down without cues/prompts. (immediate recall): 4/4 indep      (delayed recall): 15 minutes: 3/4 indep, 1/4 mod I additional time  *good success  *recommended more complex working memory and delayed recall     SHORT TERM GOAL #4:  Goal 4: Patient will complete higher level visuospatial reasoning/executive functioning tasks (i.e. medications, finances, visuospatial copying, etc) with 70% accuracy, mod cues for succesful return to IADL and work responsibilities post discharge.   INTERVENTIONS: Goal not addressed d/t focus on other goals this date     SHORT TERM GOAL #5:  Goal 5: Patient will complete mental flexibility/thought organization tasks (i.e. divergent naming, scheduling, time management, organization of multiple variables, flowers comprehension program) with 70% accuracy, mod cues for improved organization of complex material.  INTERVENTIONS:   Time management:  Pt required a complete moderately complex word problems with pictures. -2/6 MOD I via additional time, 2/6 MIN A via verbal cue, 2/6 MOD A via visual, verbal, and repetition cues. *Poor selective, sustainted, and alternating attention   *Improved success with verbal cues     SHORT TERM GOAL #6:  Goal 6: Patient will complete complex attention tasks (i.e. selective, alternating, divided) with no more than x3-4 errors/redirections within a 2 minute task for successful return to driving post discharge. INTERVENTIONS: Completed education referring to driving safety. Given increased difficulties with attention, generalized weakness, and suspected poor response times, Pt highly encourageed no driving until MD clearance and/or OT assessment of driving. Continue with Pt demonstrating poor retention regarding ST recommendations, stating \"I wouldn't drive to [place] or anything. I would just be driving to my parents house locally\". *reinforcement provided regarding safety and reaction times  *continued education, highly encouraged d/t poor comprehension and concern for decreased carry over of recommendations.     Long-Term Goals:    No LT goals recommended d/t anticipated short ELOS.      EDUCATION:  Learner: Patient  Education:  Reviewed diet and strategies, Reviewed ST goals and Plan of Care, Reviewed recommendations for follow-up and Education Related to [employer] and Wellness  Evaluation of Education: [employer] understanding     ASSESSMENT/PLAN:  Activity Tolerance:  Patient tolerance of  treatment: good. Pt making good progress towards goals. Assessment/Plan: Patient progressing toward established goals. Continues to require skilled care of licensed speech pathologist to progress toward achievement of established goals and plan of care. .            Past Medical History:  History reviewed.  No Substance and Sexual Activity    Alcohol use: Yes     Comment: social    Drug use: Never    Sexual activity: Yes     Partners: Female   Other Topics Concern    Not on file   Social History Narrative    Not on file     Social Determinants of Health     Financial Resource Strain:     Difficulty of Paying Living Expenses:    Food Insecurity:     Worried About Running Out of Food in the Last Year:     [address] in the Last Year:    Transportation Needs:     Lack of Transportation (Medical):  Lack of Transportation (Non-Medical):    Physical Activity:     Days of Exercise per Week:     Minutes of Exercise per Session:    Stress:     Feeling of Stress :    Social Connections:     Frequency of Communication with Friends and Family:     Frequency of Social Gatherings with Friends and Family:     Attends Sikhism Services:     Active Member of Clubs or Organizations:     Attends Club or Organization Meetings:     Marital Status:    Intimate Partner Violence:     Fear of Current or Ex-Partner:     Emotionally Abused:     Physically Abused:     Sexually Abused:      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  Lives with: wife (a nurse), two daughters, and one son (ages 13, 15, and 6)  Previous level of independence: independent  Type of Home: House  Home Layout: Two level, Performs ADL's on one level, Able to Live on Main level with bedroom/bathroom  Entrance Stairs - Number of Steps: 4 steps with 2 rails that are too wide to use at same time       ADL Assistance: Independent  Homemaking Assistance: Independent  Ambulation Assistance: Independent  Transfer Assistance: Independent     Active : Yes  Occupation: Full time employment as above  Additional Comments: I at PLOF no AD        Family History:   History reviewed. No pertinent family history.     Review of Systems:  CONSTITUTIONAL:  positive for  fatigue and malaise  EYES:  negative  HEENT:  negative  RESPIRATORY: positive for  dyspnea   CARDIOVASCULAR:  negative  GASTROINTESTINAL:  positive for dysphagia  GENITOURINARY:  voiding   SKIN:  negative  HEMATOLOGIC/LYMPHATIC:  negative  MUSCULOSKELETAL:  positive for  muscle weakness  NEUROLOGICAL:  positive for coordination problems, gait problems, tremor, dysphagia, weakness and cognitive concerns  BEHAVIOR/PSYCH:  positive for fatigue  System review otherwise negative    Physical Exam:  /74   Pulse 104   Temp 98.6 °F (37 °C) (Oral)   Resp 14   Ht 5' 10\" (1.778 m)   Wt 255 lb 2 oz (115.7 kg) Comment: standing  SpO2 96%   BMI 36.61 kg/m²   awake  Orientation:   person, place  Mood: within normal limits  Affect: calm  General appearance: Patient is well nourished, well developed, well groomed and in no acute distress     Memory:   Normal with conversation  Attention/Concentration: normal  Language:  abnormal - delayed responses, clear speech. Some processing difficulty.      Cranial Nerves:  cranial nerves appear intact  ROM:  abnormal - limited AROM due to weakness  Motor Exam:  Right deltoid 4- out of 5  Left deltoid 4- out of 5  Right biceps 4- out of 5  Left biceps 4- out of 5  Right triceps 3+ out of 5  Left triceps 3+ out of 5  Right finger flexion 3 out of 5  Left finger flexion 3 out of 5  Right Lower Extremity: Impaired - hip flexion 4-/5, knee 4/5  Left Lower Extremity: Impaired - hip flexion 4-/5, knee 4/5  Tone:  normal  Muscle bulk: decreased  Sensory:  Sensory intact  Coordination:   abnormal - tremor noted with finger to nose  Lungs:  No respiratory distress or cough noted.  No audible wheezing  Abdomen: soft     Skin: warm and dry, no rash or erythema  Peripheral vascular: Pulses: Normal upper and lower extremity pulses; Edema: trace          Diagnostics:  Recent Results (from the past 24 hour(s))   Hepatic function panel    Collection Time: 09/21/21  8:58 AM   Result Value Ref Range    Albumin 4.6 3.5 - 5.1 g/dL    Total Bilirubin 2.2 (H) 0.3 - 1.2 mg/dL    Bilirubin, Direct 0.4 (H) 0.0 - 0.3 mg/dL    Alkaline Phosphatase 60 38 - 126 U/L    AST 45 (H) 5 - 40 U/L    ALT 59 11 - 66 U/L    Total Protein 7.1 6.1 - 8.0 g/dL   Prealbumin    Collection Time: 09/21/21  8:58 AM   Result Value Ref Range    Prealbumin 32.7 20.0 - 40.0 mg/dl   Basic Metabolic Panel    Collection Time: 09/21/21  8:58 AM   Result Value Ref Range    Sodium 143 135 - 145 meq/L    Potassium 4.3 3.5 - 5.2 meq/L    Chloride 105 98 - 111 meq/L    CO2 23 23 - 33 meq/L    Glucose 93 70 - 108 mg/dL    BUN 17 7 - 22 mg/dL    CREATININE 0.8 0.4 - 1.2 mg/dL    Calcium 9.7 8.5 - 10.5 mg/dL   CBC auto differential    Collection Time: 09/21/21  8:58 AM   Result Value Ref Range    WBC 3.8 (L) 4.8 - 10.8 thou/mm3    RBC 4.16 (L) 4.70 - 6.10 mill/mm3    Hemoglobin 12.6 (L) 14.0 - 18.0 gm/dl    Hematocrit 40.4 (L) 42.0 - 52.0 %    MCV 97.1 (H) 80.0 - 94.0 fL    MCH 30.3 26.0 - 33.0 pg    MCHC 31.2 (L) 32.2 - 35.5 gm/dl    RDW-CV 16.8 (H) 11.5 - 14.5 %    RDW-SD 59.0 (H) 35.0 - 45.0 fL    Platelets 038 164 - 417 thou/mm3    MPV 9.7 9.4 - 12.4 fL    Seg Neutrophils 53.7 %    Lymphocytes 24.9 %    Monocytes 13.1 %    Eosinophils 6.0 %    Basophils 1.3 %    Immature Granulocytes 1.0 %    Segs Absolute 2.0 1 - 7 thou/mm3    Lymphocytes Absolute 0.9 (L) 1.0 - 4.8 thou/mm3    Monocytes Absolute 0.5 0.4 - 1.3 thou/mm3    Eosinophils Absolute 0.2 0.0 - 0.4 thou/mm3    Basophils Absolute 0.0 0.0 - 0.1 thou/mm3    Immature Grans (Abs) 0.04 0.00 - 0.07 thou/mm3    nRBC 0 /100 wbc   Anion Gap    Collection Time: 09/21/21  8:58 AM   Result Value Ref Range    Anion Gap 15.0 8.0 - 16.0 meq/L   Glomerular Filtration Rate, Estimated    Collection Time: 09/21/21  8:58 AM   Result Value Ref Range    Est, Glom Filt Rate >90 ml/min/1.73m2       Impression:  · Critical illness myopathy  · General muscle atrophy   · COVID 19 infection  · Acute hypoxemic respiratory failure  · ARDS  · Pneumonia: COVID and H. Influenza  · Sepsis  · Thrombocytopenia  · Anemia secondary to blood loss  · Obesity  Body mass index is 41.88 kg/m². Plan:   · Admit to the inpatient rehabilitation unit. The patint demonstrates good potential to participate in an inpatient rehabilitation program involving at least 3 hours per day, 5 days per week of intensive rehabilitation. Rehabilitation services will include PT, OT and SLP/RT in order to improve functional status prior to discharge. Family education and training will be completed. Equipment evaluations and recommendations will be completed as appropriate. · Rehabilitation nursing will be involved for bowel, bladder, skin, and pain management. Nursing will also provide education and training to patient and family. · Prophylaxis:  DVT: Lovenox 40 mg SQ bid. · GI: Pepcid 20 mg po q day  · Pain: Tylenol 650 mg po q 4 hours prn  · Nutrition:  Consultation to dietician for nutritional counseling and recommendations. Prealbumin will be checked on admission. · Bladder: per nursing  · Bowel: per nursing; continue /dulcolas suppository 10 mg pR q day prn; Colace 100 mg po bid prn; Glycolax 17 g po q day prn  ·  and case management consultations for coordination of care and discharge planning    The main medical problem(s) and comorbidities being actively managed by the physicians and requiring 24 hour rehabilitation nursing care during this stay include:    · Critical illness myopathy  · General muscle atrophy   · COVID 19 infection  · Acute hypoxemic respiratory failure  · ARDS  · Pneumonia: COVID and H. Influenza  · Sepsis  · Thrombocytopenia  · Anemia secondary to blood loss  Obesity  Body mass index is 41.88 kg/m².       The domains of functional impairment present in this patient which will require an intensive and interdisciplinary rehabilitation environment include self care, mobility, motor dysfunction, bowel/bladder management, pain management, safety and cognitive function. Estimated length of stay for this admission 5-7 days    Anticipated disposition: Home. The potential to achieve that is excellent. The post admission physician evaluation (EARNEST) is consistent with the pre-admission assessment. See above findings to reflect the elements required in the EARNEST. Patient's admitting condition is consistent with the findings of the preadmission assessment by the rehabilitation admissions coordinator.     Win Bone MD

## 2021-09-22 LAB
ANION GAP SERPL CALCULATED.3IONS-SCNC: 12 MEQ/L (ref 8–16)
BASOPHILS # BLD: 0.8 %
BASOPHILS ABSOLUTE: 0 THOU/MM3 (ref 0–0.1)
BUN BLDV-MCNC: 15 MG/DL (ref 7–22)
CALCIUM SERPL-MCNC: 9.6 MG/DL (ref 8.5–10.5)
CHLORIDE BLD-SCNC: 104 MEQ/L (ref 98–111)
CO2: 22 MEQ/L (ref 23–33)
CREAT SERPL-MCNC: 0.8 MG/DL (ref 0.4–1.2)
EOSINOPHIL # BLD: 5.1 %
EOSINOPHILS ABSOLUTE: 0.2 THOU/MM3 (ref 0–0.4)
ERYTHROCYTE [DISTWIDTH] IN BLOOD BY AUTOMATED COUNT: 16.8 % (ref 11.5–14.5)
ERYTHROCYTE [DISTWIDTH] IN BLOOD BY AUTOMATED COUNT: 60.1 FL (ref 35–45)
GFR SERPL CREATININE-BSD FRML MDRD: > 90 ML/MIN/1.73M2
GLUCOSE BLD-MCNC: 101 MG/DL (ref 70–108)
HCT VFR BLD CALC: 42.7 % (ref 42–52)
HEMOGLOBIN: 13.4 GM/DL (ref 14–18)
IMMATURE GRANS (ABS): 0.05 THOU/MM3 (ref 0–0.07)
IMMATURE GRANULOCYTES: 1.4 %
LYMPHOCYTES # BLD: 27.7 %
LYMPHOCYTES ABSOLUTE: 1 THOU/MM3 (ref 1–4.8)
MCH RBC QN AUTO: 30.9 PG (ref 26–33)
MCHC RBC AUTO-ENTMCNC: 31.4 GM/DL (ref 32.2–35.5)
MCV RBC AUTO: 98.6 FL (ref 80–94)
MONOCYTES # BLD: 11.9 %
MONOCYTES ABSOLUTE: 0.4 THOU/MM3 (ref 0.4–1.3)
NUCLEATED RED BLOOD CELLS: 0 /100 WBC
PLATELET # BLD: 188 THOU/MM3 (ref 130–400)
PMV BLD AUTO: 10 FL (ref 9.4–12.4)
POTASSIUM SERPL-SCNC: 4.1 MEQ/L (ref 3.5–5.2)
RBC # BLD: 4.33 MILL/MM3 (ref 4.7–6.1)
SEG NEUTROPHILS: 53.1 %
SEGMENTED NEUTROPHILS ABSOLUTE COUNT: 1.9 THOU/MM3 (ref 1.8–7.7)
SODIUM BLD-SCNC: 138 MEQ/L (ref 135–145)
WBC # BLD: 3.5 THOU/MM3 (ref 4.8–10.8)

## 2021-09-22 PROCEDURE — 97116 GAIT TRAINING THERAPY: CPT

## 2021-09-22 PROCEDURE — 6360000002 HC RX W HCPCS: Performed by: NURSE PRACTITIONER

## 2021-09-22 PROCEDURE — 36415 COLL VENOUS BLD VENIPUNCTURE: CPT

## 2021-09-22 PROCEDURE — 6370000000 HC RX 637 (ALT 250 FOR IP): Performed by: NURSE PRACTITIONER

## 2021-09-22 PROCEDURE — 1180000000 HC REHAB R&B

## 2021-09-22 PROCEDURE — 97130 THER IVNTJ EA ADDL 15 MIN: CPT

## 2021-09-22 PROCEDURE — 97110 THERAPEUTIC EXERCISES: CPT

## 2021-09-22 PROCEDURE — 99232 SBSQ HOSP IP/OBS MODERATE 35: CPT | Performed by: PHYSICAL MEDICINE & REHABILITATION

## 2021-09-22 PROCEDURE — 97129 THER IVNTJ 1ST 15 MIN: CPT

## 2021-09-22 PROCEDURE — 97535 SELF CARE MNGMENT TRAINING: CPT

## 2021-09-22 PROCEDURE — 97530 THERAPEUTIC ACTIVITIES: CPT

## 2021-09-22 PROCEDURE — 80048 BASIC METABOLIC PNL TOTAL CA: CPT

## 2021-09-22 PROCEDURE — 85025 COMPLETE CBC W/AUTO DIFF WBC: CPT

## 2021-09-22 RX ADMIN — ENOXAPARIN SODIUM 40 MG: 40 INJECTION SUBCUTANEOUS at 07:45

## 2021-09-22 RX ADMIN — ENOXAPARIN SODIUM 40 MG: 40 INJECTION SUBCUTANEOUS at 20:11

## 2021-09-22 RX ADMIN — METOPROLOL TARTRATE 25 MG: 25 TABLET, FILM COATED ORAL at 07:45

## 2021-09-22 RX ADMIN — METOPROLOL TARTRATE 25 MG: 25 TABLET, FILM COATED ORAL at 20:11

## 2021-09-22 RX ADMIN — POTASSIUM CHLORIDE 40 MEQ: 1500 TABLET, EXTENDED RELEASE ORAL at 07:45

## 2021-09-22 ASSESSMENT — PAIN SCALES - GENERAL
PAINLEVEL_OUTOF10: 0

## 2021-09-22 NOTE — PROGRESS NOTES
[facility] NOTE    Conference Date: 2021  Admit Date:  2021  7:14 PM  Patient Name: Arielle Houston    MRN: [id number]    : 1975  (48 y.o.)  Rehabilitation Admitting Diagnosis:  Critical illness myopathy [G72.81]  Referring Practitioner: KATHARINA Varghese CNP      CASE MANAGEMENT  Current issues/needs regarding patient and family discharge status: SW met with patient to introduce self and explain role, complete SW assessment, and initiate discharge planning. Prior to hospitalization, patient was active and independent with ADL's and personal care. Patient lives with spouse, Michael Cerna, and three children (Sally-15, Farzaneh-14, and Fredy-12). 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 Patient's spouse, Michael Cerna, also works full time (nurse at [address] [place] [address]). Patient was not using any medical equipment for ambulation. Patient was able to drive, manage finances, manage medication (was not previously taking any prescribed medications), complete errands, laundry, meal preparation, and housekeeping. Patient's spouse, Michael Cerna, is also able to complete household responsibilities and will be patient's primary support at discharge. Patient's goal is to return home and to work as soon as possible. Anticipate patient may benefit from continued therapy upon discharge, but will continue to assess during length of stay. SW reviewed with patient team conference on Thursday, 2021, to further discuss progress and discharge recommendations. SW to follow and maintain involvement in discharge planning. PHYSICAL THERAPY    Patient met 4/4 STG's and 1/4 LTG's. Pt with short length of stay, evaluated on , however has made good gains.   Patient progressed to sit  <> stand with modified independence, gait from SBA/CGA to supervision and increased distances and improved MARTINEZ from 41 to 56/56 indicating low fall risk. Patient also presenting with improved O2 with activity, however heart rate elevated. Patient would benefit from continued skilled PT services to allow pt to return to I PLOF and home safely. Equipment Needed: Yes  Other: pulse oximeter    SPEECH THERAPY  Patient has met 3/4 STGs and 0/1 LTG this reporting period. Patient has demonstrated improvements in the areas of immediate recall, delayed recall, working memory, executive functioning task, and thought organization tasks. Patient with improved accuracy with complex executive functioning and thought organization tasks; however, patient requiring extended time to complete tasks this date likely secondary to decreased processing speed and decreased attention. There have been no barriers to success thus far. Patient has been highly motivated to complete skilled ST services and is actively engaged and cooperative throughout sessions. At this time, recommend patient receive continued skilled ST services via outpatient setting with intermittent daily supervision. A driving evaluation is recommended at this time d/t impaired higher level attention noted. All aforementioned recommendations subject to change pending cognitive gains. OCCUPATIONAL THERAPY  Patient making slow gains towards rehab goals d/t short stay thus far. Pt currently able to complete ADL routine with SBA and functional transfers with SBA however patient does require increased time d/t decreased endurance and faitgue. Due to patient's performance deficits, patient would greatly benefit from continued occupational therapy for ADL remediation, endurance building, strengthening and energy conservation to return to prior level of functioning and safe return to home environment.   Other: patient denies shower chair but reports he has access to one if needed    RECREATIONAL THERAPY  Patient has been offered participation in recreational therapy activities and initiated  Is the patient appropriate for a stay in the functional apartment? no    Discharge Plan   Estimated Discharge Date: Tomorrow, 9/24/21  Destination: discharge home with supervision  Services at Discharge: Outpatient Speech Therapy 2x week and upper body HEP  Is patient appropriate for an outpatient driving evaluation? yes, possibly  Equipment at Discharge: Other: patient denies shower chair but reports he has access to one if needed  Other: pulse oximeter  Factors facilitating achievement of predicted outcomes: Family support, Motivated, Cooperative and Pleasant  Barriers to the achievement of predicted outcomes: Cognitive deficit, Limited safety awareness and Decreased endurance  Follow up with physiatrist? no      Team Members Present at Conference:  Case [facility], [address] [facility] OTR/L [address] [facility], [address] [place] [address]  Min Robbins M.S. [address]   Nurse:Lisa Faulkner RN  Psychologist: Simone Mims, PhD.    I approve the established interdisciplinary plan of care as documented within the medical record of [facility].     Trey Solis MD

## 2021-09-22 NOTE — PROGRESS NOTES
[address] [facility] THERAPY  [facility]  DAILY NOTE    TIME   SLP Individual Minutes  Time In: 930  Time Out: 1030  Minutes: 60  Timed Code Treatment Minutes: 60 Minutes       Date: 2021  Patient Name: Jaxon Baker      CSN: [id number]   : 1975  (48 y.o.)  Gender: male   Referring Physician: KATHARINA Shen - CNP  Attending Physician: Meenu Du MD  Diagnosis: Critical illness myopathy   Secondary Diagnosis: Higher level cognitive impairment  Precautions: Fall risk   Current Diet: Regular and Thin Liquids   Swallowing Strategies: Standard Universal Swallow Precautions  Date of Last MBS/FEES: Not Applicable    Pain:  No pain reported. Subjective:  Upon arrival to room, patient sitting upright in recliner, awake. Patient agreeable to complete skilled cognitive tx this date. Patient actively engaged, HIGHLY motivated to participate, and cooperative. Short-Term Goals:  SHORT TERM GOAL #1:  Goal 1: Patient will complete higher level (6+ units) immediate/delayed recall and working memory tasks with 70% accuracy given min cues to improve retention of new and functional information. INTERVENTIONS: Did not address this session d/t focus on other goals. SHORT TERM GOAL #2:  Goal 2: Patient will complete complex attention tasks with no more than 7 errors across a given activity in order to permit potential return to driving, occupational duties, and other multi-tasking responsibilities. INTERVENTIONS: Did not address this session d/t focus on other goals. SHORT TERM GOAL #3:  Goal 3: Patient will complete higher level executive functioning tasks (finances, med management, occupational duties) with 80% accuracy given min cues to improve skills required for IADL completion.   INTERVENTIONS:  [employer] - patient was provided with a list of 8 transactions and was instructed to organize transactions in chronological order and organization information accordingly into [employer]. Dates (chronological order): 8/8 independently  Check Number: 4/4 independently  Transaction Description: 8/8 independently  Deposit/Withdraw:  8/8 independently  Math computation: 8/8 independent  *Discussed \"Do and Review\" and limit distractions when completing financial management tasks to ensure accuracy and locate/fix errors should they arise. *excellent success with task   *good thought organization, sustained/alternating attention, and math computation with use of calculator  *task took patient ~20 minutes to complete     Money Management - [employer] Menu  8/8 independently   *required frequent repetitions of orders   *good utilization of pen and paper to organize information to complete math computation correctly   *good visual scanning of menu     SHORT TERM GOAL #4:  Goal 4: Patient will complete high level thought organizational tasks (divergent naming, organization of multiple variables, scheduling) with 90% accuracy given min cues to improve skills required for IADL completion. INTERVENTIONS:  Moderately Complex Scheduling Task - Tuesday Daily Schedule   Patient was given list of 6 plans with 4 guidelines (commute time, hours of operation, location, etc) and was prompted to schedule 6 plans in accordance with given guidelines. Plans: 6/6 independently  Guidelines followed: 2/4 independently, 2/4 given min cues   *good success with task   *required min cues to consider commute times and hours of operation to schedule 2/6 plans accordingly  *good reasoning, problem solving and thought organization. Long-Term Goals:  Timeframe for Long-term Goals: 2 weeks    LONG TERM GOAL #1:  Goal 1: Patient will improve cognitive functioning to a level of Mod I in order to permit potential return to PLOF in home setting.          Comprehension: 5 - Patient understands basic needs (hungry/hot/pain)  Expression: 5 - Expresses basic ideas/needs only

## 2021-09-22 NOTE — PROGRESS NOTES
[address] [address]  Recreational Therapy  Daily Note  [address]    Time Spent with Patient: 10 minutes    Date:  9/22/2021       Patient Name: Arielle Houston      MRN: [id number]      YOB: 1975 (48 y.o.)       Gender: male  Diagnosis: Critical Illness Myopathy  Referring Practitioner: KATHARINA Varghese CNP (Ordering)  Dr. Farrah Nguyen (Attending)    Patient enjoyed spending time with our pet therapy dogs.  Pt enjoyed petting our pet therapy dog Alexandra this am-affect bright and social-talked about his 2 dogs he has and his love for dogs-appreciative of visit    Electronically signed by: ORACIO Davila  Date: 9/22/2021

## 2021-09-22 NOTE — PROGRESS NOTES
[address] [address]  INPATIENT PHYSICAL THERAPY  PROGRESS NOTE  [address] [place] [address]    Time In: 1130  Time Out: 1230  Timed Code Treatment Minutes: 60 Minutes  Minutes: 60          Date: 2021  Patient Name: Abdirashid Yee,  Gender:  male        MRN: [id number]  : 1975  (48 y.o.)  Referral Date : 21  Referring Practitioner: KATHARINA Colbert CNP  Diagnosis: critical illness myopathy  Additional Pertinent Hx: Abdirashid Yee is a 53-year-old white male who presented to [facility] on 2021 with complaints of shortness of breath. He has a past medical history of lifetime non-smoker, obesity, but has no other medical history. He is a farmer by occupation. Per report he had presented to the emergency department after developing fever with cough chills and shortness of breath. He also states that he lost his taste and smell on 2021. He continued to progress with shortness of breath which worsened. He developed malaise. CT scan chest showed bilateral infiltrates. He initially was placed on low-flow oxygen but continued to progress to high flow. He was given remdesivir and Decadron. And subsequently he did receive tocilizumab. He continued to develop dyspnea and \"happy hypoxemia\" as he continued to worsen he did elect to be placed on the ventilator and the initiation of ECMO. On 2021 patient was cannulated and intubated. He initially underwent bilateral groin catheterization. He was later recannulated in the neck. He was decannulated on 9/10/2021. He did develop bacterial pneumonia with H influenza. He was started on Rocephin. He was transferred to [address] [place] [address] on 2021. Nebulized tobramycin was added on 2021 for 10 doses. Levaquin was completed on 2021. Patient was extubated on 2021. Pt admitted to  Rehab .      Prior Level of Function:  Lives With: Spouse (3 children aged 15, 15, and 13 y)  Type of Home: House  Home Layout: Two level, Performs ADL's on one level, Able to Live on Main level with bedroom/bathroom  Entrance Stairs - Number of Steps: 4 steps with 2 rails that are too wide to use at same time   Bathroom Shower/Tub: Tub/Shower unit, Curtain  Bathroom Toilet: Handicap height    ADL Assistance: Independent  Homemaking Assistance: Independent  Ambulation Assistance: Independent  Transfer Assistance: Independent  Active : Yes  Additional Comments: I at PLOF no AD    Restrictions/Precautions:  Restrictions/Precautions: General Precautions, Fall Risk  Position Activity Restriction  Other position/activity restrictions: Monitor O2 sats     SUBJECTIVE: Pt. Seated in BS chair upon arrival and pleasantly agrees to therapy session. PAIN: None indicated    Vitals: Oxygen: 94%-100%  Heart Rate: 105-125 BPM    OBJECTIVE:  Bed Mobility:  Not Tested    Transfers:  Sit to Stand: Modified Independent  Stand to Sit:Modified Independent  Stand Pivot:Modified Independent    Ambulation:  Supervision  Distance: 150' x 1, 225' x 1, multiple shorter distances  Surface: Level Tile  Device:No Device  Gait Deviations:  Slow Evelia, Decreased Step Length Bilaterally and Decreased Trunk Rotation    Stairs:  Stairs:  6\" steps. X 4 using One Handrail and Supervision. Balance:  Pt. Completed multiple standing dynamic balance activities with Narrow WANDER, Normal WANDER on level tile, on dynadisk and on balance board  and No UE support with Supervision, Stand By Assistance. Activity completed to improve balance, enhance functional mobility, and reduce risk of falls. Exercise:  Patient was guided in 1 set(s) 10 reps of exercise to both lower extremities. Seated marches, Seated hamstring curls, Seated heel/toe raises, Long arc quads, Seated isometric hip adduction and Seated abduction/adduction. Exercises were completed for increased independence with functional mobility.   Pt. Completed 8 minutes on Nu-Step machine on L2 utilizing Bilateral Lower Extremities to improve strength and endurance for improved functional mobility. Functional Outcome Measures: Completed  Martinez Balance Score: 56   MARTINEZ BALANCE TEST SCORING   Score of < 45 indicates a greater risk of falling  41-56= low fall risk  21-40= medium fall risk (recommendation of walking with assist at all times)  0-20= high fall risk        ASSESSMENT:  Assessment: Patient progressing toward established goals. PT ASSESSMENT: Patient met 4/4 STG's and 1/4 LTG's. Pt with short length of stay, evaluated on 9/21, however has made good gains. Patient progressed to sit  <> stand with modified independence, gait from SBA/CGA to supervision and increased distances and improved MARTINEZ from 41 to 56/56 indicating low fall risk. Patient also presenting with improved O2 with activity, however heart rate elevated. Patient would benefit from continued skilled PT services to allow pt to return to I OF and home safely. Activity Tolerance:  Patient tolerance of  treatment: good. Equipment Recommendations:Equipment Needed: Yes  Other: pulse oximeter  Discharge Recommendations:  Continue to assess pending progress    Plan: Times per week: 5x/wk 60min, 1x/wk 30min  Times per day: Once a day  Plan weeks: 2  Current Treatment Recommendations: Strengthening, Transfer Training, Endurance Training, Neuromuscular Re-education, Patient/Caregiver Education & Training, Balance Training, Gait Training, Home Exercise Program, Functional Mobility Training, Stair training, Safety Education & Training    Patient Education  Patient Education: Plan of Care, Health Promotion and Wellness Education, Home Safety Education    Goals:  Patient goals : go home  Short term goals  Time Frame for Short term goals: 1 week  Short term goal 1: Patient will complete sit < > stand and stand pivot transfer with modified independence to transfer safely.   GOAL MET, SEE LTG  Short term goal 2: Patient will ascend/descend 4 steps with 1 hand rail and SBA to progress towards independent home entry. GOAL MET, SEE LTG  Short term goal 3: Patient will ambulate 76' with SBA and maintain O2 >/=92% to progress towards safe home navigation. GOAL MET, SEE LTG  Short term goal 4: Patient will demonstrate 5 second single leg stance bilaterally to improve balance and reduce fall risk. GOAL MET, SEE LTG  Long term goals  Time Frame for Long term goals : 2 weeks  Long term goal 1: Patient will ascend/descend 4 steps with 1 hand rail with modified independence. GOAL NOT MET  Long term goal 2: Patient will ambulate 150' with no AD and modified independence and maintain O2 >/=92% to navigate home safely. GOAL NOT MET  Long term goal 3: Patient will improve MARTINEZ to greater than or equal to 51/56 to reduce his risk for falls. GOAL MET  Long term goal 4: Patient will complete car transfer with modified independence to transfer in/out of vehicle safely for transfer to home and appointments. NOT ATTEMPTED    Revised Short-Term Goals:    Short term goals  Time Frame for Short term goals: 1 week  Short term goal 1: Patient will complete sit < > stand and stand pivot transfer with modified independence to transfer safely. GOAL MET  Short term goal 2: Patient will ascend/descend 4 steps with 1 hand rail and SBA to progress towards independent home entry. GOAL MET, SEE LTG  Short term goal 3: Patient will ambulate 76' with SBA and maintain O2 >/=92% to progress towards safe home navigation. GOAL MET, SEE LTG  Short term goal 4: Patient will demonstrate 5 second single leg stance bilaterally to improve balance and reduce fall risk. GOAL MET, SEE LTG    Revised Long-Term Goals  Long term goals  Time Frame for Long term goals : 2 weeks  Long term goal 1: Patient will ascend/descend 4 steps with 1 hand rail with modified independence.   Long term goal 2: Patient will ambulate 150' with no AD and modified independence and maintain O2 >/=92% to navigate home safely. Long term goal 3: Patient will improve MARTINEZ to greater than or equal to 51/56 to reduce his risk for falls. GOAL MET  Long term goal 4: Patient will complete car transfer with modified independence to transfer in/out of vehicle safely for transfer to home and appointments. Following session, patient left in safe position with all fall risk precautions in place. Treatment and documentation completed by Lacy Linn PTA. Goal revision and assessment for progress note completed by Maya Yost, PT, DPT.

## 2021-09-22 NOTE — PROGRESS NOTES
[address] [place] [address]  [facility]  Occupational Therapy  Daily Note  Time:   Time In: 1400  Time Out: 1430  Timed Code Treatment Minutes: 30 Minutes  Minutes: 30    Date: 2021  Patient Name: Mini Granda,   Gender: male      Room: [facility]/068-A  MRN: [id number]  : 1975  (48 y.o.)  Referring Practitioner: Leopold Blamer, APRN - CNP (Ordering)  Dr. Genesis Ramírez (Attending)  Diagnosis: Critical Illness Myopathy  Additional Pertinent Hx: Mini Granda  is a 48 y.o. male admitted to the inpatient rehabilitation unit on  2021; I have seen and evaluated the patient today, 21. He was originally admitted to [address] [place] [address] on 2021. Patient with PMH of obesity. Patient presented to [facility] ER due to worsening SOB, cough, nasal congestion, loss of taste/smell and fatigue. Patient began experiencing symptoms about 4 days prior. Wife was +COVID. Patient was tested at urgent care and found to be +COVID and O2 sats 85%. Patient was brought to ER by family car from Urgent Care. CT Chest showed bilateral infiltrates. Patient continued to require more O2. He was treated with remdesivir, Decadron and subsequently tocilizumab. Patient required intubation with ventilation and ECMO on 21. Patient was found to have coinfection with Haemophilus influenza. Patient initially treated with Rocephin. Patient transitioned to higher dosing of Rocephin. Antibiotics initiated 2021. Organism is beta-lactamase positive. Rocephin ineffective. Nebulized tobramycin was added on 2021 and patient was transitioned to [address] on 2021. Repeat bronchoscopy completed 2021 showed significant reduction in mucus production. Culture demonstrated airway colonization with Candida albicans. ECMO complete 9/10/21 and patient was extubated 21 and transitioned to low flow O2. Levaquin completed 2021.  To IPR on 9/20.    Restrictions/Precautions:  Restrictions/Precautions: General Precautions, Fall Risk  Position Activity Restriction  Other position/activity restrictions: Monitor O2 sats     SUBJECTIVE: Patient in chair upon arrival     PAIN: no     Vitals: Vitals not assessed per clinical judgement, see nursing flowsheet    COGNITION: WFL    ADL:   No ADL's completed this session. Bronwyn Bueno BALANCE:  Sitting Balance:  Modified Independent. Standing Balance: Supervision. BED MOBILITY:  Not Tested    TRANSFERS:  Sit to Stand:  Supervision. Stand to Sit: Supervision. FUNCTIONAL MOBILITY:  Assistive Device: None  Assist Level:  Supervision. Distance: To and from therapy apartment     ADDITIONAL ACTIVITIES:  Patient completed IADL homemaking task this date of  Making mug cake - task was graded to challenge balance and endurance. Patient was able to retrieve all items from cabinets with supervision and no VCs for safety during the retrieval and transportation of items. Patient required increased time through problem solving converting tsp to TBSP and usage of microwave with selecting 1:10seconds correctly and a standing endurance of 10mins. ASSESSMENT:  Assessment: Patient making slow gains towards rehab goals d/t short stay thus far. Pt currently able to complete ADL routine with SBA and functional transfers with SBA however patient does require increased time d/t decreased endurance and faitgue. Due to patient's performance deficits, patient would greatly benefit from continued occupational therapy for ADL remediation, endurance building, strengthening and energy conservation to return to prior level of functioning and safe return to home environment. Activity Tolerance:  Patient tolerance of  treatment: good. Discharge Recommendations: Home with assist PRN, Outpatient OT   Equipment Recommendations:  Other: patient denies shower chair but reports he has access to one if needed  Plan: Times per week: 5x/wk for 90 min and 1x/wk for 30 min  Current Treatment Recommendations: Strengthening, Endurance Training, Patient/Caregiver Education & Training, Equipment Evaluation, Education, & procurement, Self-Care / ADL, Home Management Training, Balance Training, ROM    Patient Education  Patient Education: IADL's    Goals  Short term goals  Time Frame for Short term goals: 1 week  Short term goal 1: Pt will complete LB dressing tasks with set up only to improve indep with donning his work boots. Short term goal 2: Pt will tolerate dynamic standing >10 minutes with mod I to improve endurance needed for return to work. Short term goal 3: Pt will complete medium work IADL tasks with modified indendence with 0 vcs needed for thought organization or planning for eventual return to work. 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 Short term goal 5: Pt will demonstate good divided attention with 0 vcs needed during tasks for eventual return to driving. Long term goals  Time Frame for Long term goals : 1 week  Long term goal 1: Pt will complete BADL routine with modified independence and good use of ECT strategies to regain indep with self care at home. Long term goal 2: Pt will demonstate 2 ECT strategies to conserve his energy when returning to work. Following session, patient left in safe position with all fall risk precautions in place.

## 2021-09-22 NOTE — PROGRESS NOTES
[address] [place] [address]  Inpatient Rehabilitation  Occupational Therapy  Progress Note  Time:  Time In: 0830  Time Out: 0930  Timed Code Treatment Minutes: 60 Minutes  Minutes: 60    Date: 2021  Patient Name: Delisa Boykin,   Gender: male      Room: [facility]/068-A  MRN: [id number]  : 1975  (48 y.o.)  Referring Practitioner: KATHARINA Givens CNP (Ordering)  Dr. Iban Butler (Attending)  Diagnosis: Critical Illness Myopathy  Additional Pertinent Hx: Delisa Boykin  is a 48 y.o. male admitted to the inpatient rehabilitation unit on  2021; I have seen and evaluated the patient today, 21. He was originally admitted to [address] [place] [address] on 2021. Patient with PMH of obesity. Patient presented to [facility] ER due to worsening SOB, cough, nasal congestion, loss of taste/smell and fatigue. Patient began experiencing symptoms about 4 days prior. Wife was +COVID. Patient was tested at urgent care and found to be +COVID and O2 sats 85%. Patient was brought to ER by family car from Urgent Care. CT Chest showed bilateral infiltrates. Patient continued to require more O2. He was treated with remdesivir, Decadron and subsequently tocilizumab. Patient required intubation with ventilation and ECMO on 21. Patient was found to have coinfection with Haemophilus influenza. Patient initially treated with Rocephin. Patient transitioned to higher dosing of Rocephin. Antibiotics initiated 2021. Organism is beta-lactamase positive. Rocephin ineffective. Nebulized tobramycin was added on 2021 and patient was transitioned to [address] on 2021. Repeat bronchoscopy completed 2021 showed significant reduction in mucus production. Culture demonstrated airway colonization with Candida albicans. ECMO complete 9/10/21 and patient was extubated 21 and transitioned to low flow O2. Levaquin completed 2021. To IPR on .     Restrictions/Precautions:  Restrictions/Precautions: General Precautions, Fall Risk  Position Activity Restriction  Other position/activity restrictions: Monitor O2 sats    SUBJECTIVE: Patient in chair upon arrival agreeable to OT treatment     PAIN: no     Vitals: Oxygen: 98% throughout with activity   Heart Rate: 110-112 throughout with activity     COGNITION: WFL    ADL:   Grooming: Stand By Assistance. at sink to complete oral hygiene   Bathing: Stand By Assistance. standing in shower patient did not utilize shower chair   Upper Extremity Dressing: Modified Independent. Lower Extremity Dressing: Stand By Assistance. SBA when standing to pull up over hips, ambrose socks shoes underwear and shorts   Tub Transfer: Stand By Assistance. patient safely stepped over tub . Patient gathered all items needed for BADL routine with SBA transporting to/from shower room     BALANCE:  Sitting Balance:  Independent. Standing Balance: Stand By Assistance. BED MOBILITY:  Not Tested    TRANSFERS:   Sit to Stand:  Stand By Assistance. Stand to Sit: Stand By Assistance. FUNCTIONAL MOBILITY:  Assistive Device: None  Assist Level:  Stand By Assistance. Distance: To and from bathroom and To and from shower room     ASSESSMENT:  Activity Tolerance:  Patient tolerance of  treatment: good. Assessment: Assessment: Patient making slow gains towards rehab goals d/t short stay thus far. Pt currently able to complete ADL routine with SBA and functional transfers with SBA however patient does require increased time d/t decreased endurance and faitgue. Due to patient's performance deficits, patient would greatly benefit from continued occupational therapy for ADL remediation, endurance building, strengthening and energy conservation to return to prior level of functioning and safe return to home environment. Discharge Recommendations: Home with assist PRN, Outpatient OT  Equipment Recommendations:  Other: patient denies shower chair but reports he has access to one if

## 2021-09-22 NOTE — PROGRESS NOTES
[address] [place] THERAPY  [facility]  PROGRESS NOTE    TIME   SLP Individual Minutes  Time In: 1430  Time Out: 1500  Minutes: 30  Timed Code Treatment Minutes: 30 Minutes       Date: 2021  Patient Name: Ruby Verdugo      CSN: [id number]   : 1975  (48 y.o.)  Gender: male   Referring Physician: KATHARINA Jeronimo CNP  Attending Physician: Uri Morales MD  Diagnosis: Critical illness myopathy   Secondary Diagnosis: Higher level cognitive impairment  Precautions: Fall risk   Current Diet: Regular and Thin Liquids   Swallowing Strategies: Standard Universal Swallow Precautions  Date of Last MBS/FEES: Not Applicable    Pain:  No pain reported. Subjective:  Upon arrival to room, patient sitting upright in recliner, awake. Patient agreeable to complete skilled cognitive tx this date. Patient actively engaged, HIGHLY motivated to participate, and cooperative. Short-Term Goals:  SHORT TERM GOAL #1:  Goal 1: Patient will complete higher level (6+ units) immediate/delayed recall and working memory tasks with 70% accuracy given min cues to improve retention of new and functional information. - GOAL MET; REVISE   REVISED Goal 1: Patient will complete higher level (6+ units) immediate/delayed recall and working memory tasks with 90% accuracy given min cues to improve retention of new and functional information.   INTERVENTIONS:  Mental Manipulation - 3 units - Alphabetical Order  5/5 trials independently when given 3 repetitions of list      4/5 trials independently, 1/4 trials given min cues when given 2 repetitions   *good success with task this date  *patient forget word in list x1 this session and demonstrated recall of word when given min phonemic cue    SHORT TERM GOAL #2:  Goal 2: Patient will complete complex attention tasks with no more than 7 errors across a given activity in order to permit potential return to driving, occupational duties, and other multi-tasking responsibilities. - GOAL NOT MET; CONTINUE   INTERVENTIONS:  Divided Attention - Newspaper Task  Patient was prompted to [place] target word (the) while simultaneously reading article for comprehension in order to provided detailed summary. Target Word: 4/13 independently, missed 9/13  Summary: good summary  *poor divided attention noted within task this date  *adequate sustained/selective attention noted with distractions present in hallway      [facility] [address] #3:  Goal 3: Patient will complete higher level executive functioning tasks (finances, med management, occupational duties) with 80% accuracy given min cues to improve skills required for IADL completion. - GOAL MET; REVISE  REVISED Goal 3: Patient will complete higher level executive functioning tasks (finances, med management, occupational duties) with 90% accuracy given min cues to improve skills required for IADL completion. INTERVENTIONS: Did not address this session d/t focus on other goals. AM Session  [employer] - patient was provided with a list of 8 transactions and was instructed to organize transactions in chronological order and organization information accordingly into "[employer]. Dates (chronological order): 8/8 independently  Check Number: 4/4 independently  Transaction Description: 8/8 independently  Deposit/Withdraw:  8/8 independently  Math computation: 8/8 independent  *Discussed \"Do and Review\" and limit distractions when completing financial management tasks to ensure accuracy and locate/fix errors should they arise.    *excellent success with task   *good thought organization, sustained/alternating attention, and math computation with use of calculator  *task took patient ~20 minutes to complete     Money Management - [employer] Menu  8/8 independently   *required frequent repetitions of orders   *good utilization of pen and paper to organize information to complete math computation correctly   *good visual scanning of menu     SHORT TERM GOAL #4:  Goal 4: Patient will complete high level thought organizational tasks (divergent naming, organization of multiple variables, scheduling) with 90% accuracy given min cues to improve skills required for IADL completion. - GOAL MET; REVISE   REVISED Goal 4: Patient will complete high level thought organizational tasks (divergent naming, organization of multiple variables, scheduling) with 85% accuracy at a MOD I level to improve skills required for IADL completion. INTERVENTIONS: Did not address this session d/t focus on other goals     AM Session  Moderately Complex Scheduling Task - Tuesday Daily Schedule   Patient was given list of 6 plans with 4 guidelines (commute time, hours of operation, location, etc) and was prompted to schedule 6 plans in accordance with given guidelines. Plans: 6/6 independently  Guidelines followed: 2/4 independently, 2/4 given min cues   *good success with task   *required min cues to consider commute times and hours of operation to schedule 2/6 plans accordingly  *good reasoning, problem solving and thought organization.        Long-Term Goals:  Timeframe for Long-term Goals: 2 weeks    LONG TERM GOAL #1:  Goal 1: Patient will improve cognitive functioning to a level of Mod I in order to permit potential return to PLOF in home setting. - ONGOING       ST FIM ASSESSMENT:     Admission score Current score Goal Status   COMMUNICATION 5 - Patient understands basic needs (hungry/hot/pain)   6 - Complex ideas 90% or device (hearing aid or glasses- if patient is primarily a visual learner)   Progressing   EXPRESSION 5 - Expresses basic ideas/needs only (hungry/hot/pain)     6 - Device used to express complex ideas/needs   Progressing   SOCIAL INTERACTION 5 - Patient is appropriate with supervision/cues   6 - Patient requires medication for mood and/or effect   Progressing   PROBLEM SOLVING 4 - Patient solves simple/routine Activity Tolerance:  Patient tolerance of  treatment: good. Excellent engagement and motivation      Assessment/Plan: Patient progressing toward established goals. Continues to require skilled care of licensed speech pathologist to progress toward achievement of established goals and plan of care.      Plan for Next Session: Immediate/Delayed Recall, Medication Management, Navigation Tasks, Complex Attention     [facility]) [address]

## 2021-09-22 NOTE — PROGRESS NOTES
Physical Medicine & Rehabilitation  Progress Note                Pee Cooper MD      Chief Complaint:  Status post COVID-19. Rehab needs    Subjective:  Patient seen on rounds in his room this morning. Was working with Speech therapy with calculations and other math problems. He stated he slept well last night and that his bowels have been moving. His initial team conference will be tomorrow morning. Making very good progress. Rehabilitation:  PT: Reviewed:    OBJECTIVE:  Bed Mobility:  Not Tested     Transfers:  Sit to Stand: Stand By Assistance  Stand to Sit:Stand By Assistance   5X Sit-to-Stand Test: 19 seconds                Age Bracket         Time (sec)               61-75 yo          11.4               66-78 yo          12.6               80-79 yo          14.8         Fall Risk:  · Geriatrics               Need for further assessment of fall risk greater or equal to 12 sec              Recurrent fall and frailty > 15 sec   · Vestibular Disorders              Fall risk > 15 sec  · Parkinson's Disease              Fall risk > 16 sec      Ambulation:  Stand By Assistance  Distance: 200' x 2, various smaller distances -- several pauses to increase O2 saturation and decrease HR   Surface: Level Tile  Device:No Device  Gait Deviations:  Slow Evelia and Decreased Gait Speed  *education on pacing with HR and target HRs     Balance:  Static Sitting Balance:  Independent  Dynamic Sitting Balance: Independent  Static Standing Balance: Supervision  Dynamic Standing Balance: Stand By Assistance   *lateral walking over six 6\" hurdles each direction x 2 sets in parallel bars with SBA--decreased foot clearance on a few reps due to increased speed   *forward ambulation through wooden ladder--good clearance--did require HHA     Exercise:  Patient was guided in 1 set(s) 10 reps of exercise to both lower extremities.   Standing heel/toe raises, Standing marches, Standing hip abduction/adduction, Standing hip flexion and Mini squats. Exercises were completed for increased independence with functional mobility.     Functional Outcome Measures: Not completed     ASSESSMENT:  Assessment: Patient progressing toward established goals. Activity Tolerance:  Patient tolerance of  treatment: fair. Several rest breaks due to decreased endurance. Equipment Recommendations:Equipment Needed: Yes  Other: pulse oximeter  Discharge Recommendations:  Continue to assess pending progress       OT: Reviewed:    ADL:   Grooming: Stand By Assistance. at sink to complete oral hygiene   Bathing: Stand By Assistance. standing in shower patient did not utilize shower chair   Upper Extremity Dressing: Modified Independent. Lower Extremity Dressing: Stand By Assistance. SBA when standing to pull up over hips, ambrose socks shoes underwear and shorts   Tub Transfer: Stand By Assistance. patient safely stepped over tub . Patient gathered all items needed for BADL routine with SBA transporting to/from shower room      BALANCE:  Sitting Balance:  Independent. Standing Balance: Stand By Assistance.       BED MOBILITY:  Not Tested     TRANSFERS:   Sit to Stand:  Stand By Assistance. Stand to Sit: Stand By Assistance.       FUNCTIONAL MOBILITY:  Assistive Device: None  Assist Level:  Stand By Assistance. Distance: To and from bathroom and To and from shower room      ASSESSMENT:  Activity Tolerance:  Patient tolerance of  treatment: good. Assessment: Assessment: Patient making slow gains towards rehab goals d/t short stay thus far. Pt currently able to complete ADL routine with SBA and functional transfers with SBA however patient does require increased time d/t decreased endurance and faitgue.  Due to patient's performance deficits, patient would greatly benefit from continued occupational therapy for ADL remediation, endurance building, strengthening and energy conservation to return to prior level of functioning and safe return to was instructed to organize transactions in chronological order and organization information accordingly into [email or web address]. Dates (chronological order): 8/8 independently  Check Number: 4/4 independently  Transaction Description: 8/8 independently  Deposit/Withdraw:  8/8 independently  Math computation: 8/8 independent  *Discussed \"Do and Review\" and limit distractions when completing financial management tasks to ensure accuracy and locate/fix errors should they arise. *excellent success with task   *good thought organization, sustained/alternating attention, and math computation with use of calculator  *task took patient ~20 minutes to complete      Money Management - [employer] Menu  8/8 independently   *required frequent repetitions of orders   *good utilization of pen and paper to organize information to complete math computation correctly   *good visual scanning of menu     SHORT TERM GOAL #4:  Goal 4: Patient will complete high level thought organizational tasks (divergent naming, organization of multiple variables, scheduling) with 90% accuracy given min cues to improve skills required for IADL completion. INTERVENTIONS:  Moderately Complex Scheduling Task - Tuesday Daily Schedule   Patient was given list of 6 plans with 4 guidelines (commute time, hours of operation, location, etc) and was prompted to schedule 6 plans in accordance with given guidelines.    Plans: 6/6 independently  Guidelines followed: 2/4 independently, 2/4 given min cues   *good success with task   *required min cues to consider commute times and hours of operation to schedule 2/6 plans accordingly  *good reasoning, problem solving and thought organization.         Long-Term Goals:  Timeframe for Long-term Goals: 2 weeks     LONG TERM GOAL #1:  Goal 1: Patient will improve cognitive functioning to a level of Mod I in order to permit potential return to OF in home setting.        Comprehension: 5 - Patient understands basic needs (hungry/hot/pain)  Expression: 5 - Expresses basic ideas/needs only (hungry/hot/pain)  Social Interaction: 5 - Patient is appropriate with supervision/cues  Problem Solvin - Patient solves simple/routine tasks 75-90%+   Memory: 5 - Patient requires prompting with stress/unfamiliar situations     EDUCATION:  Learner: Patient  Education:  Reviewed ST goals and Plan of Care, Reviewed recommendations for follow-up, Education Related to Potential Risks and Complications Due to Impairment/Illness/Injury and Education Related to [employer] and Wellness  Evaluation of Education: Verbalizes understanding and Demonstrates with assistance     ASSESSMENT/PLAN:  Activity Tolerance:  Patient tolerance of  treatment: good. Excellent engagement and motivation      Assessment/Plan: Patient progressing toward established goals. Continues to require skilled care of licensed speech pathologist to progress toward achievement of established goals and plan of care. .     Plan for Next Session: Immediate/Delayed Recall, Medication Management, Attention     Review of Systems -   CONSTITUTIONAL:  positive for  fatigue and malaise  EYES:  negative  HEENT:  negative  RESPIRATORY:  positive for  dyspnea   CARDIOVASCULAR:  negative  GASTROINTESTINAL:  positive for dysphagia  GENITOURINARY:  voiding   SKIN:  negative  HEMATOLOGIC/LYMPHATIC:  negative  MUSCULOSKELETAL:  positive for  muscle weakness  NEUROLOGICAL:  positive for coordination problems, gait problems, tremor, dysphagia, weakness and cognitive concerns  BEHAVIOR/PSYCH:  positive for fatigue  System review otherwise negative    Objective:  /80   Pulse 114   Temp 97.9 °F (36.6 °C) (Oral)   Resp 16   Ht 5' 10\" (1.778 m)   Wt 255 lb 2 oz (115.7 kg) Comment: standing  SpO2 98%   BMI 36.61 kg/m²   awake  Orientation:   person, place  Mood: within normal limits  Affect: calm  General appearance: Patient is well nourished, well developed, well groomed and in no acute distress     Memory:   Normal with conversation  Attention/Concentration: normal  Language:  abnormal - delayed responses, clear speech. Some processing difficulty.      Cranial Nerves:  cranial nerves appear intact  ROM:  abnormal - limited AROM due to weakness  Motor Exam:  Right deltoid 4- out of 5  Left deltoid 4- out of 5  Right biceps 4- out of 5  Left biceps 4- out of 5  Right triceps 3+ out of 5  Left triceps 3+ out of 5  Right finger flexion 3 out of 5  Left finger flexion 3 out of 5  Right Lower Extremity: Impaired - hip flexion 4-/5, knee 4/5  Left Lower Extremity: Impaired - hip flexion 4-/5, knee 4/5  Tone:  normal  Muscle bulk: decreased  Sensory:  Sensory intact  Coordination:   abnormal - tremor noted with finger to nose  Lungs:  No respiratory distress or cough noted.  No audible wheezing  Abdomen: soft     Skin: warm and dry, no rash or erythema  Peripheral vascular: Pulses: Normal upper and lower extremity pulses; Edema: trace    Diagnostics:   Recent Results (from the past 24 hour(s))   Basic Metabolic Panel    Collection Time: 09/22/21  8:06 AM   Result Value Ref Range    Sodium 138 135 - 145 meq/L    Potassium 4.1 3.5 - 5.2 meq/L    Chloride 104 98 - 111 meq/L    CO2 22 (L) 23 - 33 meq/L    Glucose 101 70 - 108 mg/dL    BUN 15 7 - 22 mg/dL    CREATININE 0.8 0.4 - 1.2 mg/dL    Calcium 9.6 8.5 - 10.5 mg/dL   CBC auto differential    Collection Time: 09/22/21  8:06 AM   Result Value Ref Range    WBC 3.5 (L) 4.8 - 10.8 thou/mm3    RBC 4.33 (L) 4.70 - 6.10 mill/mm3    Hemoglobin 13.4 (L) 14.0 - 18.0 gm/dl    Hematocrit 42.7 42.0 - 52.0 %    MCV 98.6 (H) 80.0 - 94.0 fL    MCH 30.9 26.0 - 33.0 pg    MCHC 31.4 (L) 32.2 - 35.5 gm/dl    RDW-CV 16.8 (H) 11.5 - 14.5 %    RDW-SD 60.1 (H) 35.0 - 45.0 fL    Platelets 443 968 - 334 thou/mm3    MPV 10.0 9.4 - 12.4 fL    Seg Neutrophils 53.1 %    Lymphocytes 27.7 %    Monocytes 11.9 %    Eosinophils 5.1 %    Basophils 0.8 %    Immature Granulocytes 1.4 %    Segs

## 2021-09-22 NOTE — PROGRESS NOTES
Measures:  · Height: 5' 10\" (177.8 cm)  · Current Body Weight: 255 lb 2 oz (115.7 kg) (9/21 trace edema)   · Admission Body Weight: 255 lb 2 oz (115.7 kg) (9/21 trace edema)    · Usual Body Weight:  (per EMR: 8/31/21: 263# 7.2 oz; 9/17/21: 251# (trace, +1 edema))     · Ideal Body Weight: 166 lbs;      · BMI: 36.6  · BMI Categories: Obese Class 2 (BMI 35.0 -39.9)       Nutrition Diagnosis:   · Inadequate oral intake related to catabolic illness as evidenced by  (recent poor appetite)      Nutrition Interventions:   Food and/or Nutrient Delivery:  Continue Current Diet, Modify Oral Nutrition Supplement  Nutrition Education/Counseling:  Education initiated (9/22 Encouraged po, good nutrition at best efforts. Encouraged protein sources; ONS as needed.)   Coordination of Nutrition Care:  Continue to monitor while inpatient    Goals:  Pt. will consume 75% or more of meals during LOS. Nutrition Monitoring and Evaluation:   Behavioral-Environmental Outcomes:  None Identified   Food/Nutrient Intake Outcomes:  Diet Advancement/Tolerance, Food and Nutrient Intake, Supplement Intake  Physical Signs/Symptoms Outcomes:  Biochemical Data, Chewing or Swallowing, GI Status, Fluid Status or Edema, Nutrition Focused Physical Findings, Skin, Weight     Discharge Planning:     Too soon to determine     Electronically signed by Gómez Peñaloza RD, LD on 9/22/21 at 2:00 PM EDT    Contact: [phone]

## 2021-09-23 PROCEDURE — 97530 THERAPEUTIC ACTIVITIES: CPT

## 2021-09-23 PROCEDURE — 97129 THER IVNTJ 1ST 15 MIN: CPT

## 2021-09-23 PROCEDURE — 6370000000 HC RX 637 (ALT 250 FOR IP): Performed by: PHYSICAL MEDICINE & REHABILITATION

## 2021-09-23 PROCEDURE — 97110 THERAPEUTIC EXERCISES: CPT

## 2021-09-23 PROCEDURE — 6370000000 HC RX 637 (ALT 250 FOR IP): Performed by: NURSE PRACTITIONER

## 2021-09-23 PROCEDURE — 6360000002 HC RX W HCPCS: Performed by: NURSE PRACTITIONER

## 2021-09-23 PROCEDURE — 97116 GAIT TRAINING THERAPY: CPT

## 2021-09-23 PROCEDURE — 1180000000 HC REHAB R&B

## 2021-09-23 PROCEDURE — 97535 SELF CARE MNGMENT TRAINING: CPT

## 2021-09-23 PROCEDURE — 99233 SBSQ HOSP IP/OBS HIGH 50: CPT | Performed by: PHYSICAL MEDICINE & REHABILITATION

## 2021-09-23 PROCEDURE — 97130 THER IVNTJ EA ADDL 15 MIN: CPT

## 2021-09-23 RX ORDER — CALCIUM POLYCARBOPHIL 625 MG 625 MG/1
625 TABLET ORAL DAILY
Refills: 0 | COMMUNITY
Start: 2021-09-24 | End: 2022-07-20

## 2021-09-23 RX ORDER — AMMONIUM LACTATE 12 G/100G
LOTION TOPICAL
COMMUNITY
Start: 2021-09-23 | End: 2022-07-20 | Stop reason: CLARIF

## 2021-09-23 RX ORDER — CALCIUM POLYCARBOPHIL 625 MG 625 MG/1
625 TABLET ORAL DAILY
Status: DISCONTINUED | OUTPATIENT
Start: 2021-09-23 | End: 2021-09-24 | Stop reason: HOSPADM

## 2021-09-23 RX ADMIN — POTASSIUM CHLORIDE 40 MEQ: 1500 TABLET, EXTENDED RELEASE ORAL at 07:47

## 2021-09-23 RX ADMIN — METOPROLOL TARTRATE 25 MG: 25 TABLET, FILM COATED ORAL at 21:20

## 2021-09-23 RX ADMIN — METOPROLOL TARTRATE 25 MG: 25 TABLET, FILM COATED ORAL at 07:46

## 2021-09-23 RX ADMIN — ENOXAPARIN SODIUM 40 MG: 40 INJECTION SUBCUTANEOUS at 21:20

## 2021-09-23 RX ADMIN — ENOXAPARIN SODIUM 40 MG: 40 INJECTION SUBCUTANEOUS at 07:46

## 2021-09-23 RX ADMIN — CALCIUM POLYCARBOPHIL 625 MG: 625 TABLET, FILM COATED ORAL at 12:34

## 2021-09-23 ASSESSMENT — PAIN SCALES - GENERAL
PAINLEVEL_OUTOF10: 0

## 2021-09-23 NOTE — CONSULTS
Department of Family Practice  Consult Note        Reason for Consult:  Medical management while on the Inpatient Rehab unit. Requesting Physician:  Dr Radha Lara:   The need to continue the intensive time with therapies following the acute hospital stay. History Obtained From:  patient, EMR    HISTORY OF PRESENT ILLNESS:              The patient is a 48 y.o. male with significant past medical history of History reviewed. No pertinent past medical history. who presents with a recent admission for Covid 19. His case was severe and required external oxygenation. He was able to survive the acute illness but was initially markedly weakened. He now has come to the Inpatient Rehab Unit to continue the time with therapies prior to a discharge disposition being made. Past Medical History:    History reviewed. No pertinent past medical history.   Past Surgical History:        Procedure Laterality Date    ABDOMEN SURGERY      naval hernia removal    CHOLECYSTECTOMY       Current Medications:   Current Facility-Administered Medications: ammonium lactate (LAC-HYDRIN) 12 % lotion, , Topical, BID PRN  docusate sodium (COLACE) capsule 100 mg, 100 mg, Oral, BID PRN  albuterol (PROVENTIL) nebulizer solution 2.5 mg, 2.5 mg, Nebulization, Q4H PRN  magnesium sulfate 2000 mg in 50 mL IVPB premix, 2,000 mg, IntraVENous, PRN  sodium chloride flush 0.9 % injection 5-40 mL, 5-40 mL, IntraVENous, PRN  dextrose 5 % solution, 100 mL/hr, IntraVENous, PRN  dextrose 50 % IV solution, 12.5 g, IntraVENous, PRN  acetaminophen (TYLENOL) tablet 650 mg, 650 mg, Oral, Q4H PRN  polyethylene glycol (GLYCOLAX) packet 17 g, 17 g, Oral, Daily PRN  potassium chloride (KLOR-CON M) extended release tablet 40 mEq, 40 mEq, Oral, PRN **OR** potassium bicarb-citric acid (EFFER-K) effervescent tablet 40 mEq, 40 mEq, Oral, PRN **OR** potassium chloride 10 mEq/100 mL IVPB (Peripheral Line), 10 mEq, IntraVENous, PRN  bisacodyl (DULCOLAX)

## 2021-09-23 NOTE — PROGRESS NOTES
[address] [address]  Recreational Therapy  Daily Note  [address]    Time Spent with Patient: 25 minutes    Date:  9/23/2021       Patient Name: Rex Kirby      MRN: [id number]      YOB: 1975 (48 y.o.)       Gender: male  Diagnosis: Critical Illness Myopathy  Referring Practitioner: KATHARINA Abraham CNP (Ordering)  Dr. Stefani Shanks (Attending)    RESTRICTIONS/PRECAUTIONS:  Restrictions/Precautions: General Precautions, Fall Risk  Vision: Impaired  Hearing: Within functional limits    PAIN: 0-no c/o pain     SUBJECTIVE:  I am ready    OBJECTIVE:  Sit to stand from recliner with S-ambulated with no AD and S to the recreational therapy room where he enjoyed getting his hair washed and cut by our beautician-affect bright and social-he and beautician knew a lot of the same people-ambulated back to his room with [id number] in hopes of going home tomorrow          Patient Education  New Education Provided: Importance of Leisure, Transfer technique    Electronically signed by: ORACIO Chin  Date: 9/23/2021

## 2021-09-23 NOTE — PROGRESS NOTES
Patient: Elsa Carlyle  Unit/Bed: 3C-26/383-F  YOB: 1975  MRN: [id number] Acct: [de-identified]   Admitting Diagnosis: Critical illness myopathy [G72.81]  Admit Date:  9/20/2021  Hospital Day: 3    Assessment:     Active Problems:    Critical illness myopathy  Resolved Problems:    * No resolved hospital problems. *      Plan:     Stable for discharge tomorrow        Subjective:     Patient has no complaint of CP or SOB. .   Medication side effects: none    Scheduled Meds:   polycarbophil  625 mg Oral Daily    enoxaparin  40 mg SubCUTAneous BID    metoprolol tartrate  25 mg Oral BID    potassium chloride  40 mEq Oral Daily with breakfast     Continuous Infusions:   dextrose       PRN Meds:ammonium lactate, albuterol, magnesium sulfate, sodium chloride flush, dextrose, dextrose, acetaminophen, polyethylene glycol, potassium chloride **OR** potassium alternative oral replacement **OR** potassium chloride, bisacodyl, artificial tears, ondansetron    Review of Systems  Pertinent items are noted in HPI. Objective:     Patient Vitals for the past 8 hrs:   BP Temp Temp src Pulse Resp SpO2 Weight   09/23/21 1145 126/80 98 °F (36.7 °C) Oral 107 24 96 % --   09/23/21 0730 118/78 98.8 °F (37.1 °C) Oral 115 25 96 % --   09/23/21 0555 -- -- -- -- -- -- 256 lb 4.8 oz (116.3 kg)     I/O last 3 completed shifts:   In: 400 [P.O.:400]  Out: -   I/O this shift:  In: 300 [P.O.:300]  Out: -     /80   Pulse 107   Temp 98 °F (36.7 °C) (Oral)   Resp 24   Ht 5' 10\" (1.778 m)   Wt 256 lb 4.8 oz (116.3 kg)   SpO2 96%   BMI 36.78 kg/m²     General appearance: alert, appears stated age and cooperative  Head: Normocephalic, without obvious abnormality, atraumatic  Lungs: clear to auscultation bilaterally  Chest wall: no tenderness  Heart: regular rate and rhythm, S1, S2 normal, no murmur, click, rub or gallop  Abdomen: soft, non-tender; bowel sounds normal; no masses,  no organomegaly  Extremities: extremities normal, atraumatic, no cyanosis or edema  Skin: Skin color, texture, turgor normal. No rashes or lesions  Neurologic: Grossly normal      Electronically signed by Lucy Reyes MD on 9/23/2021 at 12:46 PM

## 2021-09-23 NOTE — PROGRESS NOTES
[address] [place] [address]  254 [facility]  Occupational Therapy  Daily Note  Time:    Time In: 1130  Time Out: 1230  Timed Code Treatment Minutes: 60 Minutes  Minutes: 60       Date: 2021  Patient Name: Deloris Goel,   Gender: male      Room: [facility]/068-A  MRN: [id number]  : 1975  (48 y.o.)  Referring Practitioner: KATHARINA Leach CNP (Ordering)  Dr. Lucy Tinsley (Attending)  Diagnosis: Critical Illness Myopathy  Additional Pertinent Hx: Deloris Goel  is a 48 y.o. male admitted to the inpatient rehabilitation unit on  2021; I have seen and evaluated the patient today, 21. He was originally admitted to [address] [place] [address] on 2021. Patient with PMH of obesity. Patient presented to [facility] ER due to worsening SOB, cough, nasal congestion, loss of taste/smell and fatigue. Patient began experiencing symptoms about 4 days prior. Wife was +COVID. Patient was tested at urgent care and found to be +COVID and O2 sats 85%. Patient was brought to ER by family car from Urgent Care. CT Chest showed bilateral infiltrates. Patient continued to require more O2. He was treated with remdesivir, Decadron and subsequently tocilizumab. Patient required intubation with ventilation and ECMO on 21. Patient was found to have coinfection with Haemophilus influenza. Patient initially treated with Rocephin. Patient transitioned to higher dosing of Rocephin. Antibiotics initiated 2021. Organism is beta-lactamase positive. Rocephin ineffective. Nebulized tobramycin was added on 2021 and patient was transitioned to [address] on 2021. Repeat bronchoscopy completed 2021 showed significant reduction in mucus production. Culture demonstrated airway colonization with Candida albicans. ECMO complete 9/10/21 and patient was extubated 21 and transitioned to low flow O2. Levaquin completed 2021.  To IPR on 9/20.    Restrictions/Precautions:  Restrictions/Precautions: General Precautions, Fall Risk  Position Activity Restriction  Other position/activity restrictions: Monitor O2 sats      SUBJECTIVE: Pt pleasant and cooperative. Collaborated with PT about patient being mod I in room. Pt appreciative. PAIN:  0 /10:      Vitals: Vitals not assessed per clinical judgement, see nursing flowsheet    COGNITION: WFL    ADL:   EATING:Independent. Shaaron Money CARE Score: 6. ORAL HYGIENE:Independent. Shaaron Money CARE Score: 6. TOILETING HYGIENE:Independent. Shaaron Money CARE Score: 6. SHOWERING/BATHING:Independent. Shaaron Money CARE Score: 6. UPPER BODY DRESSING:Independent. Shaaron Money CARE Score: 6. LOWER BODY DRESSING:Independent. Shaaron Money CARE Score: 6. FOOTWEAR:Independent   . CARE Score: 6. TOILET TRANSFER: Independent. Shaaron Money CARE Score: 6. BALANCE:  Sitting Balance:  Independent. Standing Balance: Independent. TRANSFERS:  Sit to Stand:  Independent. Stand to Sit: Independent. FUNCTIONAL MOBILITY:  Assistive Device: None  Assist Level: Independent. Distance: To and from bathroom and To and from shower room     ADDITIONAL ACTIVITIES:  Discussed return to work, pt reports he plans on working half days for first 1.5 weeks then transitioning to full time work. Discussed return to driving and pt demonstrating good insight. Pt voiced understanding   OT provided handout on energy conservation and work simplification tasks. OT spent 20 minutes reviewing ECT basic principles, ADL &  IADL work simplification tasks with handout provided. Pt appreciative of information and handout. ASSESSMENT:  Activity Tolerance:  Patient tolerance of  treatment: good. Discharge Recommendations:  (UB HEP)    Equipment Recommendations:  Other: patient denies shower chair but reports he has access to one if needed  Plan: Times per week: 5x/wk for 90 min and 1x/wk for 30 min  Current Treatment Recommendations: Strengthening, Endurance Training, Patient/Caregiver Education & Training, Equipment Evaluation, Education, & procurement, Self-Care / ADL, Home Management Training, Balance Training, ROM    Patient Education  Patient Education: Role of OT, Plan of Care, ADL's, Energy Conservation and Home Exercise Program     Goals  Short term goals  Time Frame for Short term goals: 1 week  Short term goal 1: Pt will complete LB dressing tasks with set up only to improve indep with donning his work boots. Short term goal 2: Pt will tolerate dynamic standing >10 minutes with mod I to improve endurance needed for return to work. Short term goal 3: Pt will complete medium work IADL tasks with modified indendence with 0 vcs needed for thought organization or planning for eventual return to work. 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 Short term goal 5: Pt will demonstate good divided attention with 0 vcs needed during tasks for eventual return to driving. Long term goals  Time Frame for Long term goals : 1 week  Long term goal 1: Pt will complete BADL routine with modified independence and good use of ECT strategies to regain indep with self care at home. Long term goal 2: Pt will demonstate 2 ECT strategies to conserve his energy when returning to work. Following session, patient left in safe position with all fall risk precautions in place.

## 2021-09-23 NOTE — PLAN OF CARE
Problem: DISCHARGE BARRIERS  Goal: Patient's continuum of care needs are met  Note: Team conference held Thursday, 09/23/2021. Recommendations of the team were explained to the patient by LIZ Mccall, and Dr. Moris Cohen. SW additionally met with patient and spouse, Edyta Herrera, to provide update and further discuss discharge planning. Team is recommending that patient continue on acute inpatient rehab for one more day, with expected discharge date of Friday, 09/24/2021. Prior to discharge, team is recommending family education with spouse, Edyta Herrera. Family education scheduled for Friday, 09/24/2021, beginning at 11:00am with discharge at 12:30pm. Therapy board updated accordingly. Following discharge, team is recommending continued ST as outpatient. Patient verbalizes preference to complete outpatient ST through [facility]. [facility] Out[facility]. Discuss regarding plan for return to work and driving. Plan for return to work includes 09/29/2021 through 10/08/2021 returning for a half day. 10/11/2021 return to full day. Patient insightful regarding deficits and endurance. Encouraged patient to purchase pulse-ox to monitor heart rate and 02. Plan for meds to beds with discharge medications. Care plan reviewed with patient and spouse, Edyta Herrera. Patient and spouse, Edyta Herrera, verbalized understanding of the plan of care and contributed to goal setting. SW to follow and maintain involvement in discharge planning.

## 2021-09-23 NOTE — PROGRESS NOTES
finger flexion 3 out of 5  Left finger flexion 3 out of 5  Right Lower Extremity: Impaired - hip flexion 4-/5, knee 4/5  Left Lower Extremity: Impaired - hip flexion 4-/5, knee 4/5  Tone:  normal  Muscle bulk: decreased  Sensory:  Sensory intact  Coordination:   abnormal - tremor noted with finger to nose  Lungs:  No respiratory distress or cough noted. No audible wheezing  Abdomen: soft     Skin: warm and dry, no rash or erythema  Peripheral vascular: Pulses: Normal upper and lower extremity pulses; Edema: trace    Diagnostics:   No results found for this or any previous visit (from the past 24 hour(s)). Impression:  · Critical illness myopathy  · General muscle atrophy   · COVID 19 infection  · Acute hypoxemic respiratory failure  · ARDS  · Pneumonia: COVID and H. Influenza  · Sepsis  · Thrombocytopenia  · Anemia secondary to blood loss  · Obesity  Body mass index is 41.88 kg/m²    Plan:  1. Continue therapies; speech to continue to work on higher level cognitive skills  2. Rehab nursing for bowel and bladder management; continue /dulcolas suppository 10 mg pR q day prn; Colace 100 mg po bid prn; Glycolax 17 g po q day prn  3. DVT prophylaxis:  Lovenox 40 mg SQ bid  4.  GI prophylaxis:  Pepcid 20 mg po q day  5. Pain management:  Tylenol 650 mg po q 4 hours prn  6.  for case management and discharge planning  7. Team conference today with discharge planned for tomorrow as above    I have reviewed the individualized overall plan of care developed by each specialty and agree with the treatment approach at this time. Please reference the history and physical along with interdisciplinary team conference notes from this admission for full details concerning the estimated LOS, intensity of services, and discussion of medical comorbid conditions.        Brian Huff MD

## 2021-09-23 NOTE — PROGRESS NOTES
[address] [place] [address]  254 [facility]  Occupational Therapy  Daily Note  Time:    Time In: 1330  Time Out: 1400  Timed Code Treatment Minutes: 30 Minutes  Minutes: 30       Date: 2021  Patient Name: Nallely Sarkar,   Gender: male      Room: [facility]/068-A  MRN: [id number]  : 1975  (48 y.o.)  Referring Practitioner: Welton Lombard, APRN - CNP (Ordering)  Dr. Rosio Chandra (Attending)  Diagnosis: Critical Illness Myopathy  Additional Pertinent Hx: Nallely Sarkar  is a 48 y.o. male admitted to the inpatient rehabilitation unit on  2021; I have seen and evaluated the patient today, 21. He was originally admitted to [address] [place] [address] on 2021. Patient with PMH of obesity. Patient presented to [facility] ER due to worsening SOB, cough, nasal congestion, loss of taste/smell and fatigue. Patient began experiencing symptoms about 4 days prior. Wife was +COVID. Patient was tested at urgent care and found to be +COVID and O2 sats 85%. Patient was brought to ER by family car from Urgent Care. CT Chest showed bilateral infiltrates. Patient continued to require more O2. He was treated with remdesivir, Decadron and subsequently tocilizumab. Patient required intubation with ventilation and ECMO on 21. Patient was found to have coinfection with Haemophilus influenza. Patient initially treated with Rocephin. Patient transitioned to higher dosing of Rocephin. Antibiotics initiated 2021. Organism is beta-lactamase positive. Rocephin ineffective. Nebulized tobramycin was added on 2021 and patient was transitioned to [address] on 2021. Repeat bronchoscopy completed 2021 showed significant reduction in mucus production. Culture demonstrated airway colonization with Candida albicans. ECMO complete 9/10/21 and patient was extubated 21 and transitioned to low flow O2. Levaquin completed 2021.  To IPR on 9/20.    Restrictions/Precautions:  Restrictions/Precautions: General Precautions, Fall Risk  Position Activity Restriction  Other position/activity restrictions: Monitor O2 sats      SUBJECTIVE: Pt pleasant and cooperative. Collaborated with PT about patient being mod I in room. Pt appreciative. PAIN:  0 /10:      Vitals: 95-98% on RA. COGNITION: WFL     BALANCE:  Sitting Balance:  Independent. Standing Balance: Independent. TRANSFERS:  Sit to Stand:  Independent. Stand to Sit: Independent. FUNCTIONAL MOBILITY:  Assistive Device: None  Assist Level: Independent. Distance: [facility] and [facility] for community re-entry task, min shortness of breath with ambulation. Education provided on parking options, use of electronic ADA door openers. Pt ascended and descended ramp well. ADDITIONAL ACTIVITIES:  Patient completed BUE strengthening exercises with skilled education on HEP: completed x15  reps x1  set with a medium resistance band in all joints and all planes in order to improve UE strength and activity tolerance required for BADL routine and toilet / shower transfers. Patient tolerated well, requiring no rest breaks. ASSESSMENT:  Activity Tolerance:  Patient tolerance of  treatment: good. Discharge Recommendations:  (UB HEP)    Equipment Recommendations:  Other: patient denies shower chair but reports he has access to one if needed  Plan: Times per week: 5x/wk for 90 min and 1x/wk for 30 min  Current Treatment Recommendations: Strengthening, Endurance Training, Patient/Caregiver Education & Training, Equipment Evaluation, Education, & procurement, Self-Care / ADL, Home Management Training, Balance Training, ROM    Patient Education  Patient Education: Role of OT, Plan of Care, ADL's, Energy Conservation and Home Exercise Program     Goals  Short term goals  Time Frame for Short term goals: 1 week  Short term goal 1: Pt will complete LB dressing tasks with set up only to improve indep with donning his work boots. Short term goal 2: Pt will tolerate dynamic standing >10 minutes with mod I to improve endurance needed for return to work. Short term goal 3: Pt will complete medium work IADL tasks with modified indendence with 0 vcs needed for thought organization or planning for eventual return to work. 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 Short term goal 5: Pt will demonstate good divided attention with 0 vcs needed during tasks for eventual return to driving. Long term goals  Time Frame for Long term goals : 1 week  Long term goal 1: Pt will complete BADL routine with modified independence and good use of ECT strategies to regain indep with self care at home. Long term goal 2: Pt will demonstate 2 ECT strategies to conserve his energy when returning to work. Following session, patient left in safe position with all fall risk precautions in place.

## 2021-09-23 NOTE — PROGRESS NOTES
Patient in chair with wheels locked, legs elevated. Call light, over bed table within reach. Report given to [facility], LPN.

## 2021-09-23 NOTE — PROGRESS NOTES
[address] [address]  Recreational Therapy  Discharge Note  Inpatient Rehabilitation Unit         Date:  9/23/2021       Patient Name: Delisa Boykin      MRN: [id number]       YOB: 1975 (48 y.o.)       Gender: male  Diagnosis: Critical Illness Myopathy  Referring Practitioner: KATHARINA Givens CNP (Ordering)  Dr. Iban Butler (Attending)    Patient discharged from Recreational Therapy at this time. See recreational therapy notes for details.     Electronically signed by: ORACIO Lopez  Date: 9/23/2021

## 2021-09-23 NOTE — PROGRESS NOTES
Alert. Oriented to person, place, time. Calm, interactive with staff. NORBERTO 3mm to 2mm. Mucous membranes pink, moist. Hair clean, dry. Skin warm, dry. Beep purple bruising on inner forearm bilaterally, neck, chest, lower abdomen and groin with small incisions on neck and groin clean and dry, no redness or drainage. Primary nurse notified. Speech clear. Denies difficulty swallowing. Lung sounds clear anterior, posterior, lateral. Respirations, deep, easy. States has occasional productive cough with clear phlegm. Heart sounds strong, regular. Bowel sounds active in all 4 quadrants. Abdomen round, firm, non- tender to palpations. States is passing gas and last bowel movement was this morning. Denies difficulty with urination. Radial pulses strong, equal. Hand grasps strong bilaterally. Negative arm drift. Capillary refill less than 3 seconds. Skin turgor brisk. No edema noted. Pedal pulses strong, equal. Pedal push and pull strong, bilaterally. Negative kelton's sign bilaterally. Leg lifts strong, equal. Denies numbness or tingling. In chair visiting with father. Wheels locked, chair alarm on, call light, over bed table within reach. Denies needs.

## 2021-09-23 NOTE — PROGRESS NOTES
[facility]  Diagnosis List for Inpatient Rehab facility (IRF) - Patient Assessment Instrument (ALAYNA)    Patient Name: Deloris Goel        MRN: [id number]    : 1975  (48 y.o.)  Gender: male     Primary impairment requiring rehabilitation: 3.8 Neuromuscular disorders    Etiologic Diagnosis that led to the condition: Critical illness myopathy    Comorbid conditions affecting rehabilitation:  Critical illness myopathy  General muscle atrophy   *COVID 19 infection  Acute hypoxemic respiratory failure  ARDS  *Pneumonia: COVID and H.  Influenza  Sepsis  Thrombocytopenia  Anemia secondary to blood loss  Obesity          Win Bone M.D.

## 2021-09-23 NOTE — PROGRESS NOTES
[facility] THERAPY  254 [facility]  DAILY NOTE    TIME   SLP Individual Minutes  Time In: 0240  Time Out: 1100  Minutes: 30  Timed Code Treatment Minutes: 30 Minutes       Date: 2021  Patient Name: Abdirashid Yee      CSN: [id number]   : 1975  (48 y.o.)  Gender: male   Referring Physician: KATHARINA Colbert CNP  Attending Physician: Pee Cooper MD  Diagnosis: Critical illness myopathy   Secondary Diagnosis: Higher level cognitive impairment  Precautions: Fall risk   Current Diet: Regular and Thin Liquids   Swallowing Strategies: Standard Universal Swallow Precautions  Date of Last MBS/FEES: Not Applicable    Pain:  No pain reported. Subjective:  Patient seated upright in recliner for duration of session. Alert and pleasantly engaged throughout. Short-Term Goals:  SHORT TERM GOAL #1:  Goal 1: Patient will complete higher level (6+ units) immediate/delayed recall and working memory tasks with 90% accuracy given min cues to improve retention of new and functional information. INTERVENTIONS:  Generation of 7-item errand list  Immediate recall:  indep  Delayed 15 min recall:  indep  *introduced and reviewed WRAP (write it, recite it, associate it, picture it) strategies; patient highly receptive, reporting often writing things down at home  *excellent carryover    SHORT TERM GOAL #2:  Goal 2: Patient will complete complex attention tasks with no more than 7 errors across a given activity in order to permit potential return to driving, occupational duties, and other multi-tasking responsibilities. INTERVENTIONS: Not addressed due to focus on other goals. SHORT TERM GOAL #3:  Goal 3: Patient will complete higher level executive functioning tasks (finances, med management, occupational duties) with 90% accuracy given min cues to improve skills required for IADL completion.   INTERVENTIONS: Complex deductive reasoning task - 12/15 indep, 3/15 min cues  *excellent visual organization and tracking of detailed information     SHORT TERM GOAL #4:  Goal 4: Patient will complete high level thought organizational tasks (divergent naming, organization of multiple variables, scheduling) with 85% accuracy at a MOD I level to improve skills required for IADL completion. INTERVENTIONS: Not addressed due to focus on other goals. Long-Term Goals:  Timeframe for Long-term Goals: 2 weeks    LONG TERM GOAL #1:  Goal 1: Patient will improve cognitive functioning to a level of Mod I in order to permit potential return to PLOF in home setting. Comprehension: 6 - Complex ideas 90% or device (hearing aid or glasses- if patient is primarily a visual learner)  Expression: 6 - Device used to express complex ideas/needs  Social Interaction: 6 - Patient requires medication for mood and/or effect  Problem Solvin - Patient able to solve simple/routine tasks  Memory: 5 - Patient requires prompting with stress/unfamiliar situations      EDUCATION:  Learner: Patient  Education:  Reviewed ST goals and Plan of Care, Reviewed recommendations for follow-up, Education Related to Potential Risks and Complications Due to Impairment/Illness/Injury and Education Related to [employer] and Wellness  Evaluation of Education: Verbalizes understanding and Family not present    ASSESSMENT/PLAN:    Activity Tolerance:  Patient tolerance of  treatment: good. Excellent engagement and motivation      Assessment/Plan: Patient progressing toward established goals. Continues to require skilled care of licensed speech pathologist to progress toward achievement of established goals and plan of care.      Plan for Next Session: Medication Management, Navigation Tasks, Delayed recall       Daryl Lake M.S. [facility] [address]

## 2021-09-23 NOTE — PROGRESS NOTES
[address] [facility]  [facility]  DAILY NOTE      TIME   SLP Individual Minutes  Time In: 1400  Time Out: 1500  Minutes: 60  Timed Code Treatment Minutes: 60 Minutes      Date: 2021  Patient Name: Mini Granda      CSN: [id number]   : 1975  (48 y.o.)  Gender: male   Referring Physician: Leopold Blamer, APRN - CNP  Attending Physician: Dragan Martinez MD  Diagnosis: Critical illness myopathy   Secondary Diagnosis: Higher level cognitive impairment  Precautions: Fall risk   Current Diet: Regular and Thin Liquids   Swallowing Strategies: Standard Universal Swallow Precautions  Date of Last MBS/FEES: Not Applicable    Pain:  No pain reported. Subjective:  Patient seated upright in recliner for the beginning of session. Eventual transition to hospital-wide navigational task out of room (notified RN [facility]). Alert and pleasantly engaged throughout; required one 5-minute rest break during navigational task due to lower extremity fatigue. Also spoke with patient regarding potential driving evaluation. At this time, feel patient does not necessarily need a driving evaluation given high level cognitive performance, however, discussed potential hesitations and lack of confidence that patients may feel after long hospitalization. Patient receptive, and reports he will defer the driving evaluation for now and have his wife always be with him the first few times he drives. ST suggested short distances with only one passenger, and no radio/external distractions during initial return to driving. Patient verbalized understanding. Short-Term Goals:  SHORT TERM GOAL #1:  Goal 1: Patient will complete higher level (6+ units) immediate/delayed recall and working memory tasks with 90% accuracy given min cues to improve retention of new and functional information.   INTERVENTIONS:  Delayed 4 hour recall of 7-item errand list - 7/7 indep  *very good functional recall     SHORT TERM GOAL #2:  Goal 2: Patient will complete complex attention tasks with no more than 7 errors across a given activity in order to permit potential return to driving, occupational duties, and other multi-tasking responsibilities. INTERVENTIONS: Not addressed due to focus on other goals. SHORT TERM GOAL #3:  Goal 3: Patient will complete higher level executive functioning tasks (finances, med management, occupational duties) with 90% accuracy given min cues to improve skills required for IADL completion. INTERVENTIONS: Hospital-wide navigational task initiated; patient instructed to find 7 locations in hospital while following specific navigational directions. Patient located 5/7 locations independently, required mod cues for additional 2/7 locations. *errors primarily related to reduced divided and alternating attention; patient pausing mid-task, stating, \"okay I need to focus more\", demonstrating good insight into attention deficits  *patient also required cueing for errors related to identification of appropriate signs/banners for directional assistance    *discussed with patient plans for wife to be with patient initially during all \"new\" events/places requiring navigation (e.g., attending sons football game at an unfamiliar stadium, going to a doctors appt in a new medical facility, etc.); patient in agreement and receptive       SHORT TERM GOAL #4:  Goal 4: Patient will complete high level thought organizational tasks (divergent naming, organization of multiple variables, scheduling) with 85% accuracy at a MOD I level to improve skills required for IADL completion. INTERVENTIONS: Complex 3-day scheduling task involving 14 appts/events/errands, while adhering to specific guidelines (hours of operation). Patient completed task in 25 minutes with 100% accuracy (14/14 indep); excellent sustained and selective attention throughout.  Very good re-organization of information presented. Long-Term Goals:  Timeframe for Long-term Goals: 2 weeks    LONG TERM GOAL #1:  Goal 1: Patient will improve cognitive functioning to a level of Mod I in order to permit potential return to PLOF in home setting. Comprehension: 6 - Complex ideas 90% or device (hearing aid or glasses- if patient is primarily a visual learner)  Expression: 6 - Device used to express complex ideas/needs  Social Interaction: 6 - Patient requires medication for mood and/or effect  Problem Solvin - Patient able to solve simple/routine tasks  Memory: 5 - Patient requires prompting with stress/unfamiliar situations      EDUCATION:  Learner: Patient  Education:  Reviewed ST goals and Plan of Care, Reviewed recommendations for follow-up, Education Related to Potential Risks and Complications Due to Impairment/Illness/Injury, Education Related to Prevention of Recurrence of Impairment/Illness/Injury, Education Related to [employer] and Wellness and Home Safety Education  Evaluation of Education: Verbalizes understanding and Family not present    ASSESSMENT/PLAN:    Activity Tolerance:  Patient tolerance of  treatment: good. Excellent engagement and motivation. Assessment/Plan: Patient progressing toward established goals. Continues to require skilled care of licensed speech pathologist to progress toward achievement of established goals and plan of care.      Plan for Next Session: medication management, delayed recall, complex attention         Rosa Bennett M.S. [address] [place]

## 2021-09-23 NOTE — PROGRESS NOTES
[facility]  INPATIENT PHYSICAL THERAPY  DAILY NOTE  [facility]    Time In: 08  Time Out: 930  Timed Code Treatment Minutes: 60 Minutes  Minutes: 60          Date: 2021  Patient Name: Emmett Palmer,  Gender:  male        MRN: [id number]  : 1975  (48 y.o.)  Referral Date : 21  Referring Practitioner: KATHARINA Birmingham CNP  Diagnosis: critical illness myopathy  Additional Pertinent Hx: Emmett Palmer is a 78-year-old white male who presented to [facility] on 2021 with complaints of shortness of breath. He has a past medical history of lifetime non-smoker, obesity, but has no other medical history. He is a farmer by occupation. Per report he had presented to the emergency department after developing fever with cough chills and shortness of breath. He also states that he lost his taste and smell on 2021. He continued to progress with shortness of breath which worsened. He developed malaise. CT scan chest showed bilateral infiltrates. He initially was placed on low-flow oxygen but continued to progress to high flow. He was given remdesivir and Decadron. And subsequently he did receive tocilizumab. He continued to develop dyspnea and \"happy hypoxemia\" as he continued to worsen he did elect to be placed on the ventilator and the initiation of ECMO. On 2021 patient was cannulated and intubated. He initially underwent bilateral groin catheterization. He was later recannulated in the neck. He was decannulated on 9/10/2021. He did develop bacterial pneumonia with H influenza. He was started on Rocephin. He was transferred to [address] [place] [address] on 2021. Nebulized tobramycin was added on 2021 for 10 doses. Levaquin was completed on 2021. Patient was extubated on 2021. Pt admitted to  Rehab .      Prior Level of Function:  Lives With: Spouse (3 children aged 15, 15, and 13 y)  Type of Home: House  Home Layout: Two level, Performs ADL's on one level, Able to Live on Main level with bedroom/bathroom  Entrance Stairs - Number of Steps: 4 steps with 2 rails that are too wide to use at same time   Bathroom Shower/Tub: Tub/Shower unit, Curtain  Bathroom Toilet: Handicap height    ADL Assistance: Independent  Homemaking Assistance: Independent  Ambulation Assistance: Independent  Transfer Assistance: Independent  Active : Yes  Additional Comments: I at PLOF no AD    Restrictions/Precautions:  Restrictions/Precautions: General Precautions, Fall Risk  Position Activity Restriction  Other position/activity restrictions: Monitor O2 sats     SUBJECTIVE: Pt. Seated in BS chair upon arrival and pleasantly agrees to therapy session. Pt. Motivated. PAIN: None indicated    Vitals: O2 WNLs  HR: 105-125BPM    OBJECTIVE:  Bed Mobility:  Rolling to Left: Independent   Rolling to Right: Independent   Supine to Sit: Independent  Sit to Supine: Independent     Transfers:  Sit to Stand: Independent  Stand to Sit:Independent  Stand Pivot:Independent  Car: Independent    Ambulation:  Independent  Distance: 150' x 1, 75' x 1, community distances, up/down 25' ramp  Surface: Level Tile and Ramp  Device:No Device  Gait Deviations:  Slow Evelia and Decreased Gait Speed    Balance:  Pt. Completed standing dynamic balance activity: bean bag toss with Narrow WANDER on dynadisk and No UE support with Contact Guard Assistance. Activity completed to improve balance, enhance functional mobility, and reduce risk of falls. Pt. Able to  multiple objects from floor using No Device with Independent    Stairs:  Stairs:  6\" steps. X 16 using One Handrail and Modified Independent. Platform:  6\" platform X 1 using One Handrail and Modified Independent. Exercise:  Patient was guided in 1 set(s) 10 reps of exercise to both lower extremities.   Straight leg raises, Bridges, Seated marches, Seated hamstring curls, Seated rail with modified independence. Long term goal 2: Patient will ambulate 80' with no AD and modified independence and maintain O2 >/=92% to navigate home safely. Long term goal 3: Patient will improve MARTINEZ to greater than or equal to 51/56 to reduce his risk for falls. GOAL MET  Long term goal 4: Patient will complete car transfer with modified independence to transfer in/out of vehicle safely for transfer to home and appointments. Following session, patient left in safe position with all fall risk precautions in place.

## 2021-09-24 VITALS
TEMPERATURE: 98.1 F | SYSTOLIC BLOOD PRESSURE: 118 MMHG | DIASTOLIC BLOOD PRESSURE: 70 MMHG | RESPIRATION RATE: 28 BRPM | WEIGHT: 253.3 LBS | BODY MASS INDEX: 36.26 KG/M2 | OXYGEN SATURATION: 93 % | HEIGHT: 70 IN | HEART RATE: 100 BPM

## 2021-09-24 PROBLEM — E87.6 HYPOKALEMIA: Status: ACTIVE | Noted: 2021-09-24

## 2021-09-24 PROCEDURE — 6370000000 HC RX 637 (ALT 250 FOR IP): Performed by: PHYSICAL MEDICINE & REHABILITATION

## 2021-09-24 PROCEDURE — 99239 HOSP IP/OBS DSCHRG MGMT >30: CPT | Performed by: NURSE PRACTITIONER

## 2021-09-24 PROCEDURE — 97530 THERAPEUTIC ACTIVITIES: CPT

## 2021-09-24 PROCEDURE — 6360000002 HC RX W HCPCS: Performed by: NURSE PRACTITIONER

## 2021-09-24 PROCEDURE — 97110 THERAPEUTIC EXERCISES: CPT

## 2021-09-24 PROCEDURE — 97130 THER IVNTJ EA ADDL 15 MIN: CPT

## 2021-09-24 PROCEDURE — 6370000000 HC RX 637 (ALT 250 FOR IP): Performed by: NURSE PRACTITIONER

## 2021-09-24 PROCEDURE — 97129 THER IVNTJ 1ST 15 MIN: CPT

## 2021-09-24 RX ORDER — POTASSIUM CHLORIDE 20 MEQ/1
20 TABLET, EXTENDED RELEASE ORAL
Qty: 30 TABLET | Refills: 0 | Status: SHIPPED | OUTPATIENT
Start: 2021-09-24 | End: 2022-07-20

## 2021-09-24 RX ADMIN — METOPROLOL TARTRATE 25 MG: 25 TABLET, FILM COATED ORAL at 08:40

## 2021-09-24 RX ADMIN — ENOXAPARIN SODIUM 40 MG: 40 INJECTION SUBCUTANEOUS at 08:40

## 2021-09-24 RX ADMIN — CALCIUM POLYCARBOPHIL 625 MG: 625 TABLET, FILM COATED ORAL at 08:40

## 2021-09-24 RX ADMIN — POTASSIUM CHLORIDE 40 MEQ: 1500 TABLET, EXTENDED RELEASE ORAL at 08:35

## 2021-09-24 ASSESSMENT — PAIN SCALES - GENERAL
PAINLEVEL_OUTOF10: 0
PAINLEVEL_OUTOF10: 0

## 2021-09-24 NOTE — DISCHARGE INSTR - COC
Continuity of Care Form    Patient Name: Mee Isaacs   :  1975  MRN:  [id number]    Admit date:  2021  Discharge date:  2021    Code Status Order: Full Code   Advance Directives:     Admitting Physician: Myriam Coon MD  PCP: Chriss Sargent MD    Discharging Nurse:   [address] Unit/Room#: 7E-68/068-A  Discharging Unit Phone Number: ***    Emergency Contact:   Extended Emergency Contact Information  Primary Emergency Contact: Darlene Callaway, [address] Phone: [phone]  Mobile Phone: [phone]  Relation: Spouse  Preferred language: English   needed? No    Past Surgical History:  Past Surgical History:   Procedure Laterality Date    ABDOMEN SURGERY      naval hernia removal    CHOLECYSTECTOMY         Immunization History: There is no immunization history on file for this patient.     Active Problems:  Patient Active Problem List   Diagnosis Code    COVID-19 U07.1    Acute respiratory failure with hypoxia (HCC) J96.01    Hypoxia R09.02    Critical illness myopathy G72.81    Hypokalemia E87.6       Isolation/Infection:   Isolation          No Isolation        Patient Infection Status     Infection Onset Added Last Indicated Last Indicated By Review Planned Expiration Resolved Resolved By    None active    Resolved    COVID-19 21 COVID-19, Rapid   21 Larisa Sewell RN    S/S 4-5 days, +  - severe    COVID-19 Rule Out 21 COVID-19, Rapid (Ordered)   21 Rule-Out Test Resulted          Nurse Assessment:  Last Vital Signs: /82   Pulse 102   Temp 98.7 °F (37.1 °C) (Oral)   Resp 22   Ht 5' 10\" (1.778 m)   Wt 253 lb 4.8 oz (114.9 kg)   SpO2 95%   BMI 36.34 kg/m²     Last documented pain score (0-10 scale): Pain Level: 0  Last Weight:   Wt Readings from Last 1 Encounters:   21 253 lb 4.8 oz (114.9 kg)     Mental Status:  {IP PT MENTAL STATUS:78732}    IV Access:  [facility] IV ZSQPQZ:577736243}    Nursing Mobility/ADLs:  Walking   {CHP DME QPFY:672808567}  Transfer  {CHP DME ZMDT:345862453}  Bathing  {CHP DME XNZP:567798571}  Dressing  {CHP DME PLQB:921427746}  Toileting  {CHP DME VFCS:442604617}  Feeding  {CHP DME IMXB:897447063}  Med Admin  {CHP DME YOCL:642778189}  Med Delivery   {[facility] MED Delivery:658694267}    Wound Care Documentation and Therapy:  Wound 21 Head Posterior (Active)   Wound Image   21 1318   Wound Cleansed Betadine/povidone iodine 214   Number of days: 0        Elimination:  Continence:   · Bowel: {YES / TH:01394}  · Bladder: {YES / QT:13127}  Urinary Catheter: {Urinary Catheter:976779514}   Colostomy/Ileostomy/Ileal Conduit: {YES / GO:03648}       Date of Last BM: ***    Intake/Output Summary (Last 24 hours) at 2021 1109  Last data filed at 2021 0900  Gross per 24 hour   Intake 480 ml   Output --   Net 480 ml     I/O last 3 completed shifts:   In: 5 [P.O.:540]  Out: -     Safety Concerns:     [address] Concerns:540270075}    Impairments/Disabilities:      [address] [employer] Impairments/Disabilities:656089297}    Nutrition Therapy:  Current Nutrition Therapy:   [address] [employer] Diet List:655530618}    Routes of Feeding: {CHP DME Other Feedings:380880451}  Liquids: {Slp liquid thickness:52181}  Daily Fluid Restriction: {CHP DME Yes amt example:421156438}  Last Modified Barium Swallow with Video (Video Swallowing Test): {Done Not Done FXOJ:629730503}    Treatments at the Time of Hospital Discharge:   Respiratory Treatments: ***  Oxygen Therapy:  {Therapy; copd oxygen:98380}  Ventilator:    { CC Vent RYND:998068397}    Rehab Therapies: {THERAPEUTIC INTERVENTION:3854050946}  Weight Bearing Status/Restrictions: [address] [employer]  Weight Bearin}  Other Medical Equipment (for information only, NOT a DME order):  N/A  Other Treatments: ***    Patient's personal belongings (please select all that are sent with patient):  {[facility] DME Belongings:861870018}    AMARILIS SIGNATURE: Patricia Ang RN    CASE MANAGEMENT/SOCIAL WORK SECTION    Inpatient Status Date: ***    Readmission Risk Assessment Score:  Readmission Risk              Risk of Unplanned Readmission:  13           Discharging to Facility/ Agency   · Name:   · Address:  · Phone:  · Fax:    Dialysis Facility (if applicable)   · Name:  · Address:  · Dialysis Schedule:  · Phone:  · Fax:    / signature: {Esignature:956907586}    PHYSICIAN SECTION    Prognosis: {Prognosis:9097552112}    Condition at Discharge: [address] [place] [address] Patient Condition:475630757}    Rehab Potential (if transferring to Rehab): {Prognosis:6915215631}    Recommended Labs or Other Treatments After Discharge: ***    Physician Certification: I certify the above information and transfer of Elsa Melo  is necessary for the continuing treatment of the diagnosis listed and that he requires {Admit to Appropriate Level of Care:44992} for {GREATER/LESS:234927158} 30 days.      Update Admission H&P: {CHP DME Changes in TGBDQ:906066165}    PHYSICIAN SIGNATURE:  {Esignature:179324658}

## 2021-09-24 NOTE — PROGRESS NOTES
Patient discharged to home with family via personal vehicle. Discharge instructions given to patient and wife.

## 2021-09-24 NOTE — PROGRESS NOTES
[address] [place] [address]  Inpatient Rehabilitation  Occupational Therapy  Discharge Note  Time:  Time In: 1130  Time Out: 1200  Timed Code Treatment Minutes: 30 Minutes  Minutes: 30          Date: 2021  Patient Name: Nallely Sarkar,   Gender: male      Room: [facility]/8-A  MRN: [id number]  : 1975  (48 y.o.)  Referring Practitioner: Welton Lombard, APRN - CNP (Ordering)  Dr. Rosio Chandra (Attending)  Diagnosis: Critical Illness Myopathy  Additional Pertinent Hx: Nallely Sarkar  is a 48 y.o. male admitted to the inpatient rehabilitation unit on  2021; I have seen and evaluated the patient today, 21. He was originally admitted to [address] [place] [address] on 2021. Patient with PMH of obesity. Patient presented to [facility] ER due to worsening SOB, cough, nasal congestion, loss of taste/smell and fatigue. Patient began experiencing symptoms about 4 days prior. Wife was +COVID. Patient was tested at urgent care and found to be +COVID and O2 sats 85%. Patient was brought to ER by family car from Urgent Care. CT Chest showed bilateral infiltrates. Patient continued to require more O2. He was treated with remdesivir, Decadron and subsequently tocilizumab. Patient required intubation with ventilation and ECMO on 21. Patient was found to have coinfection with Haemophilus influenza. Patient initially treated with Rocephin. Patient transitioned to higher dosing of Rocephin. Antibiotics initiated 2021. Organism is beta-lactamase positive. Rocephin ineffective. Nebulized tobramycin was added on 2021 and patient was transitioned to [address] on 2021. Repeat bronchoscopy completed 2021 showed significant reduction in mucus production. Culture demonstrated airway colonization with Candida albicans. ECMO complete 9/10/21 and patient was extubated 21 and transitioned to low flow O2. Levaquin completed 2021. To IPR on .     Restrictions/Precautions:  Restrictions/Precautions: General Precautions, Fall Risk  Position Activity Restriction  Other position/activity restrictions: Monitor O2 sats    SUBJECTIVE: Pt agreeable to OT and denies concerns for discharge home later today. Wife present for family education today. PAIN: Denies /10:      Vitals: Oxygen: 97%   Heart Rate: 121 bpm while standing     COGNITION: WFL    ADL:   Pt reports he completed ADL routine independently in room this AM     BALANCE:  Sitting Balance:  Independent. Standing Balance: Independent. BED MOBILITY:  Not Tested    TRANSFERS:  Sit to Stand:  Independent. Stand to Sit: Independent. FUNCTIONAL MOBILITY:  Assistive Device: None  Assist Level: Independent. Distance: To and from therapy apartment     ADDITIONAL ACTIVITIES:  Pt completed medium level housekeeping tasks with independence and use of 2 ECT strategies without cues. Pt demonstrates UB strength of 4+/5. Reviewed UB HEP for home and pt agreeable, stating he has been completing in evenings. Family education session held with patient's wife regarding patient's current level of independence with activities of daily living; current transfers and mobility ability; monitoring ADLs at home to ensure patient able to translate skills to new environment; patient's progress with ADL and IADL from initial evaluation to today, role and goals of continuing UB HEP to regain UB strength. Also discussed energy conservation and work simplification strategies, monitoring oxygen levels and heart rate at home and use of pulse ox. Patient and family verbalized understanding. No further questions at this time. ASSESSMENT:  Activity Tolerance:  Patient tolerance of  treatment: good. Assessment: Pt has made great gains during his short time on IP rehab. He has progressed to completing ADLs with independence while tolerating standing entire shower 2 trials.  Pt completes IADLs with independence and good carryover of energy conservation/ work simplification strategies throughout. He has been able to monitor good oxygen saturation on room air, but his heart rate is tachycardic during activity. Pt and wife have participated in family education with recommendation of monitoring vitals at home as well. Pt has demonstrated good insight into deficits and good judgement when pacing self to return to PLOF. Recommend patient continue with UB HEP and no formal follow up with OT recommended. Plan discharge home with supportive wife and 3 children. Discharge Recommendations:  (UB HEP)  Equipment Recommendations: Other: patient denies shower chair but reports he has access to one if needed  Plan: Discharge home with wife and 3 children and UB HEP. Current Treatment Recommendations: Strengthening, Endurance Training, Patient/Caregiver Education & Training, Equipment Evaluation, Education, & procurement, Self-Care / ADL, Home Management Training, Balance Training, ROM    Patient Education  Patient Education: Role of OT, Plan of Care, ADL's, IADL's, Energy Conservation, Home Exercise Program and Family Education    Goals  Short term goals  Time Frame for Short term goals: 1 week  Short term goal 1: Pt will complete LB dressing tasks with set up only to improve indep with donning his work boots. MET  Short term goal 2: Pt will tolerate dynamic standing >10 minutes with mod I to improve endurance needed for return to work. MET  Short term goal 3: Pt will complete medium work IADL tasks with modified indendence with 0 vcs needed for thought organization or planning for eventual return to work. 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 MET  Short term goal 5: Pt will demonstate good divided attention with 0 vcs needed during tasks for eventual return to driving.  MET  Long term goals  Time Frame for Long term goals : 1 week  Long term goal 1: Pt will complete BADL routine with modified independence and good use of ECT strategies to regain indep with self care at home. MET  Long term goal 2: Pt will demonstate 2 ECT strategies to conserve his energy when returning to work. MET     Following session, patient left in safe position with all fall risk precautions in place.

## 2021-09-24 NOTE — PROGRESS NOTES
Alert, oriented to person, place, year. Calm, pleasant, cooperative with staff, family. Speech clear. Skin, warm, dry. Mucous membranes pink, moist. Tongue midline. Smile symmetrical. Respirations deep, easy. Lung sounds clear anterior, posterior, laterally. No cough noted. Heart sound regular. Abdomen firm, rounded, non-tender to palpation. Bowel sounds active all four quadrants. States last bowel movement was yesterday. Denies any difficulty with urination. States no pain. Denies numbness, tingling. No edema noted. Sitting up in chair, wheels locked. Call light, over bed table within reach. Denies needs.

## 2021-09-24 NOTE — DISCHARGE SUMMARY
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 Influenza  · Sepsis  · Thrombocytopenia  · Anemia secondary to blood loss  · Obesity     Discharge Functional Status:    Physical therapy:  Bed Mobility:    Transfers:  ,  ,    Mobility:  , PT Equipment Recommendations  Equipment Needed: Yes  Other: pulse oximeter, Assessment: Patient is a 48year old male who presents with overall deconditioning s/p 3 weeks hospitilization including intubation and ECMO. Patient was independent at [facility], working full time and at this time requiring SBA/CGA to complete functional mobility, with distance limited to 48' due to shortness of breath. Patient's MARTINEZ balance score of 41/56 indicates moderate fall risk. Pt requires close monitoring of O2 and heart rate with activity with cues to pursed lip breathing. Patient would benefit from skilled PT services to improve his ability to complete functional mobility, reduce his risk for falls and allow patient to return to OF. Occupational therapy:  ,  , Assessment: Patient making slow gains towards rehab goals d/t short stay thus far. Pt currently able to complete ADL routine with SBA and functional transfers with SBA however patient does require increased time d/t decreased endurance and faitgue.  Due to patient's performance deficits, patient would greatly benefit from continued occupational therapy for ADL remediation, endurance building, strengthening and energy conservation to return to prior level of functioning and safe return to home environment. Speech therapy:  Comprehension: 6 - Complex ideas 90% or device (hearing aid or glasses- if patient is primarily a visual learner)  Expression: 6 - Device used to express complex ideas/needs  Social Interaction: 6 - Patient requires medication for mood and/or effect  Problem Solvin - Patient able to solve simple/routine tasks  Memory: 5 - Patient requires prompting with stress/unfamiliar situations    Acute Hospital Course:   Patient was originally admitted to [address] [place] [address] on 2021.  Patient with PMH of obesity. Patient presented to [facility] ER due to worsening SOB, cough, nasal congestion, loss of taste/smell and fatigue. Patient began experiencing symptoms about 4 days prior. Wife was +COVID. Patient was tested at urgent care and found to be +COVID and O2 sats 85%.  Patient was brought to ER by family car from Urgent Care. CT Chest showed bilateral infiltrates. Patient continued to require more O2. He was treated with remdesivir, Decadron and subsequently tocilizumab. Patient required intubation with ventilation and ECMO on 21. Patient was found to have coinfection with Haemophilus influenza.  Patient initially treated with Rocephin. Patient transitioned to higher dosing of Rocephin. Antibiotics initiated 2021.  Organism is beta-lactamase positive.  Rocephin ineffective.  Nebulized tobramycin was added on 2021 and patient was transitioned to [facility] on 2021.  Repeat bronchoscopy completed 2021 showed significant reduction in mucus production.  Culture demonstrated airway colonization with Candida albicans.  ECMO complete 9/10/21 and patient was extubated 21 and transitioned to low flow O2.  Levaquin completed 2021    Inpatient Rehabilitation Course:   Nallely Sarkar is a 48 y.o. male admitted to inpatient rehabilitation on 9/20/2021 for Critical Illness Myopathy s/p COVID-19. The patient participated in an aggressive multidisciplinary inpatient rehabilitation program involving 3 hours per day, 5 days per week of rehabilitation. Patient did very well on IPR and only needed a short 4 day stay. Appropriate DVT prophylaxis options were continued throughout rehabilitation stay with Lovenox. Dr Yadira Sommer followed during the IPR stay for medical management    Patient was discharged Home in Stable condition.     Consults:   Family medicine    Significant Diagnostics:   CBC:   Lab Results   Component Value Date    WBC 3.5 09/22/2021    RBC 4.33 09/22/2021    HGB 13.4 09/22/2021    HCT 42.7 09/22/2021    MCV 98.6 09/22/2021    MCH 30.9 09/22/2021    MCHC 31.4 09/22/2021     09/22/2021    MPV 10.0 09/22/2021     BMP:    Lab Results   Component Value Date     09/22/2021    K 4.1 09/22/2021    K 3.3 09/17/2021     09/22/2021    CO2 22 09/22/2021    BUN 15 09/22/2021    LABALBU 4.6 09/21/2021    CREATININE 0.8 09/22/2021    CALCIUM 9.6 09/22/2021    LABGLOM >90 09/22/2021    GLUCOSE 101 09/22/2021     Hepatic Function Panel:    Lab Results   Component Value Date    ALKPHOS 60 09/21/2021    ALT 59 09/21/2021    AST 45 09/21/2021    PROT 7.1 09/21/2021    BILITOT 2.2 09/21/2021    BILIDIR 0.4 09/21/2021    LABALBU 4.6 09/21/2021     Albumin:    Lab Results   Component Value Date    LABALBU 4.6 09/21/2021     Calcium:    Lab Results   Component Value Date    CALCIUM 9.6 09/22/2021     Magnesium:    Lab Results   Component Value Date    MG 2.2 09/17/2021     Phosphorus:    Lab Results   Component Value Date    PHOS 3.2 09/06/2021     LDH:    Lab Results   Component Value Date     09/05/2021     PT/INR:    Lab Results   Component Value Date    INR 1.29 08/30/2021     PTT:    Lab Results   Component Value Date    APTT 31.5 08/31/2021     U/A:    Lab Results   Component Value Date    COLORU RED 09/05/2021    PROTEINU 300 09/05/2021    PHUR 7.0 09/05/2021    LABCAST NONE SEEN 09/05/2021    WBCUA 0-2 09/05/2021    RBCUA 15-20 09/05/2021    MUCUS NONE SEEN 09/05/2021    BACTERIA FEW 09/05/2021    SPECGRAV 1.024 09/05/2021    LEUKOCYTESUR SMALL 09/05/2021    UROBILINOGEN 1.0 09/05/2021    BILIRUBINUR MODERATE 09/05/2021    BLOODU LARGE 09/05/2021    AMORPHOUS DEBRIS 09/05/2021     ABG:    Lab Results   Component Value Date    PH 7.42 09/11/2021    PCO2 46 09/11/2021    PO2 77 09/11/2021    HCO3 30 09/11/2021    O2SAT 95 09/11/2021     HgBA1c:    Lab Results   Component Value Date    LABA1C 5.6 09/02/2021     FLP:    Lab Results   Component Value Date    TRIG 85 09/01/2021     FERRITIN:    Lab Results   Component Value Date    FERRITIN 1,900 09/03/2021     Cholesterol Panel:   Results in Past 30 Days  Result Component Current Result Ref Range Previous Result Ref Range   Triglycerides 85 (9/1/2021) 0 - 199 mg/dL Not in Time Range          Patient Instructions:   Home  Therapy orders: SLP and HEP  Discharge lab work: [facility] 9/30/21  Code status: Full Code   Activity: activity as tolerated  Diet: ADULT DIET; Regular  Adult Oral Nutrition Supplement; Standard 4 oz Oral Supplement    Wound Care: none needed  Equipment: patient denies shower chair but reports he has access to one if needed      Follow-up visits: See after visit summary from hospitalization       Discharge Medications:   [facility]"   Home Medication Instructions PEX:[id number]    Printed on:09/24/21 1022   Medication Information                      ammonium lactate (LAC-HYDRIN) 12 % lotion  Apply topically as needed. metoprolol tartrate (LOPRESSOR) 25 MG tablet  Take 1 tablet by mouth 2 times daily .              polycarbophil (FIBERCON) 625 MG tablet  Take 1 tablet by mouth daily             potassium chloride (KLOR-CON M) 20 MEQ extended release tablet  Take 1 tablet by mouth daily (with breakfast)                   35 minutes spent preparing the patient for discharge    Flor Strickland, APRN - CNP

## 2021-09-24 NOTE — PROGRESS NOTES
[facility] Continence Nurse  Consult Note       Luis Armando Tracy  AGE: 48 y.o. GENDER: male  : 1975  TODAY'S DATE:  2021    Subjective:     Reason for [facility] Evaluation and Assessment: posterior head wound      Luis Armando Tracy is a 48 y.o. male referred by:   [] Physician/PA/APRN  [x] Nursing  [] Other:     Wound Identification:  Wound Type: traumatic wound  Contributing Factors: decreased mobility and shear force    Objective:     Rayshawn Risk Score: Rayshawn Scale Score: 21    Assessment:     Encounter: Present to pt room for consult of posterior head wound. Pt wife at side. Pt was in ICU on ventilator support for an extended period of time prior to rehab. Pt had hair washed and cut yesterday and scabbed area noticed. Wound photo and assessment below. When pt hair moved, discovered that pt appears to have healed wound area with dry drainage build up to area. This area origin unknown, may have been a shearing injury vs traumatic injury. No open areas, drainage noted. Pt to wash hair as normal and leave open to air. Reduce pressure to area when sleeping. Response to treatment:  Well tolerated by patient.      Plan:     Treatment Recommendations:   Open to air, keep clean and dry, reduce pressure      Discharge Plan:  Placement for patient upon discharge: home  Patient appropriate for [address]: no

## 2021-09-24 NOTE — PROGRESS NOTES
Patient up in chair, in room, speaking with therapy. Call light, over bed table in reach, denies needs.

## 2021-09-24 NOTE — PROGRESS NOTES
[address] THERAPY  [facility]  DISCHARGE NOTE      TIME   SLP Individual Minutes  Time In: 1200  Time Out: 5697  Minutes: 23  Timed Code Treatment Minutes: 23 Minutes      Date: 2021  Patient Name: Theo Bailey      CSN: [id number]   : 1975  (48 y.o.)  Gender: male   Referring Physician: KATHARINA Johnston - CARLYLE  Attending Physician: Buffy Sinha MD  Diagnosis: Critical illness myopathy   Secondary Diagnosis: Higher level cognitive impairment  Precautions: Fall risk   Current Diet: Regular and Thin Liquids   Swallowing Strategies: Standard Universal Swallow Precautions  Date of Last MBS/FEES: Not Applicable    Pain:  No pain reported. Subjective:  Family education initiated this date in prep for discharge to home setting with wife and family. Patient and patients wife Yasmin Torrez present. Began by reviewing rationale behind ST services, current goals/POC, recent results of both cognitive evaluations, and progress made thus far. Emphasized current concerns consisting of high level executive functioning and divided attention related to functional IADL's in home setting (return to work, finances, driving, cooking, etc.). Provided handout reviewing suggestions for use of cognitive strategies and supervision with medication management and finances initially. Also discussed considerations for driving evaluation. Reviewed recommendations for follow up outpatient speech therapy services. Patient and wife Yasmin Torrez voiced no further questions or concerns. Anticipate safe and successful discharge home with further progress to be made cognitively.      Short-Term Goals:  SHORT TERM GOAL #1:  Goal 1: Patient will complete higher level (6+ units) immediate/delayed recall and working memory tasks with 90% accuracy given min cues to improve retention of new and functional information. - GOAL MET   INTERVENTIONS:  Delayed +24 hour recall of 7-item errand list - 7/7 indep  *very good functional recall     SHORT TERM GOAL #2:  Goal 2: Patient will complete complex attention tasks with no more than 7 errors across a given activity in order to permit potential return to driving, occupational duties, and other multi-tasking responsibilities. - GOAL NOT MET   INTERVENTIONS: Not addressed due to focus on other goals. PREVIOUS SESSION:  See STG #3 for details related to attention. SHORT TERM GOAL #3:  Goal 3: Patient will complete higher level executive functioning tasks (finances, med management, occupational duties) with 90% accuracy given min cues to improve skills required for IADL completion. - GOAL MET  INTERVENTIONS: Not addressed due to focus on other goals. PREVIOUS SESSION:   Hospital-wide navigational task initiated; patient instructed to find 7 locations in hospital while following specific navigational directions. Patient located 5/7 locations independently, required mod cues for additional 2/7 locations. *errors primarily related to reduced divided and alternating attention; patient pausing mid-task, stating, \"okay I need to focus more\", demonstrating good insight into attention deficits  *patient also required cueing for errors related to identification of appropriate signs/banners for directional assistance    *discussed with patient plans for wife to be with patient initially during all \"new\" events/places requiring navigation (e.g., attending sons football game at an unfamiliar stadium, going to a doctors appt in a new medical facility, etc.); patient in agreement and receptive       Robert Koo 3555 #4:  Goal 4: Patient will complete high level thought organizational tasks (divergent naming, organization of multiple variables, scheduling) with 85% accuracy at a MOD I level to improve skills required for IADL completion. - GOAL MET   INTERVENTIONS: Not addressed due to focus on other goals.    PREVIOUS SESSION:  Complex 3-day scheduling task involving 14 appts/events/errands, while adhering to specific guidelines (hours of operation). Patient completed task in 25 minutes with 100% accuracy (14/14 indep); excellent sustained and selective attention throughout. Very good re-organization of information presented.        Long-Term Goals:  Timeframe for Long-term Goals: 2 weeks    LONG TERM GOAL #1:  Goal 1: Patient will improve cognitive functioning to a level of Mod I in order to permit potential return to PLOF in home setting.- GOAL MET        Comprehension: 7 - Patient understands complex ideas (math/planning)  Expression: 7 - Patient expresses complex ideas/needs  Social Interaction: 6 - Patient requires medication for mood and/or effect  Problem Solvin - Independent with device (e.g. notes, schedules)  Memory: 6 - Patient requires device to recall (e.g. memory book)    ST FIM ASSESSMENT:     Admission score Current score Goal Status   COMMUNICATION 5 - Patient understands basic needs (hungry/hot/pain)   7 - Patient understands complex ideas (math/planning)   Progressing   EXPRESSION 5 - Expresses basic ideas/needs only (hungry/hot/pain)     7 - Patient expresses complex ideas/needs   Progressing   SOCIAL INTERACTION 5 - Patient is appropriate with supervision/cues   6 - Patient requires medication for mood and/or effect   Progressing   PROBLEM SOLVING 4 - Patient solves simple/routine tasks 75-90%+    6 - Independent with device (e.g. notes, schedules)   Progressing   MEMORY 5 - Patient requires prompting with stress/unfamiliar situations   6 - Patient requires device to recall (e.g. memory book)   Progressing       EDUCATION:  Learner: Patient and Significant Other  Education:  Reviewed ST goals and Plan of Care, Reviewed recommendations for follow-up, Education Related to Potential Risks and Complications Due to Impairment/Illness/Injury, Education Related to Prevention of Recurrence of Impairment/Illness/Injury, Education Related to Health Promotion and Wellness, Home Safety Education and ST HEP and OP recommendations  Evaluation of Education: Verbalizes understanding and Family not present    ASSESSMENT/PLAN:  SUMMARY:  Patient has met 3/4 STG's and 1/1 LTG's this therapy period. Improvements made in delayed recall (7 units), executive functioning (finances, navigational tasks), and visual thought organization. Continues to present with mild higher level cognitive deficits in divided and alternating attention, and complex executive functioning tasks involving attention skills. At this time, no barriers to additional success foreseen. No driving evaluation formally recommended. Ongoing skilled ST services are recommended at this time via OP setting in order to continue improving all aforementioned cognitive domains and permit potential return to high level of independent living. Activity Tolerance:  Patient tolerance of  treatment: good. Excellent engagement and motivation. Assessment/Plan: Patient discharged from Speech Therapy at this time due to discharge from unit. Discharge Disposition: Home with wife and family. Continued Speech Therapy Services recommended: Yes - Outpatient Speech Therapy.               Maximino Burkett M.S. [address] [facility] ([place] [id number]

## 2021-09-24 NOTE — PROGRESS NOTES
[facility] PHYSICAL THERAPY  Discharge Note  [facility]    Time In: 1100  Time Out: 1130  Minutes: 30          Date: 2021  Patient Name: Mini Granda,  Gender:  male        MRN: [id number]  : 1975  (48 y.o.)  Referral Date : 21  Referring Practitioner: Leopold Blamer, APRN - CNP  Diagnosis: critical illness myopathy  Additional Pertinent Hx: Mini Granda is a 40-year-old white male who presented to [facility] on 2021 with complaints of shortness of breath. He has a past medical history of lifetime non-smoker, obesity, but has no other medical history. He is a farmer by occupation. Per report he had presented to the emergency department after developing fever with cough chills and shortness of breath. He also states that he lost his taste and smell on 2021. He continued to progress with shortness of breath which worsened. He developed malaise. CT scan chest showed bilateral infiltrates. He initially was placed on low-flow oxygen but continued to progress to high flow. He was given remdesivir and Decadron. And subsequently he did receive tocilizumab. He continued to develop dyspnea and \"happy hypoxemia\" as he continued to worsen he did elect to be placed on the ventilator and the initiation of ECMO. On 2021 patient was cannulated and intubated. He initially underwent bilateral groin catheterization. He was later recannulated in the neck. He was decannulated on 9/10/2021. He did develop bacterial pneumonia with H influenza. He was started on Rocephin. He was transferred to [address] [place] [address] on 2021. Nebulized tobramycin was added on 2021 for 10 doses. Levaquin was completed on 2021. Patient was extubated on 2021. Pt admitted to  Rehab .      Prior Level of Function:  Lives With: Spouse (3 children aged 15, 15, and 13 y)  Type of Home: House  Home Layout: Two level, Performs ADL's on one level, Able to Live on Main level with bedroom/bathroom  Entrance Stairs - Number of Steps: 4 steps with 2 rails that are too wide to use at same time    Vitals: Oxygen: 92-94% on room air with activity, 96-97% at rest   Heart rate 106-107 at rest, up to 135 with activity    Restrictions/Precautions:  Restrictions/Precautions: General Precautions, Fall Risk  Position Activity Restriction  Other position/activity restrictions: Monitor O2 sats    SUBJECTIVE: Patient in room in chair, wife present. Pt agreeable to PT and ready to return to home. Spouse present throughout session for family education. PAIN: no complaints of pain    OBJECTIVE:  Bed Mobility:  Not Tested    Transfers:  Sit to Stand: Independent  Stand to Sit:Independent    Ambulation:  Modified Independent  Distance: ~160' then ambulating 5 minutes and 5 seconds  Surface: Level Tile  Device:No Device  Gait Deviations:  Slow Evelia  *Educated pt on ambulating at home and timing self, working towards increasing time as able to safely. Education on monitoring O2 and heart rate and resting when he needs to. Exercise:  Standing step ups ~10 times each initially with no UE support then progressing to bilateral UE support with LE fatigue. NuStep completed x5 minutes ranging from level 1 to 4 with bilateral LE's only--no use of UE's. Pt have a bike at home similar to NuStep he can use. Education on gradually increasing resistance even though he may need to decrease time to increase LE strength. Exercises were completed for increased independence with functional mobility. Functional Outcome Measures: Not completed       ASSESSMENT:  Assessment:   . At discharge Mr Spencer Robins met 4/4 STG's and 4/4 LTG's.   Patient met all goals despite short length of stay, progressing sit < > stand from SBA to independence, gait from SBA/CGA to modified independence with no AD, steps from CGA to modified independence and MARTINEZ from 41 to 56/56 indicating low fall risk. Patient also demonstrates improved O2 with activity and demonstrates good awareness of when to rest.  Patient is returning to home with family support. Activity Tolerance:  Patient tolerance of  treatment: good. Equipment Recommendations:Equipment Needed: Yes  Other: pulse oximeter--wife reports pt has one  Discharge Recommendations:  Discharge Recommendations: Home with assist PRN    Plan: Patient is returning to home with family and walking HEP    Patient Education  Patient Education: Verbal Exercise Instruction, Home Safety Education,  - Patient Verbalized Understanding    Goals:  Patient goals : go home  Short term goals  Time Frame for Short term goals: 1 week  Short term goal 1: Patient will complete sit < > stand and stand pivot transfer with modified independence to transfer safely. GOAL MET  Short term goal 2: Patient will ascend/descend 4 steps with 1 hand rail and SBA to progress towards independent home entry. GOAL MET, SEE LTG  Short term goal 3: Patient will ambulate 76' with SBA and maintain O2 >/=92% to progress towards safe home navigation. GOAL MET, SEE LTG  Short term goal 4: Patient will demonstrate 5 second single leg stance bilaterally to improve balance and reduce fall risk. GOAL MET, SEE LTG  Long term goals  Time Frame for Long term goals : 2 weeks  Long term goal 1: Patient will ascend/descend 4 steps with 1 hand rail with modified independence. GOAL MET  Long term goal 2: Patient will ambulate 150' with no AD and modified independence and maintain O2 >/=92% to navigate home safely. GOAL MET  Long term goal 3: Patient will improve MARTINEZ to greater than or equal to 51/56 to reduce his risk for falls. GOAL MET  Long term goal 4: Patient will complete car transfer with modified independence to transfer in/out of vehicle safely for transfer to home and appointments.  GOAL MET

## 2021-09-27 NOTE — PROGRESS NOTES
CLINICAL PHARMACY NOTE: MEDS TO BEDS    Total # of Prescriptions Filled: 2   The following medications were delivered to the patient:  Potassium Chloride ER 20  Metoprolol Tartrate 25 mg    Additional Documentation:

## 2021-09-28 ENCOUNTER — TELEPHONE (OUTPATIENT)
Dept: CARDIOLOGY CLINIC | Age: 46
End: 2021-09-28

## 2021-09-28 NOTE — TELEPHONE ENCOUNTER
Patient of Dr. Pat Johnson that was recently in hospital and had a BMP ordered and would like to get labs done at [facility] [place] but they never received a lab order when he was discharged.    BMP order faxed over to [phone] per family request.

## 2021-10-06 ENCOUNTER — OFFICE VISIT (OUTPATIENT)
Dept: CARDIOLOGY CLINIC | Age: 46
End: 2021-10-06
Payer: COMMERCIAL

## 2021-10-06 VITALS
HEIGHT: 70 IN | BODY MASS INDEX: 37.8 KG/M2 | HEART RATE: 88 BPM | WEIGHT: 264 LBS | SYSTOLIC BLOOD PRESSURE: 120 MMHG | DIASTOLIC BLOOD PRESSURE: 64 MMHG

## 2021-10-06 DIAGNOSIS — Z86.16 HISTORY OF SEVERE ACUTE RESPIRATORY SYNDROME CORONAVIRUS 2 (SARS-COV-2) DISEASE: Primary | ICD-10-CM

## 2021-10-06 PROCEDURE — 1111F DSCHRG MED/CURRENT MED MERGE: CPT | Performed by: INTERNAL MEDICINE

## 2021-10-06 PROCEDURE — G8417 CALC BMI ABV UP PARAM F/U: HCPCS | Performed by: INTERNAL MEDICINE

## 2021-10-06 PROCEDURE — G8484 FLU IMMUNIZE NO ADMIN: HCPCS | Performed by: INTERNAL MEDICINE

## 2021-10-06 PROCEDURE — G8427 DOCREV CUR MEDS BY ELIG CLIN: HCPCS | Performed by: INTERNAL MEDICINE

## 2021-10-06 PROCEDURE — 1036F TOBACCO NON-USER: CPT | Performed by: INTERNAL MEDICINE

## 2021-10-06 PROCEDURE — 99212 OFFICE O/P EST SF 10 MIN: CPT | Performed by: INTERNAL MEDICINE

## 2021-10-06 NOTE — PROGRESS NOTES
[address] [facility] [address] [place] [address]  Dept: [phone]  Dept Fax: [phone]  Loc: [phone]    Visit Date: 10/6/2021    Mr. Leigh Colon is a 48 y.o. male  who presented for:  Chief Complaint   Patient presents with    Follow-Up from Hospital     Covid       HPI:   HPI 47 y/o male, for follow up for recent covid-19 infection/ARDS s/p ECMO. The patient spent 26 days of hospitalizations for covid-19 related ARDS/pneumonia, and he spent 10 days on ECMO. He discharged on 9/24/2021. Currently, c/o cough that is randomly comes, no triggers, with small clear sputum, no hemoptysis. Additionally c/o generalized weakness that is improving. He denies chest pain, heart palpitations, chest discomfort, fever, chills, dizziness, abdominal pain, dysuria. ECMO procedure wound sites are healed, no bleeding nor discharges noted. The patient returned to his work, daily activities. Still feels some weakness, but improving over time. EF-55%  Valve- no Hx of valvular disease  Ca-no hx of CAD  Rhythm - regular, tachycardiac, HR-96  Pa- no Hx of PAD        Current Outpatient Medications:     metoprolol tartrate (LOPRESSOR) 25 MG tablet, Take 1 tablet by mouth 2 times daily . , Disp: 60 tablet, Rfl: 3    potassium chloride (KLOR-CON M) 20 MEQ extended release tablet, Take 1 tablet by mouth daily (with breakfast), Disp: 30 tablet, Rfl: 0    polycarbophil (FIBERCON) 625 MG tablet, Take 1 tablet by mouth daily, Disp: , Rfl: 0    ammonium lactate (LAC-HYDRIN) 12 % lotion, Apply topically as needed. , Disp: , Rfl:     Past Medical History  Luma Burkett  has no past medical history on file. Social History  Luma Burkett  reports that he has never smoked. He has never used smokeless tobacco. He reports current alcohol use. He reports that he does not use drugs. Family History  Luma Burkett family history is not on file.     There is no family history of bicuspid aortic valve, aneurysms, heart transplant, pacemakers, defibrillators, or sudden cardiac death. Past Surgical History   Past Surgical History:   Procedure Laterality Date    ABDOMEN SURGERY      naval hernia removal    CHOLECYSTECTOMY      DIAGNOSTIC CARDIAC CATH LAB PROCEDURE  09/10/2021       Review of Systems   Constitutional: Negative for chills and fever  HENT: Negative for congestion, sinus pressure, sneezing and sore throat. Eyes: Negative for pain, discharge, redness and itching. Respiratory: Negative for apnea, cough  Gastrointestinal: Negative for blood in stool, constipation, diarrhea   Endocrine: Negative for cold intolerance, heat intolerance, polydipsia. Genitourinary: Negative for dysuria, enuresis, flank pain and hematuria. Musculoskeletal: Negative for arthralgias, joint swelling and neck pain. Neurological: Negative for numbness and headaches. Psychiatric/Behavioral: Negative for agitation, confusion, decreased concentration and dysphoric mood. Objective:     /64   Pulse 88   Ht 5' 10\" (1.778 m)   Wt 264 lb (119.7 kg)   BMI 37.88 kg/m²     Wt Readings from Last 3 Encounters:   10/06/21 264 lb (119.7 kg)   09/24/21 253 lb 4.8 oz (114.9 kg)   09/19/21 250 lb 14.4 oz (113.8 kg)     BP Readings from Last 3 Encounters:   10/06/21 120/64   09/24/21 118/70   09/20/21 120/81       Nursing note and vitals reviewed. Physical Exam   Constitutional: Oriented to person, place, and time. Appears well-developed and well-nourished. HENT:   Head: Normocephalic and atraumatic. Eyes: EOM are normal. Pupils are equal, round, and reactive to light. Neck: Normal range of motion. Neck supple. No JVD present. Cardiovascular: Tachycardia -HR-96 pm, regular rhythm, normal heart sounds and intact distal pulses. No murmur heard. Pulmonary/Chest: Effort normal and breath sounds normal. No respiratory distress. No wheezes. No rales. Abdominal: Soft.  Bowel sounds are normal. No distension. There is no tenderness. Musculoskeletal: Normal range of motion. No edema. Neurological: Alert and oriented to person, place, and time. No cranial nerve deficit. Coordination normal.   Skin: Skin is warm and dry. Psychiatric: Normal mood and affect.        No results found for: CKTOTAL, CKMB, CKMBINDEX    Lab Results   Component Value Date    WBC 3.5 09/22/2021    RBC 4.33 09/22/2021    HGB 13.4 09/22/2021    HCT 42.7 09/22/2021    MCV 98.6 09/22/2021    MCH 30.9 09/22/2021    MCHC 31.4 09/22/2021     09/22/2021    MPV 10.0 09/22/2021       Lab Results   Component Value Date     09/22/2021    K 4.1 09/22/2021    K 3.3 09/17/2021     09/22/2021    CO2 22 09/22/2021    BUN 15 09/22/2021    LABALBU 4.6 09/21/2021    CREATININE 0.8 09/22/2021    CALCIUM 9.6 09/22/2021    LABGLOM >90 09/22/2021    GLUCOSE 101 09/22/2021       Lab Results   Component Value Date    ALKPHOS 60 09/21/2021    ALT 59 09/21/2021    AST 45 09/21/2021    PROT 7.1 09/21/2021    BILITOT 2.2 09/21/2021    BILIDIR 0.4 09/21/2021    LABALBU 4.6 09/21/2021       Lab Results   Component Value Date    MG 2.2 09/17/2021       Lab Results   Component Value Date    INR 1.29 (H) 08/30/2021         Lab Results   Component Value Date    LABA1C 5.6 09/02/2021       Lab Results   Component Value Date    TRIG 85 09/01/2021       No results found for: TSH      Testing Reviewed:      I have individually reviewed the cardiac test below:    ECHO: Results for orders placed during the hospital encounter of 08/30/21    ECHO Complete 2D W Doppler W Color    Narrative  Transthoracic Echocardiography Report (TTE)    Demographics    Patient Name    Sonia Jones Gender                Male    MR #            [id number]   Race                      Ethnicity    Account #       [de-identified]   Room Number           0002    Accession       [id number]  Date of Study         09/01/2021  Number    Date of Birth   1975  Referring Physician Stefania Ray MD    Age             48 year(s)  Carol Mayfield, [facility], RVT    Interpreting          Felice Martel MD  Physician    Procedure    Type of Study    TTE procedure:ECHOCARDIOGRAM COMPLETE 2D W DOPPLER W COLOR. Procedure Date  Date: 09/01/2021 Start: 10:04 AM    Study Location: Bedside  Technical Quality: Limited visualization due to restricted mobility. Indications:COVID. Additional Medical History:Sleep apnea, Obesity, COVID-19. Patient Status: Routine    Height: 70 inches Weight: 263.01 pounds BSA: 2.34 m^2 BMI: 37.74 kg/m^2    BP: 110/77 mmHg    Conclusions    Summary  Normal left ventricle size and systolic function. Ejection fraction was  estimated at 60 %. There were no regional left ventricular wall motion  abnormalities and wall thickness was within normal limits. Signature    ----------------------------------------------------------------  Electronically signed by Felice Martel MD (Interpreting  physician) on 09/01/2021 at 05:51 PM  ----------------------------------------------------------------    Findings    Mitral Valve  The mitral valve structure was normal with normal leaflet separation. DOPPLER: The transmitral velocity was within the normal range with no  evidence for mitral stenosis. There was no evidence of mitral  regurgitation. Aortic Valve  The aortic valve was trileaflet with normal thickness and cuspal  separation. DOPPLER: Transaortic velocity was within the normal range with  no evidence of aortic stenosis. There was no evidence of aortic  regurgitation. Tricuspid Valve  The tricuspid valve structure was normal with normal leaflet separation. DOPPLER: There was no evidence of tricuspid stenosis. There was no  evidence of tricuspid regurgitation. Pulmonic Valve  The pulmonic valve leaflets exhibited normal thickness, no calcification,  and normal cuspal separation.  DOPPLER: The transpulmonic velocity was  within the normal range with no evidence for regurgitation. Left Atrium  Left atrial size was normal.    Left Ventricle  Normal left ventricle size and systolic function. Ejection fraction was  estimated at 60 %. There were no regional left ventricular wall motion  abnormalities and wall thickness was within normal limits. Right Atrium  Right atrial size was normal.    Right Ventricle  The right ventricular size was normal with normal systolic function and  wall thickness. Pericardial Effusion  The pericardium was normal in appearance with no evidence of a pericardial  effusion. Pleural Effusion  No evidence of pleural effusion. Aorta / Great Vessels  -Aortic root dimension within normal limits.  -The Pulmonary artery is within normal limits. -IVC size is within normal limits with normal respiratory phasic changes.     M-Mode/2D Measurements & Calculations    LV Diastolic   LV Systolic Dimension:    AV Cusp Separation: 2.4 cmLA  Dimension: 4.8 3.4 cm                    Dimension: 3.2 cmAO Root  cm             LV Volume Diastolic: 876  Dimension: 3.3 cmLA Area: 14.9  LV FS:29.2 %   ml                        cm^2  LV PW          LV Volume Systolic: 10.8  Diastolic: 0.8 ml  cm             LV EDV/LV EDV Index: 108  Septum         ml/46 m^2LV ESV/LV ESV    RV Diastolic Dimension: 2.9 cm  Diastolic: 0.9 Index: 46.1 ml/20 m^2  cm             EF Calculated: 56.1 %     LA/Aorta: 0.97    LA volume/Index: 34.1 ml /15m^2    Doppler Measurements & Calculations    MV Peak E-Wave: 52.7 cm/s AV Peak Velocity: 115  LVOT Peak Velocity: 69.4  MV Peak A-Wave: 59.1 cm/s cm/s                   cm/s  MV E/A Ratio: 0.89        AV Peak Gradient: 5.29 LVOT Peak Gradient: 2  MV Peak Gradient: 1.11    mmHg                   mmHg  mmHg  TV Peak E-Wave: 37.3 cm/s  MV Deceleration Time: 116                        TV Peak A-Wave: 48 cm/s  msec  IVRT: 88 msec          TV Peak Gradient: 0.56  mmHg    AV DVI (Vmax):0.6      PV Peak Velocity: 55.7  cm/s  PV Peak Gradient: 1.24  mmHg    [id number]  [id number]%3d%3d       Assessment/Plan   1. Follow up, s/p ECMO due to covid-19   2. Sinus tachycardia - most likely due to recent sytemic inflammatory response for covid-19 infection  3. Moderate obesity, BMI_37.8  4. No CAD  5. Preserved EF  Plan  -stop metoprolol in the future, d/w Wife who is RN, daily check HR  -PCP follow up to manage if tachycardia persists  -encouraged healthy diet/regular exercise and weight loss. PCP to follow. Disposition:  prn       Electronically signed by Ximena Snyder DO   10/6/2021 at 3:43 PM EDT    Attending Supervising Physician's Attestation Statement  I performed a history and physical examination on the patient and discussed the management with the resident physician. I reviewed and agree with the findings and plan as documented in the resident's note. BB for ST as he recovers for COVID issues. Auto-diuresing. Can go back to work, no restrictions. Access sites have healed. F/u prn.      Electronically signed by Long Ramirez MD on 10/7/21 at 11:25 AM EDT

## 2022-04-22 NOTE — PROGRESS NOTES
[facility] [address] [facility] initial consultation note    Ruby Verdugo                                                Chief complaint: Ruby Verdugo is a 48 y. o.oldmale came for further evaluation regarding his hypersomnia and ?sleep apnea  with referral from Dr. Holly Mccabe MD      [place]:    Sleep/Wake schedule:  Usual time to go to bed during the work/regular day of week: 9:30 PM  Usual time to wake up during the work//regular day of week: 3:40 AM  Over the weekends his sleep schedule: . [x]phase delayed. He goes to bed around 10 PM and wakes up at 6 AM. He feels the same on the weekends despite sleeping long time. He usually falls a sleep in less than: 2 to 3 minutes  He takes naps: Yes. Number of naps per week: 1 time on Sunday after coming from [employer]   During each nap he spends a total of: Approximately 60 minutes  The naps were reported as refreshing: Yes      Sleep Hygiene:  Is the temperature and evironment in his bed room is acceptable to him: Yes. He watches Television in his bed room: Yes. He read books, study, pay bills etc in the bed: No.   Frequency He wake up during night/sleep: 0-1  Majority of nocturnal awakenings are for urination: Yes. Difficulty in falling back to sleep after nocturnal awakenings: No  .  Do you drink coffee: No.  He drinks 4 to 6 cups of decaffeinated coffee per day. He drinks his last cup of decaffeinated coffee around 6 PM to 7 PM     Do you drink caffeinated beverages i.e sodas: No.   Do you drink tea: No.     Do you drink alcoholic beverages: Yes. He drinks 4 to 5 cans of beer on the weekends. History of recreational drug use: No    Social History     Tobacco Use    Smoking status: Never Smoker    Smokeless tobacco: Never Used   Vaping Use    Vaping Use: Never used   Substance Use Topics    Alcohol use: Yes     Comment: social    Drug use: Never       Sleep apnea symptoms:  Noticed to have loud snoring:Yes. Noted by his family member- spouse  Witnessed apneas during sleep noticed: Yes. Noted by his family member- spouse  History of choking and gasping sensation at night time: No.   History of headaches in the morning:No.   History of dry mouth in the morning: Yes. History of palpitations during night time/nocturnal awakenings: No.   History of sweating during night time/nocturnal awakenings: No.      General:  History of head injury in the past: No.   History of seizures: No  Rest less legs syndrome symptoms:NO  History suggestive of periodic limb movements during sleep: NO  History suggestive of hypnagogic hallucinations: NO  History suggestive of hypnopompic hallucinations: NO  History suggestive of sleep talking:NO  History suggestive of sleep walking:NO  History suggestive of bruxism: NO    History suggestive of cataplexy: NO  History suggestive of sleep paralysis: NO    Family history of sleep disorders:  Family history of obstructive sleep apnea: Yes. His father was diagnosed with obstructive sleep apnea. His father is currently using a CPAP device. Family history of Narcolepsy: No.   Family history of Rest less legs syndrome : No.       History regarding old sleep studies:  Prior history of sleep study: No.  Using CPAP device: No.  Currently using home Oxygen: NO.       Patient considerations:  Is the patient is ambulatory: Yes  Patient is currently using: None of these Wheelchair, Emilie Gambles or [employer]. Para/Quadriplegic: NO  Hearing deficit : NO  Claustrophobic: NO  MDD : NO  Blind: NO  Incontinent: NO  Para/Quadraplegi: NO.   Need transportation to and from Sleep Center:NO      Social History:  Social History     Tobacco Use    Smoking status: Never Smoker    Smokeless tobacco: Never Used   Vaping Use    Vaping Use: Never used   Substance Use Topics    Alcohol use: Yes     Comment: social    Drug use: Never   . He is currently working: Yes. He is currently working at [employer] in [facility]. He usually goes to his work at:   He completes his work at:                           No past medical history on file. Past Surgical History:   Procedure Laterality Date    ABDOMEN SURGERY      naval hernia removal    CHOLECYSTECTOMY      DIAGNOSTIC CARDIAC CATH LAB PROCEDURE  09/10/2021       No Known Allergies    Current Outpatient Medications   Medication Sig Dispense Refill    metoprolol tartrate (LOPRESSOR) 25 MG tablet Take 1 tablet by mouth 2 times daily . (Patient not taking: Reported on 5/20/2022) 60 tablet 3    potassium chloride (KLOR-CON M) 20 MEQ extended release tablet Take 1 tablet by mouth daily (with breakfast) (Patient not taking: Reported on 5/20/2022) 30 tablet 0    polycarbophil (FIBERCON) 625 MG tablet Take 1 tablet by mouth daily (Patient not taking: Reported on 5/20/2022)  0    ammonium lactate (LAC-HYDRIN) 12 % lotion Apply topically as needed. (Patient not taking: Reported on 5/20/2022)       No current facility-administered medications for this visit. No family history on file. Review of Systems:   General/Constitutional: He lost approximately 10 pounds of weight in the last 1 year with a normal appetite. No fever or chills. HENT: Negative. Eyes: Negative. Upper respiratory tract: No nasal stuffiness or post nasal drip. Lower respiratory tract/ lungs: No cough or sputum production. No hemoptysis. Cardiovascular: No palpitations or chest pain. Gastrointestinal: No nausea or vomiting. Neurological: No focal neurologiacal weakness. Extremities: No edema. Musculoskeletal: No complaints. Genitourinary: No complaints. Hematological: Negative. Psychiatric/Behavioral: Negative. Skin: No itching. /74 (Site: Left Upper Arm, Position: Sitting)   Pulse 93   Temp 97.7 °F (36.5 °C)   Ht 5' 10\" (1.778 m)   Wt 292 lb 3.2 oz (132.5 kg)   SpO2 96% Comment: on ra  BMI 41.93 kg/m²   BMI:  Body mass index is 41.93 kg/m².      Mallampati airway Class: 4  Neck Circumference:  18Inches  [place] sleepiness score 5/20/22:  15  saqli: 78     Physical Exam:   Nursing note and vitals reviewed. Constitutional: Patient appears well built and well nourished. No distress. Patient is oriented to person, place, and time. HENT:   Head: Normocephalic and atraumatic. Right Ear: External ear normal.   Left Ear: External ear normal.   Mouth/Throat: Oropharynx is clear and moist.  No oral thrush. Bilateral 3+ tonsillar hypertrophy. Eyes: Conjunctivae are normal. Pupils are equal, round, and reactive to light. No scleral icterus. Neck: Neck supple. No JVD present. No tracheal deviation present. Old ECMO catheter insertion site scar present on his right side of the neck. Cardiovascular: Normal rate, regular rhythm, normal heart sounds. No murmur heard. Pulmonary/Chest: Effort normal and breath sounds normal. No stridor. No respiratory distress. No wheezes. No rales. Patient exhibits no tenderness. Abdominal: Soft. Patient exhibits no distension. No tenderness. Musculoskeletal: Normal range of motion. Extremities: Patient exhibits no edema and no tenderness. Lymphadenopathy:  No cervical adenopathy. Neurological: Patient is alert and oriented to person, place, and time. Skin: Skin is warm and dry. Patient is not diaphoretic. Psychiatric: Patient  has a normal mood and affect. Patient behavior is normal.       Diagnostic Data:  None related sleep. Assessment:  -Snoring with witnessed apneas, nocturnal awakenings and excessive daytime sleepiness to evaluate for obstructive sleep apnea. -Inadequate sleep hygiene.  -History of COVID-19 pneumonia leading to acute respiratory failure with ARDS in August 2021. He received ECMO therapy. He recovered  -History of sinus tachycardia. He used to be on treatment with Lopressor 25mg p.o. twice daily.   He recently came off blood pressure therapy.  -Hypersomnia ( Excessive daytime sleepiness) may be due to obstructive sleep apnea Vs Inadequate sleep hygiene.  -Bilateral tonsillar enlargement with 3+    Recommendations/Plan:  -Will schedule patient for polysomnogram in the sleep lab. -I had a discussion with patient regarding avialable treatment options for his sleep disorder breathing including but not limited to CPAP titration in the sleep lab Vs.Dental appliance placement with referral to a local dentist Vs other available surgical options including Uvulopalatopharyngoplasty, maxillomandibular ostomy and tracheostomy as last option. At the end of discussion, he is not decided on his treatment if he found to have obstructive sleep apnea at this time.  -We will see Pro Razo back in 1week after the sleep study to go over the sleep study results and further management options.  -He was educated to practice good sleep hygiene practices. He was provided with a good sleep hygiene hand out.  -Taryn Salmon was advised to make earlier appointment with my clinic if he develops any worsening of sleep symptoms. He verbalizes understanding.  -Taryn Salmon was advised to not to drive any motor vehicles or operate heavy equipment until his sleep symptoms are under good control. Pro Razo verbalizes understanding.  -He was advised to loose weight by controlling diet and doing exercise once cleared by his family physician. - Pro Razo was educated about my impression and plan. He verbalizes understanding.      -I personally reviewed updated the Past medical hx, Past surgical hx,Social hx, Family hx, Medications, Allergies in the discrete data section of the patient chart along with labs, Pulmonary medicine,Sleep medicine related, Pathological, Microbiological and Radiological investigations.

## 2022-05-20 ENCOUNTER — INITIAL CONSULT (OUTPATIENT)
Dept: PULMONOLOGY | Age: 47
End: 2022-05-20
Payer: COMMERCIAL

## 2022-05-20 VITALS
BODY MASS INDEX: 41.83 KG/M2 | OXYGEN SATURATION: 96 % | TEMPERATURE: 97.7 F | HEART RATE: 93 BPM | DIASTOLIC BLOOD PRESSURE: 74 MMHG | WEIGHT: 292.2 LBS | SYSTOLIC BLOOD PRESSURE: 128 MMHG | HEIGHT: 70 IN

## 2022-05-20 DIAGNOSIS — G47.30 SLEEP APNEA, UNSPECIFIED TYPE: ICD-10-CM

## 2022-05-20 DIAGNOSIS — G47.10 HYPERSOMNIA: ICD-10-CM

## 2022-05-20 DIAGNOSIS — R06.81 APNEA: Primary | ICD-10-CM

## 2022-05-20 DIAGNOSIS — R06.83 SNORING: ICD-10-CM

## 2022-05-20 PROCEDURE — G8427 DOCREV CUR MEDS BY ELIG CLIN: HCPCS | Performed by: INTERNAL MEDICINE

## 2022-05-20 PROCEDURE — 99214 OFFICE O/P EST MOD 30 MIN: CPT | Performed by: INTERNAL MEDICINE

## 2022-05-20 PROCEDURE — 1036F TOBACCO NON-USER: CPT | Performed by: INTERNAL MEDICINE

## 2022-05-20 PROCEDURE — G8417 CALC BMI ABV UP PARAM F/U: HCPCS | Performed by: INTERNAL MEDICINE

## 2022-05-20 NOTE — PATIENT INSTRUCTIONS
-We will see Isaiuzielmame Montez back in 1week after the sleep study to go over the sleep study results and further management options.  -He was educated to practice good sleep hygiene practices. He was provided with a good sleep hygiene hand out.  -Valencia Landry was advised to make earlier appointment with my clinic if he develops any worsening of sleep symptoms. He verbalizes understanding.  -Valencia Landry was advised to not to drive any motor vehicles or operate heavy equipment until his sleep symptoms are under good control. Julia Montez verbalizes understanding.  -He was advised to loose weight by controlling diet and doing exercise once cleared by his family physician. - Charismamame Montez was educated about my impression and plan. He verbalizes understanding.

## 2022-05-20 NOTE — PROGRESS NOTES
Chief Complaint:  Margareth Mcneal is here for a Sleep consult ref by Windy Martinez  For snoring/fatigue    Mallampati airway Class:  4  Neck Circumference:  18Inches    [place] sleepiness score 5/20/22:  15  saqli 78

## 2022-06-01 ENCOUNTER — TELEPHONE (OUTPATIENT)
Dept: SLEEP CENTER | Age: 47
End: 2022-06-01

## 2022-06-01 DIAGNOSIS — G47.30 SLEEP APNEA, UNSPECIFIED TYPE: ICD-10-CM

## 2022-06-01 DIAGNOSIS — R06.83 SNORING: ICD-10-CM

## 2022-06-01 DIAGNOSIS — R06.81 APNEA: Primary | ICD-10-CM

## 2022-06-01 DIAGNOSIS — G47.10 HYPERSOMNIA: ICD-10-CM

## 2022-06-01 NOTE — TELEPHONE ENCOUNTER
[facility]  Abby Conrad MD; P [facility] Pulmonary Medicine Clinical Support Pool; Esperanza Anaya; [facility]; [address] has denied the PSG for the reason of medical necessity. A peer to peer can be completed within 21 days at [phone]. The case ref # is [id number]. A denial letter will be mailed out. Thank you,   Elder Ibanez.

## 2022-06-01 NOTE — TELEPHONE ENCOUNTER
[facility]; [facility] Pulmonary Medicine Clinical Support Pool; Sanjeev Wolf; Mae Murillo; [address] has denied the PSG for the reason of medical necessity. A peer to peer can be completed within 21 days at [phone]. The case ref # is [id number]. A denial letter will be mailed out. Thank you,   Moisés Dodd.

## 2022-06-01 NOTE — TELEPHONE ENCOUNTER
This is becoming double time waste for me. I am getting the same message from Ms. Livier Doyle. Please find out a better way to do the same job. HST was ordered in Epic. Please schedule and inform patient. Please co ordinate with Ms. Ms. Livier Doyle. I don't have any idea why 2 people are sending me the same messages for same problem. ? Wasting time.

## 2022-07-13 ENCOUNTER — HOSPITAL ENCOUNTER (OUTPATIENT)
Dept: SLEEP CENTER | Age: 47
Discharge: HOME | End: 2022-07-15
Payer: COMMERCIAL

## 2022-07-13 DIAGNOSIS — R06.83 SNORING: ICD-10-CM

## 2022-07-13 DIAGNOSIS — G47.10 HYPERSOMNIA: ICD-10-CM

## 2022-07-13 DIAGNOSIS — G47.30 SLEEP APNEA, UNSPECIFIED TYPE: ICD-10-CM

## 2022-07-13 DIAGNOSIS — R06.81 APNEA: ICD-10-CM

## 2022-07-13 PROCEDURE — 95806 SLEEP STUDY UNATT&RESP EFFT: CPT

## 2022-07-13 NOTE — PROGRESS NOTES
Soila Adams presents today for a HST instruction and demonstration on unit # 2616. Questions were asked and answers given. He was able to return demonstration and verbalized understanding. The sleep center control room phone number was provided in case questions arise during the study. Informed patient to call 911 in case of an emergency. He states he will return the unit tomorrow before 1000. Covid screening questions asked.

## 2022-07-14 LAB — STATUS: NORMAL

## 2022-07-15 NOTE — PROGRESS NOTES
[address] [place] [address]                               SLEEP STUDY REPORT    PATIENT NAME: Roxie Schultz                :        1975  MED REC NO:   [id number]                           ROOM:  ACCOUNT NO:   [de-identified]                           ADMIT DATE: 2022  PROVIDER:     Vincent Rangel. MD Dana    DATE OF STUDY:  2022    PORTABLE/HOME SLEEP STUDY REPORT    REFERRING PROVIDER:  Zulema Arzola MD    The patient's height is 70 inches, weight is 292 pounds with a BMI of  41.9. HISTORY:  The patient is a 40-year-old gentleman who was initially  evaluated by me on 2022. The patient currently suffering with  hypersomnia with [place] Sleepiness Score of 15. The patient gave a  history of snoring with witnessed apneas. The patient also having  frequent nocturnal awakenings. The patient found to have 3+ bilateral  tonsillar enlargement. The patient is scheduled for sleep study to  evaluate for sleep apnea as an etiology for hypersomnia. METHODS:  The patient underwent Type III Portable Monitoring Sleep Study  including the simultaneous recording of oral-nasal airflow, rib and  abdominal respiratory effort, pulse rate, oxygen saturation, and body  position. Sleep staging and scoring followed the standard put forth by  the American Academy of Sleep Medicine and utilized the 1B obstructive  hypopnea event desaturation of 4 percent or greater. INTERPRETATION:  This is a portable/home sleep study. The study was  performed on 2022. The study was started at 09:00 p.m. and was  terminated at 03:40 a.m. with a total recording time of 400 minutes. RESPIRATORY EVENT ANALYSIS:  Revealed the patient had a total of 35  apneas, all of them were obstructive in nature. The patient also had a  total of 68 hypopneas, all of them were obstructive in nature.   The  total number of apneas and hypopneas recorded during the study was 103  with respiratory effort index (BRAYAN) was 15.5. OXYGEN SATURATION MONITORING:  Revealed the patient had a maximum oxygen  desaturation to 79% with a mean oxygen saturation of 89%. The patient  spent a total of 72.3 minutes below oxygen saturation less than 88%. POSITION ANALYSIS:  Revealed the patient spent 59 minutes in supine  position, 341 minutes in non-supine position. EKG MONITORING:  Revealed normal sinus rhythm. The patient noted to  have sinus tachycardia especially in the first half of the sleep study. The EKG interpretation was limited towards the end of the study due to  poor quality recording and error. IMPRESSION:  1. Moderately severe obstructive sleep apnea. 2.  Nocturnal hypoxia. The patient spent a total of 72.3 minutes below  oxygen saturation less than 88%. 3.  Hypersomnia, most likely due to sleep apnea. 4.  History of COVID infection leading to respiratory failure in  08/2021. RECOMMENDATIONS:  The patient should be scheduled for followup with my  clinic as soon as possible to discuss about the sleep study findings for  further management. Thanks to Sherwin Escalera MD, for giving me this opportunity to participate  in the care of this pleasant gentleman.         Sarah Beth Owens MD    D: 07/14/2022 18:22:34       T: 07/15/2022 15:10:04     [id number]  Job#: [id number]     Doc#: [id number]    CC:

## 2022-07-16 NOTE — PROGRESS NOTES
95   Temp 97.7 °F (36.5 °C)   Ht 5' 10\" (1.778 m)   Wt 296 lb 6.4 oz (134.4 kg)   SpO2 96% Comment: room air at rest  BMI 42.53 kg/m²   BMI:  Body mass index is 42.53 kg/m². Mallampati airway Class:4  Neck Circumference:. 18Inches  [place] sleepiness score 22: 14  Sleep apnea quality of life questionnaire: 58    Physical Exam :  Constitutional: Patient appears well built and well nourished. No distress. Patient is oriented to person, place, and time. HENT:   Head: Normocephalic and atraumatic. Right Ear: External ear normal.   Left Ear: External ear normal.   Mouth/Throat: Oropharynx is clear and moist.   Eyes: Conjunctivae are normal. Pupils are equal and reactive to light. No scleral icterus. Neck: Neck supple. No JVD present. Cardiovascular: Normal rate, regular rhythm, normal heart sounds. No murmur heard. Pulmonary/Chest: Effort normal and breath sounds normal. No stridor. No respiratory distress. No wheezes. No rales. Abdominal: Soft. Patient exhibits no distension. No tenderness. Musculoskeletal: Normal range of motion. Extremities: Patient exhibits no erythema or no edema. Lymphadenopathy:  No cervical adenopathy. Neurological: Patient is alert and oriented to person, place, and time. Skin: Skin is warm and dry. Patient is not diaphoretic. Psychiatric: Patient  has a normal mood and affect. Diagnostic Data:    Home/Portable sleep study done on :     SLEEP STUDY REPORT     PATIENT NAME: Damien Eaton                :        1975  MED REC NO:   [id number]                           ROOM:  ACCOUNT NO:   [de-identified]                           ADMIT DATE: 2022  PROVIDER:     Nhan Cortez MD     DATE OF STUDY:  2022     PORTABLE/HOME SLEEP STUDY REPORT     REFERRING PROVIDER:  Sherwin Escalera MD    IMPRESSION:  1. Moderately severe obstructive sleep apnea. 2.  Nocturnal hypoxia.   The patient spent a total of 72.3 minutes below  oxygen saturation less than 88%. 3.  Hypersomnia, most likely due to sleep apnea. 4.  History of COVID infection leading to respiratory failure in  08/2021. Assesment:  -Moderately severe obstructive sleep apnea -Newly diagnosed. He needs treatment due to his ongoing hypersomnia. -Nocturnal hypoxia. The patient spent a total of 72.3 minutes below oxygen saturation less than 88%. -Hypersomnia, most likely due to sleep apnea. -History of COVID infection leading to respiratory failure in 08/2021.  -He denied any prior history of COPD, CHF, Obesity hypoventilation, Prior palate surgery and Central sleep apnea. Recommendations/Plan:  -I had a discussion with patient regarding avialable treatment options for his sleep disorder breathing including but not limited to CPAP titration in the sleep lab Vs.Dental appliance placement with referral to a local dentist Vs other available surgical options including Uvulopalatopharyngoplasty, maxillomandibular ostomy, inspire device placement and tracheostomy as last option. At the end of discussion, he decided to go for treatment with a CPAP device therapy.  -Will start patient on Auto CPAP machine therapy with a Minimum CPAP of 6cm H20 and Maximum CPAP of 20cm H20. He need to be followed closely with down load by DME company in 4 weeks. He was instructed to make earlier appointment with my sleep clinic if he had any difficulty in using his auto CPAP machine therapy to consider for in lab CPAP titration. He verbalizes understanding.  -He was advised to make early appointment with my clinic if he develops any of the following conditions including -> COPD, CHF,Obesity hypoventilation syndrome, undergo palate surgery and Central sleep apnea his Auto CPAP/BiPAP settings to a fixed CPAP/BiPAP pressure settings. He verbalizes understanding.  -He need to come for follow up with my clinic in 2months to 10 weeks with a CPAP down load for clinical reevaluation and management.   -he advised to keep good compliance with recommended positive airway pressure therapy to get optimal results and clinical improvement.  -He was advised to call [employer] regarding supplies if needed. -He was advised to call my office for earlier appointment if needed for worsening of sleep symptoms.  -He was advised to continue to practice good sleep hygiene practices.  -He was instructed to not to drive any motor vehicles or operate heavy equipment if he feels sleepy. -He was advised to loose weight by controlling diet and doing exercise once cleared by his family physician.   -Amada Gomes. was educated about my impression and plan. He verbalizes understanding.      -I personally reviewed updated the Past medical hx, Past surgical hx,Social hx, Family hx, Medications, Allergies in the discrete data section of the patient chart along with labs, Pulmonary medicine,Sleep medicine related, Pathological, Microbiological and Radiological investigations.

## 2022-07-20 ENCOUNTER — OFFICE VISIT (OUTPATIENT)
Dept: PULMONOLOGY | Age: 47
End: 2022-07-20
Payer: COMMERCIAL

## 2022-07-20 VITALS
HEART RATE: 95 BPM | TEMPERATURE: 97.7 F | WEIGHT: 296.4 LBS | SYSTOLIC BLOOD PRESSURE: 122 MMHG | DIASTOLIC BLOOD PRESSURE: 78 MMHG | OXYGEN SATURATION: 96 % | HEIGHT: 70 IN | BODY MASS INDEX: 42.43 KG/M2

## 2022-07-20 DIAGNOSIS — G47.10 HYPERSOMNIA: ICD-10-CM

## 2022-07-20 DIAGNOSIS — G47.33 OSA (OBSTRUCTIVE SLEEP APNEA): Primary | ICD-10-CM

## 2022-07-20 PROCEDURE — 99214 OFFICE O/P EST MOD 30 MIN: CPT | Performed by: INTERNAL MEDICINE

## 2022-07-20 PROCEDURE — G8417 CALC BMI ABV UP PARAM F/U: HCPCS | Performed by: INTERNAL MEDICINE

## 2022-07-20 PROCEDURE — 1036F TOBACCO NON-USER: CPT | Performed by: INTERNAL MEDICINE

## 2022-07-20 PROCEDURE — G8427 DOCREV CUR MEDS BY ELIG CLIN: HCPCS | Performed by: INTERNAL MEDICINE

## 2022-07-20 NOTE — PROGRESS NOTES
Chief Complaint: Bridgett Fernando is here for Hst resutls    Mallampati airway Class:4  Neck Circumference:.  18Inches    [place] sleepiness score 7/20/22: 14  Sleep apnea quality of life questionnaire:.62

## 2022-07-20 NOTE — PATIENT INSTRUCTIONS
Recommendations/Plan:  -I had a discussion with patient regarding avialable treatment options for his sleep disorder breathing including but not limited to CPAP titration in the sleep lab Vs.Dental appliance placement with referral to a local dentist Vs other available surgical options including Uvulopalatopharyngoplasty, maxillomandibular ostomy, inspire device placement and tracheostomy as last option. At the end of discussion, he decided to go for treatment with a CPAP device therapy.  -Will start patient on Auto CPAP machine therapy with a Minimum CPAP of 6cm H20 and Maximum CPAP of 20cm H20. He need to be followed closely with down load by DME company in 4 weeks. He was instructed to make earlier appointment with my sleep clinic if he had any difficulty in using his auto CPAP machine therapy to consider for in lab CPAP titration. He verbalizes understanding.  -He was advised to make early appointment with my clinic if he develops any of the following conditions including -> COPD, CHF,Obesity hypoventilation syndrome, undergo palate surgery and Central sleep apnea his Auto CPAP/BiPAP settings to a fixed CPAP/BiPAP pressure settings. He verbalizes understanding.  -He need to come for follow up with my clinic in 2months to 10 weeks with a CPAP down load for clinical reevaluation and management.  -he advised to keep good compliance with recommended positive airway pressure therapy to get optimal results and clinical improvement.  -He was advised to call [employer] regarding supplies if needed. -He was advised to call my office for earlier appointment if needed for worsening of sleep symptoms.  -He was advised to continue to practice good sleep hygiene practices.  -He was instructed to not to drive any motor vehicles or operate heavy equipment if he feels sleepy.    -He was advised to loose weight by controlling diet and doing exercise once cleared by his family physician.   -Gerald Anderson. was educated about my impression and plan. He verbalizes understanding.

## 2023-01-24 ENCOUNTER — OFFICE VISIT (OUTPATIENT)
Dept: PULMONOLOGY | Age: 48
End: 2023-01-24
Payer: COMMERCIAL

## 2023-01-24 VITALS
HEIGHT: 70 IN | BODY MASS INDEX: 42.8 KG/M2 | WEIGHT: 299 LBS | OXYGEN SATURATION: 95 % | TEMPERATURE: 97.8 F | SYSTOLIC BLOOD PRESSURE: 112 MMHG | DIASTOLIC BLOOD PRESSURE: 64 MMHG | HEART RATE: 92 BPM

## 2023-01-24 DIAGNOSIS — G47.10 HYPERSOMNIA: ICD-10-CM

## 2023-01-24 DIAGNOSIS — G47.33 OSA ON CPAP: Primary | ICD-10-CM

## 2023-01-24 DIAGNOSIS — E66.01 MORBID OBESITY WITH BMI OF 40.0-44.9, ADULT (HCC): ICD-10-CM

## 2023-01-24 DIAGNOSIS — Z99.89 OSA ON CPAP: Primary | ICD-10-CM

## 2023-01-24 PROCEDURE — G8484 FLU IMMUNIZE NO ADMIN: HCPCS | Performed by: NURSE PRACTITIONER

## 2023-01-24 PROCEDURE — G8427 DOCREV CUR MEDS BY ELIG CLIN: HCPCS | Performed by: NURSE PRACTITIONER

## 2023-01-24 PROCEDURE — G8417 CALC BMI ABV UP PARAM F/U: HCPCS | Performed by: NURSE PRACTITIONER

## 2023-01-24 PROCEDURE — 99214 OFFICE O/P EST MOD 30 MIN: CPT | Performed by: NURSE PRACTITIONER

## 2023-01-24 PROCEDURE — 1036F TOBACCO NON-USER: CPT | Performed by: NURSE PRACTITIONER

## 2023-01-24 ASSESSMENT — ENCOUNTER SYMPTOMS
NAUSEA: 0
COUGH: 0
DIARRHEA: 0
ABDOMINAL PAIN: 0
VOMITING: 0
SHORTNESS OF BREATH: 0
EYES NEGATIVE: 1
WHEEZING: 0

## 2023-01-24 NOTE — PROGRESS NOTES
[facility]      Andreina Deutsch.         [id number]  1/24/2023   Chief Complaint   Patient presents with    Follow-up     WILLIE follow up after Pap set up         Pt of Dr. Lalitha Gordon     PAP Download:   Original or initial AHI: 15.5     Date of initial study: 07/13/2022      Compliant  87%     Noncompliant 0 %     PAP Type Auto   Level  6/20 cmH2O    Avg Hrs/Day 6 hours 15 minutes   AHI: 0.3   Recorded compliance dates , 12/24/2022  to 01/22/2023   Machine/Mfg:   [x] ResMed    [] Respironics/Dreamstation   Interface:   [] Nasal    [] Nasal pillows   [x] FFM      Provider:      [x] SR-HME     []Derrell     [] [employer]    [] Nichole Skkanchan    [] Schwietermans               [] [employer]      [] Adaptive    [] [address]      [] Other    Neck Size: 18  Mallampati Mallampati 4  ESS:  10  SAQLI: 91    Here is a scan of the most recent download:                  Presentation:   Naomy Che presents for sleep medicine follow up for obstructive sleep apnea  Since the last visit, Naomy Che newly set up on APAP   Got sore to left side of nose from mask. Took 2 days off without use , noticed his sleep was terrible without the mask. The sore got better on it's own  Still getting tired in evenings, if does not stay active will fall asleep   No longer sleepy at work     Equipment issues: The pressure is  acceptable, the mask is acceptable     Progress History:   Since last visit any new medical issues? No  Sleep Related Issues? No  New ER or hospital visits? No  Any new or changes in medicines? No  Any new sleep medicines? No    Review of Systems -   Review of Systems   Constitutional:  Positive for fatigue. Negative for activity change, appetite change, chills, fever and unexpected weight change. HENT: Negative. Eyes: Negative. Respiratory:  Negative for cough, shortness of breath and wheezing. Cardiovascular:  Negative for chest pain, palpitations and leg swelling.    Gastrointestinal: Negative for abdominal pain, diarrhea, nausea and vomiting. Genitourinary: Negative. Musculoskeletal: Negative. Skin: Negative. Neurological: Negative. Hematological: Negative. Psychiatric/Behavioral: Negative. Physical Exam:    BMI:  Body mass index is 42.9 kg/m². Wt Readings from Last 3 Encounters:   01/24/23 299 lb (135.6 kg)   07/20/22 296 lb 6.4 oz (134.4 kg)   05/20/22 292 lb 3.2 oz (132.5 kg)     Weight stable / unchanged  Vitals: /64   Pulse 92   Temp 97.8 °F (36.6 °C)   Ht 5' 10\" (1.778 m)   Wt 299 lb (135.6 kg)   SpO2 95%   BMI 42.90 kg/m²       Physical Exam  Vitals and nursing note reviewed. Constitutional:       Appearance: Normal appearance. He is obese. HENT:      Head: Normocephalic and atraumatic. Mouth/Throat:      Pharynx: Oropharynx is clear. Eyes:      Conjunctiva/sclera: Conjunctivae normal.   Pulmonary:      Effort: Pulmonary effort is normal. No tachypnea, bradypnea or respiratory distress. Skin:     Findings: No erythema or rash. Neurological:      Mental Status: He is alert and oriented to person, place, and time. Psychiatric:         Attention and Perception: Attention normal.         Mood and Affect: Mood normal.         Speech: Speech normal.         Behavior: Behavior normal.         Thought Content: Thought content normal.         Cognition and Memory: Cognition normal.         Judgment: Judgment normal.         ASSESSMENT/DIAGNOSIS     Diagnosis Orders   1. WILLIE on CPAP Controlled       2. Hypersomnia Improving       3. Morbid obesity with BMI of 40.0-44.9, adult [facility])                 Plan   Do you need any equipment today? No    - He  was advised to continue current positive airway pressure therapy with above described pressure.   - He  advised to keep good compliance with current recommended pressure to get optimal results and clinical improvement  - Recommend 7-9 hours of sleep with PAP  -educated on good sleep hygiene practices   - He was advised to call [employer] regarding supplies if needed.   -He call my office for earlier appointment if needed for worsening of sleep symptoms.   - He was instructed on weight loss- recommend diet and exercise. Excessive can help improve mood and help boost energy levels    - Marielena Thomas was educated about my impression and plan. Patient verbalizesunderstanding.   We will see Nanette Cook. back in: 6 months with download to follow up on his hypersomnia     Information added by my medical assistant/LPN was reviewed today    billing based on medical decision making     KATHARINA Ferguson-CNP   1/24/2023

## 2023-07-25 ENCOUNTER — OFFICE VISIT (OUTPATIENT)
Dept: PULMONOLOGY | Age: 48
End: 2023-07-25
Payer: COMMERCIAL

## 2023-07-25 VITALS
HEART RATE: 92 BPM | DIASTOLIC BLOOD PRESSURE: 80 MMHG | TEMPERATURE: 98.4 F | BODY MASS INDEX: 43.06 KG/M2 | SYSTOLIC BLOOD PRESSURE: 122 MMHG | WEIGHT: 300.8 LBS | HEIGHT: 70 IN | OXYGEN SATURATION: 96 %

## 2023-07-25 DIAGNOSIS — K21.9 GASTROESOPHAGEAL REFLUX DISEASE WITHOUT ESOPHAGITIS: ICD-10-CM

## 2023-07-25 DIAGNOSIS — Z99.89 OSA ON CPAP: Primary | ICD-10-CM

## 2023-07-25 DIAGNOSIS — G47.33 OSA ON CPAP: Primary | ICD-10-CM

## 2023-07-25 DIAGNOSIS — E66.01 MORBID OBESITY WITH BMI OF 40.0-44.9, ADULT (HCC): ICD-10-CM

## 2023-07-25 PROCEDURE — 99214 OFFICE O/P EST MOD 30 MIN: CPT | Performed by: NURSE PRACTITIONER

## 2023-07-25 PROCEDURE — G8417 CALC BMI ABV UP PARAM F/U: HCPCS | Performed by: NURSE PRACTITIONER

## 2023-07-25 PROCEDURE — G8427 DOCREV CUR MEDS BY ELIG CLIN: HCPCS | Performed by: NURSE PRACTITIONER

## 2023-07-25 PROCEDURE — 1036F TOBACCO NON-USER: CPT | Performed by: NURSE PRACTITIONER

## 2023-07-25 ASSESSMENT — ENCOUNTER SYMPTOMS
ABDOMINAL PAIN: 0
SHORTNESS OF BREATH: 0
EYES NEGATIVE: 1
DIARRHEA: 0
WHEEZING: 0
BACK PAIN: 1
COUGH: 0
VOMITING: 0
NAUSEA: 0

## 2023-07-25 NOTE — PROGRESS NOTES
[facility]      Apurva Asher.         [id number]  7/25/2023   Chief Complaint   Patient presents with    Follow-up     WILLIE 6 month f/u with [facility] download. Pt of Dr. Anuja Griffin    PAP Download:   Original or initial AHI: 15.5     Date of initial study: 7/13/22      Compliant  100%     Noncompliant 0 %     PAP Type AutoSet  Level  6/20 cmH2O  Avg Hrs/Day 6hrs 13mins  AHI: 0.3   Recorded compliance dates: 6/24/23 to 7/23/23  Machine/Mfg:   [x] ResMed    [] Respironics/Dreamstation   Interface:   [] Nasal    [] Nasal pillows   [x] FFM      Provider:      [x] SR-HME     []Derrell     [] Shahrzad    [] Sherel Deal    [] Schwietermans               [] [employer]      [] Adaptive    [] [address] [facility] [address]:      [] Other    Neck Size: 18  Mallampati 4  ESS:  6  SAQLI: 90    Here is a scan of the most recent download:                    Presentation:   Giuliana Rebolledo presents for 6 months  sleep medicine follow up for obstructive sleep apnea  Since the last visit, Giuliana Sorensont continues to use with compliance and benefit . GERD has been well controlled since CPAP use     Weight stable / unchanged    Equipment issues: The pressure is  acceptable, the mask is acceptable     Sleep issues:  Do you feel better? Yes  More rested? Yes   Better concentration? NA  Difficulty falling sleep? No  Difficulty staying asleep? No  Snoring? Not sure     Progress History:   Since last visit any new medical issues? No  New ER or hospital visits? No  Any new or changes in medicines? No  Any new sleep medicines? No    Review of Systems -   Review of Systems   Constitutional:  Negative for activity change, appetite change, chills, fever and unexpected weight change. HENT: Negative. Eyes: Negative. Respiratory:  Negative for cough, shortness of breath and wheezing. Cardiovascular:  Negative for chest pain, palpitations and leg swelling.    Gastrointestinal:  Negative for abdominal pain, diarrhea,

## 2024-07-22 NOTE — PROGRESS NOTES
Alex Viramontes Jr.         [id number]  7/23/2024   Chief Complaint   Patient presents with    Follow-up     1 year linda        Pt of Dr. Dana GUEVARA    PAP Download  Original or initial AHI: 15.5     Date of initial study: 7/13/22     Compliant  100%     Noncompliant 0 %         PAP Type AutoSet    Level  6/20     Leaks (95th percentile): 5.0    Avg Hrs/Day 6.5    AHI: 0.3     Recorded compliance dates: 6/22/24 - 7/21/24    Machine/Mfg:   [x] ResMed    [] Roman / Respironics    Interface:   [] Nasal    [] Nasal pillows   [x] FFM    Durable Medical Equipment Company:      [employer]    Neck Size:  18    Mallampati:  4    ESS:  8  SAQLI: 87    Scan of the most recent download:                      Presentation:   Alex presents for 1 yearsle medicine follow up for obstructive sleep apnea  Since the last visit, Alex c/o not feeling as rested over the last 6 months and wife noticing break through snoring   Denies any current allergy symptoms , takes loratadine PRN   Has gained 13 lbs since last year   Does continue to see much benefit with CPAP use     Progress History:   Since last visit any new medical issues? No  Any trouble with Machine No      Equipment issues:  The pressure is  acceptable, the mask is acceptable - does c/o headstrap causing pressure \"digging in \" back of head     Review of Systems -   Review of Systems   Constitutional:  Positive for unexpected weight change.   Allergic/Immunologic: Positive for environmental allergies.        Physical Exam:    BMI:  Body mass index is 44.94 kg/m².    Wt Readings from Last 3 Encounters:   07/23/24 (!) 142.1 kg (313 lb 3.2 oz)   07/25/23 (!) 136.4 kg (300 lb 12.8 oz)   01/24/23 135.6 kg (299 lb)     Weight gained 13 lbs over 1 year   Vitals: /76 (Site: Left Upper Arm, Position: Sitting, Cuff Size: Medium Adult)   Pulse (!) 105   Temp 98.7 °F (37.1 °C) (Temporal)   Ht 1.778 m (5' 10\")   Wt (!) 142.1 kg (313 lb 3.2 oz)   SpO2 94% Comment: RA @ Rest

## 2024-07-23 ENCOUNTER — OFFICE VISIT (OUTPATIENT)
Dept: PULMONOLOGY | Age: 49
End: 2024-07-23
Payer: COMMERCIAL

## 2024-07-23 VITALS
HEIGHT: 70 IN | BODY MASS INDEX: 44.84 KG/M2 | SYSTOLIC BLOOD PRESSURE: 124 MMHG | WEIGHT: 313.2 LBS | HEART RATE: 105 BPM | DIASTOLIC BLOOD PRESSURE: 76 MMHG | OXYGEN SATURATION: 94 % | TEMPERATURE: 98.7 F

## 2024-07-23 DIAGNOSIS — E66.01 MORBID OBESITY WITH BMI OF 40.0-44.9, ADULT (HCC): ICD-10-CM

## 2024-07-23 DIAGNOSIS — G47.33 OSA ON CPAP: Primary | ICD-10-CM

## 2024-07-23 PROCEDURE — 3017F COLORECTAL CA SCREEN DOC REV: CPT | Performed by: NURSE PRACTITIONER

## 2024-07-23 PROCEDURE — 99214 OFFICE O/P EST MOD 30 MIN: CPT | Performed by: NURSE PRACTITIONER

## 2024-07-23 PROCEDURE — G8417 CALC BMI ABV UP PARAM F/U: HCPCS | Performed by: NURSE PRACTITIONER

## 2024-07-23 PROCEDURE — G8427 DOCREV CUR MEDS BY ELIG CLIN: HCPCS | Performed by: NURSE PRACTITIONER

## 2024-07-23 PROCEDURE — 1036F TOBACCO NON-USER: CPT | Performed by: NURSE PRACTITIONER

## 2024-10-22 ENCOUNTER — OFFICE VISIT (OUTPATIENT)
Dept: PULMONOLOGY | Age: 49
End: 2024-10-22
Payer: COMMERCIAL

## 2024-10-22 VITALS
DIASTOLIC BLOOD PRESSURE: 86 MMHG | TEMPERATURE: 97.8 F | SYSTOLIC BLOOD PRESSURE: 124 MMHG | BODY MASS INDEX: 44.52 KG/M2 | HEART RATE: 90 BPM | WEIGHT: 311 LBS | OXYGEN SATURATION: 96 % | HEIGHT: 70 IN

## 2024-10-22 DIAGNOSIS — G47.33 OSA ON CPAP: Primary | ICD-10-CM

## 2024-10-22 DIAGNOSIS — E66.01 CLASS 3 SEVERE OBESITY WITHOUT SERIOUS COMORBIDITY WITH BODY MASS INDEX (BMI) OF 40.0 TO 44.9 IN ADULT, UNSPECIFIED OBESITY TYPE: ICD-10-CM

## 2024-10-22 DIAGNOSIS — E66.813 CLASS 3 SEVERE OBESITY WITHOUT SERIOUS COMORBIDITY WITH BODY MASS INDEX (BMI) OF 40.0 TO 44.9 IN ADULT, UNSPECIFIED OBESITY TYPE: ICD-10-CM

## 2024-10-22 PROCEDURE — G8417 CALC BMI ABV UP PARAM F/U: HCPCS | Performed by: NURSE PRACTITIONER

## 2024-10-22 PROCEDURE — 3017F COLORECTAL CA SCREEN DOC REV: CPT | Performed by: NURSE PRACTITIONER

## 2024-10-22 PROCEDURE — G8427 DOCREV CUR MEDS BY ELIG CLIN: HCPCS | Performed by: NURSE PRACTITIONER

## 2024-10-22 PROCEDURE — 1036F TOBACCO NON-USER: CPT | Performed by: NURSE PRACTITIONER

## 2024-10-22 PROCEDURE — G8484 FLU IMMUNIZE NO ADMIN: HCPCS | Performed by: NURSE PRACTITIONER

## 2024-10-22 PROCEDURE — 99214 OFFICE O/P EST MOD 30 MIN: CPT | Performed by: NURSE PRACTITIONER

## 2024-10-22 NOTE — PROGRESS NOTES
[place] [facility]      Alex Viramontes Jr.         [id number]  10/22/2024   Chief Complaint   Patient presents with    Follow-up     2 Month WILLIE with pressure change and download.        Pt of Dr. Cortez    PAP Download:   Original or initial AHI: 15.5     Date of initial study: 7-      Compliant  100%     Noncompliant 0 %     PAP Type  cpap Level  [id number]   Avg Hrs/Day 6hrs 47min  AHI: 0.3   Leaks : 95 th percentile: 3.9   Recorded compliance dates , 9-  to 10-   Machine/Mfg:   [x] ResMed    [] Respironics/Dreamstation   Interface:   [] Nasal    [] Nasal pillows   [x] FFM      Provider:      [x] SR-HME     [][employer]     [] [employer]    [] [employer]    [] Schwietermans               [] [employer]      [] Adaptive    [] [place]:      [] Other    Neck Size: 18  Mallampati 4  ESS:  6  SAQLI: 90    Here is a scan of the most recent download:                      Presentation:   [facility]  Since the last visit, Alex never tried the sample mask, ended up getting neck pad and this is working well. Still some leaks from moving around, the leaks do not wake him up  Wakes up with dry eyes, resolves quickly     Progress History:   Since last visit any new medical issues? No  Any trouble with Machine No  Any new sleep medicines? no  Trouble Falling Asleep No  Trouble Staying Asleep No  Snoring no    Equipment issues:  The pressure is  acceptable, the mask is acceptable     Review of Systems -   Review of Systems   All other systems reviewed and are negative.       Physical Exam:    BMI:  Body mass index is 44.62 kg/m².    Wt Readings from Last 3 Encounters:   10/22/24 (!) 141.1 kg (311 lb)   07/23/24 (!) 142.1 kg (313 lb 3.2 oz)   07/25/23 (!) 136.4 kg (300 lb 12.8 oz)     Weight gained 10 lbs over 1 year  Vitals: /86 (Site: Left Upper Arm, Position: Sitting, Cuff Size: Large Adult)